# Patient Record
Sex: MALE | Race: WHITE | NOT HISPANIC OR LATINO | Employment: OTHER | URBAN - NONMETROPOLITAN AREA
[De-identification: names, ages, dates, MRNs, and addresses within clinical notes are randomized per-mention and may not be internally consistent; named-entity substitution may affect disease eponyms.]

---

## 2017-02-10 ENCOUNTER — OFFICE VISIT (OUTPATIENT)
Dept: OTOLARYNGOLOGY | Facility: CLINIC | Age: 77
End: 2017-02-10

## 2017-02-10 VITALS
TEMPERATURE: 98.1 F | HEART RATE: 82 BPM | WEIGHT: 182 LBS | RESPIRATION RATE: 20 BRPM | DIASTOLIC BLOOD PRESSURE: 93 MMHG | HEIGHT: 72 IN | SYSTOLIC BLOOD PRESSURE: 137 MMHG | BODY MASS INDEX: 24.65 KG/M2

## 2017-02-10 DIAGNOSIS — J35.1 ENLARGED TONSILS: ICD-10-CM

## 2017-02-10 DIAGNOSIS — J35.8 ASYMMETRIC TONSILS: Primary | ICD-10-CM

## 2017-02-10 DIAGNOSIS — D49.0 NEOPLASM OF TONSIL: ICD-10-CM

## 2017-02-10 PROCEDURE — 99203 OFFICE O/P NEW LOW 30 MIN: CPT | Performed by: OTOLARYNGOLOGY

## 2017-02-10 RX ORDER — ALBUTEROL SULFATE 90 UG/1
2 AEROSOL, METERED RESPIRATORY (INHALATION) EVERY 4 HOURS PRN
COMMUNITY
End: 2020-04-14 | Stop reason: SDUPTHER

## 2017-02-10 RX ORDER — MELOXICAM 7.5 MG/1
1 TABLET ORAL AS NEEDED
Status: ON HOLD | COMMUNITY
Start: 2017-02-02 | End: 2019-09-05 | Stop reason: ALTCHOICE

## 2017-02-10 RX ORDER — MECLIZINE HYDROCHLORIDE 25 MG/1
25 TABLET ORAL DAILY PRN
COMMUNITY
Start: 2016-11-22 | End: 2021-02-23

## 2017-02-10 RX ORDER — TAMSULOSIN HYDROCHLORIDE 0.4 MG/1
1 CAPSULE ORAL NIGHTLY
COMMUNITY
Start: 2017-02-02 | End: 2020-05-07

## 2017-02-10 RX ORDER — FINASTERIDE 5 MG/1
5 TABLET, FILM COATED ORAL DAILY
COMMUNITY
Start: 2017-02-02

## 2017-02-10 RX ORDER — ALPRAZOLAM 0.5 MG/1
0.5 TABLET ORAL DAILY PRN
COMMUNITY
Start: 2017-02-02 | End: 2021-09-07 | Stop reason: SDUPTHER

## 2017-02-10 RX ORDER — LISINOPRIL 5 MG/1
1 TABLET ORAL NIGHTLY
COMMUNITY
Start: 2016-12-22 | End: 2019-04-15

## 2017-02-10 NOTE — PATIENT INSTRUCTIONS
Please consider quitting tobacco use. Tobacco use can aggravate most ENT conditions we treat and can complicate or limit the effects of treatment of these conditions. Surgical outcomes are also affected by tobacco use including a higher risk of surgical complications. There are may options to assist the process of tobacco cessation including medicines, therapies and counseling. If you are interested in quitting, our office will help direct you to the best options to acheive this goal.

## 2017-02-10 NOTE — PROGRESS NOTES
PRIMARY CARE PROVIDER: Joseph Chiang MD  REFERRING PROVIDER: No ref. provider found    Chief Complaint   Patient presents with   • Sore Throat     swollen tonsil       Subjective   History of Present Illness:  Van Mckeon is a  76 y.o.  male who complains of an enlarged right tonsil. The symptoms are localized to the right tonsil. The patient has had no symptoms. This was found at a routine check-up by the patient's primary care physician. The patient is unsure how long this has been present due to not having any symptoms. He denies sudden weight loss, otalgia, sore throat, night sweats, or other complaints. The patient smokes 0.5 packs per day and drinks liquor daily.       Review of Systems:  Review of Systems   Constitutional: Negative for chills.   HENT:        See HPI   Respiratory: Positive for cough and choking.    Gastrointestinal: Negative for diarrhea, nausea and vomiting.   Musculoskeletal: Negative for neck pain and neck stiffness.   Neurological: Positive for light-headedness. Negative for dizziness.   Hematological: Bruises/bleeds easily.   Psychiatric/Behavioral: Negative for sleep disturbance.   All other systems reviewed and are negative.      Past History:  Past Medical History   Diagnosis Date   • Anxiety    • COPD (chronic obstructive pulmonary disease)    • Hypertension    • Prostate hypertrophy    • Snoring    • Thrombocytopenia    • TIA (transient ischemic attack)      Past Surgical History   Procedure Laterality Date   • Nasal sinus surgery     • Eye surgery     • Bronchoscopy       Family History   Problem Relation Age of Onset   • Emphysema Father    • Cancer Brother    • Heart disease Brother    • Cancer Other      Social History   Substance Use Topics   • Smoking status: Current Every Day Smoker     Packs/day: 0.25     Types: Cigarettes   • Smokeless tobacco: Not on file   • Alcohol use 3.6 oz/week     6 Shots of liquor per week       Current Outpatient Prescriptions:   •   albuterol (PROVENTIL HFA;VENTOLIN HFA) 108 (90 BASE) MCG/ACT inhaler, Inhale 2 puffs Every 4 (Four) Hours As Needed for wheezing., Disp: , Rfl:   •  ALPRAZolam (XANAX) 0.5 MG tablet, Take  by mouth., Disp: , Rfl:   •  finasteride (PROSCAR) 5 MG tablet, Take  by mouth Daily., Disp: , Rfl:   •  lisinopril (PRINIVIL,ZESTRIL) 5 MG tablet, Take 1 tablet by mouth Every Night., Disp: , Rfl:   •  meclizine (ANTIVERT) 25 MG tablet, Take  by mouth., Disp: , Rfl:   •  meloxicam (MOBIC) 7.5 MG tablet, Take 1 tablet by mouth As Needed., Disp: , Rfl:   •  tamsulosin (FLOMAX) 0.4 MG capsule 24 hr capsule, Take 1 capsule by mouth Every Night., Disp: , Rfl:   Allergies:  Review of patient's allergies indicates no known allergies.    Objective     Vital Signs:  Temp:  [98.1 °F (36.7 °C)] 98.1 °F (36.7 °C)  Heart Rate:  [82] 82  Resp:  [20] 20  BP: (137)/(93) 137/93    Physical Exam:  CONSTITUTIONAL: well nourished, well-developed, alert, oriented, in no acute distress   COMMUNICATION AND VOICE: able to communicate normally, normal voice quality  HEAD: normocephalic, no lesions, atraumatic, no tenderness, no masses   FACE: appearance normal, no lesions, no tenderness, no deformities, facial motion symmetric  SALIVARY GLANDS: parotid glands with no tenderness, no swelling, no masses, submandibular glands with normal size, nontender  EYES: ocular motility normal, eyelids normal, orbits normal, no proptosis, conjunctiva normal , pupils equal, round   EARS:  Hearing: response to conversational voice normal bilaterally   External Ears: auricles without lesions  Otoscopic: tympanic membrane appearance normal, no lesions, no perforation, normal mobility, no fluid  NOSE:  External Nose: structure normal, no tenderness on palpation, no nasal discharge, no lesions, no evidence of trauma, nostrils patent   Intranasal Exam: nasal mucosa normal, vestibule within normal limits, inferior turbinate normal, nasal septum midline   Nasopharynx:  nasopharyngeal mucosa, adenoids within normal limits  ORAL:  Lips: upper and lower lips without lesion   Teeth: dentition within normal limits for age   Gums: gingivae healthy   Oral Mucosa: oral mucosa normal, no mucosal lesions   Floor of Mouth: Warthin’s duct patent, mucosa normal  Tongue: lingual mucosa normal without lesions, normal tongue mobility   Palate: soft and hard palates with normal mucosa and structure  Oropharynx: oropharyngeal mucosa normal, tonsils asymmetric right>left; firm and erythymatous   HYPOPHARYNX: hypopharyngeal mucosa normal  LARYNX: epiglottis and arytenoid cartilage within normal limits, vocal cord mucosa normal with normal mobility   NECK: neck appearance normal, no masses or tenderness  THYROID: no overt thyromegaly, no tenderness, nodules or mass present on palpation, position midline   LYMPH NODES: no lymphadenopathy  CHEST/RESPIRATORY: respiratory effort normal, normal breath sounds   CARDIOVASCULAR: rate and rhythm normal, extremities without cyanosis or edema    NEUROLOGIC/PSYCHIATRIC: oriented to time, place and person, mood normal, affect appropriate, CN II-XII intact grossly    Results Review:   None.    Assessment   1. Asymmetric tonsils    2. Enlarged tonsils    3. Neoplasm of tonsil        Plan   Please consider quitting tobacco use. Tobacco use can aggravate most ENT conditions we treat and can complicate or limit the effects of treatment of these conditions. Surgical outcomes are also affected by tobacco use including a higher risk of surgical complications. There are may options to assist the process of tobacco cessation including medicines, therapies and counseling. If you are interested in quitting, our office will help direct you to the best options to acheive this goal.    -----SURGERY SCHEDULING:-----  Schedule direct laryngoscopy with biopsy of right tonsil.     ---INFORMED CONSENT DISCUSSION:---  DIRECT LARYNGOSCOPY WITH BIOPSY: The risks and benefits were  explained including but not limited to pain, bleeding, infection, (including possible mediastinitis), the risks of the general anesthesia, pain, temporary or permanent hoarseness, airway loss, and/or tooth injury. Questions were answered. No guarantees were made or implied.       ---PREOPERATIVE WORKUP:---  CBC  CMP  Chest X-Ray  EKG     Follow up  postoperatively    Dave Stout MD  02/10/17  10:31 AM

## 2018-11-21 ENCOUNTER — TELEPHONE (OUTPATIENT)
Dept: PULMONOLOGY | Facility: CLINIC | Age: 78
End: 2018-11-21

## 2018-11-21 NOTE — TELEPHONE ENCOUNTER
PT HAS BEEN SUMMONED FOR JURY DUTY IN January. CAN YOU WRITE HIM A LETTER TO BE EXCUSED.    FAX TO [facility]  [phone]  ATTN ROXIE LOZANO

## 2018-11-26 ENCOUNTER — TELEPHONE (OUTPATIENT)
Dept: PULMONOLOGY | Facility: CLINIC | Age: 78
End: 2018-11-26

## 2018-11-26 NOTE — TELEPHONE ENCOUNTER
I cannot do an excuse for jury duty based off of COPD which is what we treat him for. He has moderate lung disease and his last office visit and the one prior to that indicates that he was doing well and continues to work a labor job. Sorry.

## 2019-04-05 ENCOUNTER — HOSPITAL ENCOUNTER (OUTPATIENT)
Dept: GENERAL RADIOLOGY | Facility: HOSPITAL | Age: 79
Discharge: HOME | End: 2019-04-05
Admitting: NURSE PRACTITIONER

## 2019-04-05 ENCOUNTER — TRANSCRIBE ORDERS (OUTPATIENT)
Dept: ADMINISTRATIVE | Facility: HOSPITAL | Age: 79
End: 2019-04-05

## 2019-04-05 DIAGNOSIS — J44.9 CHRONIC OBSTRUCTIVE PULMONARY DISEASE, UNSPECIFIED COPD TYPE (HCC): ICD-10-CM

## 2019-04-05 DIAGNOSIS — J44.9 CHRONIC OBSTRUCTIVE PULMONARY DISEASE, UNSPECIFIED COPD TYPE (HCC): Primary | ICD-10-CM

## 2019-04-05 PROCEDURE — 71046 X-RAY EXAM CHEST 2 VIEWS: CPT

## 2019-04-09 PROBLEM — I10 ESSENTIAL HYPERTENSION: Status: ACTIVE | Noted: 2019-04-09

## 2019-04-09 PROBLEM — J44.9 COPD, GROUP B, BY GOLD 2017 CLASSIFICATION: Status: ACTIVE | Noted: 2019-04-09

## 2019-04-09 PROBLEM — J47.9 BRONCHIECTASIS WITHOUT COMPLICATION: Status: ACTIVE | Noted: 2019-04-09

## 2019-04-09 PROBLEM — Z87.891 PERSONAL HISTORY OF NICOTINE DEPENDENCE: Status: ACTIVE | Noted: 2019-04-09

## 2019-04-09 NOTE — PROGRESS NOTES
JAE Farris  [facility]   Res[facility] Disease Clinic  [address] [place] [address]  Phone: [phone]  Fax: [phone]     Van Mckeon is a 78 y.o. male.   CC:   Chief Complaint   Patient presents with   • Shortness of Breath        HPI: Mr. Mckeon is coming in for a follow-up of his COPD and bronchiectasis.  He does experience moderate persistent shortness of breath on a daily basis for greater than 1 year.  Is improved with Stiolto on a daily basis.  He also has pro-air he can use on an as-needed basis.  [facility].  He reports he seldom has to use the Pro Air.    The following portions of the patient's history were reviewed and updated as appropriate: allergies, current medications, past family history, past medical history, past social history, past surgical history and problem list.    Past Medical History:   Diagnosis Date   • Anxiety    • Bronchiectasis without complication (CMS/[facility]) 4/9/2019   • COPD (chronic obstructive pulmonary disease) (CMS/[facility])    • COPD, group B, by GOLD 2017 classification (CMS/[facility]) 4/9/2019   • Essential hypertension 4/9/2019   • Hypertension    • Personal history of nicotine dependence 4/9/2019   • Prostate hypertrophy    • Snoring    • Thrombocytopenia (CMS/[facility])    • TIA (transient ischemic attack)        Family History   Problem Relation Age of Onset   • Emphysema Father    • Cancer Brother    • Heart disease Brother    • Cancer Other        Social History     Socioeconomic History   • Marital status:      Spouse name: Not on file   • Number of children: Not on file   • Years of education: Not on file   • Highest education level: Not on file   Tobacco Use   • Smoking status: Current Every Day Smoker     Packs/day: 0.25     Years: 50.00     Pack years: 12.50     Types: Cigarettes   • Smokeless tobacco: Former User     Types: Snuff     Quit date: 1970   Substance and Sexual Activity   •  Alcohol use: Yes     Alcohol/week: 3.6 oz     Types: 6 Shots of liquor per week   • Drug use: No   • Sexual activity: Defer       Review of Systems   Constitutional: Negative for activity change, chills, fatigue and fever.   HENT: Negative for congestion, postnasal drip, rhinorrhea, sinus pressure, sore throat and trouble swallowing.    Eyes: Negative for blurred vision, double vision and pain.   Respiratory: Positive for shortness of breath (With exertion only). Negative for cough, chest tightness and wheezing.    Cardiovascular: Negative for chest pain, palpitations and leg swelling.   Gastrointestinal: Negative for abdominal distention, constipation, diarrhea, nausea and vomiting.   Endocrine: Negative for polydipsia, polyphagia and polyuria.   Genitourinary: Negative for dysuria, frequency and urgency.   Musculoskeletal: Negative for arthralgias, back pain, gait problem and joint swelling.   Skin: Negative for color change, dry skin, rash and skin lesions.   Allergic/Immunologic: Negative for environmental allergies, food allergies and immunocompromised state.   Neurological: Negative for dizziness, seizures, speech difficulty, weakness, light-headedness, memory problem and confusion.   Hematological: Negative for adenopathy. Does not bruise/bleed easily.   Psychiatric/Behavioral: Negative for sleep disturbance, negative for hyperactivity and depressed mood. The patient is not nervous/anxious.        .vs    Physical Exam   Constitutional: He is oriented to person, place, and time. He appears well-developed and well-nourished. No distress.   HENT:   Head: Normocephalic and atraumatic.   Right Ear: External ear normal.   Left Ear: External ear normal.   Nose: Nose normal.   Mouth/Throat: Oropharynx is clear and moist. No oropharyngeal exudate.   Eyes: Conjunctivae and EOM are normal. Pupils are equal, round, and reactive to light. Right eye exhibits no discharge. Left eye exhibits no discharge.   Neck: Normal  range of motion. Neck supple. No JVD present.   Cardiovascular: Normal rate and regular rhythm.   No murmur heard.  Pulmonary/Chest: Effort normal and breath sounds normal. No respiratory distress. He has no wheezes.   Abdominal: Soft. Bowel sounds are normal. He exhibits no distension. There is no tenderness.   Musculoskeletal: Normal range of motion. He exhibits no edema or deformity.   Neurological: He is alert and oriented to person, place, and time. He displays normal reflexes. No cranial nerve deficit. Coordination normal.   Skin: Skin is warm and dry. No rash noted. He is not diaphoretic. No erythema.   Psychiatric: He has a normal mood and affect. His behavior is normal. Thought content normal.   Nursing note and vitals reviewed.      Pulmonary Functions Testing Results:    No results found for: FEV1, FVC, GCP4RWI, TLC, DLCO  No PFTs for this visit    CXR: Chest x-ray from 4-10-19 shows minimal basilar lung scarring, stable        Problem List Items Addressed This Visit        Cardiovascular and Mediastinum    Essential hypertension    Relevant Medications    lisinopril (PRINIVIL,ZESTRIL) 20 MG tablet       Respiratory    Neoplasm of tonsil    COPD, group B, by GOLD 2017 classification (CMS/HCC)    Relevant Medications    tiotropium bromide-olodaterol (STIOLTO RESPIMAT) 2.5-2.5 MCG/ACT aerosol solution inhaler    Bronchiectasis without complication (CMS/[facility]) - Primary    Relevant Medications    tiotropium bromide-olodaterol (STIOLTO RESPIMAT) 2.5-2.5 MCG/ACT aerosol solution inhaler       Other    Personal history of nicotine dependence        Patient's Body mass index is 25.77 kg/m². BMI is within normal parameters. No follow-up required..    Patient indicates he continues to do well on Stiolto.  [facility]  He does not need refills on the Pro Air as he seldom uses it.  Chest x-ray stable today.  We did discuss low-dose CT scan for lung cancer screening purposes.   He would like to make arrangements for that at this time next year.  Follow-up in 6 months with spirometry or sooner if breathing worsens before then     Josephine Max, APRN  4/15/2019  9:19 AM    Return in about 6 months (around 10/15/2019) for FVL with diffusion.

## 2019-04-15 ENCOUNTER — OFFICE VISIT (OUTPATIENT)
Dept: PULMONOLOGY | Facility: CLINIC | Age: 79
End: 2019-04-15

## 2019-04-15 VITALS
DIASTOLIC BLOOD PRESSURE: 80 MMHG | HEIGHT: 72 IN | BODY MASS INDEX: 25.73 KG/M2 | WEIGHT: 190 LBS | OXYGEN SATURATION: 98 % | HEART RATE: 54 BPM | SYSTOLIC BLOOD PRESSURE: 116 MMHG

## 2019-04-15 DIAGNOSIS — I10 ESSENTIAL HYPERTENSION: ICD-10-CM

## 2019-04-15 DIAGNOSIS — Z87.891 PERSONAL HISTORY OF NICOTINE DEPENDENCE: ICD-10-CM

## 2019-04-15 DIAGNOSIS — D49.0 NEOPLASM OF TONSIL: ICD-10-CM

## 2019-04-15 DIAGNOSIS — J47.9 BRONCHIECTASIS WITHOUT COMPLICATION (HCC): Primary | ICD-10-CM

## 2019-04-15 DIAGNOSIS — J44.9 COPD, GROUP B, BY GOLD 2017 CLASSIFICATION (HCC): ICD-10-CM

## 2019-04-15 PROCEDURE — 99214 OFFICE O/P EST MOD 30 MIN: CPT | Performed by: NURSE PRACTITIONER

## 2019-04-15 RX ORDER — LISINOPRIL 20 MG/1
TABLET ORAL
Refills: 0 | Status: ON HOLD | COMMUNITY
Start: 2019-03-21 | End: 2019-09-05

## 2019-04-15 RX ORDER — NAPROXEN SODIUM 220 MG
220 TABLET ORAL 2 TIMES DAILY PRN
COMMUNITY
End: 2019-09-05 | Stop reason: HOSPADM

## 2019-05-28 ENCOUNTER — TELEPHONE (OUTPATIENT)
Dept: PULMONOLOGY | Facility: CLINIC | Age: 79
End: 2019-05-28

## 2019-05-28 DIAGNOSIS — J44.9 COPD, GROUP B, BY GOLD 2017 CLASSIFICATION (HCC): Primary | ICD-10-CM

## 2019-05-28 RX ORDER — PREDNISONE 20 MG/1
20 TABLET ORAL DAILY
Qty: 5 TABLET | Refills: 0 | Status: SHIPPED | OUTPATIENT
Start: 2019-05-28 | End: 2019-06-02

## 2019-05-28 NOTE — TELEPHONE ENCOUNTER
PATIENT IS NEEDING SOMETHING FOR HIS COUGH HE CANT GET RID OF; PATIENT SAYS THE COUGH MAKES HIM DIZZY.

## 2019-07-30 ENCOUNTER — TRANSCRIBE ORDERS (OUTPATIENT)
Dept: ADMINISTRATIVE | Facility: HOSPITAL | Age: 79
End: 2019-07-30

## 2019-07-30 ENCOUNTER — HOSPITAL ENCOUNTER (OUTPATIENT)
Dept: CT IMAGING | Facility: HOSPITAL | Age: 79
Discharge: HOME | End: 2019-07-30
Admitting: INTERNAL MEDICINE

## 2019-07-30 DIAGNOSIS — I72.9 ANEURYSM OF UNSPECIFIED SITE (HCC): ICD-10-CM

## 2019-07-30 DIAGNOSIS — R07.9 CHEST PAIN AT REST: Primary | ICD-10-CM

## 2019-07-30 DIAGNOSIS — R10.84 ABDOMINAL PAIN, GENERALIZED: ICD-10-CM

## 2019-07-30 DIAGNOSIS — Z87.898 HISTORY OF HEMOPTYSIS: ICD-10-CM

## 2019-07-30 DIAGNOSIS — R07.9 CHEST PAIN AT REST: ICD-10-CM

## 2019-07-30 LAB — CREAT BLDA-MCNC: 1 MG/DL (ref 0.6–1.3)

## 2019-07-30 PROCEDURE — 74177 CT ABD & PELVIS W/CONTRAST: CPT

## 2019-07-30 PROCEDURE — 82565 ASSAY OF CREATININE: CPT

## 2019-07-30 PROCEDURE — 71260 CT THORAX DX C+: CPT

## 2019-07-30 PROCEDURE — 25010000002 IOPAMIDOL 61 % SOLUTION: Performed by: INTERNAL MEDICINE

## 2019-07-30 RX ADMIN — IOPAMIDOL 100 ML: 612 INJECTION, SOLUTION INTRAVENOUS at 13:24

## 2019-07-30 RX ADMIN — IOPAMIDOL 50 ML: 612 INJECTION, SOLUTION INTRAVENOUS at 13:24

## 2019-09-03 ENCOUNTER — APPOINTMENT (OUTPATIENT)
Dept: CT IMAGING | Facility: HOSPITAL | Age: 79
End: 2019-09-03

## 2019-09-03 ENCOUNTER — APPOINTMENT (OUTPATIENT)
Dept: MRI IMAGING | Facility: HOSPITAL | Age: 79
End: 2019-09-03

## 2019-09-03 ENCOUNTER — APPOINTMENT (OUTPATIENT)
Dept: GENERAL RADIOLOGY | Facility: HOSPITAL | Age: 79
End: 2019-09-03

## 2019-09-03 ENCOUNTER — HOSPITAL ENCOUNTER (INPATIENT)
Facility: HOSPITAL | Age: 79
LOS: 2 days | Discharge: HOME | End: 2019-09-05
Attending: FAMILY MEDICINE | Admitting: INTERNAL MEDICINE

## 2019-09-03 DIAGNOSIS — I63.9 CEREBROVASCULAR ACCIDENT (CVA), UNSPECIFIED MECHANISM (HCC): Primary | ICD-10-CM

## 2019-09-03 DIAGNOSIS — Z74.09 IMPAIRED MOBILITY: ICD-10-CM

## 2019-09-03 DIAGNOSIS — R13.10 DYSPHAGIA, UNSPECIFIED TYPE: ICD-10-CM

## 2019-09-03 LAB
ALBUMIN SERPL-MCNC: 3.9 G/DL (ref 3.5–5)
ALBUMIN/GLOB SERPL: 1.3 G/DL (ref 1.1–2.5)
ALP SERPL-CCNC: 58 U/L (ref 24–120)
ALT SERPL W P-5'-P-CCNC: 26 U/L (ref 0–54)
AMMONIA BLD-SCNC: <9 UMOL/L (ref 9–33)
AMPHET+METHAMPHET UR QL: NEGATIVE
ANION GAP SERPL CALCULATED.3IONS-SCNC: 5 MMOL/L (ref 4–13)
APTT PPP: 26.9 SECONDS (ref 24.1–35)
AST SERPL-CCNC: 20 U/L (ref 7–45)
BARBITURATES UR QL SCN: NEGATIVE
BASOPHILS # BLD AUTO: 0.01 10*3/MM3 (ref 0–0.2)
BASOPHILS NFR BLD AUTO: 0.2 % (ref 0–1.5)
BENZODIAZ UR QL SCN: NEGATIVE
BILIRUB SERPL-MCNC: 0.8 MG/DL (ref 0.1–1)
BILIRUB UR QL STRIP: NEGATIVE
BUN BLD-MCNC: 19 MG/DL (ref 5–21)
BUN/CREAT SERPL: 22.1 (ref 7–25)
CALCIUM SPEC-SCNC: 11.5 MG/DL (ref 8.4–10.4)
CANNABINOIDS SERPL QL: NEGATIVE
CHLORIDE SERPL-SCNC: 108 MMOL/L (ref 98–110)
CLARITY UR: CLEAR
CO2 SERPL-SCNC: 27 MMOL/L (ref 24–31)
COCAINE UR QL: NEGATIVE
COLOR UR: YELLOW
CREAT BLD-MCNC: 0.86 MG/DL (ref 0.5–1.4)
DEPRECATED RDW RBC AUTO: 44.1 FL (ref 37–54)
EOSINOPHIL # BLD AUTO: 0.04 10*3/MM3 (ref 0–0.4)
EOSINOPHIL NFR BLD AUTO: 0.6 % (ref 0.3–6.2)
ERYTHROCYTE [DISTWIDTH] IN BLOOD BY AUTOMATED COUNT: 12.8 % (ref 12.3–15.4)
GFR SERPL CREATININE-BSD FRML MDRD: 86 ML/MIN/1.73
GLOBULIN UR ELPH-MCNC: 2.9 GM/DL
GLUCOSE BLD-MCNC: 82 MG/DL (ref 70–100)
GLUCOSE UR STRIP-MCNC: NEGATIVE MG/DL
HCT VFR BLD AUTO: 45.9 % (ref 37.5–51)
HGB BLD-MCNC: 15.5 G/DL (ref 13–17.7)
HGB UR QL STRIP.AUTO: NEGATIVE
IMM GRANULOCYTES # BLD AUTO: 0.02 10*3/MM3 (ref 0–0.05)
IMM GRANULOCYTES NFR BLD AUTO: 0.3 % (ref 0–0.5)
INR PPP: 0.96 (ref 0.91–1.09)
KETONES UR QL STRIP: ABNORMAL
LEUKOCYTE ESTERASE UR QL STRIP.AUTO: NEGATIVE
LYMPHOCYTES # BLD AUTO: 1.26 10*3/MM3 (ref 0.7–3.1)
LYMPHOCYTES NFR BLD AUTO: 19.2 % (ref 19.6–45.3)
MCH RBC QN AUTO: 31.7 PG (ref 26.6–33)
MCHC RBC AUTO-ENTMCNC: 33.8 G/DL (ref 31.5–35.7)
MCV RBC AUTO: 93.9 FL (ref 79–97)
METHADONE UR QL SCN: NEGATIVE
MONOCYTES # BLD AUTO: 0.41 10*3/MM3 (ref 0.1–0.9)
MONOCYTES NFR BLD AUTO: 6.3 % (ref 5–12)
NEUTROPHILS # BLD AUTO: 4.81 10*3/MM3 (ref 1.7–7)
NEUTROPHILS NFR BLD AUTO: 73.4 % (ref 42.7–76)
NITRITE UR QL STRIP: NEGATIVE
NRBC BLD AUTO-RTO: 0 /100 WBC (ref 0–0.2)
OPIATES UR QL: NEGATIVE
PCP UR QL SCN: NEGATIVE
PH UR STRIP.AUTO: <=5 [PH] (ref 5–8)
PLATELET # BLD AUTO: 100 10*3/MM3 (ref 140–450)
PMV BLD AUTO: 12.5 FL (ref 6–12)
POTASSIUM BLD-SCNC: 3.9 MMOL/L (ref 3.5–5.3)
PROT SERPL-MCNC: 6.8 G/DL (ref 6.3–8.7)
PROT UR QL STRIP: NEGATIVE
PROTHROMBIN TIME: 13.1 SECONDS (ref 11.9–14.6)
RBC # BLD AUTO: 4.89 10*6/MM3 (ref 4.14–5.8)
SODIUM BLD-SCNC: 140 MMOL/L (ref 135–145)
SP GR UR STRIP: 1.03 (ref 1–1.03)
TROPONIN I SERPL-MCNC: <0.012 NG/ML (ref 0–0.03)
UROBILINOGEN UR QL STRIP: ABNORMAL
WBC NRBC COR # BLD: 6.55 10*3/MM3 (ref 3.4–10.8)

## 2019-09-03 PROCEDURE — 84484 ASSAY OF TROPONIN QUANT: CPT | Performed by: FAMILY MEDICINE

## 2019-09-03 PROCEDURE — 85610 PROTHROMBIN TIME: CPT | Performed by: FAMILY MEDICINE

## 2019-09-03 PROCEDURE — 80307 DRUG TEST PRSMV CHEM ANLYZR: CPT | Performed by: FAMILY MEDICINE

## 2019-09-03 PROCEDURE — 70450 CT HEAD/BRAIN W/O DYE: CPT

## 2019-09-03 PROCEDURE — 82140 ASSAY OF AMMONIA: CPT | Performed by: EMERGENCY MEDICINE

## 2019-09-03 PROCEDURE — 71045 X-RAY EXAM CHEST 1 VIEW: CPT

## 2019-09-03 PROCEDURE — 93005 ELECTROCARDIOGRAM TRACING: CPT | Performed by: FAMILY MEDICINE

## 2019-09-03 PROCEDURE — 81003 URINALYSIS AUTO W/O SCOPE: CPT | Performed by: FAMILY MEDICINE

## 2019-09-03 PROCEDURE — 85025 COMPLETE CBC W/AUTO DIFF WBC: CPT | Performed by: FAMILY MEDICINE

## 2019-09-03 PROCEDURE — 93010 ELECTROCARDIOGRAM REPORT: CPT | Performed by: INTERNAL MEDICINE

## 2019-09-03 PROCEDURE — 85730 THROMBOPLASTIN TIME PARTIAL: CPT | Performed by: FAMILY MEDICINE

## 2019-09-03 PROCEDURE — 80053 COMPREHEN METABOLIC PANEL: CPT | Performed by: FAMILY MEDICINE

## 2019-09-03 PROCEDURE — 99285 EMERGENCY DEPT VISIT HI MDM: CPT

## 2019-09-03 PROCEDURE — 70551 MRI BRAIN STEM W/O DYE: CPT

## 2019-09-03 RX ORDER — ASPIRIN 81 MG/1
324 TABLET, CHEWABLE ORAL ONCE
Status: DISCONTINUED | OUTPATIENT
Start: 2019-09-03 | End: 2019-09-05 | Stop reason: HOSPADM

## 2019-09-03 RX ORDER — NICOTINE 21 MG/24HR
1 PATCH, TRANSDERMAL 24 HOURS TRANSDERMAL
Status: DISCONTINUED | OUTPATIENT
Start: 2019-09-03 | End: 2019-09-05 | Stop reason: HOSPADM

## 2019-09-03 RX ORDER — PANTOPRAZOLE SODIUM 40 MG/1
40 TABLET, DELAYED RELEASE ORAL DAILY
COMMUNITY
End: 2021-02-23

## 2019-09-03 RX ADMIN — NICOTINE 1 PATCH: 21 PATCH, EXTENDED RELEASE TRANSDERMAL at 19:36

## 2019-09-04 ENCOUNTER — APPOINTMENT (OUTPATIENT)
Dept: CARDIOLOGY | Facility: HOSPITAL | Age: 79
End: 2019-09-04

## 2019-09-04 ENCOUNTER — APPOINTMENT (OUTPATIENT)
Dept: ULTRASOUND IMAGING | Facility: HOSPITAL | Age: 79
End: 2019-09-04

## 2019-09-04 LAB
ARTICHOKE IGE QN: 109 MG/DL (ref 0–99)
BH CV ECHO MEAS - AO MAX PG (FULL): 2.9 MMHG
BH CV ECHO MEAS - AO MAX PG: 6.3 MMHG
BH CV ECHO MEAS - AO MEAN PG (FULL): 0.5 MMHG
BH CV ECHO MEAS - AO MEAN PG: 2.5 MMHG
BH CV ECHO MEAS - AO ROOT AREA (BSA CORRECTED): 1.7
BH CV ECHO MEAS - AO ROOT AREA: 8.6 CM^2
BH CV ECHO MEAS - AO ROOT DIAM: 3.3 CM
BH CV ECHO MEAS - AO V2 MAX: 125 CM/SEC
BH CV ECHO MEAS - AO V2 MEAN: 72.9 CM/SEC
BH CV ECHO MEAS - AO V2 VTI: 23.4 CM
BH CV ECHO MEAS - AVA(I,A): 3.2 CM^2
BH CV ECHO MEAS - AVA(I,D): 3.2 CM^2
BH CV ECHO MEAS - AVA(V,A): 2.5 CM^2
BH CV ECHO MEAS - AVA(V,D): 2.5 CM^2
BH CV ECHO MEAS - BSA(HAYCOCK): 2 M^2
BH CV ECHO MEAS - BSA: 2 M^2
BH CV ECHO MEAS - BZI_BMI: 22.9 KILOGRAMS/M^2
BH CV ECHO MEAS - BZI_METRIC_HEIGHT: 182.9 CM
BH CV ECHO MEAS - BZI_METRIC_WEIGHT: 76.7 KG
BH CV ECHO MEAS - EDV(CUBED): 96.1 ML
BH CV ECHO MEAS - EDV(MOD-SP4): 114 ML
BH CV ECHO MEAS - EDV(TEICH): 96.3 ML
BH CV ECHO MEAS - EF(CUBED): 72.2 %
BH CV ECHO MEAS - EF(MOD-SP4): 60.4 %
BH CV ECHO MEAS - EF(TEICH): 64 %
BH CV ECHO MEAS - ESV(CUBED): 26.7 ML
BH CV ECHO MEAS - ESV(MOD-SP4): 45.1 ML
BH CV ECHO MEAS - ESV(TEICH): 34.7 ML
BH CV ECHO MEAS - FS: 34.7 %
BH CV ECHO MEAS - IVS/LVPW: 1
BH CV ECHO MEAS - IVSD: 1 CM
BH CV ECHO MEAS - LA DIMENSION: 3.5 CM
BH CV ECHO MEAS - LA/AO: 1.1
BH CV ECHO MEAS - LAT PEAK E' VEL: 8.3 CM/SEC
BH CV ECHO MEAS - LV DIASTOLIC VOL/BSA (35-75): 57.5 ML/M^2
BH CV ECHO MEAS - LV MASS(C)D: 162 GRAMS
BH CV ECHO MEAS - LV MASS(C)DI: 81.7 GRAMS/M^2
BH CV ECHO MEAS - LV MAX PG: 3.3 MMHG
BH CV ECHO MEAS - LV MEAN PG: 2 MMHG
BH CV ECHO MEAS - LV SYSTOLIC VOL/BSA (12-30): 22.7 ML/M^2
BH CV ECHO MEAS - LV V1 MAX: 90.9 CM/SEC
BH CV ECHO MEAS - LV V1 MEAN: 61.5 CM/SEC
BH CV ECHO MEAS - LV V1 VTI: 21.8 CM
BH CV ECHO MEAS - LVIDD: 4.6 CM
BH CV ECHO MEAS - LVIDS: 3 CM
BH CV ECHO MEAS - LVLD AP4: 8.8 CM
BH CV ECHO MEAS - LVLS AP4: 6.5 CM
BH CV ECHO MEAS - LVOT AREA (M): 3.5 CM^2
BH CV ECHO MEAS - LVOT AREA: 3.5 CM^2
BH CV ECHO MEAS - LVOT DIAM: 2.1 CM
BH CV ECHO MEAS - LVPWD: 1 CM
BH CV ECHO MEAS - MED PEAK E' VEL: 5 CM/SEC
BH CV ECHO MEAS - MV A MAX VEL: 88.3 CM/SEC
BH CV ECHO MEAS - MV DEC TIME: 0.34 SEC
BH CV ECHO MEAS - MV E MAX VEL: 66 CM/SEC
BH CV ECHO MEAS - MV E/A: 0.75
BH CV ECHO MEAS - PA MAX PG: 2.6 MMHG
BH CV ECHO MEAS - PA V2 MAX: 81 CM/SEC
BH CV ECHO MEAS - SI(AO): 100.9 ML/M^2
BH CV ECHO MEAS - SI(CUBED): 35 ML/M^2
BH CV ECHO MEAS - SI(LVOT): 38.1 ML/M^2
BH CV ECHO MEAS - SI(MOD-SP4): 34.7 ML/M^2
BH CV ECHO MEAS - SI(TEICH): 31.1 ML/M^2
BH CV ECHO MEAS - SV(AO): 200.1 ML
BH CV ECHO MEAS - SV(CUBED): 69.3 ML
BH CV ECHO MEAS - SV(LVOT): 75.5 ML
BH CV ECHO MEAS - SV(MOD-SP4): 68.9 ML
BH CV ECHO MEAS - SV(TEICH): 61.6 ML
BH CV ECHO MEAS - TR MAX VEL: 179 CM/SEC
BH CV ECHO MEASUREMENTS AVERAGE E/E' RATIO: 9.92
HBA1C MFR BLD: 5.4 % (ref 4.8–5.9)
LEFT ATRIUM VOLUME INDEX: 17.8 ML/M2
LEFT ATRIUM VOLUME: 35.1 CM3
MAXIMAL PREDICTED HEART RATE: 141 BPM
STRESS TARGET HR: 120 BPM

## 2019-09-04 PROCEDURE — 83721 ASSAY OF BLOOD LIPOPROTEIN: CPT | Performed by: PSYCHIATRY & NEUROLOGY

## 2019-09-04 PROCEDURE — 97165 OT EVAL LOW COMPLEX 30 MIN: CPT

## 2019-09-04 PROCEDURE — 93306 TTE W/DOPPLER COMPLETE: CPT

## 2019-09-04 PROCEDURE — 94799 UNLISTED PULMONARY SVC/PX: CPT

## 2019-09-04 PROCEDURE — 94760 N-INVAS EAR/PLS OXIMETRY 1: CPT

## 2019-09-04 PROCEDURE — 25010000002 ENOXAPARIN PER 10 MG: Performed by: NURSE PRACTITIONER

## 2019-09-04 PROCEDURE — 83036 HEMOGLOBIN GLYCOSYLATED A1C: CPT | Performed by: PSYCHIATRY & NEUROLOGY

## 2019-09-04 PROCEDURE — 93880 EXTRACRANIAL BILAT STUDY: CPT | Performed by: SURGERY

## 2019-09-04 PROCEDURE — 97161 PT EVAL LOW COMPLEX 20 MIN: CPT | Performed by: PHYSICAL THERAPIST

## 2019-09-04 PROCEDURE — 99223 1ST HOSP IP/OBS HIGH 75: CPT | Performed by: PSYCHIATRY & NEUROLOGY

## 2019-09-04 PROCEDURE — 93306 TTE W/DOPPLER COMPLETE: CPT | Performed by: INTERNAL MEDICINE

## 2019-09-04 PROCEDURE — 92610 EVALUATE SWALLOWING FUNCTION: CPT | Performed by: SPEECH-LANGUAGE PATHOLOGIST

## 2019-09-04 PROCEDURE — 93880 EXTRACRANIAL BILAT STUDY: CPT

## 2019-09-04 PROCEDURE — 25010000002 PERFLUTREN 6.52 MG/ML SUSPENSION: Performed by: INTERNAL MEDICINE

## 2019-09-04 RX ORDER — NAPROXEN 500 MG/1
500 TABLET ORAL 2 TIMES DAILY PRN
Status: DISCONTINUED | OUTPATIENT
Start: 2019-09-04 | End: 2019-09-05

## 2019-09-04 RX ORDER — ATORVASTATIN CALCIUM 40 MG/1
40 TABLET, FILM COATED ORAL NIGHTLY
Status: DISCONTINUED | OUTPATIENT
Start: 2019-09-04 | End: 2019-09-05 | Stop reason: HOSPADM

## 2019-09-04 RX ORDER — FINASTERIDE 5 MG/1
5 TABLET, FILM COATED ORAL DAILY
Status: DISCONTINUED | OUTPATIENT
Start: 2019-09-04 | End: 2019-09-05 | Stop reason: HOSPADM

## 2019-09-04 RX ORDER — SODIUM CHLORIDE 0.9 % (FLUSH) 0.9 %
10 SYRINGE (ML) INJECTION EVERY 12 HOURS SCHEDULED
Status: DISCONTINUED | OUTPATIENT
Start: 2019-09-04 | End: 2019-09-05 | Stop reason: HOSPADM

## 2019-09-04 RX ORDER — LISINOPRIL 20 MG/1
20 TABLET ORAL DAILY
Status: DISCONTINUED | OUTPATIENT
Start: 2019-09-04 | End: 2019-09-05 | Stop reason: HOSPADM

## 2019-09-04 RX ORDER — CHLORDIAZEPOXIDE HYDROCHLORIDE 5 MG/1
10 CAPSULE, GELATIN COATED ORAL EVERY 8 HOURS SCHEDULED
Status: DISCONTINUED | OUTPATIENT
Start: 2019-09-04 | End: 2019-09-05 | Stop reason: HOSPADM

## 2019-09-04 RX ORDER — SODIUM CHLORIDE 0.9 % (FLUSH) 0.9 %
10 SYRINGE (ML) INJECTION AS NEEDED
Status: DISCONTINUED | OUTPATIENT
Start: 2019-09-04 | End: 2019-09-05 | Stop reason: HOSPADM

## 2019-09-04 RX ORDER — TAMSULOSIN HYDROCHLORIDE 0.4 MG/1
0.4 CAPSULE ORAL NIGHTLY
Status: DISCONTINUED | OUTPATIENT
Start: 2019-09-04 | End: 2019-09-05 | Stop reason: HOSPADM

## 2019-09-04 RX ORDER — MELOXICAM 7.5 MG/1
7.5 TABLET ORAL AS NEEDED
Status: DISCONTINUED | OUTPATIENT
Start: 2019-09-04 | End: 2019-09-05

## 2019-09-04 RX ORDER — LORAZEPAM 1 MG/1
1 TABLET ORAL EVERY 6 HOURS PRN
Status: DISCONTINUED | OUTPATIENT
Start: 2019-09-04 | End: 2019-09-05 | Stop reason: HOSPADM

## 2019-09-04 RX ORDER — ASPIRIN 81 MG/1
81 TABLET, CHEWABLE ORAL DAILY
Status: DISCONTINUED | OUTPATIENT
Start: 2019-09-04 | End: 2019-09-05 | Stop reason: HOSPADM

## 2019-09-04 RX ORDER — PANTOPRAZOLE SODIUM 40 MG/1
40 TABLET, DELAYED RELEASE ORAL
Status: DISCONTINUED | OUTPATIENT
Start: 2019-09-05 | End: 2019-09-05 | Stop reason: HOSPADM

## 2019-09-04 RX ADMIN — ATORVASTATIN CALCIUM 40 MG: 40 TABLET, FILM COATED ORAL at 22:30

## 2019-09-04 RX ADMIN — FINASTERIDE 5 MG: 5 TABLET, FILM COATED ORAL at 16:41

## 2019-09-04 RX ADMIN — ASPIRIN 81 MG 81 MG: 81 TABLET ORAL at 13:45

## 2019-09-04 RX ADMIN — CHLORDIAZEPOXIDE HYDROCHLORIDE 10 MG: 5 CAPSULE ORAL at 16:41

## 2019-09-04 RX ADMIN — ENOXAPARIN SODIUM 40 MG: 40 INJECTION SUBCUTANEOUS at 16:42

## 2019-09-04 RX ADMIN — NICOTINE 1 PATCH: 21 PATCH, EXTENDED RELEASE TRANSDERMAL at 18:46

## 2019-09-04 RX ADMIN — LISINOPRIL 20 MG: 20 TABLET ORAL at 16:41

## 2019-09-04 RX ADMIN — PERFLUTREN 9.78 MG: 6.52 INJECTION, SUSPENSION INTRAVENOUS at 13:12

## 2019-09-04 RX ADMIN — TAMSULOSIN HYDROCHLORIDE 0.4 MG: 0.4 CAPSULE ORAL at 22:30

## 2019-09-04 RX ADMIN — SODIUM CHLORIDE, PRESERVATIVE FREE 10 ML: 5 INJECTION INTRAVENOUS at 22:31

## 2019-09-05 VITALS
DIASTOLIC BLOOD PRESSURE: 65 MMHG | TEMPERATURE: 97.5 F | RESPIRATION RATE: 18 BRPM | OXYGEN SATURATION: 95 % | HEIGHT: 72 IN | SYSTOLIC BLOOD PRESSURE: 104 MMHG | WEIGHT: 169.6 LBS | BODY MASS INDEX: 22.97 KG/M2 | HEART RATE: 60 BPM

## 2019-09-05 LAB
ANION GAP SERPL CALCULATED.3IONS-SCNC: 5 MMOL/L (ref 4–13)
BASOPHILS # BLD AUTO: 0.02 10*3/MM3 (ref 0–0.2)
BASOPHILS NFR BLD AUTO: 0.5 % (ref 0–1.5)
BUN BLD-MCNC: 17 MG/DL (ref 5–21)
BUN/CREAT SERPL: 19.8 (ref 7–25)
CALCIUM SPEC-SCNC: 11.3 MG/DL (ref 8.4–10.4)
CHLORIDE SERPL-SCNC: 103 MMOL/L (ref 98–110)
CO2 SERPL-SCNC: 30 MMOL/L (ref 24–31)
CREAT BLD-MCNC: 0.86 MG/DL (ref 0.5–1.4)
DEPRECATED RDW RBC AUTO: 43.8 FL (ref 37–54)
EOSINOPHIL # BLD AUTO: 0.08 10*3/MM3 (ref 0–0.4)
EOSINOPHIL NFR BLD AUTO: 1.9 % (ref 0.3–6.2)
ERYTHROCYTE [DISTWIDTH] IN BLOOD BY AUTOMATED COUNT: 12.6 % (ref 12.3–15.4)
FOLATE SERPL-MCNC: 6.49 NG/ML (ref 4.78–24.2)
GFR SERPL CREATININE-BSD FRML MDRD: 86 ML/MIN/1.73
GLUCOSE BLD-MCNC: 85 MG/DL (ref 70–100)
HCT VFR BLD AUTO: 46.2 % (ref 37.5–51)
HGB BLD-MCNC: 15.6 G/DL (ref 13–17.7)
IMM GRANULOCYTES # BLD AUTO: 0.01 10*3/MM3 (ref 0–0.05)
IMM GRANULOCYTES NFR BLD AUTO: 0.2 % (ref 0–0.5)
LYMPHOCYTES # BLD AUTO: 1.26 10*3/MM3 (ref 0.7–3.1)
LYMPHOCYTES NFR BLD AUTO: 30.6 % (ref 19.6–45.3)
MAGNESIUM SERPL-MCNC: 2 MG/DL (ref 1.4–2.2)
MCH RBC QN AUTO: 32 PG (ref 26.6–33)
MCHC RBC AUTO-ENTMCNC: 33.8 G/DL (ref 31.5–35.7)
MCV RBC AUTO: 94.9 FL (ref 79–97)
MONOCYTES # BLD AUTO: 0.45 10*3/MM3 (ref 0.1–0.9)
MONOCYTES NFR BLD AUTO: 10.9 % (ref 5–12)
NEUTROPHILS # BLD AUTO: 2.3 10*3/MM3 (ref 1.7–7)
NEUTROPHILS NFR BLD AUTO: 55.9 % (ref 42.7–76)
NRBC BLD AUTO-RTO: 0 /100 WBC (ref 0–0.2)
PLATELET # BLD AUTO: 93 10*3/MM3 (ref 140–450)
PMV BLD AUTO: 12.6 FL (ref 6–12)
POTASSIUM BLD-SCNC: 3.9 MMOL/L (ref 3.5–5.3)
RBC # BLD AUTO: 4.87 10*6/MM3 (ref 4.14–5.8)
SODIUM BLD-SCNC: 138 MMOL/L (ref 135–145)
TSH SERPL DL<=0.05 MIU/L-ACNC: 2.95 UIU/ML (ref 0.47–4.68)
VIT B12 BLD-MCNC: 261 PG/ML (ref 239–931)
WBC NRBC COR # BLD: 4.12 10*3/MM3 (ref 3.4–10.8)

## 2019-09-05 PROCEDURE — 84443 ASSAY THYROID STIM HORMONE: CPT | Performed by: PSYCHIATRY & NEUROLOGY

## 2019-09-05 PROCEDURE — 25010000002 THIAMINE PER 100 MG: Performed by: PSYCHIATRY & NEUROLOGY

## 2019-09-05 PROCEDURE — 82746 ASSAY OF FOLIC ACID SERUM: CPT | Performed by: PSYCHIATRY & NEUROLOGY

## 2019-09-05 PROCEDURE — 99233 SBSQ HOSP IP/OBS HIGH 50: CPT | Performed by: PSYCHIATRY & NEUROLOGY

## 2019-09-05 PROCEDURE — 85025 COMPLETE CBC W/AUTO DIFF WBC: CPT | Performed by: NURSE PRACTITIONER

## 2019-09-05 PROCEDURE — 80048 BASIC METABOLIC PNL TOTAL CA: CPT | Performed by: NURSE PRACTITIONER

## 2019-09-05 PROCEDURE — 82607 VITAMIN B-12: CPT | Performed by: PSYCHIATRY & NEUROLOGY

## 2019-09-05 PROCEDURE — 83735 ASSAY OF MAGNESIUM: CPT | Performed by: PSYCHIATRY & NEUROLOGY

## 2019-09-05 RX ORDER — FOLIC ACID 5 MG/ML
1 INJECTION, SOLUTION INTRAMUSCULAR; INTRAVENOUS; SUBCUTANEOUS DAILY
Status: DISCONTINUED | OUTPATIENT
Start: 2019-09-05 | End: 2019-09-05

## 2019-09-05 RX ORDER — CELECOXIB 100 MG/1
100 CAPSULE ORAL 2 TIMES DAILY PRN
Status: DISCONTINUED | OUTPATIENT
Start: 2019-09-05 | End: 2019-09-05 | Stop reason: HOSPADM

## 2019-09-05 RX ORDER — THIAMINE HYDROCHLORIDE 100 MG/ML
100 INJECTION, SOLUTION INTRAMUSCULAR; INTRAVENOUS DAILY
Status: DISCONTINUED | OUTPATIENT
Start: 2019-09-05 | End: 2019-09-05 | Stop reason: HOSPADM

## 2019-09-05 RX ORDER — ASPIRIN 81 MG/1
81 TABLET, CHEWABLE ORAL DAILY
Qty: 30 TABLET | Refills: 2 | Status: SHIPPED | OUTPATIENT
Start: 2019-09-06 | End: 2021-06-03

## 2019-09-05 RX ORDER — CHOLECALCIFEROL (VITAMIN D3) 125 MCG
500 CAPSULE ORAL DAILY
Status: DISCONTINUED | OUTPATIENT
Start: 2019-09-06 | End: 2019-09-05 | Stop reason: HOSPADM

## 2019-09-05 RX ORDER — ATORVASTATIN CALCIUM 40 MG/1
40 TABLET, FILM COATED ORAL NIGHTLY
Qty: 30 TABLET | Refills: 2 | Status: SHIPPED | OUTPATIENT
Start: 2019-09-05 | End: 2020-06-17 | Stop reason: SINTOL

## 2019-09-05 RX ORDER — FOLIC ACID 1 MG/1
1 TABLET ORAL DAILY
Qty: 30 TABLET | Refills: 2 | Status: SHIPPED | OUTPATIENT
Start: 2019-09-06

## 2019-09-05 RX ORDER — LISINOPRIL 40 MG/1
20 TABLET ORAL DAILY
COMMUNITY

## 2019-09-05 RX ORDER — CYANOCOBALAMIN 1000 UG/ML
1000 INJECTION, SOLUTION INTRAMUSCULAR; SUBCUTANEOUS ONCE
Status: DISCONTINUED | OUTPATIENT
Start: 2019-09-05 | End: 2019-09-05 | Stop reason: HOSPADM

## 2019-09-05 RX ORDER — FOLIC ACID 5 MG/ML
1 INJECTION, SOLUTION INTRAMUSCULAR; INTRAVENOUS; SUBCUTANEOUS DAILY
Status: DISCONTINUED | OUTPATIENT
Start: 2019-09-05 | End: 2019-09-05 | Stop reason: HOSPADM

## 2019-09-05 RX ORDER — CELECOXIB 100 MG/1
100 CAPSULE ORAL 2 TIMES DAILY PRN
Qty: 60 CAPSULE | Refills: 2 | Status: SHIPPED | OUTPATIENT
Start: 2019-09-05 | End: 2021-02-23

## 2019-09-05 RX ORDER — FOLIC ACID 1 MG/1
1 TABLET ORAL DAILY
Status: DISCONTINUED | OUTPATIENT
Start: 2019-09-06 | End: 2019-09-05 | Stop reason: HOSPADM

## 2019-09-05 RX ADMIN — PANTOPRAZOLE SODIUM 40 MG: 40 TABLET, DELAYED RELEASE ORAL at 09:57

## 2019-09-05 RX ADMIN — LISINOPRIL 20 MG: 20 TABLET ORAL at 09:56

## 2019-09-05 RX ADMIN — ASPIRIN 81 MG 81 MG: 81 TABLET ORAL at 09:57

## 2019-09-05 RX ADMIN — THIAMINE HYDROCHLORIDE 100 MG: 100 INJECTION, SOLUTION INTRAMUSCULAR; INTRAVENOUS at 14:13

## 2019-09-05 RX ADMIN — FINASTERIDE 5 MG: 5 TABLET, FILM COATED ORAL at 09:57

## 2019-09-06 ENCOUNTER — READMISSION MANAGEMENT (OUTPATIENT)
Dept: CALL CENTER | Facility: HOSPITAL | Age: 79
End: 2019-09-06

## 2019-09-06 NOTE — OUTREACH NOTE
Prep Survey      Responses   Facility patient discharged from?  [facility]   Is patient eligible?  Yes   Discharge diagnosis  Acute left periventricular white matter infarct, tobacco abuse, alcohol abuse, COPD, HLD, thrombocytopenia, HTN, anxiety, B12 def., folate def.    Does the patient have one of the following disease processes/diagnoses(primary or secondary)?  Stroke (TIA)   Does the patient have Home health ordered?  Yes   What is the Home health agency?   [facility]   Is there a DME ordered?  No   Comments regarding appointments  Pt to schedule follow up with PCP   Prep survey completed?  Yes          Radha Salguero RN

## 2019-09-09 ENCOUNTER — READMISSION MANAGEMENT (OUTPATIENT)
Dept: CALL CENTER | Facility: HOSPITAL | Age: 79
End: 2019-09-09

## 2019-09-09 NOTE — OUTREACH NOTE
Stroke Week 1 Survey      Responses   Facility patient discharged from?  [facility]   Does the patient have one of the following disease processes/diagnoses(primary or secondary)?  Stroke (TIA)   Is there a successful TCM telephone encounter documented?  No   Week 1 attempt successful?  Yes   Call start time  0946   Call end time  0953   Discharge diagnosis  Acute left periventricular white matter infarct, tobacco abuse, alcohol abuse, COPD, HLD, thrombocytopenia, HTN, anxiety, B12 def., folate def.    Is patient permission given to speak with other caregiver?  Yes   List who call center can speak with  Daughter   Person spoke with today (if not patient) and relationship  Daughter--Sheila (she is an ARNP)   Meds reviewed with patient/caregiver?  Yes   Is the patient having any side effects they believe may be caused by any medication additions or changes?  No   Does the patient have all medications ordered at discharge?  Yes   Is the patient taking all medications as directed (includes completed medication regime)?  Yes   Comments regarding appointments  Pt to schedule follow up with PCP--Wednesday   Does the patient have a primary care provider?   Yes   Does the patient have an appointment with their PCP within 7 days of discharge?  Yes   Has the patient kept scheduled appointments due by today?  N/A   What is the Home health agency?   [facility]-- has been in to see    Has home health visited the patient within 72 hours of discharge?  Yes   Psychosocial issues?  No   Does the patient require any assistance with activities of daily living such as eating, bathing, dressing, walking, etc.?  No   Does the patient have any residual symptoms from stroke/TIA?  Yes   Residual symptoms comments  Still has some weakness and forgetfulness but he is much better   Does the patient understand the diet ordered at discharge?  Yes   Did the patient receive a copy of their discharge instructions?  Yes   Nursing interventions  Reviewed  instructions with patient   What is the patient's perception of their health status since discharge?  Improving   Nursing interventions  Nurse provided patient education   Is the patient able to teach back FAST for Stroke?  Yes   Is the patient/caregiver able to teach back the risk factors for a stroke?  High blood pressure-goal below 120/80, Excessive alcohol intake   Is the patient/caregiver able to teach back signs and symptoms related to disease process for when to call PCP?  Yes   Is the patient/caregiver able to teach back signs and symptoms related to disease process for when to call 911?  Yes   If the patient is a current smoker, are they able to teach back resources for cessation?  Smoking cessation medications   Is the patient/caregiver able to teach back the hierarchy of who to call/visit for symptoms/problems? PCP, Specialist, Home health nurse, Urgent Care, ED, 911  Yes   Additional teach back comments  Daughter is NP and is aware of s/s to watch for. She states pt has not been smoking and drinking ETOH. HH d/c'd him due to high functioning.   Week 1 call completed?  Yes          Daysi Huber RN

## 2019-09-16 ENCOUNTER — READMISSION MANAGEMENT (OUTPATIENT)
Dept: CALL CENTER | Facility: HOSPITAL | Age: 79
End: 2019-09-16

## 2019-09-16 NOTE — OUTREACH NOTE
Stroke Week 2 Survey      Responses   Facility patient discharged from?  [facility]   Does the patient have one of the following disease processes/diagnoses(primary or secondary)?  Stroke (TIA)   Week 2 attempt successful?  No   Unsuccessful attempts  Attempt 1          Claire Henry RN

## 2019-09-17 ENCOUNTER — READMISSION MANAGEMENT (OUTPATIENT)
Dept: CALL CENTER | Facility: HOSPITAL | Age: 79
End: 2019-09-17

## 2019-09-17 NOTE — OUTREACH NOTE
Stroke Week 2 Survey      Responses   Facility patient discharged from?  [facility]   Does the patient have one of the following disease processes/diagnoses(primary or secondary)?  Stroke (TIA)   Week 2 attempt successful?  No   Unsuccessful attempts  Attempt 2          Irma Mukherjee RN

## 2019-09-26 ENCOUNTER — READMISSION MANAGEMENT (OUTPATIENT)
Dept: CALL CENTER | Facility: HOSPITAL | Age: 79
End: 2019-09-26

## 2019-09-26 NOTE — OUTREACH NOTE
Stroke Week 3 Survey      Responses   Facility patient discharged from?  [facility]   Does the patient have one of the following disease processes/diagnoses(primary or secondary)?  Stroke (TIA)   Week 3 attempt successful?  Yes   Call start time  0939   Call end time  0942   Discharge diagnosis  Acute left periventricular white matter infarct, tobacco abuse, alcohol abuse, COPD, HLD, thrombocytopenia, HTN, anxiety, B12 def., folate def.    Meds reviewed with patient/caregiver?  Yes   Is the patient having any side effects they believe may be caused by any medication additions or changes?  No   Does the patient have all medications ordered at discharge?  Yes   Is the patient taking all medications as directed (includes completed medication regime)?  Yes   Does the patient have a primary care provider?   Yes   Does the patient have an appointment with their PCP within 7 days of discharge?  Yes   Has the patient kept scheduled appointments due by today?  Yes   What is the Home health agency?   [facility]-- has been in to see    Has home health visited the patient within 72 hours of discharge?  Yes   Psychosocial issues?  No   Does the patient require any assistance with activities of daily living such as eating, bathing, dressing, walking, etc.?  No   Does the patient have any residual symptoms from stroke/TIA?  Yes   Residual symptoms comments  Still with some weakness, but patient reports other than that he is back to normal.    Does the patient understand the diet ordered at discharge?  Yes   Did the patient receive a copy of their discharge instructions?  Yes   Nursing interventions  Reviewed instructions with patient   What is the patient's perception of their health status since discharge?  Improving   Nursing interventions  Nurse provided patient education   Is the patient able to teach back FAST for Stroke?  Yes   Is the patient/caregiver able to teach back the risk factors for a stroke?  High blood pressure-goal below  120/80, Excessive alcohol intake, High Cholesterol   Is the patient/caregiver able to teach back signs and symptoms related to disease process for when to call PCP?  Yes   Is the patient/caregiver able to teach back signs and symptoms related to disease process for when to call 911?  Yes   Is the patient/caregiver able to teach back the hierarchy of who to call/visit for symptoms/problems? PCP, Specialist, Home health nurse, Urgent Care, ED, 911  Yes   Week 3 call completed?  Yes          Gigi Martinez RN

## 2019-10-03 PROBLEM — Z72.0 TOBACCO USER: Status: ACTIVE | Noted: 2019-10-03

## 2019-10-03 PROBLEM — Z78.9 ALCOHOL USE: Status: ACTIVE | Noted: 2019-10-03

## 2019-10-03 PROBLEM — F10.90 ALCOHOL USE: Status: ACTIVE | Noted: 2019-10-03

## 2019-10-03 NOTE — PROGRESS NOTES
JAE Farris  [facility]   Respiratory Disease Clinic  [address] [place] [address]  Phone: [phone]  Fax: [phone]     Van Mckeon is a 79 y.o. male.   CC:   Chief Complaint   Patient presents with   • bronchiectasis without complication        HPI: Mr. Mckeon is coming in for a follow-up of his bronchiectasis and COPD.  He experiences moderate persistent shortness of breath and cough and the chest daily for greater than 1 year.  It is worsened by nothing and improved with use of bronchodilators.  He is on Stiolto daily.  His pro-air he can use when needed although he seldom uses it.  Review of his records report a recent admission for a CVA.  He reports he has no residual side effects.  He is due for updated imaging of his head.  Is now on a low-dose aspirin.  Breathing is aggravated by his smoking and also likely affected to some degree by his daily alcohol intake.  He is up-to-date on his flu pneumonia vaccinations.    The following portions of the patient's history were reviewed and updated as appropriate: allergies, current medications, past family history, past medical history, past social history, past surgical history and problem list.    Past Medical History:   Diagnosis Date   • Alcohol use 10/3/2019   • Anxiety    • Bronchiectasis without complication (CMS/[facility]) 4/9/2019   • COPD (chronic obstructive pulmonary disease) (CMS/[facility])    • COPD, group B, by GOLD 2017 classification (CMS/[facility]) 4/9/2019   • Essential hypertension 4/9/2019   • Hypertension    • Personal history of nicotine dependence 4/9/2019   • Prostate hypertrophy    • Snoring    • Thrombocytopenia (CMS/[facility])    • TIA (transient ischemic attack)    • Tobacco user 10/3/2019       Family History   Problem Relation Age of Onset   • Emphysema Father    • Cancer Brother    • Heart disease Brother    • Cancer Other        Social History     Socioeconomic History   • Marital status:      Spouse  name: Not on file   • Number of children: Not on file   • Years of education: Not on file   • Highest education level: Not on file   Tobacco Use   • Smoking status: Former Smoker     Packs/day: 0.25     Years: 50.00     Pack years: 12.50     Types: Cigarettes     Last attempt to quit: 2019     Years since quittin.1   • Smokeless tobacco: Former User     Types: Snuff     Quit date:    Substance and Sexual Activity   • Alcohol use: Yes     Alcohol/week: 3.6 oz     Types: 6 Shots of liquor per week   • Drug use: No   • Sexual activity: Defer       Review of Systems   Constitutional: Negative for activity change, chills, fatigue and fever.   HENT: Negative for congestion, postnasal drip, rhinorrhea, sinus pressure, sore throat and trouble swallowing.    Eyes: Negative for blurred vision, double vision and pain.   Respiratory: Positive for cough and shortness of breath. Negative for chest tightness and wheezing.    Cardiovascular: Negative for chest pain, palpitations and leg swelling.   Gastrointestinal: Negative for abdominal distention, constipation, diarrhea, nausea and vomiting.   Endocrine: Negative for polydipsia, polyphagia and polyuria.   Genitourinary: Negative for dysuria, frequency and urgency.   Musculoskeletal: Negative for arthralgias, back pain, gait problem and joint swelling.   Skin: Negative for color change, dry skin, rash and skin lesions.   Allergic/Immunologic: Negative for environmental allergies, food allergies and immunocompromised state.   Neurological: Negative for dizziness, seizures, speech difficulty, weakness, light-headedness, memory problem and confusion.   Hematological: Negative for adenopathy. Does not bruise/bleed easily.   Psychiatric/Behavioral: Negative for sleep disturbance, negative for hyperactivity and depressed mood. The patient is not nervous/anxious.        .vs    Physical Exam   Constitutional: He is oriented to person, place, and time. He appears well-developed  and well-nourished. No distress.   HENT:   Head: Normocephalic and atraumatic.   Right Ear: External ear normal.   Left Ear: External ear normal.   Nose: Nose normal.   Mouth/Throat: Oropharynx is clear and moist. No oropharyngeal exudate.   Eyes: Conjunctivae and EOM are normal. Pupils are equal, round, and reactive to light. Right eye exhibits no discharge. Left eye exhibits no discharge.   Neck: Normal range of motion. Neck supple. No JVD present.   Cardiovascular: Normal rate and regular rhythm.   No murmur heard.  Pulmonary/Chest: Effort normal and breath sounds normal. No respiratory distress. He has no wheezes.   Abdominal: Soft. Bowel sounds are normal. He exhibits no distension. There is no tenderness.   Musculoskeletal: Normal range of motion. He exhibits no edema or deformity.   Neurological: He is alert and oriented to person, place, and time. He displays normal reflexes. No cranial nerve deficit. Coordination normal.   Skin: Skin is warm and dry. No rash noted. He is not diaphoretic. No erythema.   Psychiatric: He has a normal mood and affect. His behavior is normal. Thought content normal.   Nursing note and vitals reviewed.      Pulmonary Functions Testing Results:    No results found for: FEV1, FVC, OSK6YIE, TLC, DLCO  My interpretation of his flow volume loop diffusion reflects moderate obstructive disease with reduced mid flows, moderate diffusion impairment when corrected for alveolar volume is still moderate, FEV1 and mid flows improved compared to previous, diffusion mildly reduced however stable    CXR: No imaging for this visit.  Imaging reviewed from July 2019 at [facility] showed severe emphysematous changes otherwise nothing acute        Problem List Items Addressed This Visit        Respiratory    COPD, group B, by GOLD 2017 classification (CMS/[facility])    Relevant Orders    Pulmonary Function Test (Completed)    Bronchiectasis without complication (CMS/[facility]) - Primary       Other    Personal  history of nicotine dependence    Tobacco user    Alcohol use        Patient's Body mass index is 25.83 kg/m². BMI is within normal parameters. No follow-up required..      Assessment/Plan         He is stable from a pulmonary standpoint on his Stiolto.  He is not requiring the use of his rescue inhaler.  He has had updated imaging as well as spirometry this year.  We will see him back in 6 months however he will call sooner if needed.  He is up-to-date on his flu pneumonia vaccine.    Josephine Max, JAE  10/14/2019  10:10 AM    Return in about 6 months (around 4/14/2020).

## 2019-10-07 ENCOUNTER — READMISSION MANAGEMENT (OUTPATIENT)
Dept: CALL CENTER | Facility: HOSPITAL | Age: 79
End: 2019-10-07

## 2019-10-07 NOTE — OUTREACH NOTE
"Stroke Week 4 Survey      Responses   Facility patient discharged from?  [facility]   Does the patient have one of the following disease processes/diagnoses(primary or secondary)?  Stroke (TIA)   Week 4 attempt successful?  Yes   Call start time  1347   Call end time  1358   Discharge diagnosis  Acute left periventricular white matter infarct, tobacco abuse, alcohol abuse, COPD, HLD, thrombocytopenia, HTN, anxiety, B12 def., folate def.    Meds reviewed with patient/caregiver?  Yes   Is the patient having any side effects they believe may be caused by any medication additions or changes?  No   Prescription comments  Pt states he is having a hard time with Atorvastatin.  states its a \"horse pill\" and his cutter is unable to cut in half.    Is the patient taking all medications as directed (includes completed medication regime)?  Yes   Has the patient kept scheduled appointments due by today?  Yes   Comments  Pt will see Dr. Aleman on wednesday   Is the patient still receiving Home Health Services?  Yes   Psychosocial issues?  No   Does the patient require any assistance with activities of daily living such as eating, bathing, dressing, walking, etc.?  No   Does the patient have any residual symptoms from stroke/TIA?  Yes   Does the patient understand the diet ordered at discharge?  Yes   Comments  Pt reports he is checking his blood pressure daily   What is the patient's perception of their health status since discharge?  Improving   Is the patient able to teach back FAST for Stroke?  Yes   Is the patient/caregiver able to teach back the risk factors for a stroke?  High blood pressure-goal below 120/80, Smoking, Diabetes, High Cholesterol, Physical inactivity and obesity, Carotid or other artery disease, History of TIAs, History of Afib, HIgh red blood cell count, Excessive alcohol intake, Illegal drug use, Sleep apnea   Is the patient/caregiver able to teach back signs and symptoms related to disease process for when " to call PCP?  Yes   Is the patient/caregiver able to teach back signs and symptoms related to disease process for when to call 911?  Yes   Is the patient/caregiver able to teach back the hierarchy of who to call/visit for symptoms/problems? PCP, Specialist, Home health nurse, Urgent Care, ED, 911  Yes   Week 4 Call Completed?  Yes   Would the patient like one additional call?  No   Graduated  Yes   Did the patient feel the follow up calls were helpful during their recovery period?  Yes   Was the number of calls appropriate?  Yes          Arabella Su, RN

## 2019-10-09 ENCOUNTER — OFFICE VISIT (OUTPATIENT)
Dept: NEUROLOGY | Facility: CLINIC | Age: 79
End: 2019-10-09

## 2019-10-09 VITALS
HEART RATE: 64 BPM | RESPIRATION RATE: 18 BRPM | DIASTOLIC BLOOD PRESSURE: 82 MMHG | SYSTOLIC BLOOD PRESSURE: 130 MMHG | BODY MASS INDEX: 24.65 KG/M2 | WEIGHT: 182 LBS | HEIGHT: 72 IN

## 2019-10-09 DIAGNOSIS — I63.9 CEREBROVASCULAR ACCIDENT (CVA), UNSPECIFIED MECHANISM (HCC): Primary | ICD-10-CM

## 2019-10-09 PROCEDURE — 99215 OFFICE O/P EST HI 40 MIN: CPT | Performed by: PSYCHIATRY & NEUROLOGY

## 2019-10-09 NOTE — PATIENT INSTRUCTIONS
Patient not to be mowing or out in the heat.  Patient not to be climbing or using sharp cutting tools and to have safety precautions while driving her working over hot fire/stove/water.  Patient to get with PCP immediately about chest pain.  Patient to get to emergency room immediately if chest pain increases or stroke symptoms occur.

## 2019-10-09 NOTE — PROGRESS NOTES
Subjective   Van Mckeon, 1940, is a male who is being seen today for   Chief Complaint   Patient presents with   • Stroke       HISTORY OF PRESENT ILLNESS: Extended follow-up.  Patient seen for stroke on 9/5/2019.  Patient came in with facial droop (stepson not sure which side) and confusion.  Neurology was consulted and put patient on 81 mg aspirin daily and Lipitor 40 mg daily.  MRI of the brain showed left periventricular stroke.  No PFO on echocardiogram with some concentric hypertrophy.  Carotid ultrasound noncontributory.  Patient saw Dr. Chiang his PCP who increased his Ecotrin to 81 mg 2 p.o. daily.  Patient had some recent blood work.  His original CBC showed 100,000 platelets and the most recent CBC showed 118,000 platelets.  Calcium is elevated.  Ionized calcium  elevated.  Patient has been having some midsternal chest pain lasting about 10 minutes nonradiating to the neck or arm and this is about every third or fourth day.  He  discussed this with his PCP 1 month ago and work-up was noncontributory.  And we are contacting PCP regarding this to follow-up immediately if necessary.  Patient's vision apparently  blurred in the sun but has been better this week.  Patient since hospitalization has not had any stroke symptoms.  Patient has intermittent vertigo and takes meclizine    REVIEW OF SYSTEMS:   GENERAL: Blood pressure today 130/82 left arm seated in same standing with pulse 64  PULMONARY: Patient denies any shortness of breath or sleep apnea CVS: As above  GASTROINTESTINAL: No acute GI distress  GENITOURINARY: No acute  distress  GYN: Not applicable  MUSCULOSKELETAL: As above  HEENT: Patient had right cataract surgery and has blurred vision in the left eye at times.  Patient denies any hearing changes acutely but has chronic hearing loss left greater than right and tinnitus both ears  ENDOCRINE: No acute endocrine symptoms  PSYCHIATRIC: No acute psychiatric symptoms  HEMATOLOGY: As  above  SKIN: No acute skin changes  Family history reviewed and otherwise noncontributory other than a history of cancer and heart disease and emphysema  Social history: Patient did smoke until this last event and apparently stopped after the stroke.  Patient does use alcohol.  Patient denies drug use    PHYSICAL EXAMINATION:    GENERAL: No acute distress  CRANIUM: Normocephalic/atraumatic  HEENT:       EYES: No acute fundic abnormalities.  Pupils equal round reactive to light.  EOMs intact without nystagmus and fields full to confrontation       EARS: Tympanic membrane normal bilaterally and hears tuning fork bilaterally       THROAT: No oropharynx abnormalities and tongue midline and swallowing normally by history.       NECK: No bruit/no lymphadenopathy  CHEST: No acute cardiopulmonary abnormalities by auscultation.  Some wheezing at the bases bilaterally.  No definite tenderness to palpation over the midsternal area  ABDOMEN: Nondistended  EXTREMITIES: Pulses symmetrical  NEURO: Patient alert and follows commands without difficulty  SPEECH: Some word finding difficulty    CRANIAL NERVES: Motor/sensory about the face shows possibly some right lower facial weakness but otherwise symmetric pin sensation    MOTOR STRENGTH: Motor strength upper lower extremities normal  STATION AND GAIT: Gait normal/Romberg negative  CEREBELLAR: Finger-nose and heel-to-shin normal  SENSORY: Some decrease in pin and vibration distal proximal lower extremities to the ankles bilaterally otherwise normal pin and vibration throughout  REFLEXES: Absent ankle jerks bilaterally but present knee jerks and biceps bilaterally with toes equivocal and no clonus noted      ASSESSMENT AND PLAN: Patient with history of CVA as above.  Patient to get to emergency room immediately if further stroke symptoms.  Patient to do MRA brain with open MRI and I told him he can take a Xanax which he takes as needed 30 minutes prior to the MRI a brain but needs  to have a  take him to the test and pick him up.  Patient driving precautions and safety precautions were reviewed in detail.  Patient have speech therapy and sed rate when he gets his next blood work done.  Patient has B12 and folate deficiency is taking supplements.  Patient to follow-up with PCP regarding low platelets.  Patient to follow with PCP about chest pain.  I spent 40 minutes with this patient with 30 minutes counseling.Patient's Body mass index is 24.68 kg/m². BMI is within normal parameters. No follow-up required..      Van was seen today for stroke.    Diagnoses and all orders for this visit:    Cerebrovascular accident (CVA), unspecified mechanism (CMS/HCC)  -     Sedimentation Rate; Future  -     Ambulatory Referral to Speech Therapy  -     MRI Angiogram Head Without Contrast; Future    Other orders  -     Cancel: Ambulatory Referral to Ophthalmology  -     Cancel: CBC & Differential; Future  -     Cancel: Comprehensive Metabolic Panel; Future  -     Cancel: Lipid Panel; Future  -     Cancel: Magnesium; Future  -     Cancel: T4, Free; Future        Dictated utilizing Dragon voice recognition software

## 2019-10-14 ENCOUNTER — OFFICE VISIT (OUTPATIENT)
Dept: PULMONOLOGY | Facility: CLINIC | Age: 79
End: 2019-10-14

## 2019-10-14 ENCOUNTER — PROCEDURE VISIT (OUTPATIENT)
Dept: PULMONOLOGY | Facility: CLINIC | Age: 79
End: 2019-10-14

## 2019-10-14 VITALS
HEART RATE: 66 BPM | SYSTOLIC BLOOD PRESSURE: 138 MMHG | BODY MASS INDEX: 25.77 KG/M2 | OXYGEN SATURATION: 98 % | HEIGHT: 70 IN | DIASTOLIC BLOOD PRESSURE: 82 MMHG | WEIGHT: 180 LBS

## 2019-10-14 DIAGNOSIS — Z78.9 ALCOHOL USE: ICD-10-CM

## 2019-10-14 DIAGNOSIS — J44.9 COPD, GROUP B, BY GOLD 2017 CLASSIFICATION (HCC): ICD-10-CM

## 2019-10-14 DIAGNOSIS — J47.9 BRONCHIECTASIS WITHOUT COMPLICATION (HCC): Primary | ICD-10-CM

## 2019-10-14 DIAGNOSIS — J44.9 COPD, GROUP B, BY GOLD 2017 CLASSIFICATION (HCC): Primary | ICD-10-CM

## 2019-10-14 DIAGNOSIS — Z72.0 TOBACCO USER: ICD-10-CM

## 2019-10-14 DIAGNOSIS — Z87.891 PERSONAL HISTORY OF NICOTINE DEPENDENCE: ICD-10-CM

## 2019-10-14 PROCEDURE — 99214 OFFICE O/P EST MOD 30 MIN: CPT | Performed by: NURSE PRACTITIONER

## 2019-10-14 PROCEDURE — 94729 DIFFUSING CAPACITY: CPT | Performed by: NURSE PRACTITIONER

## 2019-10-14 PROCEDURE — 94010 BREATHING CAPACITY TEST: CPT | Performed by: NURSE PRACTITIONER

## 2019-10-14 NOTE — PROCEDURES
Pulmonary Function Test  Performed by: Josephine Max APRN  Authorized by: Josephine Max APRN      Pre Drug    FVC: 80%   FEV1: 60%   FEF 25-75%: 32%   FEV1/FVC: 58%   DLCO: 60%   D/VAsb: 64%

## 2019-10-18 ENCOUNTER — HOSPITAL ENCOUNTER (OUTPATIENT)
Dept: MRI IMAGING | Facility: HOSPITAL | Age: 79
Discharge: HOME | End: 2019-10-18
Admitting: PSYCHIATRY & NEUROLOGY

## 2019-10-18 DIAGNOSIS — I63.9 CEREBROVASCULAR ACCIDENT (CVA), UNSPECIFIED MECHANISM (HCC): ICD-10-CM

## 2019-10-18 PROCEDURE — 70544 MR ANGIOGRAPHY HEAD W/O DYE: CPT

## 2020-02-20 ENCOUNTER — TELEPHONE (OUTPATIENT)
Dept: INTERNAL MEDICINE | Facility: CLINIC | Age: 80
End: 2020-02-20

## 2020-04-01 PROBLEM — F10.20 ALCOHOL DEPENDENCE, UNCOMPLICATED (HCC): Status: ACTIVE | Noted: 2020-04-01

## 2020-04-01 NOTE — PROGRESS NOTES
JAE Farris  [facility]   Respiratory Disease Clinic  [address] [place] [address]  Phone: [phone]  Fax: [phone]     Van Mckeon is a 79 y.o. male.   CC:   Chief Complaint   Patient presents with   • Bronchiectasis without complication        HPI: Mr. Mckeon is being evaluated today for his bronchiectasis and COPD.  He experiences moderate persistent shortness of breath and productive cough occurring in the chest daily for greater than 1 year.  It is worsened by nothing and improved with use of bronchodilators.  He controls his symptoms with Stiolto daily. He has pro-air he can use when needed although he has not utilized that in the last 6 months. Breathing is aggravated by his smoking and also likely affected to some degree by his daily alcohol intake.  He is having some watery nasal discharge and postnasal drainage.  He reports it is not overwhelming and he has not started any medication for this.  Overall he feels he is at his baseline other than the upper airway symptoms.     The following portions of the patient's history were reviewed and updated as appropriate: allergies, current medications, past family history, past medical history, past social history, past surgical history and problem list.    Past Medical History:   Diagnosis Date   • Alcohol use 10/3/2019   • Anxiety    • Bronchiectasis without complication (CMS/[facility]) 4/9/2019   • COPD (chronic obstructive pulmonary disease) (CMS/[facility])    • COPD, group B, by GOLD 2017 classification (CMS/[facility]) 4/9/2019   • Essential hypertension 4/9/2019   • Hypertension    • Personal history of nicotine dependence 4/9/2019   • Prostate hypertrophy    • Snoring    • Thrombocytopenia (CMS/[facility])    • TIA (transient ischemic attack)    • Tobacco user 10/3/2019       Prior to Admission medications    Medication Sig Start Date End Date Taking? Authorizing Provider   albuterol (PROVENTIL HFA;VENTOLIN HFA) 108 (90 BASE) MCG/ACT  inhaler Inhale 2 puffs Every 4 (Four) Hours As Needed for wheezing.    Dylon Bobo MD   ALPRAZolam (XANAX) 0.5 MG tablet Take 0.5 mg by mouth Daily As Needed for Anxiety. 2/2/17   Dylon Bobo MD   aspirin 81 MG chewable tablet Chew 1 tablet Daily. 9/6/19   Clara Ambrosio APRN   atorvastatin (LIPITOR) 40 MG tablet Take 1 tablet by mouth Every Night. 9/5/19   Clara Ambrosio APRN   celecoxib (CeleBREX) 100 MG capsule Take 1 capsule by mouth 2 (Two) Times a Day As Needed for Mild Pain . 9/5/19   Clara Ambrosio APRN   cyanocobalamin (VITAMIN B-12) 500 MCG tablet Take 1 tablet by mouth Daily. 9/6/19   Clara Ambrosio APRN   finasteride (PROSCAR) 5 MG tablet Take 5 mg by mouth Daily. 2/2/17   Dylon Bobo MD   folic acid (FOLVITE) 1 MG tablet Take 1 tablet by mouth Daily. 9/6/19   Clara Ambrosio APRN   lisinopril (PRINIVIL,ZESTRIL) 40 MG tablet Take 20 mg by mouth Daily.    Dylon Bobo MD   meclizine (ANTIVERT) 25 MG tablet Take 25 mg by mouth Daily As Needed for dizziness. 11/22/16   Dylon Bobo MD   pantoprazole (PROTONIX) 40 MG EC tablet Take 40 mg by mouth Daily.    Dylon Bobo MD   tamsulosin (FLOMAX) 0.4 MG capsule 24 hr capsule Take 1 capsule by mouth Every Night. 2/2/17   Dylon Bobo MD   tiotropium bromide-olodaterol (STIOLTO RESPIMAT) 2.5-2.5 MCG/ACT aerosol solution inhaler Inhale 2 puffs Daily.    Dylon Bobo MD       Family History   Problem Relation Age of Onset   • Emphysema Father    • Cancer Brother    • Heart disease Brother    • Cancer Other        Social History     Socioeconomic History   • Marital status:      Spouse name: Not on file   • Number of children: Not on file   • Years of education: Not on file   • Highest education level: Not on file   Tobacco Use   • Smoking status: Former Smoker     Packs/day: 0.25     Years: 50.00     Pack years: 12.50     Types: Cigarettes     Last  attempt to quit: 2019     Years since quittin.6   • Smokeless tobacco: Former User     Types: Snuff     Quit date:    Substance and Sexual Activity   • Alcohol use: Yes     Alcohol/week: 6.0 standard drinks     Types: 6 Shots of liquor per week   • Drug use: No   • Sexual activity: Defer       Review of Systems   Constitutional: Negative for activity change, chills, fatigue and fever.   HENT: Positive for postnasal drip and rhinorrhea. Negative for congestion, sinus pressure, sore throat and trouble swallowing.    Eyes: Negative for blurred vision, double vision and pain.   Respiratory: Positive for cough and shortness of breath. Negative for chest tightness and wheezing.    Cardiovascular: Negative for chest pain, palpitations and leg swelling.   Gastrointestinal: Negative for abdominal distention, constipation, diarrhea, nausea and vomiting.   Endocrine: Negative for polydipsia, polyphagia and polyuria.   Genitourinary: Negative for dysuria, frequency and urgency.   Musculoskeletal: Negative for arthralgias, back pain, gait problem and joint swelling.   Skin: Negative for color change, dry skin, rash and skin lesions.   Allergic/Immunologic: Negative for environmental allergies, food allergies and immunocompromised state.   Neurological: Negative for dizziness, seizures, speech difficulty, weakness, light-headedness, memory problem and confusion.   Hematological: Negative for adenopathy. Does not bruise/bleed easily.   Psychiatric/Behavioral: Negative for sleep disturbance, negative for hyperactivity and depressed mood. The patient is not nervous/anxious.        There were no vitals taken for this visit.    Physical Exam:    Complete physical exam not performed due to telephone encounter, COVID association.  He did seem to be awake and alert and conversation was appropriate.  He was able to speak in full sentences and did not seem to have any respiratory compromise.  He did not cough during the  telephone encounter    Pulmonary Functions Testing Results:    PFT Values        Some values may be hidden. Unless noted otherwise, only the newest values recorded on each date are displayed.         Old Values PFT Results 10/14/19   No data to display.      Pre Drug PFT Results 10/14/19   FVC 80   FEV1 60   FEF 25-75% 32   FEV1/FVC 58      Post Drug PFT Results 10/14/19   No data to display.      Other Tests PFT Results 10/14/19   DLCO 60   D/VAsb 64           Results for orders placed in visit on 10/14/19   Pulmonary Function Test    Narrative Licha Parker, RRT     10/14/2019  9:52 AM  Pulmonary Function Test  Performed by: Josephine Max APRN  Authorized by: Josephine Max APRN      Pre Drug    FVC: 80%   FEV1: 60%   FEF 25-75%: 32%   FEV1/FVC: 58%   DLCO: 60%   D/VAsb: 64%         No PFTs for this visit    CXR: No imaging for this visit        Problem List Items Addressed This Visit        Respiratory    COPD, group B, by GOLD 2017 classification (CMS/HCC)    Bronchiectasis without complication (CMS/[facility]) - Primary       Other    Personal history of nicotine dependence    Tobacco user    Alcohol dependence, uncomplicated (CMS/HCC)          Assessment/Plan         He is stable from a COPD and bronchiectasis standpoint.  He is managing well with his Stiolto.  He is not requiring the use of his rescue inhaler.  He does not need refills on either of these.  He is not in need of information about alcohol dependency.  He is not ready to establish a smoking cessation plan at this time.  We will make a follow-up appointment with him with spirometry in the fall.  He is aware he can reach out to us if he develops any new or worsening pulmonary related issues despite the use of his prescribed medications and we can establish another visit otherwise I will see him back in 6 months.    This visit has been rescheduled as a phone visit to comply with patient safety concerns in accordance with CDC recommendations.  Total time of discussion was 11 minutes.      Josephine Max, APRN  4/13/2020  09:23    Return in about 6 months (around 10/13/2020) for FVL with diffusion.

## 2020-04-13 ENCOUNTER — OFFICE VISIT (OUTPATIENT)
Dept: PULMONOLOGY | Facility: CLINIC | Age: 80
End: 2020-04-13

## 2020-04-13 DIAGNOSIS — Z87.891 PERSONAL HISTORY OF NICOTINE DEPENDENCE: ICD-10-CM

## 2020-04-13 DIAGNOSIS — Z72.0 TOBACCO USER: ICD-10-CM

## 2020-04-13 DIAGNOSIS — J47.9 BRONCHIECTASIS WITHOUT COMPLICATION (HCC): Primary | ICD-10-CM

## 2020-04-13 DIAGNOSIS — J44.9 COPD, GROUP B, BY GOLD 2017 CLASSIFICATION (HCC): ICD-10-CM

## 2020-04-13 DIAGNOSIS — F10.20 ALCOHOL DEPENDENCE, UNCOMPLICATED (HCC): ICD-10-CM

## 2020-04-13 PROCEDURE — 99442 PR PHYS/QHP TELEPHONE EVALUATION 11-20 MIN: CPT | Performed by: NURSE PRACTITIONER

## 2020-04-13 NOTE — PROGRESS NOTES
You have chosen to receive care through a telephone visit. Do you consent to use a telephone visit for your medical care today? Yes

## 2020-04-14 DIAGNOSIS — J44.9 COPD, GROUP B, BY GOLD 2017 CLASSIFICATION (HCC): Primary | ICD-10-CM

## 2020-04-14 RX ORDER — ALBUTEROL SULFATE 90 UG/1
2 AEROSOL, METERED RESPIRATORY (INHALATION) EVERY 4 HOURS PRN
Qty: 3 INHALER | Refills: 3 | Status: SHIPPED | OUTPATIENT
Start: 2020-04-14

## 2020-04-14 NOTE — TELEPHONE ENCOUNTER
Patient is needing a refill on his Albuterol HFA inhaler. It needs to be sent to the [facility].  Request sent to Josephine ROWE to sign.

## 2020-05-06 ENCOUNTER — TELEPHONE (OUTPATIENT)
Dept: INTERNAL MEDICINE | Facility: CLINIC | Age: 80
End: 2020-05-06

## 2020-05-06 NOTE — TELEPHONE ENCOUNTER
Patient called in requesting to speak with Dr. Chiang. Wants to know if he can order him a PAD scan and a NIAS scan. States that his daughter is a technician and recommended these scans because he is having pain in the back of his legs.     Callback Van Mckeon (Self) [phone] (H)

## 2020-05-06 NOTE — TELEPHONE ENCOUNTER
He needs an office visit for Dr. Chiang to check his legs and pulses before we can order any tests for him.

## 2020-05-07 ENCOUNTER — OFFICE VISIT (OUTPATIENT)
Dept: INTERNAL MEDICINE | Facility: CLINIC | Age: 80
End: 2020-05-07

## 2020-05-07 VITALS
HEIGHT: 70 IN | SYSTOLIC BLOOD PRESSURE: 130 MMHG | WEIGHT: 179.6 LBS | OXYGEN SATURATION: 98 % | BODY MASS INDEX: 25.71 KG/M2 | HEART RATE: 79 BPM | DIASTOLIC BLOOD PRESSURE: 90 MMHG | TEMPERATURE: 98.3 F

## 2020-05-07 DIAGNOSIS — Z12.11 SCREENING FOR COLON CANCER: ICD-10-CM

## 2020-05-07 DIAGNOSIS — I10 ESSENTIAL HYPERTENSION: ICD-10-CM

## 2020-05-07 DIAGNOSIS — I10 ESSENTIAL (PRIMARY) HYPERTENSION: ICD-10-CM

## 2020-05-07 DIAGNOSIS — G72.9 MYOPATHY: Primary | ICD-10-CM

## 2020-05-07 PROBLEM — F40.240 CLAUSTROPHOBIA: Status: ACTIVE | Noted: 2020-05-07

## 2020-05-07 PROBLEM — N40.0 BENIGN PROSTATIC HYPERPLASIA: Status: ACTIVE | Noted: 2020-05-07

## 2020-05-07 PROBLEM — D69.6 THROMBOCYTOPENIA (HCC): Status: ACTIVE | Noted: 2020-05-07

## 2020-05-07 PROBLEM — J84.10 PULMONARY FIBROSIS: Status: ACTIVE | Noted: 2020-05-07

## 2020-05-07 PROBLEM — J68.4: Status: ACTIVE | Noted: 2020-05-07

## 2020-05-07 PROBLEM — J44.9 CHRONIC OBSTRUCTIVE PULMONARY DISEASE: Status: ACTIVE | Noted: 2020-05-07

## 2020-05-07 PROBLEM — F41.9 ANXIETY: Status: ACTIVE | Noted: 2020-05-07

## 2020-05-07 PROBLEM — J47.9 BRONCHIOLECTASIS (HCC): Status: ACTIVE | Noted: 2020-05-07

## 2020-05-07 PROBLEM — I67.82 TEMPORARY CEREBRAL VASCULAR DYSFUNCTION: Status: ACTIVE | Noted: 2019-09-03

## 2020-05-07 PROCEDURE — 99214 OFFICE O/P EST MOD 30 MIN: CPT | Performed by: INTERNAL MEDICINE

## 2020-05-07 NOTE — PROGRESS NOTES
Subjective   Van Mckeon is a 79 y.o. male.   Chief Complaint   Patient presents with   • Leg Pain     bilateral       Patient presents today with pain in posterior thighs.  His daughter is a nurse practitioner and had concerns about peripheral artery disease.  He states that when he sitting he does not have difficulty but when he is up and moving around his thighs hurt.  He has no claudication he is not complaining of lower back pain.       The following portions of the patient's history were reviewed and updated as appropriate: allergies, current medications, past family history, past medical history, past social history, past surgical history and problem list.    Review of Systems   Constitutional: Negative for activity change, appetite change, fatigue, fever, unexpected weight gain and unexpected weight loss.   HENT: Negative for swollen glands, trouble swallowing and voice change.    Eyes: Negative for blurred vision and visual disturbance.   Respiratory: Negative for cough and shortness of breath.    Cardiovascular: Negative for chest pain, palpitations and leg swelling.   Gastrointestinal: Negative for abdominal pain, constipation, diarrhea, nausea, vomiting and indigestion.   Endocrine: Negative for cold intolerance, heat intolerance, polydipsia and polyphagia.   Genitourinary: Negative for dysuria and frequency.   Musculoskeletal: Positive for myalgias ( Posterior thighs). Negative for arthralgias, back pain, joint swelling and neck pain.   Skin: Negative for color change, rash and skin lesions.   Neurological: Negative for dizziness, weakness, headache, memory problem and confusion.   Hematological: Does not bruise/bleed easily.   Psychiatric/Behavioral: Negative for agitation, hallucinations and suicidal ideas. The patient is not nervous/anxious.        Objective   Past Medical History:   Diagnosis Date   • Alcohol use 10/3/2019   • Anxiety    • Bronchiectasis without complication (CMS/[facility]) 4/9/2019    • COPD (chronic obstructive pulmonary disease) (CMS/HCC)    • COPD, group B, by GOLD 2017 classification (CMS/HCC) 4/9/2019   • Essential hypertension 4/9/2019   • Hypertension    • Personal history of nicotine dependence 4/9/2019   • Prostate hypertrophy    • Snoring    • Thrombocytopenia (CMS/HCC)    • TIA (transient ischemic attack)    • Tobacco user 10/3/2019      Past Surgical History:   Procedure Laterality Date   • BRONCHOSCOPY     • EYE SURGERY     • NASAL SINUS SURGERY          Current Outpatient Medications:   •  albuterol sulfate  (90 Base) MCG/ACT inhaler, Inhale 2 puffs Every 4 (Four) Hours As Needed for Wheezing., Disp: 3 inhaler, Rfl: 3  •  ALPRAZolam (XANAX) 0.5 MG tablet, Take 0.5 mg by mouth Daily As Needed for Anxiety., Disp: , Rfl:   •  aspirin 81 MG chewable tablet, Chew 1 tablet Daily., Disp: 30 tablet, Rfl: 2  •  atorvastatin (LIPITOR) 40 MG tablet, Take 1 tablet by mouth Every Night., Disp: 30 tablet, Rfl: 2  •  cyanocobalamin (VITAMIN B-12) 500 MCG tablet, Take 1 tablet by mouth Daily., Disp: 30 tablet, Rfl: 2  •  finasteride (PROSCAR) 5 MG tablet, Take 5 mg by mouth Daily., Disp: , Rfl:   •  folic acid (FOLVITE) 1 MG tablet, Take 1 tablet by mouth Daily., Disp: 30 tablet, Rfl: 2  •  meclizine (ANTIVERT) 25 MG tablet, Take 25 mg by mouth Daily As Needed for dizziness., Disp: , Rfl:   •  pantoprazole (PROTONIX) 40 MG EC tablet, Take 40 mg by mouth Daily., Disp: , Rfl:   •  tiotropium bromide-olodaterol (STIOLTO RESPIMAT) 2.5-2.5 MCG/ACT aerosol solution inhaler, Inhale 2 puffs Daily., Disp: , Rfl:   •  celecoxib (CeleBREX) 100 MG capsule, Take 1 capsule by mouth 2 (Two) Times a Day As Needed for Mild Pain ., Disp: 60 capsule, Rfl: 2  •  Coenzyme Q10 (COQ-10) 100 MG capsule, Take 1 capsule by mouth Daily., Disp: 100 each, Rfl: 3  •  lisinopril (PRINIVIL,ZESTRIL) 40 MG tablet, Take 20 mg by mouth Daily., Disp: , Rfl:      Vitals:    05/07/20 1357   BP: 130/90   Pulse: 79   Temp: 98.3  °F (36.8 °C)   SpO2: 98%         05/07/20  1357   Weight: 81.5 kg (179 lb 9.6 oz)     Patient's Body mass index is 25.77 kg/m². BMI is within normal parameters. No follow-up required..      Physical Exam   Constitutional: He is oriented to person, place, and time. He appears well-developed and well-nourished.   HENT:   Head: Normocephalic and atraumatic.   Right Ear: External ear normal.   Left Ear: External ear normal.   Nose: Nose normal.   Mouth/Throat: Oropharynx is clear and moist.   Eyes: Pupils are equal, round, and reactive to light. Conjunctivae and EOM are normal.   Neck: Normal range of motion. Neck supple. No thyromegaly present.   Cardiovascular: Normal rate, regular rhythm, normal heart sounds and intact distal pulses.   Pulses in both feet posterior tibial and pedal are excellent and 3+   Pulmonary/Chest: Effort normal and breath sounds normal.   Abdominal: Soft. Bowel sounds are normal.   Musculoskeletal:   Patient has excellent leg extension he is able to extend to 85 degrees both legs without evidence of sciatica   Lymphadenopathy:     He has no cervical adenopathy.   Neurological: He is alert and oriented to person, place, and time.   Skin: Skin is warm and dry.   No discoloration of the skin of the feet to indicate peripheral vascular disease   Psychiatric: He has a normal mood and affect. His behavior is normal. Thought content normal.   Nursing note and vitals reviewed.            Assessment/Plan   Diagnoses and all orders for this visit:    1. Myopathy (Primary)  -     CK  -     ALT  -     AST  -     Lipid Panel  -     Comprehensive Metabolic Panel    2. Essential (primary) hypertension   -     Lipid Panel    3. Screening for colon cancer  -     Ambulatory Referral to Gastroenterology    4. Essential hypertension    Other orders  -     Coenzyme Q10 (COQ-10) 100 MG capsule; Take 1 capsule by mouth Daily.  Dispense: 100 each; Refill: 3      At this point patient appears to have a myopathy  possibly related related to the Lipitor.  He is also having some constipation my other concern would be whether there is anything going on in his abdomen a CT of his abdomen and pelvis with oral contrast would be indicated we are going to try and get him scheduled for colonoscopy as well.

## 2020-05-08 LAB
ALBUMIN SERPL-MCNC: 4.2 G/DL (ref 3.5–5.2)
ALBUMIN/GLOB SERPL: 1.6 G/DL
ALP SERPL-CCNC: 60 U/L (ref 39–117)
ALT SERPL-CCNC: 11 U/L (ref 1–41)
AST SERPL-CCNC: 15 U/L (ref 1–40)
BILIRUB SERPL-MCNC: 0.5 MG/DL (ref 0.2–1.2)
BUN SERPL-MCNC: 14 MG/DL (ref 8–23)
BUN/CREAT SERPL: 14.9 (ref 7–25)
CALCIUM SERPL-MCNC: 11.9 MG/DL (ref 8.6–10.5)
CHLORIDE SERPL-SCNC: 102 MMOL/L (ref 98–107)
CHOLEST SERPL-MCNC: 147 MG/DL (ref 0–200)
CK SERPL-CCNC: 106 U/L (ref 20–200)
CO2 SERPL-SCNC: 25.1 MMOL/L (ref 22–29)
CREAT SERPL-MCNC: 0.94 MG/DL (ref 0.76–1.27)
GLOBULIN SER CALC-MCNC: 2.6 GM/DL
GLUCOSE SERPL-MCNC: 90 MG/DL (ref 65–99)
HDLC SERPL-MCNC: 50 MG/DL (ref 40–60)
LDLC SERPL CALC-MCNC: 78 MG/DL (ref 0–100)
POTASSIUM SERPL-SCNC: 4.6 MMOL/L (ref 3.5–5.2)
PROT SERPL-MCNC: 6.8 G/DL (ref 6–8.5)
SODIUM SERPL-SCNC: 137 MMOL/L (ref 136–145)
TRIGL SERPL-MCNC: 97 MG/DL (ref 0–150)
VLDLC SERPL CALC-MCNC: 19.4 MG/DL (ref 5–40)

## 2020-05-18 ENCOUNTER — HOSPITAL ENCOUNTER (OUTPATIENT)
Dept: CT IMAGING | Facility: HOSPITAL | Age: 80
Discharge: HOME | End: 2020-05-18
Admitting: INTERNAL MEDICINE

## 2020-05-18 DIAGNOSIS — G72.9 MYOPATHY: ICD-10-CM

## 2020-05-18 DIAGNOSIS — Z12.11 SCREENING FOR COLON CANCER: ICD-10-CM

## 2020-05-18 LAB — CREAT BLDA-MCNC: 1 MG/DL (ref 0.6–1.3)

## 2020-05-18 PROCEDURE — 74178 CT ABD&PLV WO CNTR FLWD CNTR: CPT

## 2020-05-18 PROCEDURE — 25010000002 IOPAMIDOL 61 % SOLUTION: Performed by: INTERNAL MEDICINE

## 2020-05-18 PROCEDURE — 82565 ASSAY OF CREATININE: CPT

## 2020-05-18 RX ADMIN — IOPAMIDOL 50 ML: 612 INJECTION, SOLUTION INTRAVENOUS at 09:35

## 2020-05-18 RX ADMIN — IOPAMIDOL 100 ML: 612 INJECTION, SOLUTION INTRAVENOUS at 09:35

## 2020-05-20 ENCOUNTER — TELEPHONE (OUTPATIENT)
Dept: VASCULAR SURGERY | Facility: CLINIC | Age: 80
End: 2020-05-20

## 2020-05-20 DIAGNOSIS — I71.40 ABDOMINAL AORTIC ANEURYSM (AAA) WITHOUT RUPTURE (HCC): Primary | ICD-10-CM

## 2020-05-20 NOTE — TELEPHONE ENCOUNTER
Spoke with patient and advised of upcoming appointment with Dr. Sauceda based on referral from Dr. Chiang.  Advised of location and time. Patient confirmed address and all information mailed.

## 2020-06-10 ENCOUNTER — RESULTS ENCOUNTER (OUTPATIENT)
Dept: INTERNAL MEDICINE | Facility: CLINIC | Age: 80
End: 2020-06-10

## 2020-06-10 ENCOUNTER — TELEPHONE (OUTPATIENT)
Dept: INTERNAL MEDICINE | Facility: CLINIC | Age: 80
End: 2020-06-10

## 2020-06-10 DIAGNOSIS — Z11.59 NEED FOR HEPATITIS C SCREENING TEST: ICD-10-CM

## 2020-06-10 DIAGNOSIS — I10 ESSENTIAL HYPERTENSION: Primary | ICD-10-CM

## 2020-06-10 DIAGNOSIS — I10 ESSENTIAL HYPERTENSION: ICD-10-CM

## 2020-06-10 DIAGNOSIS — Z12.5 SCREENING PSA (PROSTATE SPECIFIC ANTIGEN): ICD-10-CM

## 2020-06-10 NOTE — TELEPHONE ENCOUNTER
Patient called and said that he was supposed to have labs completed before his wellness visit. Patient doesn't have orders listed in chart info.     Please complete lab orders and contact patient once done.   Patient contact # [phone].

## 2020-06-11 DIAGNOSIS — Z12.5 SCREENING PSA (PROSTATE SPECIFIC ANTIGEN): ICD-10-CM

## 2020-06-11 DIAGNOSIS — Z11.59 NEED FOR HEPATITIS C SCREENING TEST: ICD-10-CM

## 2020-06-11 DIAGNOSIS — I10 ESSENTIAL HYPERTENSION: ICD-10-CM

## 2020-06-12 LAB
ALBUMIN SERPL-MCNC: 4 G/DL (ref 3.5–5.2)
ALBUMIN/GLOB SERPL: 1.5 G/DL
ALP SERPL-CCNC: 66 U/L (ref 39–117)
ALT SERPL-CCNC: 12 U/L (ref 1–41)
APPEARANCE UR: CLEAR
AST SERPL-CCNC: 11 U/L (ref 1–40)
BASOPHILS # BLD AUTO: 0.03 10*3/MM3 (ref 0–0.2)
BASOPHILS NFR BLD AUTO: 0.6 % (ref 0–1.5)
BILIRUB SERPL-MCNC: 0.6 MG/DL (ref 0.2–1.2)
BILIRUB UR QL STRIP: NEGATIVE
BUN SERPL-MCNC: 12 MG/DL (ref 8–23)
BUN/CREAT SERPL: 10.5 (ref 7–25)
CALCIUM SERPL-MCNC: 11.4 MG/DL (ref 8.6–10.5)
CHLORIDE SERPL-SCNC: 105 MMOL/L (ref 98–107)
CHOLEST SERPL-MCNC: 147 MG/DL (ref 0–200)
CO2 SERPL-SCNC: 25.2 MMOL/L (ref 22–29)
COLOR UR: YELLOW
CREAT SERPL-MCNC: 1.14 MG/DL (ref 0.76–1.27)
EOSINOPHIL # BLD AUTO: 0.08 10*3/MM3 (ref 0–0.4)
EOSINOPHIL NFR BLD AUTO: 1.5 % (ref 0.3–6.2)
ERYTHROCYTE [DISTWIDTH] IN BLOOD BY AUTOMATED COUNT: 13 % (ref 12.3–15.4)
GLOBULIN SER CALC-MCNC: 2.7 GM/DL
GLUCOSE SERPL-MCNC: 97 MG/DL (ref 65–99)
GLUCOSE UR QL: NEGATIVE
HCT VFR BLD AUTO: 43 % (ref 37.5–51)
HCV AB S/CO SERPL IA: <0.1 S/CO RATIO (ref 0–0.9)
HDLC SERPL-MCNC: 46 MG/DL (ref 40–60)
HGB BLD-MCNC: 14.5 G/DL (ref 13–17.7)
HGB UR QL STRIP: NEGATIVE
IMM GRANULOCYTES # BLD AUTO: 0.01 10*3/MM3 (ref 0–0.05)
IMM GRANULOCYTES NFR BLD AUTO: 0.2 % (ref 0–0.5)
KETONES UR QL STRIP: NEGATIVE
LDLC SERPL CALC-MCNC: 78 MG/DL (ref 0–100)
LEUKOCYTE ESTERASE UR QL STRIP: NEGATIVE
LYMPHOCYTES # BLD AUTO: 1.71 10*3/MM3 (ref 0.7–3.1)
LYMPHOCYTES NFR BLD AUTO: 32.6 % (ref 19.6–45.3)
MCH RBC QN AUTO: 31.8 PG (ref 26.6–33)
MCHC RBC AUTO-ENTMCNC: 33.7 G/DL (ref 31.5–35.7)
MCV RBC AUTO: 94.3 FL (ref 79–97)
MONOCYTES # BLD AUTO: 0.55 10*3/MM3 (ref 0.1–0.9)
MONOCYTES NFR BLD AUTO: 10.5 % (ref 5–12)
NEUTROPHILS # BLD AUTO: 2.86 10*3/MM3 (ref 1.7–7)
NEUTROPHILS NFR BLD AUTO: 54.6 % (ref 42.7–76)
NITRITE UR QL STRIP: NEGATIVE
NRBC BLD AUTO-RTO: 0 /100 WBC (ref 0–0.2)
PH UR STRIP: 6.5 [PH] (ref 5–8)
PLATELET # BLD AUTO: 122 10*3/MM3 (ref 140–450)
POTASSIUM SERPL-SCNC: 4.5 MMOL/L (ref 3.5–5.2)
PROT SERPL-MCNC: 6.7 G/DL (ref 6–8.5)
PROT UR QL STRIP: NEGATIVE
PSA SERPL-MCNC: 0.72 NG/ML (ref 0–4)
RBC # BLD AUTO: 4.56 10*6/MM3 (ref 4.14–5.8)
SODIUM SERPL-SCNC: 137 MMOL/L (ref 136–145)
SP GR UR: 1.02 (ref 1–1.03)
TRIGL SERPL-MCNC: 113 MG/DL (ref 0–150)
TSH SERPL DL<=0.005 MIU/L-ACNC: 2.69 UIU/ML (ref 0.27–4.2)
URATE SERPL-MCNC: 7.6 MG/DL (ref 3.4–7)
UROBILINOGEN UR STRIP-MCNC: NORMAL MG/DL
VLDLC SERPL CALC-MCNC: 22.6 MG/DL
WBC # BLD AUTO: 5.24 10*3/MM3 (ref 3.4–10.8)

## 2020-06-17 ENCOUNTER — OFFICE VISIT (OUTPATIENT)
Dept: INTERNAL MEDICINE | Facility: CLINIC | Age: 80
End: 2020-06-17

## 2020-06-17 ENCOUNTER — TELEPHONE (OUTPATIENT)
Dept: VASCULAR SURGERY | Facility: CLINIC | Age: 80
End: 2020-06-17

## 2020-06-17 VITALS
HEART RATE: 55 BPM | BODY MASS INDEX: 23.84 KG/M2 | HEIGHT: 72 IN | DIASTOLIC BLOOD PRESSURE: 80 MMHG | WEIGHT: 176 LBS | OXYGEN SATURATION: 98 % | SYSTOLIC BLOOD PRESSURE: 120 MMHG | TEMPERATURE: 98.6 F

## 2020-06-17 DIAGNOSIS — T46.6X5A STATIN MYOPATHY: ICD-10-CM

## 2020-06-17 DIAGNOSIS — J42 CHRONIC BRONCHITIS, UNSPECIFIED CHRONIC BRONCHITIS TYPE (HCC): ICD-10-CM

## 2020-06-17 DIAGNOSIS — G72.0 STATIN MYOPATHY: ICD-10-CM

## 2020-06-17 DIAGNOSIS — I10 BENIGN HYPERTENSION: Primary | ICD-10-CM

## 2020-06-17 DIAGNOSIS — I67.82 TEMPORARY CEREBRAL VASCULAR DYSFUNCTION: ICD-10-CM

## 2020-06-17 PROBLEM — I70.0 CALCIFICATION OF ABDOMINAL AORTA (HCC): Status: ACTIVE | Noted: 2020-06-17

## 2020-06-17 PROBLEM — I73.9 PAD (PERIPHERAL ARTERY DISEASE): Status: ACTIVE | Noted: 2020-06-17

## 2020-06-17 PROBLEM — E83.52 HYPERCALCEMIA: Status: ACTIVE | Noted: 2020-06-17

## 2020-06-17 PROBLEM — I63.9 INFARCTION OF LEFT BASAL GANGLIA (HCC): Status: ACTIVE | Noted: 2020-06-17

## 2020-06-17 PROBLEM — R00.1 BRADYCARDIA: Status: ACTIVE | Noted: 2020-06-17

## 2020-06-17 PROBLEM — M21.962 KNEE DEFORMITY, ACQUIRED, LEFT: Status: ACTIVE | Noted: 2020-06-17

## 2020-06-17 PROBLEM — N40.0 HYPERTROPHY OF PROSTATE: Status: ACTIVE | Noted: 2020-06-17

## 2020-06-17 PROBLEM — H26.9 LEFT CATARACT: Status: ACTIVE | Noted: 2020-06-17

## 2020-06-17 PROBLEM — Z87.898 HISTORY OF HEMOPTYSIS: Status: ACTIVE | Noted: 2020-06-17

## 2020-06-17 PROBLEM — R91.1 LUNG NODULE: Status: ACTIVE | Noted: 2020-06-17

## 2020-06-17 PROBLEM — K21.9 GASTROESOPHAGEAL REFLUX DISEASE: Status: ACTIVE | Noted: 2020-06-17

## 2020-06-17 PROBLEM — I32 PERICARDITIS ASSOCIATED WITH SEVERE CHRONIC ANEMIA: Status: ACTIVE | Noted: 2020-06-17

## 2020-06-17 PROBLEM — D64.9 PERICARDITIS ASSOCIATED WITH SEVERE CHRONIC ANEMIA: Status: ACTIVE | Noted: 2020-06-17

## 2020-06-17 PROBLEM — Z96.1 PSEUDOPHAKIA: Status: ACTIVE | Noted: 2018-04-20

## 2020-06-17 PROCEDURE — G0439 PPPS, SUBSEQ VISIT: HCPCS | Performed by: INTERNAL MEDICINE

## 2020-06-17 RX ORDER — TAMSULOSIN HYDROCHLORIDE 0.4 MG/1
1 CAPSULE ORAL DAILY
COMMUNITY
Start: 2019-02-21

## 2020-06-17 NOTE — PROGRESS NOTES
"The ABCs of the Annual Wellness Visit  Subsequent Medicare Wellness Visit  There are no new medical problems today patient has no new complaints  Chief Complaint   Patient presents with   • Medicare Wellness-subsequent     No new complaints.  Wants to discuss \"aneurysm\" and his appt with Dr. Sauceda tomorrow.       Subjective   History of Present Illness:  Van Mckeon is a 79 y.o. male who presents for a Subsequent Medicare Wellness Visit.    HEALTH RISK ASSESSMENT    Recent Hospitalizations:  No hospitalization(s) within the last year.    Current Medical Providers:  Patient Care Team:  Joseph Chiang MD as PCP - General (Internal Medicine)  [facility], Dave Hayden MD as Consulting Physician (Otolaryngology)  Josephine Max APRN as Nurse Practitioner (Pulmonary Disease)    Smoking Status:  Social History     Tobacco Use   Smoking Status Former Smoker   • Packs/day: 0.25   • Years: 50.00   • Pack years: 12.50   • Types: Cigarettes   • Last attempt to quit: 2019   • Years since quittin.7   Smokeless Tobacco Former User   • Types: Snuff   • Quit date:        Alcohol Consumption:  Social History     Substance and Sexual Activity   Alcohol Use Yes   • Alcohol/week: 3.0 standard drinks   • Types: 3 Shots of liquor per week   • Frequency: 4 or more times a week   • Drinks per session: 3 or 4       Depression Screen:   PHQ-2/PHQ-9 Depression Screening 2020   Little interest or pleasure in doing things 1   Feeling down, depressed, or hopeless 0   Total Score 1       Fall Risk Screen:  LANIEADI Fall Risk Assessment was completed, and patient is at LOW risk for falls.Assessment completed on:2020    Health Habits and Functional and Cognitive Screening:  Functional & Cognitive Status 2020   Do you have difficulty preparing food and eating? No   Do you have difficulty bathing yourself, getting dressed or grooming yourself? No   Do you have difficulty using the toilet? No   Do you have difficulty " moving around from place to place? No   Do you have trouble with steps or getting out of a bed or a chair? No   Do you need help using the phone?  No   Are you deaf or do you have serious difficulty hearing?  No   Do you need help with transportation? No   Do you need help shopping? No   Do you need help preparing meals?  No   Do you need help with housework?  No   Do you need help with laundry? No   Do you need help taking your medications? No   Do you need help managing money? No   Do you ever drive or ride in a car without wearing a seat belt? No   Have you felt unusual stress, anger or loneliness in the last month? No   Who do you live with? Alone   If you need help, do you have trouble finding someone available to you? No   Do you have difficulty concentrating, remembering or making decisions? Yes         Does the patient have evidence of cognitive impairment? No    Asprin use counseling:Taking ASA appropriately as indicated    Age-appropriate Screening Schedule:  Refer to the list below for future screening recommendations based on patient's age, sex and/or medical conditions. Orders for these recommended tests are listed in the plan section. The patient has been provided with a written plan.    Health Maintenance   Topic Date Due   • ZOSTER VACCINE (1 of 2) 07/23/1990   • INFLUENZA VACCINE  08/01/2020   • TDAP/TD VACCINES (2 - Td) 11/01/2028          The following portions of the patient's history were reviewed and updated as appropriate: allergies, current medications, past family history, past medical history, past social history, past surgical history and problem list.    Outpatient Medications Prior to Visit   Medication Sig Dispense Refill   • albuterol sulfate  (90 Base) MCG/ACT inhaler Inhale 2 puffs Every 4 (Four) Hours As Needed for Wheezing. 3 inhaler 3   • ALPRAZolam (XANAX) 0.5 MG tablet Take 0.5 mg by mouth Daily As Needed for Anxiety.     • aspirin 81 MG chewable tablet Chew 1 tablet  Daily. (Patient taking differently: Chew 162 mg Daily.) 30 tablet 2   • celecoxib (CeleBREX) 100 MG capsule Take 1 capsule by mouth 2 (Two) Times a Day As Needed for Mild Pain . 60 capsule 2   • Coenzyme Q10 (COQ-10) 100 MG capsule Take 1 capsule by mouth Daily. 100 each 3   • cyanocobalamin (VITAMIN B-12) 500 MCG tablet Take 1 tablet by mouth Daily. 30 tablet 2   • finasteride (PROSCAR) 5 MG tablet Take 5 mg by mouth Daily.     • folic acid (FOLVITE) 1 MG tablet Take 1 tablet by mouth Daily. 30 tablet 2   • lisinopril (PRINIVIL,ZESTRIL) 40 MG tablet Take 20 mg by mouth Daily.     • meclizine (ANTIVERT) 25 MG tablet Take 25 mg by mouth Daily As Needed for dizziness.     • pantoprazole (PROTONIX) 40 MG EC tablet Take 40 mg by mouth Daily.     • tamsulosin (Flomax) 0.4 MG capsule 24 hr capsule Take 1 capsule by mouth Daily.     • tiotropium bromide-olodaterol (STIOLTO RESPIMAT) 2.5-2.5 MCG/ACT aerosol solution inhaler Inhale 2 puffs Daily.     • atorvastatin (LIPITOR) 40 MG tablet Take 1 tablet by mouth Every Night. 30 tablet 2     No facility-administered medications prior to visit.        Patient Active Problem List   Diagnosis   • Asymmetry of tonsils   • Enlarged tonsils   • Neoplasm of tonsil   • COPD, group B, by GOLD 2017 classification (CMS/HCC)   • Bronchiectasis without complication (CMS/HCC)   • Essential (primary) hypertension   • Personal history of nicotine dependence   • Temporary cerebral vascular dysfunction   • Tobacco user   • Alcohol use   • Alcohol dependence, uncomplicated (CMS/HCC)   • Benign prostatic hyperplasia   • Anxiety   • Claustrophobia   • Pulmonary fibrosis (CMS/HCC)   • Thrombocytopenia (CMS/HCC)   • Anatoly sprayers' lung (CMS/HCC)   • Bronchiolectasis (CMS/HCC)   • Chronic obstructive pulmonary disease (CMS/HCC)   • Benign hypertension   • Myopathy   • Bradycardia   • Calcification of abdominal aorta (CMS/HCC)   • Gastroesophageal reflux disease   • Hypercalcemia   • Hypertrophy of  prostate   • Infarction of left basal ganglia (CMS/HCC)   • Knee deformity, acquired, left   • Left cataract   • Lung nodule   • PAD (peripheral artery disease) (CMS/HCC)   • Pericarditis associated with severe chronic anemia   • Pseudophakia   • History of hemoptysis   • Statin myopathy       Advanced Care Planning:  ACP discussion was held with the patient during this visit. Patient has an advance directive in EMR which is still valid.     Review of Systems   Constitutional: Negative for activity change, appetite change and chills.   HENT: Negative for congestion, ear pain and facial swelling.    Eyes: Negative for pain, discharge and itching.   Respiratory: Negative for apnea, cough and shortness of breath.    Cardiovascular: Negative for chest pain, palpitations and leg swelling.   Gastrointestinal: Negative for abdominal distention, abdominal pain and anal bleeding.   Endocrine: Negative for cold intolerance and heat intolerance.   Genitourinary: Negative for difficulty urinating, dysuria and flank pain.   Musculoskeletal: Negative for arthralgias, back pain and joint swelling.   Skin: Negative for color change, pallor and rash.   Allergic/Immunologic: Negative for environmental allergies and food allergies.   Neurological: Negative for dizziness and facial asymmetry.   Hematological: Negative for adenopathy. Does not bruise/bleed easily.   Psychiatric/Behavioral: Negative for agitation, behavioral problems and confusion.       Compared to one year ago, the patient feels his physical health is the same.  Compared to one year ago, the patient feels his mental health is the same.    Reviewed chart for potential of high risk medication in the elderly: yes  Reviewed chart for potential of harmful drug interactions in the elderly:yes    Objective         Vitals:    06/17/20 0808   BP: 120/80   BP Location: Left arm   Pulse: 55   Temp: 98.6 °F (37 °C)   TempSrc: Temporal   SpO2: 98%   Weight: 79.8 kg (176 lb)  "  Height: 182.9 cm (72\")   PainSc: 0-No pain       Body mass index is 23.87 kg/m².  Discussed the patient's BMI with him. The BMI is in the acceptable range.    Physical Exam   Constitutional: He is oriented to person, place, and time. He appears well-developed and well-nourished.   HENT:   Head: Normocephalic and atraumatic.   Mouth/Throat: Oropharynx is clear and moist.   Eyes: Pupils are equal, round, and reactive to light. EOM are normal.   Neck: Normal range of motion. Neck supple.   Cardiovascular: Normal rate and regular rhythm.   Pulmonary/Chest: Effort normal and breath sounds normal.   Abdominal: Soft. Bowel sounds are normal.   Musculoskeletal: Normal range of motion.   Neurological: He is alert and oriented to person, place, and time.   Skin: Skin is warm and dry.   Psychiatric: He has a normal mood and affect. His behavior is normal.   Nursing note and vitals reviewed.      Lab Results   Component Value Date    GLU 97 06/11/2020    CHLPL 147 06/11/2020    TRIG 113 06/11/2020    HDL 46 06/11/2020    LDL 78 06/11/2020    VLDL 22.6 06/11/2020        Assessment/Plan   Medicare Risks and Personalized Health Plan  CMS Preventative Services Quick Reference  Cardiovascular risk  Colon Cancer Screening  Immunizations Discussed/Encouraged (specific immunizations; Shingrix )  Lung Cancer Risk  Prostate Cancer Screening     The above risks/problems have been discussed with the patient.  Pertinent information has been shared with the patient in the After Visit Summary.  Follow up plans and orders are seen below in the Assessment/Plan Section.    Diagnoses and all orders for this visit:    1. Benign hypertension (Primary)    2. Temporary cerebral vascular dysfunction    3. Chronic bronchitis, unspecified chronic bronchitis type (CMS/HCC)    4. Statin myopathy      Follow Up:  No follow-ups on file.     An After Visit Summary and PPPS were given to the patient.       She is up-to-date on all of his immunizations the " only thing I see that he needs is a Shingrix at talk to him about checking with insurance regarding best coverage.  His last colonoscopy was 9 years ago he will be due again next year.  He also states that after stopping his statins his pain completely went away due to the severity of the pain I am not instituting another form of statin drug he would be a candidate for injectables.

## 2020-06-18 ENCOUNTER — OFFICE VISIT (OUTPATIENT)
Dept: VASCULAR SURGERY | Facility: CLINIC | Age: 80
End: 2020-06-18

## 2020-06-18 ENCOUNTER — TELEPHONE (OUTPATIENT)
Dept: VASCULAR SURGERY | Facility: CLINIC | Age: 80
End: 2020-06-18

## 2020-06-18 VITALS
HEART RATE: 70 BPM | WEIGHT: 174 LBS | OXYGEN SATURATION: 93 % | BODY MASS INDEX: 23.57 KG/M2 | HEIGHT: 72 IN | DIASTOLIC BLOOD PRESSURE: 82 MMHG | SYSTOLIC BLOOD PRESSURE: 132 MMHG

## 2020-06-18 DIAGNOSIS — I71.40 ABDOMINAL AORTIC ANEURYSM (AAA) 35 TO 39 MM IN DIAMETER (HCC): Primary | ICD-10-CM

## 2020-06-18 DIAGNOSIS — I10 ESSENTIAL (PRIMARY) HYPERTENSION: ICD-10-CM

## 2020-06-18 DIAGNOSIS — I73.9 PAD (PERIPHERAL ARTERY DISEASE) (HCC): ICD-10-CM

## 2020-06-18 DIAGNOSIS — I65.23 BILATERAL CAROTID ARTERY STENOSIS: ICD-10-CM

## 2020-06-18 PROCEDURE — 99204 OFFICE O/P NEW MOD 45 MIN: CPT | Performed by: SURGERY

## 2020-06-18 NOTE — PROGRESS NOTES
06/18/2020      Joseph Chiang MD  [address] [place] [address]    Van Mckeon  1940    Chief Complaint   Patient presents with   • Establish Care     Referred over by Dr Joseph Chiang for Abdominal Aortic Aneurysm without Rupture. Test [id number] CT Abdomen Pelvis With & Without Contrast. Patient denies any stroke like symptoms.        Dear Joseph Chiang MD    HPI  I had the pleasure of seeing your patient Van Mckeon in the office today.  Thank you kindly for this consultation.  As you recall, Van Mckeon is a 79 y.o.  male who you are currently following for routine health maintenance.  He had complaints of left sided abdominal pain for 6 months and bilateral leg pain. He reports the abdominal pain has resolved.  He was sent for CT of the abdomen and pelvis and abdominal aortic aneurysm was found.  He was a former smoker but has quit.   He is maintained on aspirin.  He does have a history of TIA in August.         Past Medical History:   Diagnosis Date   • Alcohol use 10/3/2019   • Anxiety    • Bronchiectasis without complication (CMS/HCC) 4/9/2019   • COPD (chronic obstructive pulmonary disease) (CMS/[facility])    • COPD, group B, by GOLD 2017 classification (CMS/[facility]) 4/9/2019   • Essential hypertension 4/9/2019   • Hypertension    • Personal history of nicotine dependence 4/9/2019   • Prostate hypertrophy    • Snoring    • Thrombocytopenia (CMS/[facility])    • TIA (transient ischemic attack)    • Tobacco user 10/3/2019       Past Surgical History:   Procedure Laterality Date   • BRONCHOSCOPY     • EYE SURGERY     • NASAL SINUS SURGERY         Family History   Problem Relation Age of Onset   • Emphysema Father    • Cancer Brother    • Heart disease Brother    • Cancer Other        Social History     Socioeconomic History   • Marital status:      Spouse name: Not on file   • Number of children: Not on file   • Years of education: Not on file   • Highest education level: Not on  file   Tobacco Use   • Smoking status: Former Smoker     Packs/day: 0.25     Years: 50.00     Pack years: 12.50     Types: Cigarettes     Last attempt to quit: 2019     Years since quittin.7   • Smokeless tobacco: Former User     Types: Snuff     Quit date:    Substance and Sexual Activity   • Alcohol use: Yes     Alcohol/week: 3.0 standard drinks     Types: 3 Shots of liquor per week     Frequency: 4 or more times a week     Drinks per session: 3 or 4   • Drug use: No   • Sexual activity: Defer       Allergies   Allergen Reactions   • Statins Myalgia   • Other Unknown - High Severity     Horse Serum   • Tetanus Toxoids Itching         Current Outpatient Medications:   •  albuterol sulfate  (90 Base) MCG/ACT inhaler, Inhale 2 puffs Every 4 (Four) Hours As Needed for Wheezing., Disp: 3 inhaler, Rfl: 3  •  ALPRAZolam (XANAX) 0.5 MG tablet, Take 0.5 mg by mouth Daily As Needed for Anxiety., Disp: , Rfl:   •  aspirin 81 MG chewable tablet, Chew 1 tablet Daily. (Patient taking differently: Chew 162 mg Daily.), Disp: 30 tablet, Rfl: 2  •  celecoxib (CeleBREX) 100 MG capsule, Take 1 capsule by mouth 2 (Two) Times a Day As Needed for Mild Pain ., Disp: 60 capsule, Rfl: 2  •  Coenzyme Q10 (COQ-10) 100 MG capsule, Take 1 capsule by mouth Daily., Disp: 100 each, Rfl: 3  •  cyanocobalamin (VITAMIN B-12) 500 MCG tablet, Take 1 tablet by mouth Daily., Disp: 30 tablet, Rfl: 2  •  finasteride (PROSCAR) 5 MG tablet, Take 5 mg by mouth Daily., Disp: , Rfl:   •  folic acid (FOLVITE) 1 MG tablet, Take 1 tablet by mouth Daily., Disp: 30 tablet, Rfl: 2  •  lisinopril (PRINIVIL,ZESTRIL) 40 MG tablet, Take 20 mg by mouth Daily., Disp: , Rfl:   •  meclizine (ANTIVERT) 25 MG tablet, Take 25 mg by mouth Daily As Needed for dizziness., Disp: , Rfl:   •  pantoprazole (PROTONIX) 40 MG EC tablet, Take 40 mg by mouth Daily., Disp: , Rfl:   •  tamsulosin (Flomax) 0.4 MG capsule 24 hr capsule, Take 1 capsule by mouth Daily., Disp:  ", Rfl:   •  tiotropium bromide-olodaterol (STIOLTO RESPIMAT) 2.5-2.5 MCG/ACT aerosol solution inhaler, Inhale 2 puffs Daily., Disp: , Rfl:     Review of Systems   Constitutional: Negative.    HENT: Negative.    Eyes: Negative.    Respiratory: Negative.    Cardiovascular: Negative.    Gastrointestinal: Negative.    Endocrine: Negative.    Genitourinary: Negative.    Musculoskeletal: Negative.    Skin: Negative.    Allergic/Immunologic: Negative.    Neurological: Negative.    Hematological: Negative.    Psychiatric/Behavioral: Negative.    All other systems reviewed and are negative.    /82 (BP Location: Left arm, Patient Position: Sitting, Cuff Size: Adult)   Pulse 70   Ht 182.9 cm (72\")   Wt 78.9 kg (174 lb)   SpO2 93%   BMI 23.60 kg/m²     Physical Exam   Constitutional: He is oriented to person, place, and time. He appears well-developed and well-nourished.   HENT:   Head: Normocephalic and atraumatic.   Eyes: Pupils are equal, round, and reactive to light. No scleral icterus.   Neck: Neck supple. No JVD present. Carotid bruit is not present. No thyromegaly present.   Cardiovascular: Normal rate, regular rhythm and normal heart sounds.   Pulses:       Carotid pulses are 2+ on the right side, and 2+ on the left side.       Femoral pulses are 2+ on the right side, and 2+ on the left side.       Popliteal pulses are 2+ on the right side, and 2+ on the left side.        Dorsalis pedis pulses are 2+ on the right side, and 2+ on the left side.        Posterior tibial pulses are 2+ on the right side, and 2+ on the left side.   Prominent popliteals bilateral   Pulmonary/Chest: Effort normal and breath sounds normal.   Abdominal: Soft. Bowel sounds are normal. He exhibits pulsatile midline mass. He exhibits no distension, no abdominal bruit and no mass. There is no hepatosplenomegaly. There is no tenderness.   Musculoskeletal: Normal range of motion. He exhibits no edema.   Lymphadenopathy:     He has no " cervical adenopathy.   Neurological: He is alert and oriented to person, place, and time. He has normal strength. No cranial nerve deficit or sensory deficit.   Skin: Skin is warm, dry and intact.   Psychiatric: He has a normal mood and affect. His behavior is normal. Judgment and thought content normal.   Nursing note and vitals reviewed.    Diagnostic Data:  EXAMINATION: CT abdomen and pelvis with and without contrast 05/18/2020     HISTORY: Left-sided abdominal pain x6 months. Bilateral leg pain.     DOSE: 655 mGycm. Automated exposure control was utilized to diminish  patient radiation dose..     FINDINGS: Multiple contiguous axials are obtained through the abdomen  and pelvis both with and without IV contrast administration and  following oral contrast administration. Reformatted images are obtained  the sagittal and coronal projections from the original data set.     Right middle lobe and left lingular atelectasis or scarring are present.  There is a calcified granuloma within the left posterior costophrenic  angle. Lung bases are otherwise clear. The base of the heart is  unremarkable.     The liver is homogeneous in density with no evidence of focal mass and  normal enhancement of the hepatic vasculature.     The gallbladder is unremarkable with no definite stones or  pericholecystic inflammatory change. No evidence of intra or  extrahepatic biliary dilatation.     The pancreas and spleen are unremarkable. There is no evidence of  pancreatic ductal dilatation or discrete mass.     The adrenals are unremarkable. There is a cortical cyst involving the  posterior interpolar aspect of the right kidney measuring 4.1 cm in size  and a cortical cyst involving the posterior lower pole of the left  kidney measuring 2 cm in size. Both of these measure less than 5  Hounsfield units on postcontrast imaging. No discrete solid enhancing  mass is identified within either kidney. There is no evidence of  nephrolithiasis.  The ureters are decompressed and normal in appearance.     Examination of the retroperitoneum demonstrates an infrarenal abdominal  aortic aneurysm with mural thrombus measuring 3.7 x 3.8 cm in size.  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     There is mild constipation with increased stool throughout the colon.  Diverticulosis is noted of the sigmoid colon without evidence of acute  diverticulitis. The cecum is on a loose mesentery projecting into the  right upper quadrant and midabdomen. The appendix is identified and is  normal in appearance.     The small bowel mesentery is unremarkable with no evidence of mass or  adenopathy. A small fat-containing periumbilical hernia is present.     There is diffuse enlargement of the prostate gland with mass effect on  the base of bladder. The prostate measures 6.5 cm in transverse  dimension by 5.7 cm in anterior to posterior dimension. The urinary  bladder is unremarkable.     Review of bone windows does demonstrate spondylosis within the lumbar  spine with degenerative disc disease at L5-S1. End plate intrusion's are  noted at several levels.     IMPRESSION:  1.. Cortical cysts of both kidneys. No evidence of nephrolithiasis or  obstructive uropathy.  2. Infrarenal abdominal aortic aneurysm with measurements as above with  mural thrombus. 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 No evidence of retroperitoneal hematoma or adenopathy.  3. Mild to moderate constipation. Diverticulosis is noted of the sigmoid  colon without evidence of acute diverticulitis. The cecum is on a loose  mesentery. The appendix is identified and is normal in appearance.  4. Tiny fat-containing periumbilical hernia.  5. Diffuse enlargement of the prostate gland with mass effect on the  base of  bladder.  This report was finalized on 05/18/2020 11:17 by Dr. Arnulfo Garsia MD.    Patient Active Problem List   Diagnosis   • Asymmetry of tonsils   • Enlarged tonsils   • Neoplasm of tonsil   • COPD, group B, by GOLD 2017 classification (CMS/HCC)   • Bronchiectasis without complication (CMS/HCC)   • Essential (primary) hypertension   • Personal history of nicotine dependence   • Temporary cerebral vascular dysfunction   • Tobacco user   • Alcohol use   • Alcohol dependence, uncomplicated (CMS/HCC)   • Benign prostatic hyperplasia   • Anxiety   • Claustrophobia   • Pulmonary fibrosis (CMS/HCC)   • Thrombocytopenia (CMS/HCC)   • [place] sprayers' lung (CMS/HCC)   • Bronchiolectasis (CMS/HCC)   • Chronic obstructive pulmonary disease (CMS/HCC)   • Benign hypertension   • Myopathy   • Bradycardia   • Calcification of abdominal aorta (CMS/HCC)   • Gastroesophageal reflux disease   • Hypercalcemia   • Hypertrophy of prostate   • Infarction of left basal ganglia (CMS/HCC)   • Knee deformity, acquired, left   • Left cataract   • Lung nodule   • PAD (peripheral artery disease) (CMS/HCC)   • Pericarditis associated with severe chronic anemia   • Pseudophakia   • History of hemoptysis   • Statin myopathy        Diagnosis Plan   1. Abdominal aortic aneurysm (AAA) 35 to 39 mm in diameter (CMS/HCC)  US Aorta Limited   2. Essential (primary) hypertension     3. PAD (peripheral artery disease) (CMS/HCC)  US Arterial Doppler Lower Extremity Bilateral   4. Bilateral carotid artery stenosis         Plan: After thoroughly evaluating Van Mckeon, I believe the best course of action is to remain conservative from a vascular surgery standpoint.  His aneurysm is stable measuring 3.7  X 3.8 cm.  On examination, he does have prominent popliteal pulses.  He denies any family history of aneurysmal disease.  He is a former smoker though.  I will see him back in 1 years time with continued surveillance which will include a carotid  duplex, abdominal ultrasound, and a popliteal duplex.  He did have a stroke less than a year ago which appears to be small vessel disease in nature. I did discuss vascular risk factors as they pertain to the progression of vascular disease including controlling hypertension.  This risk factors currently stable.  The patient is to continue taking their medications as previously discussed.   This was all discussed in full with complete understanding.  Thank you for allowing me to participate in the care of your patient.  Please do not hesitate to call with any questions or concerns.  We will keep you aware of any further encounters with Van Mckeon.        Sincerely yours,         DO Андрей Gonzalez Ronald L, MD

## 2020-06-30 ENCOUNTER — HOSPITAL ENCOUNTER (OUTPATIENT)
Dept: GENERAL RADIOLOGY | Facility: HOSPITAL | Age: 80
Discharge: HOME | End: 2020-06-30
Admitting: NURSE PRACTITIONER

## 2020-06-30 ENCOUNTER — OFFICE VISIT (OUTPATIENT)
Dept: INTERNAL MEDICINE | Facility: CLINIC | Age: 80
End: 2020-06-30

## 2020-06-30 VITALS
OXYGEN SATURATION: 98 % | TEMPERATURE: 97.9 F | BODY MASS INDEX: 23.6 KG/M2 | SYSTOLIC BLOOD PRESSURE: 120 MMHG | HEART RATE: 66 BPM | HEIGHT: 72 IN | WEIGHT: 174.2 LBS | DIASTOLIC BLOOD PRESSURE: 80 MMHG

## 2020-06-30 DIAGNOSIS — M53.3 TAIL BONE PAIN: Primary | ICD-10-CM

## 2020-06-30 DIAGNOSIS — M53.3 TAIL BONE PAIN: ICD-10-CM

## 2020-06-30 DIAGNOSIS — M43.17 SPONDYLOLISTHESIS OF LUMBOSACRAL REGION: ICD-10-CM

## 2020-06-30 PROCEDURE — 72100 X-RAY EXAM L-S SPINE 2/3 VWS: CPT

## 2020-06-30 PROCEDURE — 99213 OFFICE O/P EST LOW 20 MIN: CPT | Performed by: NURSE PRACTITIONER

## 2020-06-30 RX ORDER — METHYLPREDNISOLONE 4 MG/1
TABLET ORAL
Qty: 1 EACH | Refills: 0 | Status: SHIPPED | OUTPATIENT
Start: 2020-06-30 | End: 2020-07-30

## 2020-06-30 NOTE — PROGRESS NOTES
"Subjective   Van Mckeon is a 79 y.o. male.   Chief Complaint   Patient presents with   • Tailbone Pain     onset saturday       Back Pain   This is a new problem. The current episode started in the past 7 days (Started Saturday after riding tractor ). The problem is unchanged. The pain is present in the sacro-iliac. The quality of the pain is described as aching (\"like a toothache\"). The pain does not radiate. The pain is moderate. The symptoms are aggravated by standing. Pertinent negatives include no abdominal pain, chest pain, dysuria, fever, leg pain, numbness, pelvic pain, tingling, weakness or weight loss. Treatments tried: Takes daily celebrex. The treatment provided no relief.    Van was riding on a tractor Saturday and reports he has to climb up and down on this.  He states when he was finished and got off of the tractor he noticed the pain in his tailbone.  Sitting he feels fine, but feels the pain when he stands up.  He denies any injury or fall to the area.      The following portions of the patient's history were reviewed and updated as appropriate: allergies, current medications, past family history, past medical history, past social history, past surgical history and problem list.    Review of Systems   Constitutional: Negative for activity change, appetite change, fatigue, fever, unexpected weight gain and unexpected weight loss.   HENT: Negative for swollen glands, trouble swallowing and voice change.    Eyes: Negative for blurred vision and visual disturbance.   Respiratory: Negative for cough and shortness of breath.    Cardiovascular: Negative for chest pain, palpitations and leg swelling.   Gastrointestinal: Negative for abdominal pain, constipation, diarrhea, nausea, vomiting and indigestion.   Endocrine: Negative for cold intolerance, heat intolerance, polydipsia and polyphagia.   Genitourinary: Negative for dysuria, frequency and pelvic pain.   Musculoskeletal: Positive for back " pain. Negative for arthralgias, joint swelling and neck pain.   Skin: Negative for color change, rash and skin lesions.   Neurological: Negative for dizziness, tingling, weakness, numbness, headache, memory problem and confusion.   Hematological: Does not bruise/bleed easily.   Psychiatric/Behavioral: Negative for agitation, hallucinations and suicidal ideas. The patient is not nervous/anxious.        Objective   Past Medical History:   Diagnosis Date   • Alcohol use 10/3/2019   • Anxiety    • Bronchiectasis without complication (CMS/[facility]) 4/9/2019   • COPD (chronic obstructive pulmonary disease) (CMS/[facility])    • COPD, group B, by GOLD 2017 classification (CMS/HCC) 4/9/2019   • Essential hypertension 4/9/2019   • Hypertension    • Personal history of nicotine dependence 4/9/2019   • Prostate hypertrophy    • Snoring    • Thrombocytopenia (CMS/[facility])    • TIA (transient ischemic attack)    • Tobacco user 10/3/2019      Past Surgical History:   Procedure Laterality Date   • BRONCHOSCOPY     • EYE SURGERY     • NASAL SINUS SURGERY          Current Outpatient Medications:   •  albuterol sulfate  (90 Base) MCG/ACT inhaler, Inhale 2 puffs Every 4 (Four) Hours As Needed for Wheezing., Disp: 3 inhaler, Rfl: 3  •  ALPRAZolam (XANAX) 0.5 MG tablet, Take 0.5 mg by mouth Daily As Needed for Anxiety., Disp: , Rfl:   •  aspirin 81 MG chewable tablet, Chew 1 tablet Daily. (Patient taking differently: Chew 162 mg Daily.), Disp: 30 tablet, Rfl: 2  •  celecoxib (CeleBREX) 100 MG capsule, Take 1 capsule by mouth 2 (Two) Times a Day As Needed for Mild Pain ., Disp: 60 capsule, Rfl: 2  •  Coenzyme Q10 (COQ-10) 100 MG capsule, Take 1 capsule by mouth Daily., Disp: 100 each, Rfl: 3  •  cyanocobalamin (VITAMIN B-12) 500 MCG tablet, Take 1 tablet by mouth Daily., Disp: 30 tablet, Rfl: 2  •  finasteride (PROSCAR) 5 MG tablet, Take 5 mg by mouth Daily., Disp: , Rfl:   •  folic acid (FOLVITE) 1 MG tablet, Take 1 tablet by mouth Daily., Disp:  30 tablet, Rfl: 2  •  lisinopril (PRINIVIL,ZESTRIL) 40 MG tablet, Take 20 mg by mouth Daily., Disp: , Rfl:   •  meclizine (ANTIVERT) 25 MG tablet, Take 25 mg by mouth Daily As Needed for dizziness., Disp: , Rfl:   •  pantoprazole (PROTONIX) 40 MG EC tablet, Take 40 mg by mouth Daily., Disp: , Rfl:   •  tamsulosin (Flomax) 0.4 MG capsule 24 hr capsule, Take 1 capsule by mouth Daily., Disp: , Rfl:   •  tiotropium bromide-olodaterol (STIOLTO RESPIMAT) 2.5-2.5 MCG/ACT aerosol solution inhaler, Inhale 2 puffs Daily., Disp: , Rfl:      Vitals:    06/30/20 1412   BP: 120/80   Pulse: 66   Temp: 97.9 °F (36.6 °C)   SpO2: 98%         06/30/20  1412   Weight: 79 kg (174 lb 3.2 oz)           Physical Exam   Constitutional: He is oriented to person, place, and time. He appears well-developed and well-nourished.   HENT:   Head: Normocephalic and atraumatic.   Right Ear: External ear normal.   Left Ear: External ear normal.   Eyes: Conjunctivae and EOM are normal.   Neck: Normal range of motion.   Cardiovascular: Normal rate, regular rhythm and normal heart sounds.   Pulmonary/Chest: Effort normal and breath sounds normal.   Popping in the LLL; consistent with COPD.   Musculoskeletal:   No spinal tenderness with palpation.  Negative straight leg raises.    Neurological: He is alert and oriented to person, place, and time.   Skin: Skin is warm and dry.   Psychiatric: He has a normal mood and affect. His behavior is normal. Thought content normal.   Nursing note and vitals reviewed.            Assessment/Plan   Diagnoses and all orders for this visit:    1. Tail bone pain (Primary)  -     XR Spine Lumbar 2 or 3 View; Future  -     XR Sacrum & Coccyx; Future      This is a new complaint for the patient.  He reports his whole life he has had some back discomfort on and off due to his occupation but this is a new pain.  I would like to get imaging today to rule out fracture.  Will consider sending medrol dose pack pending imaging  results and if appropriate.

## 2020-07-30 ENCOUNTER — OFFICE VISIT (OUTPATIENT)
Dept: INTERNAL MEDICINE | Facility: CLINIC | Age: 80
End: 2020-07-30

## 2020-07-30 VITALS
OXYGEN SATURATION: 98 % | HEART RATE: 98 BPM | DIASTOLIC BLOOD PRESSURE: 92 MMHG | TEMPERATURE: 98.2 F | SYSTOLIC BLOOD PRESSURE: 178 MMHG | BODY MASS INDEX: 23.7 KG/M2 | HEIGHT: 72 IN | WEIGHT: 175 LBS

## 2020-07-30 DIAGNOSIS — I87.303 STASIS EDEMA OF BOTH LOWER EXTREMITIES: Primary | ICD-10-CM

## 2020-07-30 DIAGNOSIS — I10 ESSENTIAL (PRIMARY) HYPERTENSION: ICD-10-CM

## 2020-07-30 PROCEDURE — 99214 OFFICE O/P EST MOD 30 MIN: CPT | Performed by: INTERNAL MEDICINE

## 2020-07-30 RX ORDER — HYDROCHLOROTHIAZIDE 12.5 MG/1
12.5 TABLET ORAL DAILY
Qty: 90 TABLET | Refills: 3 | Status: SHIPPED | OUTPATIENT
Start: 2020-07-30 | End: 2021-02-23

## 2020-07-30 NOTE — PROGRESS NOTES
Subjective   Van Mckeon is a 80 y.o. male.   Chief Complaint   Patient presents with   • Leg Swelling     SWELLING IN FEET AND LEGS; 2-3 WEEKS WITH SYMPTOMS       Patient has noted some swelling of lower extremities primarily in his feet also has some bruising across the toes of the right foot.  He is not short of breath or having chest pains there is been no change in his medication recently.       The following portions of the patient's history were reviewed and updated as appropriate: allergies, current medications, past family history, past medical history, past social history, past surgical history and problem list.    Review of Systems   Constitutional: Negative for activity change, appetite change, fatigue, fever, unexpected weight gain and unexpected weight loss.   HENT: Negative for swollen glands, trouble swallowing and voice change.    Eyes: Negative for blurred vision and visual disturbance.   Respiratory: Negative for cough and shortness of breath.    Cardiovascular: Positive for leg swelling. Negative for chest pain and palpitations.   Gastrointestinal: Negative for abdominal pain, constipation, diarrhea, nausea, vomiting and indigestion.   Endocrine: Negative for cold intolerance, heat intolerance, polydipsia and polyphagia.   Genitourinary: Negative for dysuria and frequency.   Musculoskeletal: Negative for arthralgias, back pain, joint swelling and neck pain.   Skin: Negative for color change, rash and skin lesions.   Neurological: Negative for dizziness, weakness, headache, memory problem and confusion.   Hematological: Does not bruise/bleed easily.   Psychiatric/Behavioral: Negative for agitation, hallucinations and suicidal ideas. The patient is not nervous/anxious.        Objective   Past Medical History:   Diagnosis Date   • Alcohol use 10/3/2019   • Anxiety    • Bronchiectasis without complication (CMS/[facility]) 4/9/2019   • COPD (chronic obstructive pulmonary disease) (CMS/[facility])    • COPD,  group B, by GOLD 2017 classification (CMS/[facility]) 4/9/2019   • Essential hypertension 4/9/2019   • Hypertension    • Personal history of nicotine dependence 4/9/2019   • Prostate hypertrophy    • Snoring    • Thrombocytopenia (CMS/[facility])    • TIA (transient ischemic attack)    • Tobacco user 10/3/2019      Past Surgical History:   Procedure Laterality Date   • BRONCHOSCOPY     • EYE SURGERY     • NASAL SINUS SURGERY          Current Outpatient Medications:   •  albuterol sulfate  (90 Base) MCG/ACT inhaler, Inhale 2 puffs Every 4 (Four) Hours As Needed for Wheezing., Disp: 3 inhaler, Rfl: 3  •  ALPRAZolam (XANAX) 0.5 MG tablet, Take 0.5 mg by mouth Daily As Needed for Anxiety., Disp: , Rfl:   •  aspirin 81 MG chewable tablet, Chew 1 tablet Daily. (Patient taking differently: Chew 162 mg Daily.), Disp: 30 tablet, Rfl: 2  •  celecoxib (CeleBREX) 100 MG capsule, Take 1 capsule by mouth 2 (Two) Times a Day As Needed for Mild Pain ., Disp: 60 capsule, Rfl: 2  •  Coenzyme Q10 (COQ-10) 100 MG capsule, Take 1 capsule by mouth Daily., Disp: 100 each, Rfl: 3  •  cyanocobalamin (VITAMIN B-12) 500 MCG tablet, Take 1 tablet by mouth Daily., Disp: 30 tablet, Rfl: 2  •  finasteride (PROSCAR) 5 MG tablet, Take 5 mg by mouth Daily., Disp: , Rfl:   •  folic acid (FOLVITE) 1 MG tablet, Take 1 tablet by mouth Daily., Disp: 30 tablet, Rfl: 2  •  lisinopril (PRINIVIL,ZESTRIL) 40 MG tablet, Take 20 mg by mouth Daily., Disp: , Rfl:   •  meclizine (ANTIVERT) 25 MG tablet, Take 25 mg by mouth Daily As Needed for dizziness., Disp: , Rfl:   •  pantoprazole (PROTONIX) 40 MG EC tablet, Take 40 mg by mouth Daily., Disp: , Rfl:   •  tiotropium bromide-olodaterol (STIOLTO RESPIMAT) 2.5-2.5 MCG/ACT aerosol solution inhaler, Inhale 2 puffs Daily., Disp: , Rfl:   •  hydroCHLOROthiazide (HYDRODIURIL) 12.5 MG tablet, Take 1 tablet by mouth Daily., Disp: 90 tablet, Rfl: 3  •  tamsulosin (Flomax) 0.4 MG capsule 24 hr capsule, Take 1 capsule by mouth  Daily., Disp: , Rfl:      Vitals:    07/30/20 0850   BP: 178/92   Pulse: 98   Temp: 98.2 °F (36.8 °C)   SpO2: 98%         07/30/20  0850   Weight: 79.4 kg (175 lb)     Patient's Body mass index is 23.73 kg/m². BMI is within normal parameters. No follow-up required..      Physical Exam   Constitutional: He is oriented to person, place, and time. He appears well-developed and well-nourished.   HENT:   Head: Normocephalic and atraumatic.   Right Ear: External ear normal.   Left Ear: External ear normal.   Nose: Nose normal.   Mouth/Throat: Oropharynx is clear and moist.   Eyes: Pupils are equal, round, and reactive to light. Conjunctivae and EOM are normal.   Neck: Normal range of motion. Neck supple. No thyromegaly present.   Cardiovascular: Normal rate, regular rhythm, normal heart sounds and intact distal pulses.   Pulmonary/Chest: Effort normal and breath sounds normal.   Abdominal: Soft. Bowel sounds are normal.   Musculoskeletal: He exhibits edema ( 1+ both feet).   Lymphadenopathy:     He has no cervical adenopathy.   Neurological: He is alert and oriented to person, place, and time.   Skin: Skin is warm and dry.   Psychiatric: He has a normal mood and affect. His behavior is normal. Thought content normal.   Nursing note and vitals reviewed.            Assessment/Plan   Diagnoses and all orders for this visit:    1. Stasis edema of both lower extremities (Primary)    2. Essential (primary) hypertension  -     Basic Metabolic Panel    Other orders  -     hydroCHLOROthiazide (HYDRODIURIL) 12.5 MG tablet; Take 1 tablet by mouth Daily.  Dispense: 90 tablet; Refill: 3      Patient has stasis edema both lower extremities this may be caused from his nonsteroidal use I am going to go ahead and start him on HydroDIURIL daily both for the stasis as well as for his elevated blood pressure he will come back to see me short-term to make sure his blood pressure and potassium are okay

## 2020-07-31 LAB
BUN SERPL-MCNC: 14 MG/DL (ref 8–23)
BUN/CREAT SERPL: 13.9 (ref 7–25)
CALCIUM SERPL-MCNC: 10.9 MG/DL (ref 8.6–10.5)
CHLORIDE SERPL-SCNC: 104 MMOL/L (ref 98–107)
CO2 SERPL-SCNC: 28 MMOL/L (ref 22–29)
CREAT SERPL-MCNC: 1.01 MG/DL (ref 0.76–1.27)
GLUCOSE SERPL-MCNC: 82 MG/DL (ref 65–99)
POTASSIUM SERPL-SCNC: 4.1 MMOL/L (ref 3.5–5.2)
SODIUM SERPL-SCNC: 140 MMOL/L (ref 136–145)

## 2020-10-12 PROBLEM — J44.9 CHRONIC OBSTRUCTIVE PULMONARY DISEASE: Status: RESOLVED | Noted: 2020-05-07 | Resolved: 2020-10-12

## 2020-10-12 PROBLEM — J47.9 BRONCHIOLECTASIS (HCC): Status: RESOLVED | Noted: 2020-05-07 | Resolved: 2020-10-12

## 2020-10-12 NOTE — PROGRESS NOTES
JAE Farris  [facility]   [facility]  [address] [place] [address]  Phone: [phone]  Fax: [phone]     Van Mckeon is a 80 y.o. male.   CC:   Chief Complaint   Patient presents with   • Shortness of Breath        HPI: Mr. Mckeon is being evaluated today for management of his bronchiectasis and COPD.  He experiences moderate persistent shortness of breath and productive cough occurring in the chest daily for greater than 1 year.  It is worsened by nothing and improved with use of bronchodilators.  He controls his symptoms with Stiolto daily. He has pro-air he can use when needed.  [facility]  Breathing is aggravated by his smoking.  He is not interested in smoking cessation information.  He denies any new complaints.    The following portions of the patient's history were reviewed and updated as appropriate: allergies, current medications, past family history, past medical history, past social history, past surgical history and problem list.    Past Medical History:   Diagnosis Date   • Alcohol use 10/3/2019   • Anxiety    • Bronchiectasis without complication (CMS/[facility]) 4/9/2019   • COPD (chronic obstructive pulmonary disease) (CMS/[facility])    • COPD, group B, by GOLD 2017 classification (CMS/[facility]) 4/9/2019   • Essential hypertension 4/9/2019   • Hypertension    • Personal history of nicotine dependence 4/9/2019   • Prostate hypertrophy    • Snoring    • Thrombocytopenia (CMS/[facility])    • TIA (transient ischemic attack)    • Tobacco user 10/3/2019       Prior to Admission medications    Medication Sig Start Date End Date Taking? Authorizing Provider   albuterol sulfate  (90 Base) MCG/ACT inhaler Inhale 2 puffs Every 4 (Four) Hours As Needed for Wheezing. 4/14/20   Josephine Max APRN   ALPRAZolam (XANAX) 0.5 MG tablet Take 0.5 mg by mouth Daily As Needed for Anxiety. 2/2/17   Provider, MD Dylon   aspirin 81 MG  chewable tablet Chew 1 tablet Daily.  Patient taking differently: Chew 162 mg Daily. 19   Clara Ambrosio APRN   celecoxib (CeleBREX) 100 MG capsule Take 1 capsule by mouth 2 (Two) Times a Day As Needed for Mild Pain . 19   Clara Ambrosio APRN   Coenzyme Q10 (COQ-10) 100 MG capsule Take 1 capsule by mouth Daily. 20   Joseph Chiang MD   cyanocobalamin (VITAMIN B-12) 500 MCG tablet Take 1 tablet by mouth Daily. 19   Clara Ambrosio APRN   finasteride (PROSCAR) 5 MG tablet Take 5 mg by mouth Daily. 17   Dylon Bobo MD   folic acid (FOLVITE) 1 MG tablet Take 1 tablet by mouth Daily. 19   Clara Ambrosio APRN   hydroCHLOROthiazide (HYDRODIURIL) 12.5 MG tablet Take 1 tablet by mouth Daily. 20   Joseph Chiang MD   lisinopril (PRINIVIL,ZESTRIL) 40 MG tablet Take 20 mg by mouth Daily.    Dylon Bobo MD   meclizine (ANTIVERT) 25 MG tablet Take 25 mg by mouth Daily As Needed for dizziness. 16   Dylon Bobo MD   pantoprazole (PROTONIX) 40 MG EC tablet Take 40 mg by mouth Daily.    Dylon Bobo MD   tamsulosin (Flomax) 0.4 MG capsule 24 hr capsule Take 1 capsule by mouth Daily. 19   Dylon Bobo MD   tiotropium bromide-olodaterol (STIOLTO RESPIMAT) 2.5-2.5 MCG/ACT aerosol solution inhaler Inhale 2 puffs Daily.    Dylon Bobo MD       Family History   Problem Relation Age of Onset   • Emphysema Father    • Cancer Brother    • Heart disease Brother    • Cancer Other        Social History     Socioeconomic History   • Marital status:      Spouse name: Not on file   • Number of children: Not on file   • Years of education: Not on file   • Highest education level: Not on file   Tobacco Use   • Smoking status: Current Every Day Smoker     Packs/day: 1.00     Years: 50.00     Pack years: 50.00     Types: Cigarettes     Last attempt to quit: 2019     Years since quittin.1   • Smokeless tobacco:  "Former User     Types: Snuff     Quit date: 1970   • Tobacco comment: started back 6 months ago   Substance and Sexual Activity   • Alcohol use: Yes     Alcohol/week: 3.0 standard drinks     Types: 3 Shots of liquor per week     Frequency: 4 or more times a week     Drinks per session: 3 or 4   • Drug use: No   • Sexual activity: Defer       Review of Systems   Constitutional: Negative for activity change, chills, fatigue and fever.   HENT: Negative for congestion, postnasal drip, rhinorrhea, sinus pressure, sore throat and trouble swallowing.    Eyes: Negative for blurred vision, double vision and pain.   Respiratory: Positive for shortness of breath. Negative for cough, chest tightness and wheezing.    Cardiovascular: Negative for chest pain, palpitations and leg swelling.   Gastrointestinal: Negative for abdominal distention, constipation, diarrhea, nausea and vomiting.   Endocrine: Negative for polydipsia, polyphagia and polyuria.   Genitourinary: Negative for dysuria, frequency and urgency.   Musculoskeletal: Negative for arthralgias, back pain, gait problem and joint swelling.   Skin: Negative for color change, dry skin, rash and skin lesions.   Allergic/Immunologic: Negative for environmental allergies, food allergies and immunocompromised state.   Neurological: Negative for dizziness, seizures, speech difficulty, weakness, light-headedness, memory problem and confusion.   Hematological: Negative for adenopathy. Does not bruise/bleed easily.   Psychiatric/Behavioral: Negative for sleep disturbance, negative for hyperactivity and depressed mood. The patient is not nervous/anxious.        /78   Pulse 76   Temp 98 °F (36.7 °C)   Resp 16   Ht 182.9 cm (72\")   Wt 78 kg (172 lb)   SpO2 98% Comment: RA  BMI 23.33 kg/m²     Physical Exam  Vitals signs and nursing note reviewed.   Constitutional:       General: He is not in acute distress.     Appearance: He is well-developed. He is not diaphoretic.      " Interventions: Face mask in place.   HENT:      Head: Normocephalic and atraumatic.      Right Ear: External ear normal.      Left Ear: External ear normal.      Nose: Nose normal.      Mouth/Throat:      Pharynx: No oropharyngeal exudate.   Eyes:      General:         Right eye: No discharge.         Left eye: No discharge.      Conjunctiva/sclera: Conjunctivae normal.      Pupils: Pupils are equal, round, and reactive to light.   Neck:      Musculoskeletal: Normal range of motion and neck supple.      Vascular: No JVD.   Cardiovascular:      Rate and Rhythm: Normal rate and regular rhythm.      Heart sounds: No murmur.   Pulmonary:      Effort: Pulmonary effort is normal. No respiratory distress.      Breath sounds: Normal breath sounds. No wheezing.   Abdominal:      General: Bowel sounds are normal. There is no distension.      Palpations: Abdomen is soft.      Tenderness: There is no abdominal tenderness.   Musculoskeletal: Normal range of motion.         General: No deformity.   Skin:     General: Skin is warm and dry.      Findings: No erythema or rash.   Neurological:      Mental Status: He is alert and oriented to person, place, and time.      Cranial Nerves: No cranial nerve deficit.      Coordination: Coordination normal.      Deep Tendon Reflexes: Reflexes normal.   Psychiatric:         Behavior: Behavior normal.         Thought Content: Thought content normal.         Pulmonary Functions Testing Results:    PFT Values        Some values may be hidden. Unless noted otherwise, only the newest values recorded on each date are displayed.         Old Values PFT Results 10/14/19   No data to display.      Pre Drug PFT Results 10/14/19   FVC 80   FEV1 60   FEF 25-75% 32   FEV1/FVC 58      Post Drug PFT Results 10/14/19   No data to display.      Other Tests PFT Results 10/14/19   DLCO 60   D/VAsb 64           Results for orders placed in visit on 10/14/19   Pulmonary Function Test    Narrative Keith  Licha KNOWLES, RRT     10/14/2019  9:52 AM  Pulmonary Function Test  Performed by: Josephine Max APRN  Authorized by: Josephine Max APRN      Pre Drug    FVC: 80%   FEV1: 60%   FEF 25-75%: 32%   FEV1/FVC: 58%   DLCO: 60%   D/VAsb: 64%         No PFTs for this visit    CXR: No imaging for this visit        Problem List Items Addressed This Visit        Respiratory    COPD, group B, by GOLD 2017 classification (CMS/[facility])    Bronchiectasis without complication (CMS/[facility]) - Primary       Other    Personal history of nicotine dependence        Patient's Body mass index is 23.33 kg/m². BMI is within normal parameters. No follow-up required..      Assessment/Plan         COPD and bronchiectasis stable on Stiolto.  He does not need refills.  These were provided to him by the [facility].  He has plenty of albuterol although he seldom requires it.  Flu pneumonia vaccinations up-to-date.  He continues to smoke and is not interested in smoking cessation information.  We will update PFTs when he returns in 6 months if feasible to do so given the pandemic.  He will call sooner if needed.    JAE Farris  10/19/2020  12:13 CDT    Return in about 6 months (around 4/19/2021) for FVL with diffusion.

## 2020-10-19 ENCOUNTER — OFFICE VISIT (OUTPATIENT)
Dept: PULMONOLOGY | Facility: CLINIC | Age: 80
End: 2020-10-19

## 2020-10-19 VITALS
SYSTOLIC BLOOD PRESSURE: 128 MMHG | OXYGEN SATURATION: 98 % | BODY MASS INDEX: 23.3 KG/M2 | WEIGHT: 172 LBS | HEART RATE: 76 BPM | HEIGHT: 72 IN | DIASTOLIC BLOOD PRESSURE: 78 MMHG | TEMPERATURE: 98 F | RESPIRATION RATE: 16 BRPM

## 2020-10-19 DIAGNOSIS — Z87.891 PERSONAL HISTORY OF NICOTINE DEPENDENCE: ICD-10-CM

## 2020-10-19 DIAGNOSIS — J44.9 COPD, GROUP B, BY GOLD 2017 CLASSIFICATION (HCC): ICD-10-CM

## 2020-10-19 DIAGNOSIS — J47.9 BRONCHIECTASIS WITHOUT COMPLICATION (HCC): Primary | ICD-10-CM

## 2020-10-19 PROCEDURE — 99213 OFFICE O/P EST LOW 20 MIN: CPT | Performed by: NURSE PRACTITIONER

## 2020-12-08 NOTE — PROGRESS NOTES
Chief Complaint   Patient presents with   • Colonoscopy     1-4-2014 colon 4 polyps has constipation       PCP: Joseph Chiang MD  REFER: No ref. provider found    Subjective     HPI    Van Mckeon is a 80 y.o. male who presents to office for preventative maintenance.  There is  a personal history of colon polyps.  There is not a history of colon cancer.  He does not have complaints of nausea/vomiting,  weight loss, no BRBPR, no melena.  Over past 4 months increase in constipation.  Bowels currently described as moving apx once per month and he is required to take OTC laxative.  Prior to change in bowels his bowel habit described as moving daily without difficulty.   He has not tried taking anything on regular basis for BM.   There is not a family history of colon cancer.  There is not a family history of colon polyps.  His last colonoscopy-2015 .  Bowels do move on regular basis.    CScope (Dr Luevano) 2013-tubular adenoma     Past Medical History:   Diagnosis Date   • Alcohol use 10/3/2019   • Anxiety    • Bronchiectasis without complication (CMS/[facility]) 4/9/2019   • COPD (chronic obstructive pulmonary disease) (CMS/[facility])    • COPD, group B, by GOLD 2017 classification (CMS/[facility]) 4/9/2019   • Essential hypertension 4/9/2019   • Hypertension    • Personal history of nicotine dependence 4/9/2019   • Prostate hypertrophy    • Snoring    • Thrombocytopenia (CMS/[facility])    • TIA (transient ischemic attack)    • Tobacco user 10/3/2019     Past Surgical History:   Procedure Laterality Date   • BRONCHOSCOPY     • EYE SURGERY     • NASAL SINUS SURGERY       Outpatient Medications Marked as Taking for the 12/10/20 encounter (Office Visit) with Dewayne Hinson APRN   Medication Sig Dispense Refill   • albuterol sulfate  (90 Base) MCG/ACT inhaler Inhale 2 puffs Every 4 (Four) Hours As Needed for Wheezing. 3 inhaler 3   • aspirin 81 MG chewable tablet Chew 1 tablet Daily. (Patient taking differently: Chew 162 mg  Daily.) 30 tablet 2   • Coenzyme Q10 (COQ-10) 100 MG capsule Take 1 capsule by mouth Daily. 100 each 3   • cyanocobalamin (VITAMIN B-12) 500 MCG tablet Take 1 tablet by mouth Daily. 30 tablet 2   • finasteride (PROSCAR) 5 MG tablet Take 5 mg by mouth Daily.     • folic acid (FOLVITE) 1 MG tablet Take 1 tablet by mouth Daily. 30 tablet 2   • lisinopril (PRINIVIL,ZESTRIL) 40 MG tablet Take 20 mg by mouth Daily.     • tiotropium bromide-olodaterol (STIOLTO RESPIMAT) 2.5-2.5 MCG/ACT aerosol solution inhaler Inhale 2 puffs Daily.       Allergies   Allergen Reactions   • Statins Myalgia   • Other Unknown - High Severity     Horse Serum   • Tetanus Toxoids Itching     Social History     Socioeconomic History   • Marital status:      Spouse name: Not on file   • Number of children: Not on file   • Years of education: Not on file   • Highest education level: Not on file   Tobacco Use   • Smoking status: Former Smoker     Packs/day: 1.00     Years: 50.00     Pack years: 50.00     Types: Cigarettes     Quit date: 2019     Years since quittin.2   • Smokeless tobacco: Former User     Types: Snuff     Quit date:    Substance and Sexual Activity   • Alcohol use: Yes     Alcohol/week: 3.0 standard drinks     Types: 3 Shots of liquor per week     Frequency: 4 or more times a week     Drinks per session: 3 or 4     Comment: daily   • Drug use: No   • Sexual activity: Defer     Family History   Problem Relation Age of Onset   • Emphysema Father    • Cancer Brother    • Heart disease Brother    • Cancer Other    • Colon cancer Neg Hx    • Colon polyps Neg Hx    • Esophageal cancer Neg Hx      Review of Systems   Constitutional: Negative for fatigue, fever and unexpected weight change.   HENT: Negative for hearing loss, sore throat and voice change.    Eyes: Negative for visual disturbance.   Respiratory: Negative for cough, shortness of breath and wheezing.    Cardiovascular: Negative for chest pain and palpitations.    Gastrointestinal: Positive for constipation. Negative for abdominal pain, blood in stool and vomiting.   Endocrine: Negative for polydipsia and polyuria.   Genitourinary: Negative for difficulty urinating, dysuria, hematuria and urgency.   Musculoskeletal: Negative for joint swelling and myalgias.   Skin: Negative for color change, rash and wound.   Neurological: Negative for dizziness, tremors, seizures and syncope.   Hematological: Does not bruise/bleed easily.   Psychiatric/Behavioral: Negative for agitation and confusion. The patient is not nervous/anxious.      Objective   Vitals:    12/10/20 0909   BP: 128/70   Pulse: 80   Temp: 97.3 °F (36.3 °C)   SpO2: 97%     Physical Exam  Constitutional:       Appearance: He is well-developed.   HENT:      Head: Normocephalic and atraumatic.   Eyes:      Comments: Pink, Nonicteric   Neck:      Comments: Global Assessment- supple. No JVD or lymphadenopathy  Cardiovascular:      Rate and Rhythm: Normal rate and regular rhythm.      Heart sounds: Normal heart sounds. No murmur. No friction rub. No gallop.    Pulmonary:      Effort: Pulmonary effort is normal. No respiratory distress.      Breath sounds: Normal breath sounds. No wheezing or rales.   Abdominal:      General: Bowel sounds are normal. There is no distension.      Palpations: Abdomen is soft. There is no mass.      Tenderness: There is no abdominal tenderness. There is no guarding or rebound.   Neurological:      Mental Status: He is alert and oriented to person, place, and time.      Comments: General Exam-Deemed a reliable historian, able to converse without difficulty and Able to move all extremities without difficulty       Imaging Results (Most Recent)     None        Body mass index is 23.06 kg/m².    Assessment/Plan   Diagnoses and all orders for this visit:    1. History of adenomatous polyp of colon (Primary)  -     Case Request; Standing  -     Implement Anesthesia Orders Day of Procedure; Standing  -      Obtain Informed Consent; Standing  -     Case Request    2. Change in bowel habit    3. Constipation, unspecified constipation type    Other orders  -     Cancel: sodium-potassium-magnesium sulfates (Suprep Bowel Prep Kit) 17.5-3.13-1.6 GM/177ML solution oral solution; Take as directed  Dispense: 1 bottle; Refill: 0  -     polyethylene glycol (GoLYTELY) 236 g solution; Take 3,785 mL by mouth 1 (One) Time for 1 dose. Take as directed  Dispense: 3350 mL; Refill: 0      COLONOSCOPY WITH ANESTHESIA (N/A)    Recommend daily use of Miralax, adjust as needed      Patient is to continue all blood pressure and cardiac medications prior to procedure and has been advised to take medications morning of procedure   Pt verbalized understanding    Advised pt to stop ASA, use of NSAIDs, Fish Oil, and MV 5 days prior to procedure, per Dr Luevano protocol.  Tylenol based products are ok to take.  Pt verbalized understanding.      All risks, benefits, alternatives, and indications of colonoscopy procedure have been discussed with the patient. Risks to include perforation of the colon requiring possible surgery or colostomy, risk of bleeding from biopsies or removal of colon tissue, possibility of missing a colon polyp or cancer, or adverse drug reaction.  Benefits to include the diagnosis and management of disease of the colon and rectum. Alternatives to include barium enema, radiographic evaluation, lab testing or no intervention. He verbalizes understanding and agrees.     Precautions are currently being put in place due to COVID-19.  I have explained to Van Mckeon they will be required to undergo COVID testing prior to their procedure.  Van Mckeon verbalized understanding and was willing to proceed.     Patient's Body mass index is 23.06 kg/m². BMI is above normal parameters. Recommendations include: no follow up.          Dewayne Hinson, APRN  12/10/20        There are no Patient Instructions on file for this  visit.

## 2020-12-10 ENCOUNTER — OFFICE VISIT (OUTPATIENT)
Dept: GASTROENTEROLOGY | Facility: CLINIC | Age: 80
End: 2020-12-10

## 2020-12-10 ENCOUNTER — TRANSCRIBE ORDERS (OUTPATIENT)
Dept: LAB | Facility: HOSPITAL | Age: 80
End: 2020-12-10

## 2020-12-10 VITALS
HEIGHT: 72 IN | WEIGHT: 170 LBS | BODY MASS INDEX: 23.03 KG/M2 | HEART RATE: 80 BPM | TEMPERATURE: 97.3 F | DIASTOLIC BLOOD PRESSURE: 70 MMHG | SYSTOLIC BLOOD PRESSURE: 128 MMHG | OXYGEN SATURATION: 97 %

## 2020-12-10 DIAGNOSIS — R19.4 CHANGE IN BOWEL HABIT: ICD-10-CM

## 2020-12-10 DIAGNOSIS — Z01.818 PRE-OP TESTING: Primary | ICD-10-CM

## 2020-12-10 DIAGNOSIS — K59.00 CONSTIPATION, UNSPECIFIED CONSTIPATION TYPE: ICD-10-CM

## 2020-12-10 DIAGNOSIS — Z86.010 HISTORY OF ADENOMATOUS POLYP OF COLON: Primary | ICD-10-CM

## 2020-12-10 PROBLEM — Z86.0101 HISTORY OF ADENOMATOUS POLYP OF COLON: Status: ACTIVE | Noted: 2020-12-10

## 2020-12-10 PROCEDURE — S0260 H&P FOR SURGERY: HCPCS | Performed by: NURSE PRACTITIONER

## 2020-12-10 RX ORDER — SODIUM, POTASSIUM,MAG SULFATES 17.5-3.13G
SOLUTION, RECONSTITUTED, ORAL ORAL
Qty: 1 BOTTLE | Refills: 0 | Status: CANCELLED | OUTPATIENT
Start: 2020-12-10

## 2020-12-15 ENCOUNTER — TELEPHONE (OUTPATIENT)
Dept: GASTROENTEROLOGY | Facility: CLINIC | Age: 80
End: 2020-12-15

## 2020-12-15 NOTE — TELEPHONE ENCOUNTER
Spoke with patient - He did not go get covid testing done yesterday.     Patient asked to wait till the summer before having colonoscopy.     I will put him in recall and he will recd letter to call me to reschedule colonoscopy.     Thank you.     Cancelled procedure with Dr. Luevano for 12/16/2020

## 2020-12-17 NOTE — TELEPHONE ENCOUNTER
Spoke with Van Mckeon on phone  Explained I did not recommend delaying colonoscopy until summer due to complain of constipation  Explained delay in colonoscopy could delay necessary treatment   He woke up today with diarrhea and asked what could have caused this.  I once again explained I recommend colonoscopy for further evaluation  Van Mckeon verbalized understanding and willing to proceed with colonoscopy

## 2021-01-05 ENCOUNTER — TELEPHONE (OUTPATIENT)
Dept: GASTROENTEROLOGY | Facility: CLINIC | Age: 81
End: 2021-01-05

## 2021-01-05 NOTE — TELEPHONE ENCOUNTER
Spoke with patient this morning - He is needing to schedule his colonoscopy with us.     Patient denied scheduling - he is waiting for a call back about get his covid vaccine - Once he gets this he will schedule his procedure with us.     I will give him a call again in a month.     Thank you

## 2021-02-23 ENCOUNTER — OFFICE VISIT (OUTPATIENT)
Dept: INTERNAL MEDICINE | Facility: CLINIC | Age: 81
End: 2021-02-23

## 2021-02-23 VITALS
OXYGEN SATURATION: 98 % | DIASTOLIC BLOOD PRESSURE: 82 MMHG | BODY MASS INDEX: 23.43 KG/M2 | SYSTOLIC BLOOD PRESSURE: 124 MMHG | HEIGHT: 72 IN | TEMPERATURE: 97.1 F | HEART RATE: 69 BPM | WEIGHT: 173 LBS

## 2021-02-23 DIAGNOSIS — I63.9 INFARCTION OF LEFT BASAL GANGLIA (HCC): ICD-10-CM

## 2021-02-23 DIAGNOSIS — J44.9 COPD, GROUP B, BY GOLD 2017 CLASSIFICATION (HCC): ICD-10-CM

## 2021-02-23 DIAGNOSIS — G72.0 STATIN MYOPATHY: ICD-10-CM

## 2021-02-23 DIAGNOSIS — I71.40 ABDOMINAL AORTIC ANEURYSM (AAA) WITHOUT RUPTURE (HCC): ICD-10-CM

## 2021-02-23 DIAGNOSIS — I10 ESSENTIAL (PRIMARY) HYPERTENSION: Primary | ICD-10-CM

## 2021-02-23 DIAGNOSIS — T46.6X5A STATIN MYOPATHY: ICD-10-CM

## 2021-02-23 PROBLEM — J47.9 BRONCHIOLECTASIS (HCC): Status: ACTIVE | Noted: 2021-02-23

## 2021-02-23 PROCEDURE — 99214 OFFICE O/P EST MOD 30 MIN: CPT | Performed by: INTERNAL MEDICINE

## 2021-02-23 NOTE — PROGRESS NOTES
Subjective   Van Mckeon is a 80 y.o. male.   Chief Complaint   Patient presents with   • Hypertension     6 month follow up    • Med Refill     wants to knoe if he needs to coninue coq-10 if so needs refill        History of Present Illness patient is here for follow-up of hypertension COPD and recent discovery of abdominal aortic aneurysm.  I was able to go back and review patient's CAT scan which showed his aneurysm this aneurysm is less than 4 cm in size he is already been evaluated by vascular surgery who he plans to follow-up with in June.  He already has a repeat ultrasound scheduled prior to that visit.  In regard to his COPD he is breathing reasonably well he is not having any new problems he is able to do his usual activities his only major concern is that he is actually having some memory difficulty since his prior stroke.    The following portions of the patient's history were reviewed and updated as appropriate: allergies, current medications, past family history, past medical history, past social history, past surgical history and problem list.    Review of Systems   Constitutional: Negative for activity change, appetite change, fatigue, fever, unexpected weight gain and unexpected weight loss.   HENT: Negative for swollen glands, trouble swallowing and voice change.    Eyes: Negative for blurred vision and visual disturbance.   Respiratory: Positive for shortness of breath. Negative for cough.    Cardiovascular: Negative for chest pain, palpitations and leg swelling.   Gastrointestinal: Negative for abdominal pain, constipation, diarrhea, nausea, vomiting and indigestion.   Endocrine: Negative for cold intolerance, heat intolerance, polydipsia and polyphagia.   Genitourinary: Negative for dysuria and frequency.   Musculoskeletal: Negative for arthralgias, back pain, joint swelling and neck pain.   Skin: Negative for color change, rash and skin lesions.   Neurological: Positive for memory  problem. Negative for dizziness, weakness, headache and confusion.   Hematological: Does not bruise/bleed easily.   Psychiatric/Behavioral: Negative for agitation, hallucinations and suicidal ideas. The patient is not nervous/anxious.        Objective   Past Medical History:   Diagnosis Date   • Alcohol use 10/3/2019   • Anxiety    • Bronchiectasis without complication (CMS/HCC) 4/9/2019   • COPD (chronic obstructive pulmonary disease) (CMS/HCC)    • COPD, group B, by GOLD 2017 classification (CMS/HCC) 4/9/2019   • Essential hypertension 4/9/2019   • Hypertension    • Personal history of nicotine dependence 4/9/2019   • Prostate hypertrophy    • Snoring    • Thrombocytopenia (CMS/HCC)    • TIA (transient ischemic attack)    • Tobacco user 10/3/2019      Past Surgical History:   Procedure Laterality Date   • BRONCHOSCOPY     • EYE SURGERY     • NASAL SINUS SURGERY          Current Outpatient Medications:   •  albuterol sulfate  (90 Base) MCG/ACT inhaler, Inhale 2 puffs Every 4 (Four) Hours As Needed for Wheezing., Disp: 3 inhaler, Rfl: 3  •  ALPRAZolam (XANAX) 0.5 MG tablet, Take 0.5 mg by mouth Daily As Needed for Anxiety., Disp: , Rfl:   •  aspirin 81 MG chewable tablet, Chew 1 tablet Daily. (Patient taking differently: Chew 162 mg Daily. Pt takes 81mg), Disp: 30 tablet, Rfl: 2  •  cyanocobalamin (VITAMIN B-12) 500 MCG tablet, Take 1 tablet by mouth Daily., Disp: 30 tablet, Rfl: 2  •  finasteride (PROSCAR) 5 MG tablet, Take 5 mg by mouth Daily., Disp: , Rfl:   •  folic acid (FOLVITE) 1 MG tablet, Take 1 tablet by mouth Daily., Disp: 30 tablet, Rfl: 2  •  lisinopril (PRINIVIL,ZESTRIL) 40 MG tablet, Take 20 mg by mouth Daily., Disp: , Rfl:   •  tamsulosin (Flomax) 0.4 MG capsule 24 hr capsule, Take 1 capsule by mouth Daily., Disp: , Rfl:   •  tiotropium bromide-olodaterol (STIOLTO RESPIMAT) 2.5-2.5 MCG/ACT aerosol solution inhaler, Inhale 2 puffs Daily., Disp: , Rfl:      Vitals:    02/23/21 1120   BP: 124/82    Pulse: 69   Temp: 97.1 °F (36.2 °C)   SpO2: 98%         02/23/21  1120   Weight: 78.5 kg (173 lb)     Patient's Body mass index is 23.46 kg/m². BMI is .      Physical Exam  Vitals signs and nursing note reviewed.   Constitutional:       General: He is not in acute distress.     Appearance: Normal appearance. He is well-developed.   HENT:      Head: Normocephalic and atraumatic.      Right Ear: External ear normal.      Left Ear: External ear normal.      Nose: Nose normal.   Eyes:      Extraocular Movements: Extraocular movements intact.      Conjunctiva/sclera: Conjunctivae normal.      Pupils: Pupils are equal, round, and reactive to light.   Neck:      Musculoskeletal: Normal range of motion and neck supple. No neck rigidity or muscular tenderness.   Cardiovascular:      Rate and Rhythm: Normal rate and regular rhythm.      Pulses: Normal pulses.      Heart sounds: Normal heart sounds.   Pulmonary:      Effort: Pulmonary effort is normal.      Breath sounds: Normal breath sounds.   Abdominal:      General: Bowel sounds are normal.      Palpations: Abdomen is soft.   Musculoskeletal: Normal range of motion.   Skin:     General: Skin is warm and dry.   Neurological:      General: No focal deficit present.      Mental Status: He is alert and oriented to person, place, and time.   Psychiatric:         Mood and Affect: Mood normal.         Behavior: Behavior normal.               Assessment/Plan   Diagnoses and all orders for this visit:    1. Essential (primary) hypertension (Primary)    2. COPD, group B, by GOLD 2017 classification (CMS/HCC)    3. Abdominal aortic aneurysm (AAA) without rupture (CMS/HCC)    4. Statin myopathy    5. Infarction of left basal ganglia (CMS/HCC)      At this point we are following up on a number of problems which are chronic #1 patient has hypertension his blood pressure is well controlled on current medication.  #2 patient has COPD he is able to function be very active I am not making  any changes in that medication or treatment.  In regard to his abdominal aortic aneurysm I reviewed his last CAT scan as well as vascular surgery evaluation he does have an upcoming appointment with vascular surgery so no new plans for that as he is scheduled to have an ultrasound in the summer.  In regard guard to need for statin therapy he is intolerant to statins and he is asking whether he should continue his co-Q10 I have actually discontinued that since that is used to go along with his statins.  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 I have encouraged him to limit his TV time which apparently is his major activity.

## 2021-04-12 PROBLEM — Z72.0 TOBACCO USER: Status: RESOLVED | Noted: 2019-10-03 | Resolved: 2021-04-12

## 2021-04-12 PROBLEM — J47.9 BRONCHIECTASIS WITHOUT COMPLICATION (HCC): Chronic | Status: ACTIVE | Noted: 2019-04-09

## 2021-04-12 PROBLEM — Z87.891 PERSONAL HISTORY OF NICOTINE DEPENDENCE: Chronic | Status: ACTIVE | Noted: 2019-04-09

## 2021-04-12 PROBLEM — J44.9 COPD, GROUP B, BY GOLD 2017 CLASSIFICATION: Chronic | Status: ACTIVE | Noted: 2019-04-09

## 2021-04-12 NOTE — PROGRESS NOTES
Chief Complaint  COPD    Subjective    History of Present Illness {  Problem List  Visit Diagnosis   Encounters  Notes  Medications  Labs  Result Review Imaging  Media     Van Mckeon presents to [facility] for:    Management of his bronchiectasis and COPD.  Most recent FEV1 was 60.  Diffusion 61, corrected was 64.  He frequently has shortness of breath and productive coughing.  He controls his symptoms with Stiolto daily. He has pro-air he can use when needed.  [facility]. he has an extensive smoking history.  He is not ready to establish a cessation plan.  He does not meet low-dose lung cancer imaging criteria due to his age.  He has intermittent pleuritic type chest discomfort.  He has benefited from nonsteroidals in the past.  He would like a refill.         Prior to Admission medications    Medication Sig Start Date End Date Taking? Authorizing Provider   albuterol sulfate  (90 Base) MCG/ACT inhaler Inhale 2 puffs Every 4 (Four) Hours As Needed for Wheezing. 4/14/20   Josephine Max APRN   ALPRAZolam (XANAX) 0.5 MG tablet Take 0.5 mg by mouth Daily As Needed for Anxiety. 2/2/17   Dylon Bobo MD   aspirin 81 MG chewable tablet Chew 1 tablet Daily.  Patient taking differently: Chew 162 mg Daily. Pt takes 81mg 9/6/19   Clara Ambrosio APRN   cyanocobalamin (VITAMIN B-12) 500 MCG tablet Take 1 tablet by mouth Daily. 9/6/19   Clara Ambrosio APRN   finasteride (PROSCAR) 5 MG tablet Take 5 mg by mouth Daily. 2/2/17   Dylon Bobo MD   folic acid (FOLVITE) 1 MG tablet Take 1 tablet by mouth Daily. 9/6/19   Clara Ambrosio APRN   lisinopril (PRINIVIL,ZESTRIL) 40 MG tablet Take 20 mg by mouth Daily.    Dylon Bobo MD   tamsulosin (Flomax) 0.4 MG capsule 24 hr capsule Take 1 capsule by mouth Daily. 2/21/19   Dylon Bobo MD   tiotropium bromide-olodaterol  "(STIOLTO RESPIMAT) 2.5-2.5 MCG/ACT aerosol solution inhaler Inhale 2 puffs Daily.    Provider, Historical, MD       Social History     Socioeconomic History   • Marital status:      Spouse name: Not on file   • Number of children: Not on file   • Years of education: Not on file   • Highest education level: Not on file   Tobacco Use   • Smoking status: Current Every Day Smoker     Packs/day: 1.00     Years: 50.00     Pack years: 50.00     Types: Cigarettes     Last attempt to quit: 2019     Years since quittin.6   • Smokeless tobacco: Former User     Types: Snuff     Quit date:    Substance and Sexual Activity   • Alcohol use: Yes     Alcohol/week: 3.0 standard drinks     Types: 3 Shots of liquor per week     Comment: daily   • Drug use: No   • Sexual activity: Defer       Objective   Vital Signs:   /74   Pulse 50   Ht 182.9 cm (72\")   Wt 78.9 kg (174 lb)   SpO2 92% Comment: RA  BMI 23.60 kg/m²     Physical Exam  Constitutional:       Interventions: Face mask in place.   Cardiovascular:      Rate and Rhythm: Normal rate and regular rhythm.      Heart sounds: No murmur heard.     Pulmonary:      Effort: Pulmonary effort is normal.      Breath sounds: Normal breath sounds.   Musculoskeletal:      Right lower leg: No edema.      Left lower leg: No edema.   Neurological:      Mental Status: He is alert and oriented to person, place, and time.        Result Review :{ Labs  Result Review  Imaging  Med Tab  Media :    PFT Values        Some values may be hidden. Unless noted otherwise, only the newest values recorded on each date are displayed.         Old Values PFT Results 10/14/19   No data to display.      Pre Drug PFT Results 10/14/19   FVC 80   FEV1 60   FEF 25-75% 32   FEV1/FVC 58      Post Drug PFT Results 10/14/19   No data to display.      Other Tests PFT Results 10/14/19   DLCO 60   D/VAsb 64           My interpretation of the PFT: None for this visit    Results for orders placed " in visit on 10/14/19    Pulmonary Function Test    Narrative  Pulmonary Function Test  Performed by: Josephine Max APRN  Authorized by: Josephine Max APRN    Pre Drug  FVC: 80%  FEV1: 60%  FEF 25-75%: 32%  FEV1/FVC: 58%  DLCO: 60%  D/VAsb: 64%      My interpretation of imaging: None for this visit    My interpretation of labs: None for this visit      Assessment and Plan {CC Problem List  Visit Diagnosis  ROS  Review (Popup)  Health Maintenance  Quality  BestPractice  Medications  SmartSets  SnapShot Encounters  Media      Diagnoses and all orders for this visit:    1. Bronchiectasis without complication (CMS/[facility]) (Primary)  Comments:  Continue Stiolto.  Use albuterol as needed.  He does not need refills.    2. Personal history of nicotine dependence  Comments:  Recommend smoking cessation.  He is not interested    3. COPD, group B, by GOLD 2017 classification (CMS/[facility])  Comments:  Continue bronchodilators.  Update PFTs with follow-up    4. Pleurisy  Comments:  Refill sent for Celebrex.  He has responded well in the past to this.  Use sparingly  Orders:  -     celecoxib (CeleBREX) 100 MG capsule; Take 1 capsule by mouth 2 (Two) Times a Day As Needed for Mild Pain  for up to 30 days.  Dispense: 60 capsule; Refill: 1        Patient's Body mass index is 23.6 kg/m². BMI is within normal parameters. No follow-up required..    JAE Farris  4/21/2021  11:58 CDT    Follow Up {Instructions Charge Capture  Follow-up Communications   Return in about 6 months (around 10/21/2021) for FVL with diffusion.    Patient was given instructions and counseling regarding his condition or for health maintenance advice. Please see specific information pulled into the AVS if appropriate.

## 2021-04-15 ENCOUNTER — OFFICE VISIT (OUTPATIENT)
Dept: INTERNAL MEDICINE | Facility: CLINIC | Age: 81
End: 2021-04-15

## 2021-04-15 VITALS
TEMPERATURE: 97.1 F | WEIGHT: 170 LBS | SYSTOLIC BLOOD PRESSURE: 146 MMHG | HEIGHT: 72 IN | DIASTOLIC BLOOD PRESSURE: 90 MMHG | OXYGEN SATURATION: 99 % | HEART RATE: 61 BPM | BODY MASS INDEX: 23.03 KG/M2

## 2021-04-15 DIAGNOSIS — M95.4 CHEST WALL DEFORMITY, ACQUIRED: Primary | ICD-10-CM

## 2021-04-15 DIAGNOSIS — I71.40 ABDOMINAL AORTIC ANEURYSM (AAA) WITHOUT RUPTURE (HCC): ICD-10-CM

## 2021-04-15 PROCEDURE — 99214 OFFICE O/P EST MOD 30 MIN: CPT | Performed by: INTERNAL MEDICINE

## 2021-04-15 NOTE — PROGRESS NOTES
Subjective   Van Mckeon is a 80 y.o. male.   Chief Complaint   Patient presents with   • KNOT ON RIBS     LEFT SIDE OF RIBS LARGE KNOT; NOTICED 3/4 DAYS AGO ON OCCASION HAS A SHARP PAIN ON THE LEFT SIDE        History of Present Illness patient is here due to an enlargement of the left lower chest he is also concerned that he has an aneurysm that he is worried at that aneurysm that bulging there.  He has had some sharp pains on that left-hand side.  Coincidently I was able to pull up a CAT scan of his abdomen which clearly shows he is aneurysm as well as that left lower chest wall.  I reviewed that with him showing him the actual pictures he has a left chest wall deformity looks like an old injury.    The following portions of the patient's history were reviewed and updated as appropriate: allergies, current medications, past family history, past medical history, past social history, past surgical history and problem list.    Review of Systems   Constitutional: Negative for activity change, appetite change, fatigue, fever, unexpected weight gain and unexpected weight loss.   HENT: Negative for swollen glands, trouble swallowing and voice change.    Eyes: Negative for blurred vision and visual disturbance.   Respiratory: Negative for cough and shortness of breath.    Cardiovascular: Negative for chest pain, palpitations and leg swelling.   Gastrointestinal: Negative for abdominal pain, constipation, diarrhea, nausea, vomiting and indigestion.   Endocrine: Negative for cold intolerance, heat intolerance, polydipsia and polyphagia.   Genitourinary: Negative for dysuria and frequency.   Musculoskeletal: Negative for arthralgias, back pain, joint swelling and neck pain.   Skin: Negative for color change, rash and skin lesions.   Neurological: Negative for dizziness, weakness, headache, memory problem and confusion.   Hematological: Does not bruise/bleed easily.   Psychiatric/Behavioral: Negative for agitation,  hallucinations and suicidal ideas. The patient is not nervous/anxious.        Objective   Past Medical History:   Diagnosis Date   • Alcohol use 10/3/2019   • Anxiety    • Bronchiectasis without complication (CMS/[facility]) 4/9/2019   • COPD (chronic obstructive pulmonary disease) (CMS/HCC)    • COPD, group B, by GOLD 2017 classification (CMS/HCC) 4/9/2019   • Essential hypertension 4/9/2019   • Hypertension    • Personal history of nicotine dependence 4/9/2019   • Prostate hypertrophy    • Snoring    • Thrombocytopenia (CMS/HCC)    • TIA (transient ischemic attack)    • Tobacco user 10/3/2019      Past Surgical History:   Procedure Laterality Date   • BRONCHOSCOPY     • EYE SURGERY     • NASAL SINUS SURGERY          Current Outpatient Medications:   •  albuterol sulfate  (90 Base) MCG/ACT inhaler, Inhale 2 puffs Every 4 (Four) Hours As Needed for Wheezing., Disp: 3 inhaler, Rfl: 3  •  ALPRAZolam (XANAX) 0.5 MG tablet, Take 0.5 mg by mouth Daily As Needed for Anxiety., Disp: , Rfl:   •  aspirin 81 MG chewable tablet, Chew 1 tablet Daily. (Patient taking differently: Chew 162 mg Daily. Pt takes 81mg), Disp: 30 tablet, Rfl: 2  •  cyanocobalamin (VITAMIN B-12) 500 MCG tablet, Take 1 tablet by mouth Daily., Disp: 30 tablet, Rfl: 2  •  finasteride (PROSCAR) 5 MG tablet, Take 5 mg by mouth Daily., Disp: , Rfl:   •  folic acid (FOLVITE) 1 MG tablet, Take 1 tablet by mouth Daily., Disp: 30 tablet, Rfl: 2  •  lisinopril (PRINIVIL,ZESTRIL) 40 MG tablet, Take 20 mg by mouth Daily., Disp: , Rfl:   •  tamsulosin (Flomax) 0.4 MG capsule 24 hr capsule, Take 1 capsule by mouth Daily., Disp: , Rfl:   •  tiotropium bromide-olodaterol (STIOLTO RESPIMAT) 2.5-2.5 MCG/ACT aerosol solution inhaler, Inhale 2 puffs Daily., Disp: , Rfl:      Vitals:    04/15/21 1351   BP: 146/90   Pulse: 61   Temp: 97.1 °F (36.2 °C)   SpO2: 99%         04/15/21  1351   Weight: 77.1 kg (170 lb)     Patient's Body mass index is 23.06 kg/m². BMI is  .      Physical Exam  Vitals and nursing note reviewed.   Constitutional:       General: He is not in acute distress.     Appearance: Normal appearance. He is well-developed.   HENT:      Head: Normocephalic and atraumatic.      Right Ear: External ear normal.      Left Ear: External ear normal.      Nose: Nose normal.   Eyes:      Extraocular Movements: Extraocular movements intact.      Conjunctiva/sclera: Conjunctivae normal.      Pupils: Pupils are equal, round, and reactive to light.   Cardiovascular:      Rate and Rhythm: Normal rate and regular rhythm.      Pulses: Normal pulses.      Heart sounds: Normal heart sounds.   Pulmonary:      Effort: Pulmonary effort is normal.      Breath sounds: Normal breath sounds.   Abdominal:      General: Bowel sounds are normal.      Palpations: Abdomen is soft.   Musculoskeletal:         General: Normal range of motion.      Cervical back: Normal range of motion and neck supple. No rigidity. No muscular tenderness.   Skin:     General: Skin is warm and dry.   Neurological:      General: No focal deficit present.      Mental Status: He is alert and oriented to person, place, and time.   Psychiatric:         Mood and Affect: Mood normal.         Behavior: Behavior normal.               Assessment/Plan   Diagnoses and all orders for this visit:    1. Chest wall deformity, acquired (Primary)    2. Abdominal aortic aneurysm (AAA) without rupture (CMS/HCC)      I was able to show patient what his chest wall looks like on CAT scan.  The abnormality that he is noticing right now is likely more prominent due to of separation of his AC joint and a little bit of winging of that left scapula.  He sounds good otherwise I do not see any other problems the pain is likely from activity or something that he is done I have told him to call me if he continues to have problem we will get some chest x-rays and possibly a CT of his chest.  No medications are prescribed patient felt relieved he  ensured me that he would call me if he had more problems.

## 2021-04-21 ENCOUNTER — OFFICE VISIT (OUTPATIENT)
Dept: PULMONOLOGY | Facility: CLINIC | Age: 81
End: 2021-04-21

## 2021-04-21 VITALS
WEIGHT: 174 LBS | SYSTOLIC BLOOD PRESSURE: 126 MMHG | DIASTOLIC BLOOD PRESSURE: 74 MMHG | BODY MASS INDEX: 23.57 KG/M2 | HEART RATE: 50 BPM | OXYGEN SATURATION: 92 % | HEIGHT: 72 IN

## 2021-04-21 DIAGNOSIS — J44.9 COPD, GROUP B, BY GOLD 2017 CLASSIFICATION (HCC): Chronic | ICD-10-CM

## 2021-04-21 DIAGNOSIS — J47.9 BRONCHIECTASIS WITHOUT COMPLICATION (HCC): Primary | Chronic | ICD-10-CM

## 2021-04-21 DIAGNOSIS — R09.1 PLEURISY: Chronic | ICD-10-CM

## 2021-04-21 DIAGNOSIS — Z87.891 PERSONAL HISTORY OF NICOTINE DEPENDENCE: Chronic | ICD-10-CM

## 2021-04-21 PROCEDURE — 99214 OFFICE O/P EST MOD 30 MIN: CPT | Performed by: NURSE PRACTITIONER

## 2021-04-21 RX ORDER — CELECOXIB 100 MG/1
100 CAPSULE ORAL 2 TIMES DAILY PRN
Qty: 60 CAPSULE | Refills: 1 | Status: SHIPPED | OUTPATIENT
Start: 2021-04-21 | End: 2021-05-21

## 2021-04-27 ENCOUNTER — TELEPHONE (OUTPATIENT)
Dept: GASTROENTEROLOGY | Facility: CLINIC | Age: 81
End: 2021-04-27

## 2021-04-27 NOTE — TELEPHONE ENCOUNTER
Called patient this morning to schedule colonoscopy because back in December 2020- he didn't schedule due to covid. While talking with patient this morning he states he is scheduled for vascular surgery in June due to an aortic aneurysm. Patient wants to take care of this first.   Told patient I would check back with him in June 2021.

## 2021-05-21 ENCOUNTER — APPOINTMENT (OUTPATIENT)
Dept: CT IMAGING | Facility: HOSPITAL | Age: 81
End: 2021-05-21

## 2021-05-21 ENCOUNTER — APPOINTMENT (OUTPATIENT)
Dept: GENERAL RADIOLOGY | Facility: HOSPITAL | Age: 81
End: 2021-05-21

## 2021-05-21 ENCOUNTER — HOSPITAL ENCOUNTER (OUTPATIENT)
Facility: HOSPITAL | Age: 81
Setting detail: OBSERVATION
Discharge: LEFT BEFORE DISCHARGE | End: 2021-05-21
Attending: EMERGENCY MEDICINE | Admitting: EMERGENCY MEDICINE

## 2021-05-21 VITALS
SYSTOLIC BLOOD PRESSURE: 147 MMHG | RESPIRATION RATE: 15 BRPM | OXYGEN SATURATION: 96 % | TEMPERATURE: 97.7 F | HEIGHT: 72 IN | BODY MASS INDEX: 22.75 KG/M2 | WEIGHT: 168 LBS | DIASTOLIC BLOOD PRESSURE: 83 MMHG | HEART RATE: 71 BPM

## 2021-05-21 DIAGNOSIS — R41.82 ALTERED MENTAL STATUS, UNSPECIFIED ALTERED MENTAL STATUS TYPE: ICD-10-CM

## 2021-05-21 DIAGNOSIS — G45.9 TIA (TRANSIENT ISCHEMIC ATTACK): Primary | ICD-10-CM

## 2021-05-21 LAB
ABO GROUP BLD: NORMAL
ALBUMIN SERPL-MCNC: 3.9 G/DL (ref 3.5–5.2)
ALBUMIN/GLOB SERPL: 1.3 G/DL
ALP SERPL-CCNC: 75 U/L (ref 39–117)
ALT SERPL W P-5'-P-CCNC: 9 U/L (ref 1–41)
ANION GAP SERPL CALCULATED.3IONS-SCNC: 5 MMOL/L (ref 5–15)
AST SERPL-CCNC: 14 U/L (ref 1–40)
BASOPHILS # BLD AUTO: 0.03 10*3/MM3 (ref 0–0.2)
BASOPHILS NFR BLD AUTO: 0.4 % (ref 0–1.5)
BILIRUB SERPL-MCNC: 0.5 MG/DL (ref 0–1.2)
BLD GP AB SCN SERPL QL: NEGATIVE
BUN SERPL-MCNC: 17 MG/DL (ref 8–23)
BUN/CREAT SERPL: 16.2 (ref 7–25)
CALCIUM SPEC-SCNC: 11.3 MG/DL (ref 8.6–10.5)
CHLORIDE SERPL-SCNC: 107 MMOL/L (ref 98–107)
CO2 SERPL-SCNC: 27 MMOL/L (ref 22–29)
CREAT SERPL-MCNC: 1.05 MG/DL (ref 0.76–1.27)
DEPRECATED RDW RBC AUTO: 48 FL (ref 37–54)
EOSINOPHIL # BLD AUTO: 0.14 10*3/MM3 (ref 0–0.4)
EOSINOPHIL NFR BLD AUTO: 2.1 % (ref 0.3–6.2)
ERYTHROCYTE [DISTWIDTH] IN BLOOD BY AUTOMATED COUNT: 13.6 % (ref 12.3–15.4)
GFR SERPL CREATININE-BSD FRML MDRD: 68 ML/MIN/1.73
GLOBULIN UR ELPH-MCNC: 2.9 GM/DL
GLUCOSE BLDC GLUCOMTR-MCNC: 88 MG/DL (ref 70–130)
GLUCOSE SERPL-MCNC: 96 MG/DL (ref 65–99)
HCT VFR BLD AUTO: 46.7 % (ref 37.5–51)
HGB BLD-MCNC: 15.3 G/DL (ref 13–17.7)
HOLD SPECIMEN: NORMAL
HOLD SPECIMEN: NORMAL
IMM GRANULOCYTES # BLD AUTO: 0.02 10*3/MM3 (ref 0–0.05)
IMM GRANULOCYTES NFR BLD AUTO: 0.3 % (ref 0–0.5)
INR PPP: 1.04 (ref 0.91–1.09)
LYMPHOCYTES # BLD AUTO: 1.69 10*3/MM3 (ref 0.7–3.1)
LYMPHOCYTES NFR BLD AUTO: 25.2 % (ref 19.6–45.3)
MCH RBC QN AUTO: 31.3 PG (ref 26.6–33)
MCHC RBC AUTO-ENTMCNC: 32.8 G/DL (ref 31.5–35.7)
MCV RBC AUTO: 95.5 FL (ref 79–97)
MONOCYTES # BLD AUTO: 0.49 10*3/MM3 (ref 0.1–0.9)
MONOCYTES NFR BLD AUTO: 7.3 % (ref 5–12)
NEUTROPHILS NFR BLD AUTO: 4.33 10*3/MM3 (ref 1.7–7)
NEUTROPHILS NFR BLD AUTO: 64.7 % (ref 42.7–76)
NRBC BLD AUTO-RTO: 0 /100 WBC (ref 0–0.2)
PLATELET # BLD AUTO: 115 10*3/MM3 (ref 140–450)
PMV BLD AUTO: 12 FL (ref 6–12)
POTASSIUM SERPL-SCNC: 4.3 MMOL/L (ref 3.5–5.2)
PROT SERPL-MCNC: 6.8 G/DL (ref 6–8.5)
PROTHROMBIN TIME: 12.8 SECONDS (ref 11.5–13.4)
RBC # BLD AUTO: 4.89 10*6/MM3 (ref 4.14–5.8)
RH BLD: POSITIVE
SARS-COV-2 RDRP RESP QL NAA+PROBE: NORMAL
SODIUM SERPL-SCNC: 139 MMOL/L (ref 136–145)
T&S EXPIRATION DATE: NORMAL
WBC # BLD AUTO: 6.7 10*3/MM3 (ref 3.4–10.8)
WHOLE BLOOD HOLD SPECIMEN: NORMAL
WHOLE BLOOD HOLD SPECIMEN: NORMAL

## 2021-05-21 PROCEDURE — 70450 CT HEAD/BRAIN W/O DYE: CPT

## 2021-05-21 PROCEDURE — 86900 BLOOD TYPING SEROLOGIC ABO: CPT | Performed by: EMERGENCY MEDICINE

## 2021-05-21 PROCEDURE — 82962 GLUCOSE BLOOD TEST: CPT

## 2021-05-21 PROCEDURE — 87635 SARS-COV-2 COVID-19 AMP PRB: CPT | Performed by: EMERGENCY MEDICINE

## 2021-05-21 PROCEDURE — 80053 COMPREHEN METABOLIC PANEL: CPT | Performed by: EMERGENCY MEDICINE

## 2021-05-21 PROCEDURE — 93010 ELECTROCARDIOGRAM REPORT: CPT | Performed by: INTERNAL MEDICINE

## 2021-05-21 PROCEDURE — 71045 X-RAY EXAM CHEST 1 VIEW: CPT

## 2021-05-21 PROCEDURE — 70498 CT ANGIOGRAPHY NECK: CPT

## 2021-05-21 PROCEDURE — 0 IOPAMIDOL PER 1 ML: Performed by: EMERGENCY MEDICINE

## 2021-05-21 PROCEDURE — 86901 BLOOD TYPING SEROLOGIC RH(D): CPT | Performed by: EMERGENCY MEDICINE

## 2021-05-21 PROCEDURE — 99283 EMERGENCY DEPT VISIT LOW MDM: CPT

## 2021-05-21 PROCEDURE — 85610 PROTHROMBIN TIME: CPT | Performed by: EMERGENCY MEDICINE

## 2021-05-21 PROCEDURE — 86850 RBC ANTIBODY SCREEN: CPT | Performed by: EMERGENCY MEDICINE

## 2021-05-21 PROCEDURE — 85025 COMPLETE CBC W/AUTO DIFF WBC: CPT | Performed by: EMERGENCY MEDICINE

## 2021-05-21 PROCEDURE — C9803 HOPD COVID-19 SPEC COLLECT: HCPCS

## 2021-05-21 PROCEDURE — 70496 CT ANGIOGRAPHY HEAD: CPT

## 2021-05-21 PROCEDURE — 93005 ELECTROCARDIOGRAM TRACING: CPT | Performed by: EMERGENCY MEDICINE

## 2021-05-21 RX ORDER — SODIUM CHLORIDE 0.9 % (FLUSH) 0.9 %
10 SYRINGE (ML) INJECTION AS NEEDED
Status: DISCONTINUED | OUTPATIENT
Start: 2021-05-21 | End: 2021-05-21 | Stop reason: HOSPADM

## 2021-05-21 RX ADMIN — IOPAMIDOL 100 ML: 755 INJECTION, SOLUTION INTRAVENOUS at 16:12

## 2021-05-22 LAB
QT INTERVAL: 380 MS
QTC INTERVAL: 392 MS

## 2021-05-24 ENCOUNTER — TELEPHONE (OUTPATIENT)
Dept: INTERNAL MEDICINE | Facility: CLINIC | Age: 81
End: 2021-05-24

## 2021-05-25 ENCOUNTER — OFFICE VISIT (OUTPATIENT)
Dept: INTERNAL MEDICINE | Facility: CLINIC | Age: 81
End: 2021-05-25

## 2021-05-25 VITALS
DIASTOLIC BLOOD PRESSURE: 72 MMHG | TEMPERATURE: 97.5 F | OXYGEN SATURATION: 97 % | BODY MASS INDEX: 22.62 KG/M2 | HEIGHT: 72 IN | WEIGHT: 167 LBS | SYSTOLIC BLOOD PRESSURE: 128 MMHG | HEART RATE: 61 BPM

## 2021-05-25 DIAGNOSIS — Z72.0 TOBACCO USE: ICD-10-CM

## 2021-05-25 DIAGNOSIS — G45.9 TIA (TRANSIENT ISCHEMIC ATTACK): ICD-10-CM

## 2021-05-25 DIAGNOSIS — Z09 HOSPITAL DISCHARGE FOLLOW-UP: Primary | ICD-10-CM

## 2021-05-25 DIAGNOSIS — I63.9 INFARCTION OF LEFT BASAL GANGLIA (HCC): ICD-10-CM

## 2021-05-25 DIAGNOSIS — D69.6 THROMBOCYTOPENIA (HCC): ICD-10-CM

## 2021-05-25 PROCEDURE — 99214 OFFICE O/P EST MOD 30 MIN: CPT | Performed by: NURSE PRACTITIONER

## 2021-05-25 NOTE — PATIENT INSTRUCTIONS
"Transient Ischemic Attack    A transient ischemic attack (TIA) is a \"warning stroke\" that causes stroke-like symptoms that go away quickly. A TIA does not cause lasting damage to the brain. But having a TIA is a sign that you may be at risk for a stroke. Lifestyle changes and medical treatments can help prevent a stroke.  It is important to know the symptoms of a TIA and what to do. Get help right away, even if your symptoms go away. The symptoms of a TIA are the same as those of a stroke. They can happen fast, and they usually go away within minutes or hours. They can include:  · Weakness or loss of feeling in your face, arm, or leg. This often happens on one side of your body.  · Trouble walking.  · Trouble moving your arms or legs.  · Trouble talking or understanding what people are saying.  · Trouble seeing.  · Seeing two of one object (double vision).  · Feeling dizzy.  · Feeling confused.  · Loss of balance or coordination.  · Feeling sick to your stomach (nauseous) and throwing up (vomiting).  · A very bad headache for no reason.  What increases the risk?  Certain things may make you more likely to have a TIA. Some of these are things that you can change, such as:  · Being very overweight (obese).  · Using products that contain nicotine or tobacco, such as cigarettes and e-cigarettes.  · Taking birth control pills.  · Not being active.  · Drinking too much alcohol.  · Using drugs.  Other risk factors include:  · Having an irregular heartbeat (atrial fibrillation).  · Being  or .  · Having had blood clots, stroke, TIA, or heart attack in the past.  · Being a woman with a history of high blood pressure in pregnancy (preeclampsia).  · Being over the age of 60.  · Being male.  · Having family history of stroke.  · Having the following diseases or conditions:  ? High blood pressure.  ? High cholesterol.  ? Diabetes.  ? Heart disease.  ? Sickle cell disease.  ? Sleep apnea.  ? Migraine " "headache.  ? Long-term (chronic) diseases that cause soreness and swelling (inflammation).  ? Disorders that affect how your blood clots.  Follow these instructions at home:  Medicines    · Take over-the-counter and prescription medicines only as told by your doctor.  · If you were told to take aspirin or another medicine to thin your blood, take it exactly as told by your doctor.  ? Taking too much of the medicine can cause bleeding.  ? Taking too little of the medicine may not work to treat the problem.  Eating and drinking    · Eat 5 or more servings of fruits and vegetables each day.  · Follow instructions from your doctor about your diet. You may need to follow a certain diet to help lower your risk of having a stroke. You may need to:  ? Eat a diet that is low in fat and salt.  ? Eat foods that contain a lot of fiber.  ? Limit the amount of carbohydrates and sugar in your diet.  · Limit alcohol intake to 1 drink a day for nonpregnant women and 2 drinks a day for men. One drink equals 12 oz of beer, 5 oz of wine, or 1½ oz of hard liquor.  General instructions  · Keep a healthy weight.  · Stay active. Try to get at least 30 minutes of activity on all or most days.  · Find out if you have a condition called sleep apnea. Get treatment if needed.  · Do not use any products that contain nicotine or tobacco, such as cigarettes and e-cigarettes. If you need help quitting, ask your doctor.  · Do not abuse drugs.  · Keep all follow-up visits as told by your doctor. This is important.  Get help right away if:  · You have any signs of stroke. \"BE FAST\" is an easy way to remember the main warning signs:  ? B - Balance. Signs are dizziness, sudden trouble walking, or loss of balance.  ? E - Eyes. Signs are trouble seeing or a sudden change in how you see.  ? F - Face. Signs are sudden weakness or loss of feeling of the face, or the face or eyelid drooping on one side.  ? A - Arms. Signs are weakness or loss of feeling in an " "arm. This happens suddenly and usually on one side of the body.  ? S - Speech. Signs are sudden trouble speaking, slurred speech, or trouble understanding what people say.  ? T - Time. Time to call emergency services. Write down what time symptoms started.  · You have other signs of stroke, such as:  ? A sudden, very bad headache with no known cause.  ? Feeling sick to your stomach (nausea).  ? Throwing up (vomiting).  ? Jerky movements that you cannot control (seizure).  These symptoms may be an emergency. Do not wait to see if the symptoms will go away. Get medical help right away. Call your local emergency services (911 in the [place] Do not drive yourself to the hospital.  Summary  · A transient ischemic attack (TIA) is a \"warning stroke\" that causes stroke-like symptoms that go away quickly.  · A TIA is a medical emergency. Get help right away, even if your symptoms go away.  · A TIA does not cause lasting damage to the brain.  · Having a TIA is a sign that you may be at risk for a stroke. Lifestyle changes and medical treatments can help prevent a stroke.  This information is not intended to replace advice given to you by your health care provider. Make sure you discuss any questions you have with your health care provider.  Document Revised: 09/13/2019 Document Reviewed: 03/21/2018  Elsevier Patient Education © 2021 [employer]    "

## 2021-05-25 NOTE — PROGRESS NOTES
"Chief Complaint  ER followup (Followup for ER visit from Friday left AMA)    Subjective          Van Mckeon presents to [facility]  Mr. Mckeon is a pleasant 79 yo male who present following ED visit for possible TIA. Patient's daughter is present for history assistance. Patient's daughter reports on 5/21 her father had called and complained of left arm tingling, but blamed this on his carpal tunnel and continued with his mowing. However, around 8 am, noticed difficulties with his memory and when his other daughter showed up with breakfast he had reported he didn't know what he was supposed to do with the meal. He had a history of stroke, so they took in to the ER for further evaluation. She reports by the time they were in the ER, 30 minutes from memory change, he was back to his baseline and feeling better. The CT of head, CTA of neck and head all confirmed chronic stroke findings, but no acute stroke findings. The ER physician wanted to keep patient for further testing, including rule out seizure with EEG, but patient refused and left AMA. Patient reports that he is feeling okay today and denies further issues with memory or numbness on the left side. His daughter was concerned today about how much aspirin he should be taking. He is taking  1 tablet daily, but previously was told by Dr. Chiang to take 2 tablets daily for stroke prevention. His platelet count was 115,000 and most likely the reason for reducing his dose. Neurologic exam completed today without acute findings.       Objective   Vital Signs:   /72 (BP Location: Left arm, Patient Position: Sitting, Cuff Size: Adult)   Pulse 61   Temp 97.5 °F (36.4 °C) (Infrared)   Ht 182.9 cm (72\")   Wt 75.8 kg (167 lb)   SpO2 97%   BMI 22.65 kg/m²     Physical Exam  Vitals and nursing note reviewed.   Constitutional:       Appearance: He is well-developed.   HENT:      Head: Normocephalic and atraumatic.      Right " Ear: External ear normal.      Left Ear: External ear normal.   Eyes:      Conjunctiva/sclera: Conjunctivae normal.      Pupils: Pupils are equal, round, and reactive to light.   Neck:      Thyroid: No thyromegaly.   Cardiovascular:      Rate and Rhythm: Normal rate and regular rhythm.      Heart sounds: Normal heart sounds.   Pulmonary:      Effort: Pulmonary effort is normal.      Breath sounds: Normal breath sounds.   Abdominal:      General: Bowel sounds are normal.      Palpations: Abdomen is soft.   Musculoskeletal:      Cervical back: Normal range of motion and neck supple.   Lymphadenopathy:      Cervical: No cervical adenopathy.   Skin:     General: Skin is warm and dry.   Neurological:      Mental Status: He is alert and oriented to person, place, and time.      Sensory: Sensation is intact.      Motor: Motor function is intact.      Coordination: Coordination is intact.      Gait: Gait is intact.      Comments: Mild aphasia   Psychiatric:         Attention and Perception: Attention normal.         Mood and Affect: Mood normal.         Speech: Speech is delayed.         Behavior: Behavior normal.         Thought Content: Thought content normal.         Cognition and Memory: He exhibits impaired remote memory.         Judgment: Judgment normal.        Result Review :   The following data was reviewed by: JAE Oliver on 05/25/2021:  Common labs    Common Labsle 6/11/20 6/11/20 6/11/20 6/11/20 6/11/20 7/30/20 5/21/21 5/21/21    0828 0828 0828 0828 0828  1500 1500   Glucose        96   Glucose     97 82     BUN     12 14  17   Creatinine     1.14 1.01  1.05   eGFR Non  Am     62 71  68   eGFR  Am     75 86     Sodium     137 140  139   Potassium     4.5 4.1  4.3   Chloride     105 104  107   Calcium     11.4 (A) 10.9 (A)  11.3 (A)   Total Protein     6.7      Albumin     4.00   3.90   Total Bilirubin     0.6   0.5   Alkaline Phosphatase     66   75   AST (SGOT)     11   14   ALT (SGPT)      12   9   WBC   5.24    6.70    Hemoglobin   14.5    15.3    Hematocrit   43.0    46.7    Platelets   122 (A)    115 (A)    Total Cholesterol    147       Triglycerides    113       HDL Cholesterol    46       LDL Cholesterol     78       PSA 0.724          Uric Acid  7.6 (A)         (A) Abnormal value       Comments are available for some flowsheets but are not being displayed.           Data reviewed: Recent hospitalization notes ER visit 5/21          Assessment and Plan    Diagnoses and all orders for this visit:    1. Hospital discharge follow-up (Primary)    2. TIA (transient ischemic attack)  -     Ambulatory Referral to Neurology    3. Infarction of left basal ganglia (CMS/HCC)  -     Ambulatory Referral to Neurology    4. Tobacco use    5. Thrombocytopenia (CMS/HCC)  -     CBC & Differential      I spent 20 minutes caring for Van on this date of service. This time includes time spent by me in the following activities:preparing for the visit, reviewing tests, obtaining and/or reviewing a separately obtained history, performing a medically appropriate examination and/or evaluation , counseling and educating the patient/family/caregiver, ordering medications, tests, or procedures, referring and communicating with other health care professionals , documenting information in the medical record, independently interpreting results and communicating that information with the patient/family/caregiver and care coordination     Follow Up     Return if symptoms worsen or fail to improve.     Will refer patient to neurologist at this point. His history of stroke and recent TIA places him at high risk for reoccurrence. Patient ,000, will have him continue on daily 81 mg until advised otherwise by Dr. Chiang or Dr. Aleman. Will recheck platelet count today.    Advised patient to quit smoking immediately. Discussed his increased risk for stroke/TIA. Educated on different methods of smoking cessation. He did not  want to set a plan today, but will start reducing cigarette use and when he is ready will call the office for smoking cessation assistance.     Patient was given instructions and counseling regarding his condition or for health maintenance advice. Please see specific information pulled into the AVS if appropriate.

## 2021-05-26 ENCOUNTER — TELEPHONE (OUTPATIENT)
Dept: INTERNAL MEDICINE | Facility: CLINIC | Age: 81
End: 2021-05-26

## 2021-05-26 LAB
BASOPHILS # BLD AUTO: 0.04 10*3/MM3 (ref 0–0.2)
BASOPHILS NFR BLD AUTO: 0.8 % (ref 0–1.5)
EOSINOPHIL # BLD AUTO: 0.05 10*3/MM3 (ref 0–0.4)
EOSINOPHIL NFR BLD AUTO: 1 % (ref 0.3–6.2)
ERYTHROCYTE [DISTWIDTH] IN BLOOD BY AUTOMATED COUNT: 12.3 % (ref 12.3–15.4)
HCT VFR BLD AUTO: 45.4 % (ref 37.5–51)
HGB BLD-MCNC: 15.6 G/DL (ref 13–17.7)
IMM GRANULOCYTES # BLD AUTO: 0.02 10*3/MM3 (ref 0–0.05)
IMM GRANULOCYTES NFR BLD AUTO: 0.4 % (ref 0–0.5)
LYMPHOCYTES # BLD AUTO: 1.57 10*3/MM3 (ref 0.7–3.1)
LYMPHOCYTES NFR BLD AUTO: 30 % (ref 19.6–45.3)
MCH RBC QN AUTO: 32.4 PG (ref 26.6–33)
MCHC RBC AUTO-ENTMCNC: 34.4 G/DL (ref 31.5–35.7)
MCV RBC AUTO: 94.4 FL (ref 79–97)
MONOCYTES # BLD AUTO: 0.48 10*3/MM3 (ref 0.1–0.9)
MONOCYTES NFR BLD AUTO: 9.2 % (ref 5–12)
NEUTROPHILS # BLD AUTO: 3.08 10*3/MM3 (ref 1.7–7)
NEUTROPHILS NFR BLD AUTO: 58.6 % (ref 42.7–76)
NRBC BLD AUTO-RTO: 0 /100 WBC (ref 0–0.2)
PLATELET # BLD AUTO: 115 10*3/MM3 (ref 140–450)
RBC # BLD AUTO: 4.81 10*6/MM3 (ref 4.14–5.8)
WBC # BLD AUTO: 5.24 10*3/MM3 (ref 3.4–10.8)

## 2021-05-26 NOTE — TELEPHONE ENCOUNTER
----- Message from JAE Mata sent at 5/26/2021  7:56 AM CDT -----  Platelet still slightly low and with bruising on skin yesterday, would like for him to stay on asa 81 daily per discharge instructions. Will discuss the need for additional dose of ASA with Dr. Aleman.

## 2021-06-02 ENCOUNTER — TELEPHONE (OUTPATIENT)
Dept: VASCULAR SURGERY | Facility: CLINIC | Age: 81
End: 2021-06-02

## 2021-06-02 NOTE — TELEPHONE ENCOUNTER
Left message reminding Mr Mckeon of his appointments for Thursday, June 3rd, 2021. Reminded Mr Mckeon to arrive at the [facility] at 8 am for 830 am testing with nothing to eat or drink after midnight and follow up afterwards at 1115 am with Dr Sauceda. Also advised Mr Mckeon if he had any questions, concerns or needs to reschedule to please call the office at [phone].

## 2021-06-03 ENCOUNTER — HOSPITAL ENCOUNTER (OUTPATIENT)
Dept: ULTRASOUND IMAGING | Facility: HOSPITAL | Age: 81
Discharge: HOME | End: 2021-06-03

## 2021-06-03 ENCOUNTER — OFFICE VISIT (OUTPATIENT)
Dept: VASCULAR SURGERY | Facility: CLINIC | Age: 81
End: 2021-06-03

## 2021-06-03 VITALS
HEIGHT: 72 IN | SYSTOLIC BLOOD PRESSURE: 120 MMHG | HEART RATE: 76 BPM | WEIGHT: 170.2 LBS | DIASTOLIC BLOOD PRESSURE: 76 MMHG | BODY MASS INDEX: 23.05 KG/M2 | OXYGEN SATURATION: 96 %

## 2021-06-03 DIAGNOSIS — I73.9 PAD (PERIPHERAL ARTERY DISEASE) (HCC): ICD-10-CM

## 2021-06-03 DIAGNOSIS — I65.23 BILATERAL CAROTID ARTERY STENOSIS: ICD-10-CM

## 2021-06-03 DIAGNOSIS — I72.4 ANEURYSM OF RIGHT POPLITEAL ARTERY (HCC): ICD-10-CM

## 2021-06-03 DIAGNOSIS — I71.40 ABDOMINAL AORTIC ANEURYSM (AAA) 35 TO 39 MM IN DIAMETER (HCC): ICD-10-CM

## 2021-06-03 DIAGNOSIS — I71.40 ABDOMINAL AORTIC ANEURYSM (AAA) 35 TO 39 MM IN DIAMETER (HCC): Primary | ICD-10-CM

## 2021-06-03 PROCEDURE — 93925 LOWER EXTREMITY STUDY: CPT | Performed by: SURGERY

## 2021-06-03 PROCEDURE — 93925 LOWER EXTREMITY STUDY: CPT

## 2021-06-03 PROCEDURE — 76775 US EXAM ABDO BACK WALL LIM: CPT

## 2021-06-03 PROCEDURE — 99214 OFFICE O/P EST MOD 30 MIN: CPT | Performed by: SURGERY

## 2021-06-03 RX ORDER — CLOPIDOGREL BISULFATE 75 MG/1
75 TABLET ORAL DAILY
Qty: 30 TABLET | Refills: 5 | Status: SHIPPED | OUTPATIENT
Start: 2021-06-03 | End: 2021-08-25 | Stop reason: SDDI

## 2021-06-03 NOTE — PROGRESS NOTES
"6/3/2021       Joseph Chiang MD  [address] [place] [address]    Van Mckeon  1940    Chief Complaint   Patient presents with   • Follow-up     1 year followup on AAA.         Dear Joseph Chiang MD   HPI  I had the pleasure of seeing your patient Van Mckeon in the office today.  As you recall, Van Mckeon is a 80 y.o.  male who you are currently following for routine health maintenance.  We are following for a small abdominal aortic aneurysm, and small right popliteal artery aneurysm.  He was recently in ED after having some speech difficulty and confusion.  His symptoms resolved while in ED.  He was a former smoker but has quit.  He is maintained on aspirin. He did have noninvasive testing performed today, which I did review in office.       Review of Systems   Constitutional: Negative.    HENT: Negative.    Eyes: Negative.    Respiratory: Negative.    Cardiovascular: Negative.    Gastrointestinal: Negative.    Endocrine: Negative.    Genitourinary: Negative.    Musculoskeletal: Negative.    Skin: Negative.    Allergic/Immunologic: Negative.    Neurological: Negative.    Hematological: Negative.    Psychiatric/Behavioral: Negative.    All other systems reviewed and are negative.       /76 (BP Location: Left arm, Patient Position: Sitting, Cuff Size: Adult)   Pulse 76   Ht 182.9 cm (72\")   Wt 77.2 kg (170 lb 3.2 oz)   SpO2 96%   BMI 23.08 kg/m²     Physical Exam  Vitals and nursing note reviewed.   Constitutional:       General: He is not in acute distress.     Appearance: Normal appearance. He is well-developed. He is not diaphoretic.   HENT:      Head: Normocephalic and atraumatic.   Neck:      Vascular: No carotid bruit or JVD.   Cardiovascular:      Rate and Rhythm: Normal rate and regular rhythm.      Pulses: Normal pulses.           Femoral pulses are 2+ on the right side and 2+ on the left side.       Popliteal pulses are 2+ on the right side and 2+ on " the left side.        Dorsalis pedis pulses are 2+ on the right side and 2+ on the left side.        Posterior tibial pulses are 2+ on the right side and 2+ on the left side.      Heart sounds: Normal heart sounds, S1 normal and S2 normal. No murmur heard.   No friction rub. No gallop.       Comments: Prominent popliteals bilaterally  Pulmonary:      Effort: Pulmonary effort is normal.      Breath sounds: Normal breath sounds.   Abdominal:      General: Bowel sounds are normal. There is no abdominal bruit.      Palpations: Abdomen is soft. There is pulsatile mass.      Tenderness: There is no abdominal tenderness.   Musculoskeletal:         General: Normal range of motion.      Cervical back: Neck supple.   Skin:     General: Skin is warm and dry.   Neurological:      General: No focal deficit present.      Mental Status: He is alert and oriented to person, place, and time.      Cranial Nerves: No cranial nerve deficit.   Psychiatric:         Mood and Affect: Mood normal.         Behavior: Behavior normal.         Thought Content: Thought content normal.         Judgment: Judgment normal.       Diagnostic Data:  CT Angiogram Neck    Result Date: 5/21/2021  Narrative: EXAMINATION: CT ANGIOGRAM NECK-   5/21/2021 3:50 PM CDT  HISTORY: Stroke, follow up; R41.82-Altered mental status, unspecified  In order to have a CT radiation dose as low as reasonably achievable Automated Exposure Control was utilized for adjustment of the mA and/or KV according to patient size.  DLP in mGycm= 223.  CT angiography protocol. CT imaging with bolus IV contrast injection. Under concurrent supervision axial, sagittal, coronal, and three-dimensional data sets were constructed.  Minimal aortic arch calcification. Normal great vessel origins.  Normal and symmetric common carotid arteries.  There is minimal atherosclerotic calcification at the left ICA origin. 0% stenosis based on ACAS/NASCET criteria.  Similar appearance with minimal  atherosclerotic calcification at the right ICA origin. 0% stenosis based on ACAS/NASCET criteria.  Symmetric and normally patent vertebral arteries.  No intraluminal thrombus is seen.  Chronic change at the lung apices with emphysema.  Prominent degenerative disc and endplate change with degenerative retrolisthesis at C4-5.  Summary: 1. Minimal atherosclerotic plaque with no significant carotid occlusive disease.              This report was finalized on 05/21/2021 16:18 by Dr. Mariano Norris MD.    XR Chest 1 View    Result Date: 5/21/2021  Narrative: Frontal upright radiograph of the chest 5/21/2021 3:27 PM CDT  HISTORY: Stroke protocol  COMPARISON: Chest exam dated 9/3/2019.  FINDINGS:  No lung consolidation. No pleural effusion or pneumothorax. The cardiomediastinal silhouette and pulmonary vascularity are within normal limits. The osseous structures and surrounding soft tissues demonstrate no acute abnormality.      Impression: 1. No radiographic evidence of acute cardiopulmonary process.   This report was finalized on 05/21/2021 15:31 by Dr Stan Jacome, .    US Aorta Limited    Result Date: 6/3/2021  Narrative: EXAM: Ultrasound of the abdominal using grayscale, color Doppler, and spectral Doppler  INDICATION: AAA; I71.4-Abdominal aortic aneurysm, without rupture  COMPARISON: 5/18/2020  FINDINGS:  The proximal abdominal aorta measures 2.7 x 2.8 cm AP diameter. The mid abdominal aorta measures 2.4 x 2.6 cm AP diameter. The distal abdominal aorta measures 3.7 x 3.8 cm AP diameter. The right common iliac artery measures 2.0 cm. The left common iliac artery measures 1.6 cm.      Impression:  1.  Infrarenal abdominal aortic aneurysm measuring up to 3.8 cm. Measurements are unchanged from CT performed 5/18/2020. 2.  Ectasia of the common iliac arteries measuring 2.0 cm on the RIGHT and 1.6 cm and the LEFT. This report was finalized on 06/03/2021 09:02 by Dr. Mike Steinberg MD.    CT Angiogram Head    Result Date:  5/21/2021  Narrative: EXAMINATION: CT ANGIOGRAM HEAD-   5/21/2021 3:50 PM CDT  HISTORY: Stroke, follow up; R41.82-Altered mental status, unspecified  In order to have a CT radiation dose as low as reasonably achievable Automated Exposure Control was utilized for adjustment of the mA and/or KV according to patient size.  DLP in mGycm= 223.  CT angiography protocol. CT imaging with bolus IV contrast injection. Under concurrent supervision axial, sagittal, coronal, and three-dimensional data sets were constructed. Normal and symmetric distal internal carotid arteries.  Normal and symmetric anterior, middle, and posterior cerebral arteries.  No aneurysm or arterial occlusion.  No intraluminal thrombus.  Summary: 1. No intracranial arterial abnormality is seen.            This report was finalized on 05/21/2021 16:20 by Dr. Mariano Norris MD.    CT Head Without Contrast Stroke Protocol    Result Date: 5/21/2021  Narrative: EXAMINATION: CT head without contrast or protocol 5/21/2021  DOSE: 781 mGycm. Automated exposure control was utilized to diminish patient radiation dose..  HISTORY: Stroke follow-up.  FINDINGS: Today's exam is compared to previous study of 9/3/2019. Multiple contiguous axials are obtained from the skull base to the vertex per protocol without intravenous contrast-enhancement with reformatted images obtained in the sagittal and coronal projections from the original data set.  There is mild atrophy of the brain. There is evidence of a previous extensive left basal ganglia infarct with encephalomalacia and ex vacuo enlargement of the frontal horn of the left lateral ventricle. There is no evidence of mass, mass effect or shift of the midline. No evidence of acute infarct or hemorrhage. No acute calvarial abnormalities are present.  Mucous retention cysts or polyps are noted within both maxillary sinuses. There is also disease of the ethmoid air cells. The mastoid air cells demonstrate normal aeration.  There is no fluid in the middle ear cavity.      Impression: 1.. Previous infarct involving the left basal ganglia with encephalomalacia and ex vacuo enlargement of the frontal horn of the left lateral ventricle. 2. Otherwise unremarkable nonenhanced CT of the brain. 3. Chronic appearing sinus disease of the maxillary sinuses and ethmoid air cells. This report was finalized on 05/21/2021 16:18 by Dr. Arnulfo Garsia MD.       Patient Active Problem List   Diagnosis   • Asymmetry of tonsils   • Enlarged tonsils   • Neoplasm of tonsil   • COPD, group B, by GOLD 2017 classification (CMS/HCC)   • Bronchiectasis without complication (CMS/HCC)   • Essential (primary) hypertension   • Personal history of nicotine dependence   • Temporary cerebral vascular dysfunction   • Alcohol use   • Alcohol dependence, uncomplicated (CMS/HCC)   • Benign prostatic hyperplasia   • Anxiety   • Claustrophobia   • Pulmonary fibrosis (CMS/HCC)   • Thrombocytopenia (CMS/HCC)   • [place] sprayers' lung (CMS/HCC)   • Benign hypertension   • Myopathy   • Bradycardia   • Calcification of abdominal aorta (CMS/HCC)   • Gastroesophageal reflux disease   • Hypercalcemia   • Hypertrophy of prostate   • Infarction of left basal ganglia (CMS/HCC)   • Knee deformity, acquired, left   • Left cataract   • Lung nodule   • PAD (peripheral artery disease) (CMS/HCC)   • Pericarditis associated with severe chronic anemia   • Pseudophakia   • History of hemoptysis   • Statin myopathy   • Stasis edema of both lower extremities   • History of adenomatous polyp of colon   • Bronchiolectasis (CMS/HCC)   • Abdominal aortic aneurysm (AAA) without rupture (CMS/HCC)   • TIA (transient ischemic attack)        Diagnosis Plan   1. Abdominal aortic aneurysm (AAA) 35 to 39 mm in diameter (CMS/HCC)  US Aorta Limited   2. Aneurysm of right popliteal artery (CMS/HCC)  US Arterial Doppler Lower Extremity Bilateral       Plan: After thoroughly evaluating PATI Johnson  believe the best course of action is to remain conservative from a vascular surgery standpoint.  I did review his testing and aneurysm measuring 3.8 cm, which is stable.  His right popliteal 1.13 cm and left is ectatic.  He did recently have TIA symptoms while taking aspirin.  I would have him stop aspirin and begin taking Plavix.  We discussed his thrombocytopenia and if any problems should arise to contact our office.  His CTA of the neck showed no significant carotid stenosis.  He is awaiting referral to neurology from PCP.   We will see him back in 1 year with repeat noninvasive testing , including an ultrasound of the aorta and a popliteal duplex.  I did discuss vascular risk factors as they pertain to the progression of vascular disease including controlling hypertension.  His blood pressure is stable on his current medication regimen.   The patient is to continue taking their medications as previously discussed.   This was all discussed in full with complete understanding.  Thank you for allowing me to participate in the care of your patient.  Please do not hesitate to call with any questions or concerns.  We will keep you aware of any further encounters with Van Mckeon.        Sincerely yours,         JAE Spencer Ronald L, MD

## 2021-08-25 ENCOUNTER — OFFICE VISIT (OUTPATIENT)
Dept: NEUROLOGY | Facility: CLINIC | Age: 81
End: 2021-08-25

## 2021-08-25 VITALS
BODY MASS INDEX: 22.21 KG/M2 | SYSTOLIC BLOOD PRESSURE: 122 MMHG | HEART RATE: 72 BPM | WEIGHT: 164 LBS | HEIGHT: 72 IN | DIASTOLIC BLOOD PRESSURE: 70 MMHG | RESPIRATION RATE: 18 BRPM

## 2021-08-25 DIAGNOSIS — J44.9 CHRONIC OBSTRUCTIVE PULMONARY DISEASE, UNSPECIFIED COPD TYPE (HCC): ICD-10-CM

## 2021-08-25 DIAGNOSIS — I10 ESSENTIAL (PRIMARY) HYPERTENSION: ICD-10-CM

## 2021-08-25 DIAGNOSIS — G45.9 TIA (TRANSIENT ISCHEMIC ATTACK): Primary | ICD-10-CM

## 2021-08-25 DIAGNOSIS — R41.3 MEMORY DIFFICULTY: ICD-10-CM

## 2021-08-25 PROCEDURE — 99215 OFFICE O/P EST HI 40 MIN: CPT | Performed by: PSYCHIATRY & NEUROLOGY

## 2021-08-25 RX ORDER — ASPIRIN 81 MG/1
81 TABLET ORAL
COMMUNITY

## 2021-08-25 NOTE — PROGRESS NOTES
Subjective   Van Mckeon, 1940, is a male who is being seen today for   Chief Complaint   Patient presents with   • Stroke       HISTORY OF PRESENT ILLNESS: Patient seen for recent ER visit for TIA at 521/21.  Patient had confusion while working in the yard mowing grass.  Patient was seen by his daughter and given some lunch but he did not know what to do with it.  Patient went to the ER where a CT head scan and CT angiography head and neck were noncontributory.  Patient left AMA as within 2 hours to the problem had resolved.  Patient has had no episodes since then.  Patient saw Dr. Sauceda about abdominal aortic aneurysm who put the patient on Plavix and he took it for about 3 weeks and then stop and restart his aspirin 81 mg 2 p.o. daily patient has low platelets previously.  Patient is now complaining of some memory difficulty more significant than previously.  Patient tried to write a check recently and had difficulty with that.  The problems seem to come and go.  Patient had previous muscle pain with statin so that was stopped.  His lipids were in normal range last year.  Patient's calcium was elevated on previous recent CMP and platelets 115,000.  Patient is to follow-up with PCP about that.  Patient to get to emergency room mainly if stroke symptoms occur.  Patient has occasional headache but rare and usually not significant.  Patient has had no history of head trauma.  Patient apparently has  had no problems driving but is warned of driving and safety precautions.  Patient had a previous stroke in 2019 with infarct involving the left basal ganglia with encephalomalacia and ex vacuo enlargement of the frontal horn of the left lateral ventricle.  There was chronic appearing sinus disease in the maxillary sinus and ethmoid air cells.    REVIEW OF SYSTEMS:   GENERAL: Blood pressure today 124/72 left arm seated 122/70 left arm standing  PULMONARY: Patient has history of COPD and is to get overnight  continuous oximetry on room air to check for desaturations.  CVS: No acute chest pain or palpitation  GASTROINTESTINAL: No acute GI distress  GENITOURINARY: No acute  distress  GYN: Not applicable  MUSCULOSKELETAL: No acute musculoskeletal problems  HEENT: No acute vision or hearing change  ENDOCRINE: Not diabetic  PSYCHIATRIC: No history of psychiatric symptoms  HEMATOLOGY: As above  SKIN: As above  Family history reviewed and otherwise noncontributory to this problem  Social history: Patient denies smoking or drug use.  Patient does use alcohol    PHYSICAL EXAMINATION:    GENERAL: No acute distress.  MMSE is 29 out of 30  CRANIUM: Normal cephalic/atraumatic  HEENT:       EYES: EOMs intact without nystagmus and fields full to confrontation.  Pupils equal round reactive to light.  No acute fundic abnormalities.       EARS: Tympanic membrane on the left obscured by wax but hears tuning fork bilaterally       THROAT: No acute oropharynx abnormalities and no swallowing difficulties by history       NECK: No bruits/no lymphadenopathy  CHEST: No acute cardiopulmonary abnormalities by auscultation  ABDOMEN: Nondistended  EXTREMITIES: Dorsalis pedis pulses symmetrical.  Patient has peripheral edema bilaterally in the lower extremities  NEURO: Patient alert and follows commands with minimal difficulty  SPEECH: Normal    CRANIAL NERVES: Motor/sensory about the face normal and symmetric    MOTOR STRENGTH: Motor strength upper and lower extremities normal  STATION AND GAIT: Gait slightly wide-based but no tendency to fall and Romberg negative.  Patient can tandem walk with minimal difficulty  CEREBELLAR: Finger-nose and heel-to-shin normal  SENSORY: Decreased pin vibration distal approximately lower extremities to the ankles bilaterally otherwise normal pin and vibration throughout  REFLEXES: Decreased to absent reflexes throughout upper and lower extremity without clonus or Babinski      ASSESSMENT AND PLAN: Patient with  history of TIA/stroke and memory difficulty.  Patient is to get repeat MRI brain noncontrast.  Patient denies any metal placement since last MRI in 2019.  Patient to get formal memory testing and overnight continuous oximetry on room air.  Patient get further blood work.  I spent 40 minutes with this patient with counseling, review of records and exam.  Patient BMI in normal range for age.  Patient to get to emergency room Malays stroke symptoms occur.      Diagnoses and all orders for this visit:    1. TIA (transient ischemic attack) (Primary)  -     MRI Brain Without Contrast; Future    2. Memory difficulty  -     CBC & Differential; Future  -     Comprehensive Metabolic Panel; Future  -     Ambulatory Referral to Speech Therapy  -     MRI Brain Without Contrast; Future  -     Vitamin B12; Future  -     Folate; Future  -     Lipid Panel; Future  -     Magnesium; Future  -     Sedimentation Rate; Future  -     T4, Free; Future    3. Chronic obstructive pulmonary disease, unspecified COPD type (CMS/HCC)  -     Overnight Sleep Oximetry Study; Future    4. Essential (primary) hypertension   -     Lipid Panel; Future        Dictated utilizing Dragon voice recognition software

## 2021-08-25 NOTE — PATIENT INSTRUCTIONS
Patient to get to emergency room immediately if stroke symptoms occur.  Patient to have driving precautions and safety precautions as discussed.  Patient to have caution while climbing or using sharp cutting pills or working around hot fire/stove/grill/water.  Patient to follow-up with PCP about elevated calcium and low platelets

## 2021-08-31 ENCOUNTER — OFFICE VISIT (OUTPATIENT)
Dept: INTERNAL MEDICINE | Facility: CLINIC | Age: 81
End: 2021-08-31

## 2021-08-31 VITALS
SYSTOLIC BLOOD PRESSURE: 142 MMHG | OXYGEN SATURATION: 99 % | DIASTOLIC BLOOD PRESSURE: 78 MMHG | HEART RATE: 61 BPM | HEIGHT: 72 IN | BODY MASS INDEX: 22.08 KG/M2 | WEIGHT: 163 LBS | TEMPERATURE: 97.1 F

## 2021-08-31 DIAGNOSIS — Z71.6 TOBACCO ABUSE COUNSELING: ICD-10-CM

## 2021-08-31 DIAGNOSIS — R41.3 MEMORY DISORDER: ICD-10-CM

## 2021-08-31 DIAGNOSIS — F10.10 ETOH ABUSE: ICD-10-CM

## 2021-08-31 DIAGNOSIS — Z79.899 ENCOUNTER FOR LONG-TERM CURRENT USE OF MEDICATION: ICD-10-CM

## 2021-08-31 DIAGNOSIS — I10 ESSENTIAL (PRIMARY) HYPERTENSION: Primary | ICD-10-CM

## 2021-08-31 PROCEDURE — G0439 PPPS, SUBSEQ VISIT: HCPCS | Performed by: INTERNAL MEDICINE

## 2021-08-31 NOTE — PROGRESS NOTES
The ABCs of the Annual Wellness Visit  Subsequent Medicare Wellness Visit    Chief Complaint   Patient presents with   • Medicare Wellness-subsequent      Subjective    History of Present Illness:  Van Mckeon is a 81 y.o. male who presents for a Subsequent Medicare Wellness Visit.  Mr. Mckeon is here for his annual wellness evaluation.  He is also undergoing a work-up and evaluation by Dr. Aleman for a memory disorder.  I spent some time asking him in some detail question about alcohol consumption apparently he drinks 3 shots every evening these are 3 full glass shots measuring at least 4 ounces.    The following portions of the patient's history were reviewed and   updated as appropriate: allergies, current medications, past family history, past medical history, past social history, past surgical history and problem list.     Compared to one year ago, the patient feels his physical   health is the same.    Compared to one year ago, the patient feels his mental   health is worse.    Recent Hospitalizations:  He was not admitted to the hospital during the last year.       Current Medical Providers:  Patient Care Team:  Joseph Chiang MD as PCP - General (Internal Medicine)  [facility], Dave Hayden MD as Consulting Physician (Otolaryngology)  Josephine Max APRN as Nurse Practitioner (Pulmonary Disease)    Outpatient Medications Prior to Visit   Medication Sig Dispense Refill   • albuterol sulfate  (90 Base) MCG/ACT inhaler Inhale 2 puffs Every 4 (Four) Hours As Needed for Wheezing. 3 inhaler 3   • ALPRAZolam (XANAX) 0.5 MG tablet Take 0.5 mg by mouth Daily As Needed for Anxiety.     • aspirin 81 MG EC tablet Take 81 mg by mouth. TAke 2 tabs po daily     • cyanocobalamin (VITAMIN B-12) 500 MCG tablet Take 1 tablet by mouth Daily. 30 tablet 2   • finasteride (PROSCAR) 5 MG tablet Take 5 mg by mouth Daily.     • folic acid (FOLVITE) 1 MG tablet Take 1 tablet by mouth Daily. 30 tablet 2   • lisinopril  (PRINIVIL,ZESTRIL) 40 MG tablet Take 20 mg by mouth Daily.     • tamsulosin (Flomax) 0.4 MG capsule 24 hr capsule Take 1 capsule by mouth Daily.     • tiotropium bromide-olodaterol (STIOLTO RESPIMAT) 2.5-2.5 MCG/ACT aerosol solution inhaler Inhale 2 puffs Daily.       No facility-administered medications prior to visit.       No opioid medication identified on active medication list. I have reviewed chart for other potential  high risk medication/s and harmful drug interactions in the elderly.          Aspirin is on active medication list. Aspirin use is indicated based on review of current medical condition/s. Pros and cons of this therapy have been discussed today. Benefits of this medication outweigh potential harm.  Patient has been encouraged to continue taking this medication.  .      Patient Active Problem List   Diagnosis   • Asymmetry of tonsils   • Enlarged tonsils   • Neoplasm of tonsil   • COPD, group B, by GOLD 2017 classification (CMS/HCC)   • Bronchiectasis without complication (CMS/HCC)   • Essential (primary) hypertension   • Personal history of nicotine dependence   • Temporary cerebral vascular dysfunction   • Alcohol use   • Alcohol dependence, uncomplicated (CMS/HCC)   • Benign prostatic hyperplasia   • Anxiety   • Claustrophobia   • Pulmonary fibrosis (CMS/HCC)   • Thrombocytopenia (CMS/HCC)   • [place] sprayers' lung (CMS/HCC)   • Benign hypertension   • Myopathy   • Bradycardia   • Calcification of abdominal aorta (CMS/HCC)   • Gastroesophageal reflux disease   • Hypercalcemia   • Hypertrophy of prostate   • Infarction of left basal ganglia (CMS/HCC)   • Knee deformity, acquired, left   • Left cataract   • Lung nodule   • PAD (peripheral artery disease) (CMS/HCC)   • Pericarditis associated with severe chronic anemia   • Pseudophakia   • History of hemoptysis   • Statin myopathy   • Stasis edema of both lower extremities   • History of adenomatous polyp of colon   • Bronchiolectasis  "(CMS/[facility])   • Abdominal aortic aneurysm (AAA) without rupture (CMS/[facility])   • TIA (transient ischemic attack)     Advance Care Planning   Advance Directive is not on file.  ACP discussion was held with the patient during this visit. Patient does not have an advance directive, information provided.    Review of Systems   Constitutional: Negative for appetite change, chills and fever.   HENT: Negative for congestion and ear pain.    Eyes: Negative for pain and discharge.   Respiratory: Negative for cough, chest tightness and shortness of breath.    Cardiovascular: Negative for chest pain, palpitations and leg swelling.   Gastrointestinal: Negative for abdominal distention and abdominal pain.   Endocrine: Negative for cold intolerance.   Genitourinary: Negative for difficulty urinating, dysuria and flank pain.   Musculoskeletal: Negative for arthralgias, back pain and gait problem.   Skin: Negative for color change and rash.   Neurological: Negative for dizziness and seizures.   Psychiatric/Behavioral: Negative for agitation, decreased concentration and dysphoric mood.        Objective       Vitals:    08/31/21 0956   BP: 142/78   BP Location: Left arm   Patient Position: Sitting   Cuff Size: Adult   Pulse: 61   Temp: 97.1 °F (36.2 °C)   TempSrc: Temporal   SpO2: 99%   Weight: 73.9 kg (163 lb)   Height: 182.9 cm (72\")   PainSc: 0-No pain     BMI Readings from Last 1 Encounters:   08/31/21 22.11 kg/m²   BMI is within normal parameters. No follow-up required.    Does the patient have evidence of cognitive impairment? Yes    Physical Exam  Vitals and nursing note reviewed.   Constitutional:       General: He is not in acute distress.     Appearance: Normal appearance. He is well-developed.   HENT:      Head: Normocephalic and atraumatic.      Right Ear: External ear normal.      Left Ear: External ear normal.      Nose: Nose normal.   Eyes:      Extraocular Movements: Extraocular movements intact.      Conjunctiva/sclera: " Conjunctivae normal.      Pupils: Pupils are equal, round, and reactive to light.   Cardiovascular:      Rate and Rhythm: Normal rate and regular rhythm.      Pulses: Normal pulses.      Heart sounds: Normal heart sounds.   Pulmonary:      Effort: Pulmonary effort is normal.      Breath sounds: Normal breath sounds.   Abdominal:      General: Bowel sounds are normal.      Palpations: Abdomen is soft.   Musculoskeletal:         General: Normal range of motion.      Cervical back: Normal range of motion and neck supple. No rigidity. No muscular tenderness.   Skin:     General: Skin is warm and dry.   Neurological:      General: No focal deficit present.      Mental Status: He is alert and oriented to person, place, and time.   Psychiatric:         Mood and Affect: Mood normal.         Behavior: Behavior normal.                 HEALTH RISK ASSESSMENT    Smoking Status:  Social History     Tobacco Use   Smoking Status Current Every Day Smoker   • Packs/day: 1.00   • Years: 50.00   • Pack years: 50.00   • Types: Cigarettes   • Last attempt to quit: 2019   • Years since quittin.0   Smokeless Tobacco Former User   • Types: Snuff   • Quit date:      Alcohol Consumption:  Social History     Substance and Sexual Activity   Alcohol Use Yes   • Alcohol/week: 3.0 standard drinks   • Types: 3 Shots of liquor per week    Comment: daily     Fall Risk Screen:    [facility], and patient is at LOW risk for falls.Assessment completed on:2021    Depression Screening:  PHQ-2/PHQ-9 Depression Screening 2021   Little interest or pleasure in doing things 0   Feeling down, depressed, or hopeless 0   Total Score 0       Health Habits and Functional and Cognitive Screening:  Functional & Cognitive Status 2021   Do you have difficulty preparing food and eating? No   Do you have difficulty bathing yourself, getting dressed or grooming yourself? No   Do you have difficulty using the toilet?  No   Do you have difficulty moving around from place to place? No   Do you have trouble with steps or getting out of a bed or a chair? No   Current Diet Well Balanced Diet   Dental Exam Up to date   Eye Exam Up to date   Exercise (times per week) 0 times per week   Current Exercises Include No Regular Exercise   Do you need help using the phone?  No   Are you deaf or do you have serious difficulty hearing?  No   Do you need help with transportation? No   Do you need help shopping? No   Do you need help preparing meals?  No   Do you need help with housework?  No   Do you need help with laundry? No   Do you need help taking your medications? No   Do you need help managing money? No   Do you ever drive or ride in a car without wearing a seat belt? No   Have you felt unusual stress, anger or loneliness in the last month? No   Who do you live with? Alone   If you need help, do you have trouble finding someone available to you? No   Have you been bothered in the last four weeks by sexual problems? No   Do you have difficulty concentrating, remembering or making decisions? Yes       Age-appropriate Screening Schedule:  Refer to the list below for future screening recommendations based on patient's age, sex and/or medical conditions. Orders for these recommended tests are listed in the plan section. The patient has been provided with a written plan.    Health Maintenance   Topic Date Due   • ZOSTER VACCINE (1 of 2) Never done   • INFLUENZA VACCINE  10/01/2021              Assessment/Plan     CMS Preventative Services Quick Reference  Risk Factors Identified During Encounter  Alcohol Misuse  Cardiovascular Disease  Dementia/Memory   Hearing Problem  The above risks/problems have been discussed with the patient.  Follow up actions/plans if indicated are seen below in the Assessment/Plan Section.  Pertinent information has been shared with the patient in the After Visit Summary.    Diagnoses and all orders for this  visit:    1. Essential (primary) hypertension (Primary)  -     Cancel: Comprehensive Metabolic Panel  -     Urinalysis With Microscopic - Urine, Clean Catch  -     Uric Acid    2. Encounter for long-term current use of medication  -     Cancel: CBC & Differential  -     TSH  -     Cancel: Lipid Panel    3. ETOH abuse    4. Tobacco abuse counseling    5. Memory disorder    At today's visit her primary focus was his wellness evaluation updating immunizations colonoscopies etc.  In addition to that I have counseled him regarding his tobacco and alcohol consumption.  Both of these may be leading to some memory particularly 3 shots every evening talk to him about a healthy amount of alcohol which would certainly be no more than 4 shots a week.  He is to continue follow-up with Dr. Aleman and his work-up evaluation there.    Follow Up:   Return in about 6 months (around 2/28/2022).     An After Visit Summary and PPPS were given to the patient.

## 2021-09-01 ENCOUNTER — TELEPHONE (OUTPATIENT)
Dept: INTERNAL MEDICINE | Facility: CLINIC | Age: 81
End: 2021-09-01

## 2021-09-01 LAB
APPEARANCE UR: CLEAR
BACTERIA #/AREA URNS HPF: NORMAL /HPF
BILIRUB UR QL STRIP: NEGATIVE
CASTS URNS MICRO: NORMAL
COLOR UR: YELLOW
EPI CELLS #/AREA URNS HPF: NORMAL /HPF
GLUCOSE UR QL: NEGATIVE
HGB UR QL STRIP: NEGATIVE
KETONES UR QL STRIP: NEGATIVE
LEUKOCYTE ESTERASE UR QL STRIP: NEGATIVE
NITRITE UR QL STRIP: NEGATIVE
PH UR STRIP: 7.5 [PH] (ref 5–8)
PROT UR QL STRIP: NEGATIVE
RBC #/AREA URNS HPF: NORMAL /HPF
SP GR UR: 1.01 (ref 1–1.03)
TSH SERPL DL<=0.005 MIU/L-ACNC: 2.85 UIU/ML (ref 0.27–4.2)
URATE SERPL-MCNC: 7 MG/DL (ref 3.4–7)
UROBILINOGEN UR STRIP-MCNC: NORMAL MG/DL
WBC #/AREA URNS HPF: NORMAL /HPF

## 2021-09-07 DIAGNOSIS — F41.8 TEST ANXIETY: Primary | ICD-10-CM

## 2021-09-07 RX ORDER — ALPRAZOLAM 0.5 MG/1
TABLET ORAL
Qty: 2 TABLET | Refills: 0 | Status: SHIPPED | OUTPATIENT
Start: 2021-09-07 | End: 2021-11-01

## 2021-09-07 NOTE — TELEPHONE ENCOUNTER
Pt states Dr Aleman ordered MRI. It is not scheduled yet. He is requesting [facility] for this. Roberto in [place].

## 2021-09-07 NOTE — TELEPHONE ENCOUNTER
Pt has called asking for Xanax for upcoming MRI per Dr. Aleman.  We have given it to him before.  I have pended Rx if you agree.

## 2021-09-28 ENCOUNTER — OFFICE VISIT (OUTPATIENT)
Dept: PHYSICAL THERAPY | Facility: CLINIC | Age: 81
End: 2021-09-28

## 2021-09-28 ENCOUNTER — TELEPHONE (OUTPATIENT)
Dept: NEUROLOGY | Facility: CLINIC | Age: 81
End: 2021-09-28

## 2021-09-28 DIAGNOSIS — R41.89 OTHER SYMPTOMS AND SIGNS INVOLVING COGNITIVE FUNCTIONS AND AWARENESS: Primary | ICD-10-CM

## 2021-09-28 PROCEDURE — 96125 COGNITIVE TEST BY HC PRO: CPT | Performed by: SPEECH-LANGUAGE PATHOLOGIST

## 2021-09-28 NOTE — PROGRESS NOTES
Speech/Language Pathology Initial Evaluation and Discharge    Patient: Van Mckeon               : 1940  Visit Date: 2021  Referring practitioner: Dave Aleman MD  Date of Initial Visit: 2021  Patient seen for 1 sessions    Visit Diagnoses:    ICD-10-CM ICD-9-CM   1. Other symptoms and signs involving cognitive functions and awareness  R41.89 799.59     Past Medical History:   Diagnosis Date   • Alcohol use 10/3/2019   • Anxiety    • Bronchiectasis without complication (CMS/[facility]) 2019   • COPD (chronic obstructive pulmonary disease) (CMS/[facility])    • COPD, group B, by GOLD 2017 classification (CMS/[facility]) 2019   • Essential hypertension 2019   • Hypertension    • Personal history of nicotine dependence 2019   • Prostate hypertrophy    • Snoring    • Thrombocytopenia (CMS/[facility])    • TIA (transient ischemic attack)    • Tobacco user 10/3/2019     Past Surgical History:   Procedure Laterality Date   • BRONCHOSCOPY     • EYE SURGERY     • NASAL SINUS SURGERY         SUBJECTIVE     Subjective   Patient presents with the following symptoms: memory problems and episodes of confusion  Date of Onset:   History of present problems: Patient reports worsening memory and episodes of confusion where he will not be able to write or spell, he states that these last for up to 30 min.   The functional impact on the patient: Difficulty learning or remembering new information.     Pertinent Medical History Related to this Problem: CVA in 2019, recent TIA       OBJECTIVE     RBANS: The Repeatable Battery for the Assessment of Neuropsychological Status (RBANS) assesses patient function in the areas of Immediate and Delayed memory, visuospatial/constructional skills, language and attention. It is used to detect and track neurocognitive deficits.   Index score Percentile Qualitative Description   Immediate Memory 69 2 Extremely Low   Visuospatial 64 1 Extremely Low   Language 92 30 Average    Attention 79 8 Borderline   Delayed Memory 68 1 Extremely Low   Total Scale 67 1 Extremely Low    Comments:       IMPRESSION/RECOMMENDATIONS  Factors Affecting Performance:   Learning Motivation: strong  Education/Learning Comments: Patient and daughter demonstrated understanding    Clinical Impression: Memory loss  Impact on Function: Difficulty learning and remembering new information  Prognosis Comment: Baseline status      Therapy Education/Self Care    Details: Assessment results and POC   Given home strategies   Progress: New   Education provided to:  Patient and Family   Level of understanding Verbalized           Total Time of Visit:            60   mins    ASSESSMENT/PLAN       ASSESSMENT:   Patient is not indicated for skilled care by a Speech/Language Pathologist. Patient and daughter state that he uses external strategies and is able to manage his household with assistance from family. Patient declined therapy.     Summary of Assessment: Memory loss.       PLAN:  Details of Plan of Care: Follow up with MD    Thank you so much for letting us work with Van. I appreciate your letting us work with your patients. If you have any questions or concerns, please don't hesitate to contact me.     SPEECH/LANGUAGE PATHOLOGIST SIGNATURE: Snehal Orosco, CCC-SLP

## 2021-10-01 NOTE — TELEPHONE ENCOUNTER
Pt has been notified of upcoming appts in [facility] system. He has also made me aware that he will be using his own medication prescribed in order to help with his claustrophobia.

## 2021-10-01 NOTE — TELEPHONE ENCOUNTER
PT IS CALLING AGAIN TO GET RX FOR MRI . IF HE DON'T HAVE SOMETHING HE CAN NOT DO IT PLEASE ADVISE       [id number] - [place], [place] - [address] AT [address] - [phone]  - [phone]   [phone]    PLEASE ADVISE.

## 2021-10-04 NOTE — TELEPHONE ENCOUNTER
PT RETURNING A MESS LEFT ON MACHINE. DID NOT SEE WHERE WE CALLED HIM ? DISHA THINKS MAYBE FOR RX NEEDED ? TRANSFERRED

## 2021-10-04 NOTE — TELEPHONE ENCOUNTER
Jean Marie notified there is a script from Dr. Chiang for the MRI, he said he didn't realize the script was sent.  Explained that he needs to have someone drive for him, he said his daughter will take him.

## 2021-10-11 ENCOUNTER — OFFICE VISIT (OUTPATIENT)
Dept: NEUROLOGY | Facility: CLINIC | Age: 81
End: 2021-10-11

## 2021-10-11 ENCOUNTER — HOSPITAL ENCOUNTER (OUTPATIENT)
Dept: MRI IMAGING | Facility: HOSPITAL | Age: 81
Discharge: HOME | End: 2021-10-11
Admitting: PSYCHIATRY & NEUROLOGY

## 2021-10-11 VITALS
RESPIRATION RATE: 18 BRPM | WEIGHT: 163 LBS | HEART RATE: 64 BPM | HEIGHT: 72 IN | BODY MASS INDEX: 22.08 KG/M2 | DIASTOLIC BLOOD PRESSURE: 74 MMHG | SYSTOLIC BLOOD PRESSURE: 122 MMHG

## 2021-10-11 DIAGNOSIS — R41.3 MEMORY DIFFICULTIES: Primary | ICD-10-CM

## 2021-10-11 DIAGNOSIS — G45.9 TIA (TRANSIENT ISCHEMIC ATTACK): ICD-10-CM

## 2021-10-11 DIAGNOSIS — R41.3 MEMORY DIFFICULTY: ICD-10-CM

## 2021-10-11 PROCEDURE — 70551 MRI BRAIN STEM W/O DYE: CPT

## 2021-10-11 PROCEDURE — 99214 OFFICE O/P EST MOD 30 MIN: CPT | Performed by: PSYCHIATRY & NEUROLOGY

## 2021-10-11 NOTE — PATIENT INSTRUCTIONS
I suggested the patient be driving only with the adult  in the car with him to have safety and fall precautions.  If the patient is started on Namenda or increasing the dosage patient not to be driving at all for least 3 days after starting medication to make sure no side effects.  Patient to not be climbing or using sharp cutting tools

## 2021-10-11 NOTE — PROGRESS NOTES
Subjective   Van Mckeon, 1940, is a male who is being seen today for   Chief Complaint   Patient presents with   • Stroke       HISTORY OF PRESENT ILLNESS: Patient seen for memory difficulties.  Patient had his MRI brain which has not been officially read but reviewed with the radiologist and showed no new findings.  He had to have the lacunar type event in the left hemisphere periventricular in the past.  He has had no new stroke symptoms.  Patient had his formal memory testing which showed extremely low scores and immediate memory delayed memory and visuospatial memory with average to borderline findings elsewhere.  Patient's MMSE today is 29/30.  Patient has not got his blood work done as requested so we will have to hold on starting the Namenda until baseline blood work is done potential side effects of the Namenda are explained in detail to the patient's daughter and the patient.  Patient takes 81 mg aspirin but is allergic to statins.  Patient denies any significant headaches or falls neck section 1  REVIEW OF SYSTEMS:   GENERAL: Blood pressure 120/70 left arm seated 122/74 left arm standing with pulse 64  PULMONARY: No acute respiratory difficulty.  Overnight continuous oximetry on room air was without significant desaturations  CVS: No acute chest pain or palpitation  GASTROINTESTINAL: No acute GI distress  GENITOURINARY: No acute  distress  GYN: Not applicable  MUSCULOSKELETAL: No acute musculoskeletal symptoms  HEENT: No acute vision or hearing change  ENDOCRINE: Not diabetic  PSYCHIATRIC: No acute psychiatric symptoms  HEMATOLOGY: No recent CBC for review  SKIN: No acute skin changes  Family history reviewed and otherwise noncontributory to this problem  Social history: Patient denies smoking or drug use.  Patient does use alcohol  PHYSICAL EXAMINATION:    GENERAL: No acute distress  CRANIUM: Normal cephalic/atraumatic  HEENT:       EYES: EOMs intact without nystagmus and fields full to  confrontation.  Pupils equal round reactive to light.  No acute fundic abnormalities.       EARS: Tympanic membranes normal and hears tuning fork bilaterally       THROAT: No acute oropharynx abnormalities/ no swallowing difficulties by history       NECK: No bruits/no lymphadenopathy  CHEST: No acute cardiopulmonary abnormalities by auscultation  ABDOMEN: Nondistended  EXTREMITIES: Dorsalis pedis pulses symmetrical  NEURO: Patient alert follows commands with some difficulty  SPEECH: Normal    CRANIAL NERVES: Motor/sensory about the face normal and symmetric    MOTOR STRENGTH: Motor strength upper and lower extremities normal  STATION AND GAIT: Slightly wide-based gait but no tendency to fall  CEREBELLAR: Finger-nose and heel shin normal  SENSORY: Decreased pin and vibration distal to proximal in the lower extremities ankles bilaterally otherwise normal pin and vibration throughout  REFLEXES: Decreased to absent reflexes throughout upper and lower extremities/ no clonus or Babinski      ASSESSMENT AND PLAN: Patient with history of memory difficulties as above hopefully eventually starting low-dose Namenda and if started patient cannot be driving for least 3 days after starting the medicine and increasing the dosage.  I spent 30 minutes with this patient with counseling and review of records and exam.      Diagnoses and all orders for this visit:    1. Memory difficulties (Primary)        Dictated utilizing Dragon voice recognition software

## 2021-10-19 ENCOUNTER — TELEPHONE (OUTPATIENT)
Dept: NEUROLOGY | Facility: CLINIC | Age: 81
End: 2021-10-19

## 2021-10-19 NOTE — TELEPHONE ENCOUNTER
Provider:   Caller: SABINA  Relationship to Patient: DAUGHTER  Pharmacy: NA  Phone Number: [phone]  Reason for Call: PATIENTS DAUGHTER CALLING TO VERIFY WE GOT LAB RESULTS. I LET HER KNOW THEY HAD BEEN SCANNED INTO CHART. SHE STATED  HAD MENTIONED PATIENT STARTING NAMENDA AFTER LABS CAME BACK. PLEASE ADVISE.   When was the patient last seen: 10-11-21

## 2021-10-19 NOTE — TELEPHONE ENCOUNTER
Results in letter read to Sheila.  Notified Dr. Aleman wants Van to see his PCP for the lab before starting Namenda.

## 2021-11-01 ENCOUNTER — HOSPITAL ENCOUNTER (OUTPATIENT)
Dept: GENERAL RADIOLOGY | Facility: HOSPITAL | Age: 81
Discharge: HOME | End: 2021-11-01
Admitting: INTERNAL MEDICINE

## 2021-11-01 ENCOUNTER — TELEPHONE (OUTPATIENT)
Dept: INTERNAL MEDICINE | Facility: CLINIC | Age: 81
End: 2021-11-01

## 2021-11-01 ENCOUNTER — OFFICE VISIT (OUTPATIENT)
Dept: INTERNAL MEDICINE | Facility: CLINIC | Age: 81
End: 2021-11-01

## 2021-11-01 VITALS
BODY MASS INDEX: 20.99 KG/M2 | WEIGHT: 155 LBS | SYSTOLIC BLOOD PRESSURE: 112 MMHG | OXYGEN SATURATION: 96 % | TEMPERATURE: 96.9 F | HEIGHT: 72 IN | DIASTOLIC BLOOD PRESSURE: 74 MMHG | HEART RATE: 66 BPM

## 2021-11-01 DIAGNOSIS — R05.9 COUGH: ICD-10-CM

## 2021-11-01 DIAGNOSIS — R05.9 COUGH: Primary | ICD-10-CM

## 2021-11-01 DIAGNOSIS — E83.52 HYPERCALCEMIA: ICD-10-CM

## 2021-11-01 DIAGNOSIS — D69.6 THROMBOCYTOPENIA (HCC): ICD-10-CM

## 2021-11-01 DIAGNOSIS — I10 BENIGN HYPERTENSION: ICD-10-CM

## 2021-11-01 PROCEDURE — 99214 OFFICE O/P EST MOD 30 MIN: CPT | Performed by: INTERNAL MEDICINE

## 2021-11-01 PROCEDURE — 71046 X-RAY EXAM CHEST 2 VIEWS: CPT

## 2021-11-01 NOTE — TELEPHONE ENCOUNTER
----- Message from Joseph Chiang MD sent at 11/1/2021 12:58 PM CDT -----  No signs of tumors they did see some streaky areas which could be just congestion if he develops productive sputum or temperatures call we will place him on antibiotics.

## 2021-11-01 NOTE — PROGRESS NOTES
Subjective   Van Mckeon is a 81 y.o. male.   Chief Complaint   Patient presents with   • Shortness of Breath   • Fatigue     has notcied more fatigue and shakyness and weakness when standing for 10-15 min   • Memory Loss     pt states over the last few months he seems to be having memory issues    • discuss medicaiton     Dr. Aquino wants to put patient on a new medication, but wanted you to review his labs work fist.         History of Present Illness patient was recently seen evaluation by neurology they had requested we review the new medication for any contraindications also some lab work that showed a low platelet count and elevated calcium.  I went back and reviewed his calcium and his platelet counts over the last few years his low platelet count and elevated calcium are running in the same general range that they have for some time.    The following portions of the patient's history were reviewed and updated as appropriate: allergies, current medications, past family history, past medical history, past social history, past surgical history and problem list.    Review of Systems   Constitutional: Positive for unexpected weight loss. Negative for activity change, appetite change, fatigue, fever and unexpected weight gain.   HENT: Negative for swollen glands, trouble swallowing and voice change.    Eyes: Negative for blurred vision and visual disturbance.   Respiratory: Negative for cough and shortness of breath.    Cardiovascular: Negative for chest pain, palpitations and leg swelling.   Gastrointestinal: Negative for abdominal pain, constipation, diarrhea, nausea, vomiting and indigestion.   Endocrine: Negative for cold intolerance, heat intolerance, polydipsia and polyphagia.   Genitourinary: Negative for dysuria and frequency.   Musculoskeletal: Negative for arthralgias, back pain, joint swelling and neck pain.   Skin: Negative for color change, rash and skin lesions.   Neurological: Positive for  weakness and memory problem. Negative for dizziness, headache and confusion.   Hematological: Does not bruise/bleed easily.   Psychiatric/Behavioral: Negative for agitation, hallucinations and suicidal ideas. The patient is not nervous/anxious.        Objective   Past Medical History:   Diagnosis Date   • Alcohol use 10/3/2019   • Anxiety    • Bronchiectasis without complication ([facility]) 4/9/2019   • COPD (chronic obstructive pulmonary disease) ([facility])    • COPD, group B, by GOLD 2017 classification ([facility]) 4/9/2019   • Essential hypertension 4/9/2019   • Hypertension    • Personal history of nicotine dependence 4/9/2019   • Prostate hypertrophy    • Snoring    • Thrombocytopenia ([facility])    • TIA (transient ischemic attack)    • Tobacco user 10/3/2019      Past Surgical History:   Procedure Laterality Date   • BRONCHOSCOPY     • EYE SURGERY     • NASAL SINUS SURGERY          Current Outpatient Medications:   •  albuterol sulfate  (90 Base) MCG/ACT inhaler, Inhale 2 puffs Every 4 (Four) Hours As Needed for Wheezing., Disp: 3 inhaler, Rfl: 3  •  aspirin 81 MG EC tablet, Take 81 mg by mouth. TAke 2 tabs po daily, Disp: , Rfl:   •  cyanocobalamin (VITAMIN B-12) 500 MCG tablet, Take 1 tablet by mouth Daily., Disp: 30 tablet, Rfl: 2  •  finasteride (PROSCAR) 5 MG tablet, Take 5 mg by mouth Daily., Disp: , Rfl:   •  folic acid (FOLVITE) 1 MG tablet, Take 1 tablet by mouth Daily., Disp: 30 tablet, Rfl: 2  •  lisinopril (PRINIVIL,ZESTRIL) 40 MG tablet, Take 20 mg by mouth Daily., Disp: , Rfl:   •  tamsulosin (Flomax) 0.4 MG capsule 24 hr capsule, Take 1 capsule by mouth Daily., Disp: , Rfl:   •  tiotropium bromide-olodaterol (STIOLTO RESPIMAT) 2.5-2.5 MCG/ACT aerosol solution inhaler, Inhale 2 puffs Daily., Disp: , Rfl:       Vitals:    11/01/21 1044   BP: 112/74   Pulse: 66   Temp: 96.9 °F (36.1 °C)   SpO2: 96%         11/01/21  1044   Weight: 70.3 kg (155 lb)       Body mass index is 21.02 kg/m².    Physical Exam  Vitals and  nursing note reviewed.   Constitutional:       General: He is not in acute distress.     Appearance: Normal appearance. He is well-developed.   HENT:      Head: Normocephalic and atraumatic.      Right Ear: External ear normal.      Left Ear: External ear normal.      Nose: Nose normal.   Eyes:      Extraocular Movements: Extraocular movements intact.      Conjunctiva/sclera: Conjunctivae normal.      Pupils: Pupils are equal, round, and reactive to light.   Cardiovascular:      Rate and Rhythm: Normal rate and regular rhythm.      Pulses: Normal pulses.      Heart sounds: Normal heart sounds.   Pulmonary:      Effort: Pulmonary effort is normal.      Breath sounds: Normal breath sounds.   Abdominal:      General: Bowel sounds are normal.      Palpations: Abdomen is soft.   Musculoskeletal:         General: Normal range of motion.      Cervical back: Normal range of motion and neck supple. No rigidity. No muscular tenderness.   Skin:     General: Skin is warm and dry.   Neurological:      General: No focal deficit present.      Mental Status: He is alert and oriented to person, place, and time.   Psychiatric:         Mood and Affect: Mood normal.         Behavior: Behavior normal.               Assessment/Plan   Diagnoses and all orders for this visit:    1. Cough (Primary)  -     XR Chest PA & Lateral; Future    2. Benign hypertension    3. Thrombocytopenia (HCC)    4. Hypercalcemia      Patient has a cough which is not really new for him however since he has had weight loss will go ahead and get another chest x-ray.  I reviewed his chest x-ray from May when he had his stroke at that point nothing showed up he is recently had another MRI and I reviewed that MRI there is no evidence of any new problems.  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  In regard to his elevated calcium it is consistently elevated over the last many years he likely has asymptomatic hyperparathyroidism.  I am  not putting him through another work-up for that.  His platelets are low and that is benign thrombocytopenia

## 2022-05-18 PROBLEM — J47.9 BRONCHIOLECTASIS: Status: RESOLVED | Noted: 2021-02-23 | Resolved: 2022-05-18

## 2022-05-18 PROBLEM — J84.10 PULMONARY FIBROSIS: Status: RESOLVED | Noted: 2020-05-07 | Resolved: 2022-05-18

## 2022-05-18 PROBLEM — R91.1 LUNG NODULE: Status: RESOLVED | Noted: 2020-06-17 | Resolved: 2022-05-18

## 2022-05-18 PROBLEM — Z87.898 HISTORY OF HEMOPTYSIS: Status: RESOLVED | Noted: 2020-06-17 | Resolved: 2022-05-18

## 2022-05-23 ENCOUNTER — TELEPHONE (OUTPATIENT)
Dept: VASCULAR SURGERY | Facility: CLINIC | Age: 82
End: 2022-05-23

## 2022-05-23 NOTE — PROGRESS NOTES
"5/24/2022       Jean Marie Cabrera MD  [address]  [facility] [address]    Van Mckeon  1940    Chief Complaint   Patient presents with   • Popliteal Aneurysm     1 year follow-up with US pad lower ext arteries bilat 05/24/22.  Pt states he has pain in both legs and has a lot of movement.  Pt denies any stroke-like symptoms.   • Current Smoker     Pt verified Current Smoker       Dear Jean Marie Cabrera MD   HPI  I had the pleasure of seeing your patient Van Mckeon in the office today.  As you recall, Van Mckeon is a 81 y.o.  male who you are currently following for routine health maintenance.  We are following for a small abdominal aortic aneurysm, and small right popliteal artery aneurysm.  He has previous TIA symptoms, but carotids found to be widely patent.  He was a former smoker but has quit.  He is maintained on aspirin. He did have noninvasive testing performed today, which I did review in office.       Review of Systems   Constitutional: Negative.    HENT: Negative.    Eyes: Negative.    Respiratory: Negative.    Cardiovascular: Negative.    Gastrointestinal: Negative.    Endocrine: Negative.    Genitourinary: Negative.    Musculoskeletal: Negative.    Skin: Negative.    Allergic/Immunologic: Negative.    Neurological: Negative.    Hematological: Negative.    Psychiatric/Behavioral: Negative.    All other systems reviewed and are negative.       /80 (BP Location: Left arm, Patient Position: Sitting, Cuff Size: Adult)   Pulse 71   Resp 18   Ht 182.9 cm (72\")   Wt 70.3 kg (155 lb)   SpO2 96%   BMI 21.02 kg/m²     Physical Exam  Vitals and nursing note reviewed.   Constitutional:       General: He is not in acute distress.     Appearance: Normal appearance. He is well-developed and normal weight. He is not diaphoretic.   HENT:      Head: Normocephalic and atraumatic.   Neck:      Vascular: No carotid bruit or JVD.   Cardiovascular:      Rate and Rhythm: Normal rate and " regular rhythm.      Pulses: Normal pulses.           Femoral pulses are 2+ on the right side and 2+ on the left side.       Popliteal pulses are 2+ on the right side and 2+ on the left side.        Dorsalis pedis pulses are 2+ on the right side and 2+ on the left side.        Posterior tibial pulses are 2+ on the right side and 2+ on the left side.      Heart sounds: Normal heart sounds, S1 normal and S2 normal. No murmur heard.    No friction rub. No gallop.      Comments: Prominent popliteals bilaterally  Pulmonary:      Effort: Pulmonary effort is normal.      Breath sounds: Normal breath sounds.   Abdominal:      General: Bowel sounds are normal. There is no abdominal bruit.      Palpations: Abdomen is soft. There is pulsatile mass.      Tenderness: There is no abdominal tenderness.   Musculoskeletal:         General: Normal range of motion.      Cervical back: Neck supple.   Skin:     General: Skin is warm and dry.   Neurological:      General: No focal deficit present.      Mental Status: He is alert and oriented to person, place, and time.      Cranial Nerves: No cranial nerve deficit.   Psychiatric:         Mood and Affect: Mood normal.         Behavior: Behavior normal.         Thought Content: Thought content normal.         Judgment: Judgment normal.       Diagnostic Data:  XR Spine Thoracic 3 View    Result Date: 5/24/2022  Narrative: XR SPINE THORACIC 3 VW- 5/24/2022 11:26 AM CDT  HISTORY: mid back pain; M54.9-Dorsalgia, unspecified  COMPARISON: None  FINDINGS: Frontal, lateral and swimmers lateral radiographs of the thoracic spine demonstrate normal alignment without evidence of compression fracture or subluxation. The pedicles are intact. The disc spaces are maintained.  The visualized thorax is clear.      Impression: 1. No acute osseous injury in the thoracic spine is identified..  This report was finalized on 05/24/2022 12:18 by Dr. Mario Solomon MD.    US Aorta Limited    Result Date:  5/24/2022  Narrative: EXAMINATION:   US AORTA LIMITED-  5/24/2022 11:43 AM CDT  HISTORY: ULTRASOUND AORTA 5/24/2022 11:22 AM CDT  HISTORY: Abdominal aortic aneurysm  COMPARISON: [place] 3, 2021  TECHNIQUE: Transverse and longitudinal sonographic images of the abdominal aorta were obtained.  FINDINGS:  The proximal aorta measures 2.7 x 2.9 cm.  The mid aorta measures 2.4 x 2.9 cm.  The distal aorta measures 3.7 x 4.2 cm.  The right iliac artery is mildly aneurysmal at 2 cm. The left iliac artery is mildly aneurysmal at 1.4 cm..      Impression: 1. 3.7 x 4.2 cm infrarenal abdominal aortic aneurysm. Previously this was 3.7 x 3.8 cm [place] 3, 2021. 2. Right common iliac artery is 2 cm. Left common iliac artery is 1.4 cm. These are also aneurysmal.  This report was finalized on 05/24/2022 11:46 by Dr. Mario Solomon MD.     Popliteal duplex both measuring 0.9      Patient Active Problem List   Diagnosis   • Asymmetry of tonsils   • Enlarged tonsils   • Neoplasm of tonsil   • COPD, group B, by GOLD 2017 classification (HCC)   • Bronchiectasis without complication (HCC)   • Essential (primary) hypertension   • Personal history of nicotine dependence   • Temporary cerebral vascular dysfunction   • Alcohol use   • Alcohol dependence, uncomplicated (HCC)   • Benign prostatic hyperplasia   • Anxiety   • Claustrophobia   • Thrombocytopenia (HCC)   • Anatoly sprayers' lung (HCC)   • Benign hypertension   • Myopathy   • Bradycardia   • Calcification of abdominal aorta ([facility])   • Gastroesophageal reflux disease   • Hypercalcemia   • Hypertrophy of prostate   • Infarction of left basal ganglia (HCC)   • Knee deformity, acquired, left   • Left cataract   • PAD (peripheral artery disease) ([facility])   • Pericarditis associated with severe chronic anemia   • Pseudophakia   • Statin myopathy   • Stasis edema of both lower extremities   • History of adenomatous polyp of colon   • Abdominal aortic aneurysm (AAA) without rupture ([facility])   • TIA  (transient ischemic attack)        Diagnosis Plan   1. Abdominal aortic aneurysm (AAA) 35 to 39 mm in diameter (HCC)  CT Angiogram Abdomen Pelvis   2. Aneurysm of right popliteal artery (HCC)  US Arterial Doppler Lower Extremity Bilateral   3. Bilateral carotid artery stenosis  US Carotid Bilateral   4. Essential (primary) hypertension         Plan: After thoroughly evaluating Van Mckeon, I believe the best course of action is to remain conservative from a vascular surgery standpoint.  I did review his testing and aneurysm measuring 3.8 cm, which is unchanged  His right popliteal 0.91 cm and left is 0.90.   Previously, His CTA of the neck showed no significant carotid stenosis.   We will see him back in 1 year with repeat noninvasive testing , including a CTA of the abdomen and pelvis, carotid duplex, and a popliteal duplex.  I did discuss vascular risk factors as they pertain to the progression of vascular disease including controlling hypertension.  This risk factor is currently stable.  The patient is to continue taking their medications as previously discussed.   This was all discussed in full with complete understanding.  Thank you for allowing me to participate in the care of your patient.  Please do not hesitate to call with any questions or concerns.  We will keep you aware of any further encounters with Van Mckeon.        Sincerely yours,         DO Rick Gonzalez David R, MD

## 2022-05-23 NOTE — TELEPHONE ENCOUNTER
Left VM for pt with appt reminder for tomorrow 5/24/22 with arrival at 10:30 with US at 11:00 and 12:00 and follow-up with Dr Sauceda at 1:15.

## 2022-05-24 ENCOUNTER — OFFICE VISIT (OUTPATIENT)
Dept: VASCULAR SURGERY | Facility: CLINIC | Age: 82
End: 2022-05-24

## 2022-05-24 ENCOUNTER — HOSPITAL ENCOUNTER (OUTPATIENT)
Dept: ULTRASOUND IMAGING | Facility: HOSPITAL | Age: 82
Discharge: HOME | End: 2022-05-24

## 2022-05-24 ENCOUNTER — HOSPITAL ENCOUNTER (OUTPATIENT)
Dept: GENERAL RADIOLOGY | Facility: HOSPITAL | Age: 82
Discharge: HOME | End: 2022-05-24

## 2022-05-24 ENCOUNTER — TRANSCRIBE ORDERS (OUTPATIENT)
Dept: GENERAL RADIOLOGY | Facility: HOSPITAL | Age: 82
End: 2022-05-24

## 2022-05-24 VITALS
BODY MASS INDEX: 20.99 KG/M2 | RESPIRATION RATE: 18 BRPM | HEIGHT: 72 IN | DIASTOLIC BLOOD PRESSURE: 80 MMHG | OXYGEN SATURATION: 96 % | WEIGHT: 155 LBS | SYSTOLIC BLOOD PRESSURE: 132 MMHG | HEART RATE: 71 BPM

## 2022-05-24 DIAGNOSIS — M54.9 MID BACK PAIN: ICD-10-CM

## 2022-05-24 DIAGNOSIS — I71.40 ABDOMINAL AORTIC ANEURYSM (AAA) 35 TO 39 MM IN DIAMETER: Primary | ICD-10-CM

## 2022-05-24 DIAGNOSIS — I72.4 ANEURYSM OF RIGHT POPLITEAL ARTERY: ICD-10-CM

## 2022-05-24 DIAGNOSIS — I65.23 BILATERAL CAROTID ARTERY STENOSIS: ICD-10-CM

## 2022-05-24 DIAGNOSIS — M54.9 MID BACK PAIN: Primary | ICD-10-CM

## 2022-05-24 DIAGNOSIS — I71.40 ABDOMINAL AORTIC ANEURYSM (AAA) 35 TO 39 MM IN DIAMETER: ICD-10-CM

## 2022-05-24 DIAGNOSIS — I10 ESSENTIAL (PRIMARY) HYPERTENSION: ICD-10-CM

## 2022-05-24 PROCEDURE — 72072 X-RAY EXAM THORAC SPINE 3VWS: CPT

## 2022-05-24 PROCEDURE — 93925 LOWER EXTREMITY STUDY: CPT | Performed by: SURGERY

## 2022-05-24 PROCEDURE — 93925 LOWER EXTREMITY STUDY: CPT

## 2022-05-24 PROCEDURE — 76775 US EXAM ABDO BACK WALL LIM: CPT

## 2022-05-24 PROCEDURE — 99214 OFFICE O/P EST MOD 30 MIN: CPT | Performed by: SURGERY

## 2022-05-25 ENCOUNTER — TELEPHONE (OUTPATIENT)
Dept: VASCULAR SURGERY | Facility: CLINIC | Age: 82
End: 2022-05-25

## 2022-05-25 NOTE — TELEPHONE ENCOUNTER
Left a message for her to call back.  I need more information regarding records request that was laying on my desk. [facility], but there was nothing listed on them as to where this is to go.      **[place] called back and said that we should send all of his records to the [facility].  I printed them out and faxed them to the number listed per her request.

## 2022-06-08 NOTE — PROGRESS NOTES
"Chief Complaint  bronchiectasis with out complication and COPD    Subjective    History of Present Illness     Van Mckeon presents to [facility] for:    Management of his bronchiectasis and COPD.  He has not been seen by myself since April 2021.  Most recent FEV1 was 60.  Diffusion 61, corrected was 64.  He quit smoking in 2019.  He frequently has shortness of breath and productive coughing.  He controls his symptoms with Stiolto daily.  He is doing great with it still.  [facility]  He has pro-air he can use when needed.  Seldom requires it.  His current rescue he has with him still has 132 doses left.  He has suffered a stroke.  Minimal residual other than occasional memory issues and \"getting overheated if it is too hot outside\" according to his daughter who is present.       Prior to Admission medications    Medication Sig Start Date End Date Taking? Authorizing Provider   albuterol sulfate  (90 Base) MCG/ACT inhaler Inhale 2 puffs Every 4 (Four) Hours As Needed for Wheezing. 4/14/20   Josephine Max APRN   aspirin 81 MG EC tablet Take 81 mg by mouth. TAke 2 tabs po daily    ProviderDylon MD   cyanocobalamin (VITAMIN B-12) 500 MCG tablet Take 1 tablet by mouth Daily. 9/6/19   Clara Ambrosio APRN   finasteride (PROSCAR) 5 MG tablet Take 5 mg by mouth Daily. 2/2/17   Dylon Bobo MD   folic acid (FOLVITE) 1 MG tablet Take 1 tablet by mouth Daily. 9/6/19   Clara Ambrosio APRN   lisinopril (PRINIVIL,ZESTRIL) 40 MG tablet Take 20 mg by mouth Daily.    ProviderDylon MD   tamsulosin (FLOMAX) 0.4 MG capsule 24 hr capsule Take 1 capsule by mouth Daily. 2/21/19   Dylon Bobo MD   tiotropium bromide-olodaterol (STIOLTO RESPIMAT) 2.5-2.5 MCG/ACT aerosol solution inhaler Inhale 2 puffs Daily.    ProviderDylon MD       Social History     Socioeconomic History   • Marital " "status:    Tobacco Use   • Smoking status: Current Every Day Smoker     Packs/day: 1.50     Years: 50.00     Pack years: 75.00     Types: Cigarettes     Last attempt to quit: 2019     Years since quittin.7   • Smokeless tobacco: Former User     Types: Snuff     Quit date:    Vaping Use   • Vaping Use: Never used   Substance and Sexual Activity   • Alcohol use: Yes     Alcohol/week: 1.0 standard drink     Types: 1 Shots of liquor per week     Comment: daily   • Drug use: No   • Sexual activity: Defer       Objective   Vital Signs:   /70   Pulse 79   Ht 182.9 cm (72\")   Wt 69.3 kg (152 lb 12.8 oz)   SpO2 95% Comment: ra  BMI 20.72 kg/m²     Physical Exam  Constitutional:       Interventions: Face mask in place.   Cardiovascular:      Rate and Rhythm: Normal rate and regular rhythm.      Heart sounds: No murmur heard.  Pulmonary:      Effort: Pulmonary effort is normal.      Breath sounds: Normal breath sounds.   Musculoskeletal:      Right lower leg: No edema.      Left lower leg: No edema.   Neurological:      Mental Status: He is alert and oriented to person, place, and time.        Result Review :      My interpretation of the PFT: none for this visit    Results for orders placed in visit on 10/14/19    Pulmonary Function Test    Narrative  Pulmonary Function Test  Performed by: Josephine Max APRN  Authorized by: Josephine Max APRN    Pre Drug  FVC: 80%  FEV1: 60%  FEF 25-75%: 32%  FEV1/FVC: 58%  DLCO: 60%  D/VAsb: 64%    My interpretation of imaging:  None for this visit    My interpretation of labs: none for this visit      Assessment and Plan     Diagnoses and all orders for this visit:    1. Bronchiectasis without complication (HCC) (Primary)  Comments:  Well-controlled on Stiolto.  Continue.  [facility]  Seldom requiring albuterol.  Routine imaging/PFTs not indicated at his age    2. COPD, group B, by GOLD 2017 classification (HCC)  Comments:  Well-controlled " on Stiolto.  Continue.  [facility].  Seldom requiring albuterol.  Routine imaging/PFTs not indicated at his age  Orders:  -     tiotropium bromide-olodaterol (Stiolto Respimat) 2.5-2.5 MCG/ACT aerosol solution inhaler; Inhale 2 puffs Daily for 90 days.  Dispense: 3 each; Refill: 3    3. Personal history of nicotine dependence  Comments:  Recommend smoking cessation.        BMI is within normal parameters. No other follow-up for BMI required.    Josephine Max, JAE  6/16/2022  15:12 CDT    Follow Up   Return in about 6 months (around 12/16/2022).    Patient was given instructions and counseling regarding his condition or for health maintenance advice. Please see specific information pulled into the AVS if appropriate.

## 2022-06-16 ENCOUNTER — OFFICE VISIT (OUTPATIENT)
Dept: PULMONOLOGY | Facility: CLINIC | Age: 82
End: 2022-06-16

## 2022-06-16 VITALS
SYSTOLIC BLOOD PRESSURE: 108 MMHG | OXYGEN SATURATION: 95 % | HEART RATE: 79 BPM | HEIGHT: 72 IN | DIASTOLIC BLOOD PRESSURE: 70 MMHG | WEIGHT: 152.8 LBS | BODY MASS INDEX: 20.7 KG/M2

## 2022-06-16 DIAGNOSIS — J47.9 BRONCHIECTASIS WITHOUT COMPLICATION: Primary | Chronic | ICD-10-CM

## 2022-06-16 DIAGNOSIS — Z87.891 PERSONAL HISTORY OF NICOTINE DEPENDENCE: Chronic | ICD-10-CM

## 2022-06-16 DIAGNOSIS — J44.9 COPD, GROUP B, BY GOLD 2017 CLASSIFICATION: Chronic | ICD-10-CM

## 2022-06-16 PROCEDURE — 99213 OFFICE O/P EST LOW 20 MIN: CPT | Performed by: NURSE PRACTITIONER

## 2022-06-16 RX ORDER — TIOTROPIUM BROMIDE AND OLODATEROL 3.124; 2.736 UG/1; UG/1
2 SPRAY, METERED RESPIRATORY (INHALATION)
Qty: 3 EACH | Refills: 3 | Status: SHIPPED | OUTPATIENT
Start: 2022-06-16 | End: 2022-09-14

## 2022-08-15 ENCOUNTER — HOSPITAL ENCOUNTER (EMERGENCY)
Facility: HOSPITAL | Age: 82
Discharge: LEFT BEFORE DISCHARGE | End: 2022-08-15

## 2022-08-15 VITALS
WEIGHT: 152 LBS | DIASTOLIC BLOOD PRESSURE: 92 MMHG | RESPIRATION RATE: 17 BRPM | BODY MASS INDEX: 20.59 KG/M2 | OXYGEN SATURATION: 92 % | TEMPERATURE: 98.6 F | HEART RATE: 85 BPM | SYSTOLIC BLOOD PRESSURE: 147 MMHG | HEIGHT: 72 IN

## 2022-08-15 PROCEDURE — 99211 OFF/OP EST MAY X REQ PHY/QHP: CPT

## 2022-12-27 NOTE — PROGRESS NOTES
"Chief Complaint  Bronchiectasis without complication    Subjective    History of Present Illness     Van Mckeon presents to [facility] for:    History of Present Illness  Management of his bronchiectasis and COPD.  Most recent FEV1 was 60.  Diffusion 61, corrected was 64.  He is a long time smoker. He frequently has shortness of breath and productive coughing.  He controls his symptoms with Stiolto daily.  More recently he has not been using it daily.  He has been using the albuterol instead.  He is not really sure why he started doing this.  He believes both of them are helpful.  [facility]  He indicates he received something recently that this may no longer be on formulary although they did just mail him a 90-day supply.  He has suffered a stroke in the past with minimal residual memory issues.  As a result his daughter who is a nurse practitioner at [employer] attends his appointments.  She indicates he was hospitalized around [place] time.  Underwent colonoscopy without issue.  He also had an endoscopy identifying erosive esophagitis.  They started him on Prilosec.  Several days later he reported feeling \"weak and wobbly in his legs\".  He had labs obtained through PCP identifying a critically elevated calcium level.  He went to the emergency room where this was confirmed through repeat labs.  He was admitted to the ICU for treatment of this as well as confusion.  She indicates they believe they found multiple cysts on his parathyroid.  He is now being followed by ENT.  They have ordered a PET scan and a scan of his chest with IV contrast.  She believes they are assessing lymph nodes but she is not certain.  He denies having any acute pulmonary complaints.  He is concerned about a \"spot behind my right ear\".  He indicates he believes he cut it while he was shaving.       Prior to Admission medications    Medication Sig Start " "Date End Date Taking? Authorizing Provider   albuterol sulfate  (90 Base) MCG/ACT inhaler Inhale 2 puffs Every 4 (Four) Hours As Needed for Wheezing. 4/14/20   Josephine Max APRN   aspirin 81 MG EC tablet Take 81 mg by mouth. TAke 2 tabs po daily    Dylon Bobo MD   cyanocobalamin (VITAMIN B-12) 500 MCG tablet Take 1 tablet by mouth Daily. 9/6/19   Clara Ambrosio APRN   finasteride (PROSCAR) 5 MG tablet Take 5 mg by mouth Daily. 2/2/17   Dylon Bobo MD   folic acid (FOLVITE) 1 MG tablet Take 1 tablet by mouth Daily. 9/6/19   Clara Ambrosio APRN   lisinopril (PRINIVIL,ZESTRIL) 40 MG tablet Take 20 mg by mouth Daily.    Dylon Bobo MD   tamsulosin (FLOMAX) 0.4 MG capsule 24 hr capsule Take 1 capsule by mouth Daily. 2/21/19   Dylon Bobo MD       Social History     Socioeconomic History   • Marital status:    Tobacco Use   • Smoking status: Every Day     Packs/day: 1.50     Years: 50.00     Pack years: 75.00     Types: Cigarettes   • Smokeless tobacco: Former     Types: Snuff     Quit date: 1970   Vaping Use   • Vaping Use: Never used   Substance and Sexual Activity   • Alcohol use: Yes     Alcohol/week: 1.0 standard drink     Types: 1 Shots of liquor per week     Comment: daily - 2 mixed drinks   • Drug use: No   • Sexual activity: Defer       Objective   Vital Signs:   /82   Pulse 74   Ht 182.9 cm (72\")   Wt 68.9 kg (152 lb)   SpO2 96% Comment: RA  BMI 20.61 kg/m²     Physical Exam  Constitutional:       Interventions: Face mask in place.   HENT:      Head:     Cardiovascular:      Rate and Rhythm: Normal rate and regular rhythm.      Heart sounds: No murmur heard.  Pulmonary:      Effort: Pulmonary effort is normal.      Breath sounds: Normal breath sounds.   Musculoskeletal:      Right lower leg: No edema.      Left lower leg: No edema.   Neurological:      Mental Status: He is alert and oriented to person, place, and time.      "   Result Review :      My interpretation of the PFT: none    Results for orders placed in visit on 10/14/19    Pulmonary Function Test    Narrative  Pulmonary Function Test  Performed by: Josephine Max APRN  Authorized by: Josephine Max APRN    Pre Drug  FVC: 80%  FEV1: 60%  FEF 25-75%: 32%  FEV1/FVC: 58%  DLCO: 60%  D/VAsb: 64%      My interpretation of imaging:  none    My interpretation of labs: none      Assessment and Plan     Diagnoses and all orders for this visit:    1. Bronchiectasis without complication (HCC) (Primary)  Comments:  Stable.  Encouraged to use Stiolto 2 sprays daily as opposed to intermittently.  Use albuterol if steel toe not beneficial.    2. COPD, group B, by GOLD 2017 classification (HCC)  Comments:  Stable.  Encouraged to use Stiolto 2 sprays daily as opposed to intermittently.  Use albuterol if steel toe not beneficial.    3. Personal history of nicotine dependence  Comments:  Recently resumed smoking.  Encouraged to stop doing this.  Does not meet low-dose lung cancer screening criteria    4. Superficial abrasion  Comments:  Appears to have small nick from shaving.  Encouraged to watch closely.  If ongoing bleeding/wound healing and issue may need to see dermatology    5. Parathyroid disorder (HCC)  Comments:  Recent hospitalization for elevated calcium.  Abnormal parathyroid scan.  Awaiting PET scan and CT scan of chest with IV contrast      Body mass index is 20.61 kg/m².      JAE Farris  1/24/2023  14:49 CST    Follow Up   Return in about 1 year (around 1/24/2024).    Patient was given instructions and counseling regarding his condition or for health maintenance advice. Please see specific information pulled into the AVS if appropriate.

## 2023-01-16 ENCOUNTER — TELEPHONE (OUTPATIENT)
Dept: PULMONOLOGY | Facility: CLINIC | Age: 83
End: 2023-01-16
Payer: MEDICARE

## 2023-01-16 NOTE — TELEPHONE ENCOUNTER
Patient states he was told the [facility] was not going to pay for his Stiolto. Told patient we have samples that I will leave at the  that he can  on 1/24/23 when he sees Josephine ROWE.   Instructed patient he should find out from the [facility] what is covered.

## 2023-01-24 ENCOUNTER — OFFICE VISIT (OUTPATIENT)
Dept: PULMONOLOGY | Facility: CLINIC | Age: 83
End: 2023-01-24
Payer: OTHER GOVERNMENT

## 2023-01-24 VITALS
HEART RATE: 74 BPM | BODY MASS INDEX: 20.59 KG/M2 | DIASTOLIC BLOOD PRESSURE: 82 MMHG | WEIGHT: 152 LBS | OXYGEN SATURATION: 96 % | SYSTOLIC BLOOD PRESSURE: 124 MMHG | HEIGHT: 72 IN

## 2023-01-24 DIAGNOSIS — Z87.891 PERSONAL HISTORY OF NICOTINE DEPENDENCE: Chronic | ICD-10-CM

## 2023-01-24 DIAGNOSIS — J44.9 COPD, GROUP B, BY GOLD 2017 CLASSIFICATION: Chronic | ICD-10-CM

## 2023-01-24 DIAGNOSIS — J47.9 BRONCHIECTASIS WITHOUT COMPLICATION: Primary | Chronic | ICD-10-CM

## 2023-01-24 DIAGNOSIS — E21.5 PARATHYROID DISORDER: ICD-10-CM

## 2023-01-24 DIAGNOSIS — T14.8XXA SUPERFICIAL ABRASION: ICD-10-CM

## 2023-01-24 PROCEDURE — 99214 OFFICE O/P EST MOD 30 MIN: CPT | Performed by: NURSE PRACTITIONER

## 2023-01-24 RX ORDER — FUROSEMIDE 40 MG/1
40 TABLET ORAL DAILY
COMMUNITY
Start: 2022-12-20

## 2023-01-24 RX ORDER — POTASSIUM CHLORIDE 1500 MG/1
20 TABLET, FILM COATED, EXTENDED RELEASE ORAL DAILY
COMMUNITY
Start: 2022-12-20

## 2023-01-24 RX ORDER — TIOTROPIUM BROMIDE AND OLODATEROL 3.124; 2.736 UG/1; UG/1
2 SPRAY, METERED RESPIRATORY (INHALATION)
COMMUNITY

## 2023-05-15 ENCOUNTER — TELEPHONE (OUTPATIENT)
Dept: VASCULAR SURGERY | Facility: CLINIC | Age: 83
End: 2023-05-15
Payer: MEDICARE

## 2023-05-15 NOTE — TELEPHONE ENCOUNTER
Tried to call the patient to remind him of his prep, testing and appt tomorrow with Dr. Sauceda, but there was no answer/machine.

## 2023-05-16 ENCOUNTER — HOSPITAL ENCOUNTER (OUTPATIENT)
Dept: ULTRASOUND IMAGING | Facility: HOSPITAL | Age: 83
Discharge: HOME | End: 2023-05-16
Payer: OTHER GOVERNMENT

## 2023-05-16 ENCOUNTER — OFFICE VISIT (OUTPATIENT)
Dept: VASCULAR SURGERY | Facility: CLINIC | Age: 83
End: 2023-05-16
Payer: MEDICARE

## 2023-05-16 ENCOUNTER — HOSPITAL ENCOUNTER (OUTPATIENT)
Dept: CT IMAGING | Facility: HOSPITAL | Age: 83
Discharge: HOME | End: 2023-05-16
Payer: OTHER GOVERNMENT

## 2023-05-16 VITALS
HEART RATE: 72 BPM | OXYGEN SATURATION: 98 % | DIASTOLIC BLOOD PRESSURE: 74 MMHG | SYSTOLIC BLOOD PRESSURE: 122 MMHG | HEIGHT: 72 IN | WEIGHT: 152 LBS | BODY MASS INDEX: 20.59 KG/M2

## 2023-05-16 DIAGNOSIS — I10 ESSENTIAL (PRIMARY) HYPERTENSION: ICD-10-CM

## 2023-05-16 DIAGNOSIS — Z87.891 PERSONAL HISTORY OF NICOTINE DEPENDENCE: Chronic | ICD-10-CM

## 2023-05-16 DIAGNOSIS — I72.4 ANEURYSM OF RIGHT POPLITEAL ARTERY: ICD-10-CM

## 2023-05-16 DIAGNOSIS — I71.40 ABDOMINAL AORTIC ANEURYSM (AAA) 35 TO 39 MM IN DIAMETER: ICD-10-CM

## 2023-05-16 DIAGNOSIS — I65.23 BILATERAL CAROTID ARTERY STENOSIS: ICD-10-CM

## 2023-05-16 DIAGNOSIS — I72.4 POPLITEAL ARTERY ANEURYSM, BILATERAL: ICD-10-CM

## 2023-05-16 DIAGNOSIS — I71.43 INFRARENAL ABDOMINAL AORTIC ANEURYSM (AAA) WITHOUT RUPTURE: Primary | ICD-10-CM

## 2023-05-16 LAB — CREAT BLDA-MCNC: 1.7 MG/DL (ref 0.6–1.3)

## 2023-05-16 PROCEDURE — 93880 EXTRACRANIAL BILAT STUDY: CPT

## 2023-05-16 PROCEDURE — 74174 CTA ABD&PLVS W/CONTRAST: CPT

## 2023-05-16 PROCEDURE — 25510000001 IOPAMIDOL PER 1 ML: Performed by: SURGERY

## 2023-05-16 PROCEDURE — 82565 ASSAY OF CREATININE: CPT

## 2023-05-16 PROCEDURE — 93925 LOWER EXTREMITY STUDY: CPT

## 2023-05-16 RX ADMIN — IOPAMIDOL 100 ML: 755 INJECTION, SOLUTION INTRAVENOUS at 08:30

## 2023-05-16 NOTE — PROGRESS NOTES
"5/16/2023       Jean Marie Cabrera MD  [address]  [facility] [address]    Van Mckeon  1940    Chief Complaint   Patient presents with    Follow-up     1 year follow up with testing. Pt was last seen on 5/24/22 for AAA. Pt states no changes or issues in the last year.        Dear Jean Marie Cabrera MD   HPI  I had the pleasure of seeing your patient Van Mckeon in the office today.  As you recall, Van Mckeon is a 82 y.o.  male who you are currently following for routine health maintenance.  We are following for a small abdominal aortic aneurysm, and small right popliteal artery aneurysm.  He has previous TIA symptoms, but carotids found to be widely patent.  He was a former smoker but has quit.  He is maintained on aspirin.  He did have noninvasive testing performed today, which I did review in office.       Review of Systems   Constitutional: Negative.    HENT: Negative.     Eyes: Negative.    Respiratory: Negative.     Cardiovascular:  Positive for leg swelling.   Gastrointestinal: Negative.    Endocrine: Negative.    Genitourinary: Negative.    Musculoskeletal: Negative.    Skin: Negative.    Allergic/Immunologic: Negative.    Neurological: Negative.    Hematological: Negative.    Psychiatric/Behavioral: Negative.     All other systems reviewed and are negative.     /74   Pulse 72   Ht 182.9 cm (72\")   Wt 68.9 kg (152 lb)   SpO2 98%   BMI 20.61 kg/m²     Physical Exam  Vitals and nursing note reviewed.   Constitutional:       General: He is not in acute distress.     Appearance: Normal appearance. He is well-developed and normal weight. He is not diaphoretic.   HENT:      Head: Normocephalic and atraumatic.   Eyes:      General: No scleral icterus.     Pupils: Pupils are equal, round, and reactive to light.   Neck:      Thyroid: No thyromegaly.      Vascular: No carotid bruit or JVD.   Cardiovascular:      Rate and Rhythm: Normal rate and regular rhythm.      Pulses: Normal " pulses.           Carotid pulses are 2+ on the right side and 2+ on the left side.       Femoral pulses are 2+ on the right side and 2+ on the left side.       Popliteal pulses are 2+ on the right side and 2+ on the left side.        Dorsalis pedis pulses are 2+ on the right side and 2+ on the left side.        Posterior tibial pulses are 2+ on the right side and 2+ on the left side.      Heart sounds: Normal heart sounds, S1 normal and S2 normal. No murmur heard.    No friction rub. No gallop.      Comments: Prominent popliteals bilaterally  Pulmonary:      Effort: Pulmonary effort is normal.      Breath sounds: Normal breath sounds.   Abdominal:      General: Bowel sounds are normal. There is no distension or abdominal bruit.      Palpations: Abdomen is soft. There is pulsatile mass. There is no mass.      Tenderness: There is no abdominal tenderness.   Musculoskeletal:         General: Normal range of motion.      Cervical back: Neck supple.   Lymphadenopathy:      Cervical: No cervical adenopathy.   Skin:     General: Skin is warm and dry.   Neurological:      General: No focal deficit present.      Mental Status: He is alert and oriented to person, place, and time.      Cranial Nerves: No cranial nerve deficit.      Sensory: No sensory deficit.   Psychiatric:         Mood and Affect: Mood normal.         Behavior: Behavior normal.         Thought Content: Thought content normal.         Judgment: Judgment normal.     Diagnostic Data:  CT Angiogram Abdomen Pelvis    Result Date: 5/16/2023  Narrative: EXAMINATION: CT ANGIOGRAM ABDOMEN PELVIS-   5/16/2023 8:25 AM CDT  HISTORY: Aortic aneurysm (AAA), surveillance; I71.40-Abdominal aortic aneurysm, without rupture, unspecified  In order to have a CT radiation dose as low as reasonably achievable Automated Exposure Control was utilized for adjustment of the mA and/or KV according to patient size.  DLP in mGycm= 476  The CT angiography of the abdomen and pelvis is  performed after intravenous contrast enhancement.  Images are acquired in axial plane and subsequent 2-D reconstruction in coronal and sagittal planes and 3-D maximum intensity projection reconstruction.  There is no previous similar study for comparison. Correlation is made with CT scan of the abdomen dated 05/18/2020.  There are atheromatous changes of the abdominal aorta iliac and femoral arteries.  There is fusiform aneurysmal dilatation of the infrarenal abdominal aorta involving 9 cm of the distal abdominal aorta. Maximum lumen diameter is 4.1 cm. It previously measured 3.8 x 3.7 cm at the same level. There is prefrontal partial lumen mural thrombosis with effective lumen of the aorta measuring 2.9 x 2.1 cm. No dissection.  Moderate ectasia of the right common iliac artery is stable and measures 2.1 cm. No change.  Atheromatous changes of both common internal and external iliac arteries is seen. No aneurysmal dilatation. No stenosis. Atheromatous plaques are seen in the common femoral arteries bilaterally without stenosis. Both divides into normal-appearing superficial deep femoral arteries.  Atheromatous plaques are seen at the origin of the celiac and superior mesenteric artery. No significant stenosis. Atheromatous plaques is seen at the origin of both renal arteries. No stenosis. 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  The lung bases included in the study are unremarkable except for mild atelectatic changes at calcified granulomas.  The liver and spleen are unremarkable.  No radiopaque gallstones.  Pancreas is normal.  Adrenal glands bilaterally are unremarkable.  Moderate lobulation of the kidneys bilaterally seen. There are several well-defined sharply marginated low-density nodule/mass in both kidneys which are similar to the previous study. The largest nodule in the right kidney posteriorly measures 2.9 cm in maximum dimension in axial plane images. It measured 4.1 cm in the  previous study. The CT density suggests a cyst. The largest nodule in the left kidney, laterally in the lower pole, measures 2.9 cm in maximum dimension. It previously measured 2 cm. CT density suggests a cyst. No radiopaque calculi. No hydronephrosis. The ureters are nondilated. The urinary bladder is poorly distended with moderate thickening of the walls. No intrinsic abnormality.  Prostate is significantly enlarged and measures 6.1 x 5.2 x 5.3 cm. No significant change when measured at the same level and same dimension.  There are small fat-containing inguinal hernias. A tiny fat-containing umbilical hernia.  The stomach and duodenum and small bowel are normal. Cecum lies low in the pelvis. Appendix is normal. There is significant large volume stool in the entire colon. No obstruction.  There is no evidence of abdominal or pelvic lymphadenopathy.  Images are reviewed in bone window show chronic degenerative changes of the thoracolumbar spine with multilevel Schmorl's nodes. No acute bony abnormality.      Impression: 1. Fusiform aneurysmal dilatation of the infrarenal abdominal aorta which now measures 41 cm in diameter. It previously measured 3.8 x 3.7 cm at the same level. No dissection. Partial thrombosis. 2. 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 3. Bilateral renal cysts as described above. 4. A large volume stool in the colon. No obstruction. 5. Enlarged prostate. 6. Moderate thickening of the wall of the incompletely distended urinary bladder may partly be due to incomplete distention. There is probability of chronic partial outlet obstruction or chronic cystitis.          This report was finalized on 05/16/2023 10:22 by Dr. Glory Sumner MD.        Noninvasive testing including a carotid duplex shows less than 50% carotid stenosis bilaterally.  Popliteal duplex shows small popliteal artery aneurysms 1.14 on the right 1.05 on the left.    Patient Active Problem List   Diagnosis     Asymmetry of tonsils    Enlarged tonsils    Neoplasm of tonsil    COPD, group B, by GOLD 2017 classification    Bronchiectasis without complication    Essential (primary) hypertension    Personal history of nicotine dependence    Temporary cerebral vascular dysfunction    Alcohol use    Alcohol dependence, uncomplicated    Benign prostatic hyperplasia    Anxiety    Claustrophobia    Thrombocytopenia    Anatoly sprayers' lung    Benign hypertension    Myopathy    Bradycardia    Calcification of abdominal aorta    Gastroesophageal reflux disease    Hypercalcemia    Hypertrophy of prostate    Infarction of left basal ganglia    Knee deformity, acquired, left    Left cataract    PAD (peripheral artery disease)    Pericarditis associated with severe chronic anemia    Pseudophakia    Statin myopathy    Stasis edema of both lower extremities    History of adenomatous polyp of colon    Abdominal aortic aneurysm (AAA) without rupture    TIA (transient ischemic attack)        Diagnosis Plan   1. Infrarenal abdominal aortic aneurysm (AAA) without rupture        2. Essential (primary) hypertension        3. Popliteal artery aneurysm, bilateral        4. Personal history of nicotine dependence            Plan: After thoroughly evaluating Van Mckeon, I believe the best course of action is to remain conservative from vascular surgery standpoint.  I did review his testing closely and his aneurysm is slightly larger measuring about 4.2 cm.  I will send to my rep as I am worried about losing the ability to fix minimally invasive as the landing zone is becoming aneurysmal.  He has small popliteal artery aneurysms bilaterally measuring just over 1 cm.  His carotid duplex shows less than 50% carotid stenosis bilaterally.  Once I discuss further with my rep I will contact the patient and his daughter Siomara.  Otherwise I will plan for a follow-up in 1 year with a repeat CTA of the abdomen pelvis, carotid duplex, and popliteal  artery duplex.   I did discuss vascular risk factors as they pertain to the progression of vascular disease including controlling hypertension.  This risk factors currently stable and controlled.  The patient is to continue taking their medications as previously discussed.   This was all discussed in full with complete understanding.  Thank you for allowing me to participate in the care of your patient.  Please do not hesitate to call with any questions or concerns.  We will keep you aware of any further encounters with Van Mckeon.        Sincerely yours,         DO Rick Gonzalez David R, MD

## 2023-05-16 NOTE — LETTER
"May 16, 2023     Jean Marie Cabrera MD  [address]  [facility] [address]    Patient: Van Mckeon   YOB: 1940   Date of Visit: 5/16/2023       Dear Dr. Rick MD:    Thank you for referring Van Mckeon to me for evaluation. Below are the relevant portions of my assessment and plan of care.    If you have questions, please do not hesitate to call me. I look forward to following Van along with you.         Sincerely,        Collins Sauceda DO        CC: No Recipients    Collins Sauceda DO  05/16/23 1235  Sign when Signing Visit  5/16/2023       Jean Marie Cabrera MD  [address]  [facility] [address]    Van Mckeon  1940    Chief Complaint   Patient presents with   • Follow-up     1 year follow up with testing. Pt was last seen on 5/24/22 for AAA. Pt states no changes or issues in the last year.        Dear Jean Marie Cabrera MD   HPI  I had the pleasure of seeing your patient Van Mckeon in the office today.  As you recall, Van Mckeon is a 82 y.o.  male who you are currently following for routine health maintenance.  We are following for a small abdominal aortic aneurysm, and small right popliteal artery aneurysm.  He has previous TIA symptoms, but carotids found to be widely patent.  He was a former smoker but has quit.  He is maintained on aspirin.  He did have noninvasive testing performed today, which I did review in office.       Review of Systems   Constitutional: Negative.    HENT: Negative.     Eyes: Negative.    Respiratory: Negative.     Cardiovascular:  Positive for leg swelling.   Gastrointestinal: Negative.    Endocrine: Negative.    Genitourinary: Negative.    Musculoskeletal: Negative.    Skin: Negative.    Allergic/Immunologic: Negative.    Neurological: Negative.    Hematological: Negative.    Psychiatric/Behavioral: Negative.     All other systems reviewed and are negative.     /74   Pulse 72   Ht 182.9 cm (72\")   Wt 68.9 " kg (152 lb)   SpO2 98%   BMI 20.61 kg/m²     Physical Exam  Vitals and nursing note reviewed.   Constitutional:       General: He is not in acute distress.     Appearance: Normal appearance. He is well-developed and normal weight. He is not diaphoretic.   HENT:      Head: Normocephalic and atraumatic.   Eyes:      General: No scleral icterus.     Pupils: Pupils are equal, round, and reactive to light.   Neck:      Thyroid: No thyromegaly.      Vascular: No carotid bruit or JVD.   Cardiovascular:      Rate and Rhythm: Normal rate and regular rhythm.      Pulses: Normal pulses.           Carotid pulses are 2+ on the right side and 2+ on the left side.       Femoral pulses are 2+ on the right side and 2+ on the left side.       Popliteal pulses are 2+ on the right side and 2+ on the left side.        Dorsalis pedis pulses are 2+ on the right side and 2+ on the left side.        Posterior tibial pulses are 2+ on the right side and 2+ on the left side.      Heart sounds: Normal heart sounds, S1 normal and S2 normal. No murmur heard.    No friction rub. No gallop.      Comments: Prominent popliteals bilaterally  Pulmonary:      Effort: Pulmonary effort is normal.      Breath sounds: Normal breath sounds.   Abdominal:      General: Bowel sounds are normal. There is no distension or abdominal bruit.      Palpations: Abdomen is soft. There is pulsatile mass. There is no mass.      Tenderness: There is no abdominal tenderness.   Musculoskeletal:         General: Normal range of motion.      Cervical back: Neck supple.   Lymphadenopathy:      Cervical: No cervical adenopathy.   Skin:     General: Skin is warm and dry.   Neurological:      General: No focal deficit present.      Mental Status: He is alert and oriented to person, place, and time.      Cranial Nerves: No cranial nerve deficit.      Sensory: No sensory deficit.   Psychiatric:         Mood and Affect: Mood normal.         Behavior: Behavior normal.          Thought Content: Thought content normal.         Judgment: Judgment normal.     Diagnostic Data:  CT Angiogram Abdomen Pelvis    Result Date: 5/16/2023  Narrative: EXAMINATION: CT ANGIOGRAM ABDOMEN PELVIS-   5/16/2023 8:25 AM CDT  HISTORY: Aortic aneurysm (AAA), surveillance; I71.40-Abdominal aortic aneurysm, without rupture, unspecified  In order to have a CT radiation dose as low as reasonably achievable Automated Exposure Control was utilized for adjustment of the mA and/or KV according to patient size.  DLP in mGycm= 476  The CT angiography of the abdomen and pelvis is performed after intravenous contrast enhancement.  Images are acquired in axial plane and subsequent 2-D reconstruction in coronal and sagittal planes and 3-D maximum intensity projection reconstruction.  There is no previous similar study for comparison. Correlation is made with CT scan of the abdomen dated 05/18/2020.  There are atheromatous changes of the abdominal aorta iliac and femoral arteries.  There is fusiform aneurysmal dilatation of the infrarenal abdominal aorta involving 9 cm of the distal abdominal aorta. Maximum lumen diameter is 4.1 cm. It previously measured 3.8 x 3.7 cm at the same level. There is prefrontal partial lumen mural thrombosis with effective lumen of the aorta measuring 2.9 x 2.1 cm. No dissection.  Moderate ectasia of the right common iliac artery is stable and measures 2.1 cm. No change.  Atheromatous changes of both common internal and external iliac arteries is seen. No aneurysmal dilatation. No stenosis. Atheromatous plaques are seen in the common femoral arteries bilaterally without stenosis. Both divides into normal-appearing superficial deep femoral arteries.  Atheromatous plaques are seen at the origin of the celiac and superior mesenteric artery. No significant stenosis. Atheromatous plaques is seen at the origin of both renal arteries. No stenosis. The origin of inferior mesenteric artery is completely  occluded.  The lung bases included in the study are unremarkable except for mild atelectatic changes at calcified granulomas.  The liver and spleen are unremarkable.  No radiopaque gallstones.  Pancreas is normal.  Adrenal glands bilaterally are unremarkable.  Moderate lobulation of the kidneys bilaterally seen. There are several well-defined sharply marginated low-density nodule/mass in both kidneys which are similar to the previous study. The largest nodule in the right kidney posteriorly measures 2.9 cm in maximum dimension in axial plane images. It measured 4.1 cm in the previous study. The CT density suggests a cyst. The largest nodule in the left kidney, laterally in the lower pole, measures 2.9 cm in maximum dimension. It previously measured 2 cm. CT density suggests a cyst. No radiopaque calculi. No hydronephrosis. The ureters are nondilated. The urinary bladder is poorly distended with moderate thickening of the walls. No intrinsic abnormality.  Prostate is significantly enlarged and measures 6.1 x 5.2 x 5.3 cm. No significant change when measured at the same level and same dimension.  There are small fat-containing inguinal hernias. A tiny fat-containing umbilical hernia.  The stomach and duodenum and small bowel are normal. Cecum lies low in the pelvis. Appendix is normal. There is significant large volume stool in the entire colon. No obstruction.  There is no evidence of abdominal or pelvic lymphadenopathy.  Images are reviewed in bone window show chronic degenerative changes of the thoracolumbar spine with multilevel Schmorl's nodes. No acute bony abnormality.      Impression: 1. Fusiform aneurysmal dilatation of the infrarenal abdominal aorta which now measures 41 cm in diameter. It previously measured 3.8 x 3.7 cm at the same level. No dissection. Partial thrombosis. 2. 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 3. Bilateral renal cysts as described above. 4. A large volume  stool in the colon. No obstruction. 5. Enlarged prostate. 6. Moderate thickening of the wall of the incompletely distended urinary bladder may partly be due to incomplete distention. There is probability of chronic partial outlet obstruction or chronic cystitis.          This report was finalized on 05/16/2023 10:22 by Dr. Glory Sumner MD.        Noninvasive testing including a carotid duplex shows less than 50% carotid stenosis bilaterally.  Popliteal duplex shows small popliteal artery aneurysms 1.14 on the right 1.05 on the left.    Patient Active Problem List   Diagnosis   • Asymmetry of tonsils   • Enlarged tonsils   • Neoplasm of tonsil   • COPD, group B, by GOLD 2017 classification   • Bronchiectasis without complication   • Essential (primary) hypertension   • Personal history of nicotine dependence   • Temporary cerebral vascular dysfunction   • Alcohol use   • Alcohol dependence, uncomplicated   • Benign prostatic hyperplasia   • Anxiety   • Claustrophobia   • Thrombocytopenia   • Anatoly sprayers' lung   • Benign hypertension   • Myopathy   • Bradycardia   • Calcification of abdominal aorta   • Gastroesophageal reflux disease   • Hypercalcemia   • Hypertrophy of prostate   • Infarction of left basal ganglia   • Knee deformity, acquired, left   • Left cataract   • PAD (peripheral artery disease)   • Pericarditis associated with severe chronic anemia   • Pseudophakia   • Statin myopathy   • Stasis edema of both lower extremities   • History of adenomatous polyp of colon   • Abdominal aortic aneurysm (AAA) without rupture   • TIA (transient ischemic attack)        Diagnosis Plan   1. Infrarenal abdominal aortic aneurysm (AAA) without rupture        2. Essential (primary) hypertension        3. Popliteal artery aneurysm, bilateral        4. Personal history of nicotine dependence            Plan: After thoroughly evaluating Van Mckeon, I believe the best course of action is to remain conservative  from vascular surgery standpoint.  I did review his testing closely and his aneurysm is slightly larger measuring about 4.2 cm.  I will send to my rep as I am worried about losing the ability to fix minimally invasive as the landing zone is becoming aneurysmal.  He has small popliteal artery aneurysms bilaterally measuring just over 1 cm.  His carotid duplex shows less than 50% carotid stenosis bilaterally.  Once I discuss further with my rep I will contact the patient and his daughter Siomara.  Otherwise I will plan for a follow-up in 1 year with a repeat CTA of the abdomen pelvis, carotid duplex, and popliteal artery duplex.   I did discuss vascular risk factors as they pertain to the progression of vascular disease including controlling hypertension.  This risk factors currently stable and controlled.  The patient is to continue taking their medications as previously discussed.   This was all discussed in full with complete understanding.  Thank you for allowing me to participate in the care of your patient.  Please do not hesitate to call with any questions or concerns.  We will keep you aware of any further encounters with Van Mckeon.        Sincerely yours,         DO Rick Gonzalez David R, MD

## 2023-05-24 ENCOUNTER — TELEPHONE (OUTPATIENT)
Dept: PODIATRY | Facility: CLINIC | Age: 83
End: 2023-05-24
Payer: MEDICARE

## 2023-05-24 NOTE — TELEPHONE ENCOUNTER
Pt daughter gaye called wanting to know if her father needs the surgery or not. Was told on 05/16/2023 that dr dean was going to speak to rep regarding poss surgery and get back with them. Her number [phone]

## 2023-05-25 DIAGNOSIS — I71.43 INFRARENAL ABDOMINAL AORTIC ANEURYSM (AAA) WITHOUT RUPTURE: Primary | ICD-10-CM

## 2023-06-06 ENCOUNTER — OFFICE VISIT (OUTPATIENT)
Dept: CARDIOLOGY | Facility: CLINIC | Age: 83
End: 2023-06-06
Payer: MEDICARE

## 2023-06-06 ENCOUNTER — LAB (OUTPATIENT)
Dept: LAB | Facility: HOSPITAL | Age: 83
End: 2023-06-06
Payer: MEDICARE

## 2023-06-06 VITALS
OXYGEN SATURATION: 93 % | WEIGHT: 151 LBS | BODY MASS INDEX: 20.45 KG/M2 | DIASTOLIC BLOOD PRESSURE: 66 MMHG | HEART RATE: 51 BPM | SYSTOLIC BLOOD PRESSURE: 114 MMHG | HEIGHT: 72 IN

## 2023-06-06 DIAGNOSIS — I10 ESSENTIAL (PRIMARY) HYPERTENSION: ICD-10-CM

## 2023-06-06 DIAGNOSIS — I73.9 PAD (PERIPHERAL ARTERY DISEASE): ICD-10-CM

## 2023-06-06 DIAGNOSIS — I10 ESSENTIAL (PRIMARY) HYPERTENSION: Chronic | ICD-10-CM

## 2023-06-06 DIAGNOSIS — I50.32 CHRONIC DIASTOLIC CONGESTIVE HEART FAILURE: ICD-10-CM

## 2023-06-06 DIAGNOSIS — Z78.9 STATIN INTOLERANCE: ICD-10-CM

## 2023-06-06 DIAGNOSIS — Z86.73 HISTORY OF TIA (TRANSIENT ISCHEMIC ATTACK): ICD-10-CM

## 2023-06-06 DIAGNOSIS — I50.32 CHRONIC DIASTOLIC CONGESTIVE HEART FAILURE: Chronic | ICD-10-CM

## 2023-06-06 DIAGNOSIS — Z01.810 PREOP CARDIOVASCULAR EXAM: Primary | ICD-10-CM

## 2023-06-06 DIAGNOSIS — I73.9 PAD (PERIPHERAL ARTERY DISEASE): Chronic | ICD-10-CM

## 2023-06-06 LAB
ALBUMIN SERPL-MCNC: 4.1 G/DL (ref 3.5–5.2)
ALBUMIN/GLOB SERPL: 1.4 G/DL
ALP SERPL-CCNC: 70 U/L (ref 39–117)
ALT SERPL W P-5'-P-CCNC: 12 U/L (ref 1–41)
ANION GAP SERPL CALCULATED.3IONS-SCNC: 8 MMOL/L (ref 5–15)
AST SERPL-CCNC: 17 U/L (ref 1–40)
BILIRUB SERPL-MCNC: 0.2 MG/DL (ref 0–1.2)
BUN SERPL-MCNC: 25 MG/DL (ref 8–23)
BUN/CREAT SERPL: 16.8 (ref 7–25)
CALCIUM SPEC-SCNC: 10.7 MG/DL (ref 8.6–10.5)
CHLORIDE SERPL-SCNC: 106 MMOL/L (ref 98–107)
CHOLEST SERPL-MCNC: 130 MG/DL (ref 0–200)
CK SERPL-CCNC: 103 U/L (ref 20–200)
CO2 SERPL-SCNC: 27 MMOL/L (ref 22–29)
CREAT SERPL-MCNC: 1.49 MG/DL (ref 0.76–1.27)
EGFRCR SERPLBLD CKD-EPI 2021: 46.6 ML/MIN/1.73
GLOBULIN UR ELPH-MCNC: 3 GM/DL
GLUCOSE SERPL-MCNC: 82 MG/DL (ref 65–99)
HDLC SERPL-MCNC: 51 MG/DL (ref 40–60)
LDLC SERPL CALC-MCNC: 62 MG/DL (ref 0–100)
LDLC/HDLC SERPL: 1.19 {RATIO}
NT-PROBNP SERPL-MCNC: 130.8 PG/ML (ref 0–1800)
POTASSIUM SERPL-SCNC: 4.5 MMOL/L (ref 3.5–5.2)
PROT SERPL-MCNC: 7.1 G/DL (ref 6–8.5)
SODIUM SERPL-SCNC: 141 MMOL/L (ref 136–145)
TRIGL SERPL-MCNC: 91 MG/DL (ref 0–150)
VLDLC SERPL-MCNC: 17 MG/DL (ref 5–40)

## 2023-06-06 PROCEDURE — 82550 ASSAY OF CK (CPK): CPT

## 2023-06-06 PROCEDURE — 80061 LIPID PANEL: CPT

## 2023-06-06 PROCEDURE — 83880 ASSAY OF NATRIURETIC PEPTIDE: CPT

## 2023-06-06 PROCEDURE — 80053 COMPREHEN METABOLIC PANEL: CPT

## 2023-06-06 PROCEDURE — 36415 COLL VENOUS BLD VENIPUNCTURE: CPT

## 2023-06-06 RX ORDER — ROSUVASTATIN CALCIUM 5 MG/1
5 TABLET, COATED ORAL DAILY
Qty: 30 TABLET | Refills: 11 | Status: SHIPPED | OUTPATIENT
Start: 2023-06-06

## 2023-06-06 NOTE — PROGRESS NOTES
Reason For Visit:  Preoperative risk assessment    Subjective        Van Mckeon is a 82 y.o. male with the below pertinent PMH who is referred for preoperative risk assessment.    Patient follows with Dr. Sauceda for his vascular disease.  Following his most recent visit, plan was established for him to undergo endovascular repair of his AAA.  He was referred to cardiology for further evaluation.    Today, he reports that he has maintained a decent exertional capacity and denies any clear anginal symptoms.  He states that he was able to walk a reasonable distance around the hospital for his recent appointments without any chest pain or needing to stop.  He has some chronic dyspnea on exertion related to COPD that has not changed recently.  He does endorse some lower extremity swelling primarily in his ankles for which he had been prescribed Lasix in the past but had to stop it due to worsening renal function.  About a month ago, he was advised to stop Lasix due to RODRÍGUEZ but has instead taking it every other day because his swelling worsened again off of it.  He gets occasional mild orthostatic lightheadedness when he stands up too fast but otherwise denies significant lightheadedness.  No significant issues with palpitations or orthopnea.    ROS: Pertinent findings are included above.    Pertinent PMH  - History of TIA   - Former tobacco use  - Hypertension  - COPD  - AAA  - Bilateral popliteal artery aneurysms    Pertinent past medical, surgical, family, and social history were reviewed and updated in the EMR.      Current Outpatient Medications:     albuterol sulfate  (90 Base) MCG/ACT inhaler, Inhale 2 puffs Every 4 (Four) Hours As Needed for Wheezing., Disp: 3 inhaler, Rfl: 3    aspirin 81 MG EC tablet, Take 1 tablet by mouth Daily., Disp: , Rfl:     cyanocobalamin (VITAMIN B-12) 500 MCG tablet, Take 1 tablet by mouth Daily., Disp: 30 tablet, Rfl: 2    finasteride (PROSCAR) 5 MG tablet, Take 1 tablet  "by mouth Daily., Disp: , Rfl:     folic acid (FOLVITE) 1 MG tablet, Take 1 tablet by mouth Daily., Disp: 30 tablet, Rfl: 2    furosemide (LASIX) 40 MG tablet, Take 1 tablet by mouth Every Other Day., Disp: , Rfl:     lisinopril (PRINIVIL,ZESTRIL) 40 MG tablet, Take 1 tablet by mouth Daily. Takes 1/2 tablet po QD, Disp: , Rfl:     metoprolol tartrate (LOPRESSOR) 25 MG tablet, Take 12.5 mg by mouth 2 (Two) Times a Day., Disp: , Rfl:     potassium chloride ER (K-TAB) 20 MEQ tablet controlled-release ER tablet, Take 1 tablet by mouth Every Other Day., Disp: , Rfl:     tamsulosin (FLOMAX) 0.4 MG capsule 24 hr capsule, Take 1 capsule by mouth Daily., Disp: , Rfl:     tiotropium bromide-olodaterol (Stiolto Respimat) 2.5-2.5 MCG/ACT aerosol solution inhaler, Inhale 2 puffs Daily., Disp: , Rfl:      Objective   Vital Signs:  /66   Pulse 51   Ht 182.9 cm (72\")   Wt 68.5 kg (151 lb)   SpO2 93%   BMI 20.48 kg/m²   Estimated body mass index is 20.48 kg/m² as calculated from the following:    Height as of this encounter: 182.9 cm (72\").    Weight as of this encounter: 68.5 kg (151 lb).      Constitutional:       Appearance: Healthy appearance. Not in distress.   Neck:      Vascular: JVD normal.   Pulmonary:      Effort: Pulmonary effort is normal.      Breath sounds: Normal breath sounds.   Cardiovascular:      Normal rate. Irregularly irregular rhythm.      Murmurs: There is no murmur.      No gallop.  No click. No rub.   Edema:     Ankle: bilateral 1+ edema of the ankle.  Abdominal:      General: There is no distension.      Palpations: Abdomen is soft.      Tenderness: There is no abdominal tenderness.   Skin:     General: Skin is warm and dry.   Neurological:      Mental Status: Alert and oriented to person, place and time.      Result Review :           ECG 12 Lead    Date/Time: 6/6/2023 4:33 PM  Performed by: Jean Marie Díaz MD  Authorized by: Jean Marie Díaz MD   Comparison: compared with previous ECG from " 5/21/2023  Comparison to previous ECG: Ventricular rate has increased 13 bpm  Rhythm: sinus rhythm  Ectopy: atrial premature contractions  Rate: normal  Conduction: conduction normal  ST Segments: ST segments normal  T Waves: T waves normal  Clinical impression comment: Borderline ECG         Adult Transthoracic Echo Complete W/ Cont if Necessary Per Protocol (09/04/2019 13:33)   Left ventricular wall thickness is consistent with hypertrophy. Sigmoid-shaped ventricular septum is present.  Left ventricular diastolic dysfunction (grade I) consistent with impaired relaxation.  Left ventricular systolic function is normal.  NO CLOTS, SHUNTS OR MASSES  NORMAL LV AND RV SYSTOLIC FUNCTION  NO SIGNIFICANT VALVULAR DYSFUNCTION       Assessment and Plan   Diagnoses and all orders for this visit:    1. Preop cardiovascular exam (Primary)  Patient endorses reasonable activity tolerance without any clear anginal symptoms.  In the absence of clear angina, there is no current indication for further ischemic evaluation.  He is undergoing work-up for CHF as noted below.  Currently, the patient is at elevated risk for a moderate risk surgery (percutaneous endovascular AAA repair) given his history of known vascular disease, hypertension, and CHF.  This risk does not preclude proceeding with the planned surgery.  He should continue his aspirin, statin, and beta-blocker during the perioperative period.    2. Chronic diastolic congestive heart failure  Prior echocardiogram from 2019 demonstrated LVH with G1DD.  He now has mild CHF symptoms with lower extremity swelling.  I will obtain an updated echocardiogram although anticipate that this is related to HFpEF.  I will await lab results from today and try to obtain recent lab results from his PCPs office to help clarify baseline renal function before making significant changes to his medications.  - Echocardiogram  - Continue Lasix 40 mg every other day  - Continue KCl 20 mEq every other  day  - Anticipate starting an SGLT2 inhibitor and spironolactone in the future with potential weaning off of Lasix  - Referral to heart failure clinic for follow-up in 2 weeks  - CMP and BNP today    3. Essential (primary) hypertension  - Diuretics as noted above  - Lisinopril 40 mg daily  - Metoprolol tartrate 12.5 mg twice daily; may consider stopping in the future if persistently low HR    4. PAD (peripheral artery disease)  5. History of TIA (transient ischemic attack)  6. Statin intolerance  Reports a history of statin intolerance.  On chart review, it appears that in 5/2020 he developed myalgias in his thighs and had a normal CK level.  Atorvastatin 40 mg daily was stopped, and it does not appear that he ever trialed a statin again.  He likely would be able to tolerate a lower dose or different statin, and we agreed to trial Crestor.  - Start Crestor 5 mg daily  - Continue aspirin 81 mg daily  - Lipid panel and CK today    Follow Up   No follow-ups on file.  Patient was given instructions and counseling regarding his condition or for health maintenance advice. Please see specific information pulled into the AVS if appropriate.       EMR Dragon/Transcription disclaimer: Much of this encounter note is an electronic transcription/translation of spoken language to printed text. The electronic translation of spoken language may permit erroneous, or at times, nonsensical words or phrases to be inadvertently transcribed; although I have reviewed the note for such errors, some may still exist.

## 2023-06-07 ENCOUNTER — TELEPHONE (OUTPATIENT)
Dept: CARDIOLOGY | Facility: CLINIC | Age: 83
End: 2023-06-07
Payer: MEDICARE

## 2023-06-07 NOTE — TELEPHONE ENCOUNTER
----- Message from Jean Marie Díaz MD sent at 6/6/2023  3:14 PM CDT -----  Regarding: labs  Can you get labs from Dr. Cabrera's office since the beginning of the year

## 2023-06-07 NOTE — TELEPHONE ENCOUNTER
Called PCP office and requested the 2023 labs to be faxed to us.  They state they will send them over.  Pam Flynn MA

## 2023-06-12 ENCOUNTER — TELEPHONE (OUTPATIENT)
Dept: CARDIOLOGY | Facility: CLINIC | Age: 83
End: 2023-06-12
Payer: MEDICARE

## 2023-06-12 NOTE — TELEPHONE ENCOUNTER
The patient's daughter is called and notified of Dr. Díaz's recommendations after reviewing his blood work results from Dr. Cabrera.  She reports he will start the Spironolactone 25mg QD, hold the potassium, and change the Lasix to every Mon, Wed, & Friday.  Please send RX to [facility].  Pam Flynn MA

## 2023-06-12 NOTE — TELEPHONE ENCOUNTER
----- Message from Jean Marie Díaz MD sent at 6/9/2023  4:54 PM CDT -----  Regarding: Labs and Meds  After reviewing his labs from Dr. Cabrera, I'd like to start him on spironolactone 25 mg daily, switch lasix to Monday, Wednesday, and Friday (instead of every other day), and stop taking potassium. Please let me know if he is ok with this change and I will send a prescription to his pharmacy. I can get follow-up labs at his HF clinic visit. Thanks!

## 2023-06-13 ENCOUNTER — PREP FOR SURGERY (OUTPATIENT)
Dept: VASCULAR SURGERY | Facility: CLINIC | Age: 83
End: 2023-06-13
Payer: MEDICARE

## 2023-06-13 DIAGNOSIS — Z79.02 ENCOUNTER FOR MONITORING ANTIPLATELET THERAPY: ICD-10-CM

## 2023-06-13 DIAGNOSIS — I71.40 ABDOMINAL AORTIC ANEURYSM: Primary | ICD-10-CM

## 2023-06-13 DIAGNOSIS — Z51.81 ENCOUNTER FOR MONITORING ANTIPLATELET THERAPY: ICD-10-CM

## 2023-06-13 DIAGNOSIS — Z01.818 PREOP TESTING: ICD-10-CM

## 2023-06-14 RX ORDER — FUROSEMIDE 40 MG/1
40 TABLET ORAL 3 TIMES WEEKLY
Qty: 30 TABLET | Refills: 5 | Status: SHIPPED | OUTPATIENT
Start: 2023-06-14

## 2023-06-14 RX ORDER — SPIRONOLACTONE 25 MG/1
25 TABLET ORAL DAILY
Qty: 30 TABLET | Refills: 11 | Status: SHIPPED | OUTPATIENT
Start: 2023-06-14

## 2023-06-19 ENCOUNTER — TELEPHONE (OUTPATIENT)
Dept: VASCULAR SURGERY | Facility: CLINIC | Age: 83
End: 2023-06-19
Payer: MEDICARE

## 2023-06-19 PROBLEM — Z01.818 PREOP TESTING: Status: ACTIVE | Noted: 2023-06-19

## 2023-06-19 NOTE — TELEPHONE ENCOUNTER
Pt expressed understanding of prework/surgery time and instruction for procedure scheduled 07/19/23 with Dr. Sauceda.  NPO after midnight.

## 2023-07-14 LAB
ANION GAP SERPL CALCULATED.3IONS-SCNC: 8 MMOL/L (ref 5–15)
APTT PPP: 28.3 SECONDS (ref 24.1–35)
BASOPHILS # BLD AUTO: 0.02 10*3/MM3 (ref 0–0.2)
BASOPHILS NFR BLD AUTO: 0.3 % (ref 0–1.5)
BUN SERPL-MCNC: 24 MG/DL (ref 8–23)
BUN/CREAT SERPL: 13 (ref 7–25)
CALCIUM SPEC-SCNC: 9.8 MG/DL (ref 8.6–10.5)
CHLORIDE SERPL-SCNC: 105 MMOL/L (ref 98–107)
CO2 SERPL-SCNC: 27 MMOL/L (ref 22–29)
CREAT SERPL-MCNC: 1.85 MG/DL (ref 0.76–1.27)
DEPRECATED RDW RBC AUTO: 47.8 FL (ref 37–54)
EGFRCR SERPLBLD CKD-EPI 2021: 35.9 ML/MIN/1.73
EOSINOPHIL # BLD AUTO: 0.09 10*3/MM3 (ref 0–0.4)
EOSINOPHIL NFR BLD AUTO: 1.3 % (ref 0.3–6.2)
ERYTHROCYTE [DISTWIDTH] IN BLOOD BY AUTOMATED COUNT: 13.2 % (ref 12.3–15.4)
GLUCOSE SERPL-MCNC: 95 MG/DL (ref 65–99)
HCT VFR BLD AUTO: 41.8 % (ref 37.5–51)
HGB BLD-MCNC: 13.1 G/DL (ref 13–17.7)
IMM GRANULOCYTES # BLD AUTO: 0.03 10*3/MM3 (ref 0–0.05)
IMM GRANULOCYTES NFR BLD AUTO: 0.4 % (ref 0–0.5)
INR PPP: 0.95 (ref 0.91–1.09)
LYMPHOCYTES # BLD AUTO: 1.55 10*3/MM3 (ref 0.7–3.1)
LYMPHOCYTES NFR BLD AUTO: 22.8 % (ref 19.6–45.3)
MCH RBC QN AUTO: 30.7 PG (ref 26.6–33)
MCHC RBC AUTO-ENTMCNC: 31.3 G/DL (ref 31.5–35.7)
MCV RBC AUTO: 97.9 FL (ref 79–97)
MONOCYTES # BLD AUTO: 0.53 10*3/MM3 (ref 0.1–0.9)
MONOCYTES NFR BLD AUTO: 7.8 % (ref 5–12)
NEUTROPHILS NFR BLD AUTO: 4.57 10*3/MM3 (ref 1.7–7)
NEUTROPHILS NFR BLD AUTO: 67.4 % (ref 42.7–76)
NRBC BLD AUTO-RTO: 0 /100 WBC (ref 0–0.2)
PLATELET # BLD AUTO: 114 10*3/MM3 (ref 140–450)
PMV BLD AUTO: 11.9 FL (ref 6–12)
POTASSIUM SERPL-SCNC: 4.8 MMOL/L (ref 3.5–5.2)
PROTHROMBIN TIME: 12.8 SECONDS (ref 11.8–14.8)
RBC # BLD AUTO: 4.27 10*6/MM3 (ref 4.14–5.8)
SODIUM SERPL-SCNC: 140 MMOL/L (ref 136–145)
WBC NRBC COR # BLD: 6.79 10*3/MM3 (ref 3.4–10.8)

## 2023-07-14 PROCEDURE — 85730 THROMBOPLASTIN TIME PARTIAL: CPT | Performed by: NURSE PRACTITIONER

## 2023-07-14 PROCEDURE — 80048 BASIC METABOLIC PNL TOTAL CA: CPT | Performed by: NURSE PRACTITIONER

## 2023-07-14 PROCEDURE — 85025 COMPLETE CBC W/AUTO DIFF WBC: CPT | Performed by: NURSE PRACTITIONER

## 2023-07-14 PROCEDURE — 85610 PROTHROMBIN TIME: CPT | Performed by: NURSE PRACTITIONER

## 2023-07-19 ENCOUNTER — APPOINTMENT (OUTPATIENT)
Dept: INTERVENTIONAL RADIOLOGY/VASCULAR | Facility: HOSPITAL | Age: 83
DRG: 269 | End: 2023-07-19
Payer: MEDICARE

## 2023-07-19 ENCOUNTER — HOSPITAL ENCOUNTER (INPATIENT)
Facility: HOSPITAL | Age: 83
LOS: 1 days | Discharge: HOME | DRG: 269 | End: 2023-07-20
Attending: SURGERY | Admitting: SURGERY
Payer: MEDICARE

## 2023-07-19 DIAGNOSIS — Z01.818 PREOP TESTING: ICD-10-CM

## 2023-07-19 DIAGNOSIS — I71.40 ABDOMINAL AORTIC ANEURYSM: ICD-10-CM

## 2023-07-19 DIAGNOSIS — Z51.81 ENCOUNTER FOR MONITORING ANTIPLATELET THERAPY: ICD-10-CM

## 2023-07-19 DIAGNOSIS — Z79.02 ENCOUNTER FOR MONITORING ANTIPLATELET THERAPY: ICD-10-CM

## 2023-07-19 PROBLEM — I71.43 INFRARENAL ABDOMINAL AORTIC ANEURYSM (AAA) WITHOUT RUPTURE: Status: ACTIVE | Noted: 2023-07-19

## 2023-07-19 LAB
ABO GROUP BLD: NORMAL
BLD GP AB SCN SERPL QL: NEGATIVE
RH BLD: POSITIVE
T&S EXPIRATION DATE: NORMAL

## 2023-07-19 PROCEDURE — 04V03EZ RESTRICTION OF ABDOMINAL AORTA WITH BRANCHED OR FENESTRATED INTRALUMINAL DEVICE, ONE OR TWO ARTERIES, PERCUTANEOUS APPROACH: ICD-10-PCS | Performed by: SURGERY

## 2023-07-19 PROCEDURE — 34713 PERQ ACCESS & CLSR FEM ART: CPT | Performed by: SURGERY

## 2023-07-19 PROCEDURE — C1894 INTRO/SHEATH, NON-LASER: HCPCS | Performed by: SURGERY

## 2023-07-19 PROCEDURE — 86850 RBC ANTIBODY SCREEN: CPT | Performed by: NURSE PRACTITIONER

## 2023-07-19 PROCEDURE — 04H032Z INSERTION OF MONITORING DEVICE INTO ABDOMINAL AORTA, PERCUTANEOUS APPROACH: ICD-10-PCS | Performed by: SURGERY

## 2023-07-19 PROCEDURE — C1889 IMPLANT/INSERT DEVICE, NOC: HCPCS | Performed by: SURGERY

## 2023-07-19 PROCEDURE — 4A133B1 MONITORING OF ARTERIAL PRESSURE, PERIPHERAL, PERCUTANEOUS APPROACH: ICD-10-PCS | Performed by: SURGERY

## 2023-07-19 PROCEDURE — 86900 BLOOD TYPING SEROLOGIC ABO: CPT | Performed by: NURSE PRACTITIONER

## 2023-07-19 PROCEDURE — 25010000002 CEFAZOLIN PER 500 MG: Performed by: SURGERY

## 2023-07-19 PROCEDURE — 25510000001 IOPAMIDOL 61 % SOLUTION: Performed by: SURGERY

## 2023-07-19 PROCEDURE — 86901 BLOOD TYPING SEROLOGIC RH(D): CPT | Performed by: NURSE PRACTITIONER

## 2023-07-19 PROCEDURE — 76000 FLUOROSCOPY <1 HR PHYS/QHP: CPT

## 2023-07-19 PROCEDURE — B41C1ZZ FLUOROSCOPY OF PELVIC ARTERIES USING LOW OSMOLAR CONTRAST: ICD-10-PCS | Performed by: SURGERY

## 2023-07-19 PROCEDURE — L1830 KO IMMOB CANVAS LONG PRE OTS: HCPCS | Performed by: SURGERY

## 2023-07-19 PROCEDURE — 34705 EVAC RPR A-BIILIAC NDGFT: CPT | Performed by: SURGERY

## 2023-07-19 PROCEDURE — C1760 CLOSURE DEV, VASC: HCPCS | Performed by: SURGERY

## 2023-07-19 PROCEDURE — 25010000002 HEPARIN (PORCINE) PER 1000 UNITS: Performed by: SURGERY

## 2023-07-19 PROCEDURE — C2628 CATHETER, OCCLUSION: HCPCS | Performed by: SURGERY

## 2023-07-19 PROCEDURE — B4101ZZ FLUOROSCOPY OF ABDOMINAL AORTA USING LOW OSMOLAR CONTRAST: ICD-10-PCS | Performed by: SURGERY

## 2023-07-19 PROCEDURE — 0 BUPIVACAINE 0.5 % SOLUTION: Performed by: SURGERY

## 2023-07-19 DEVICE — STENTGR AAA ENDURANT2 BIFUR 20F 32X14X103MM: Type: IMPLANTABLE DEVICE | Site: GROIN | Status: FUNCTIONAL

## 2023-07-19 DEVICE — IMPLANTABLE DEVICE: Type: IMPLANTABLE DEVICE | Site: GROIN | Status: FUNCTIONAL

## 2023-07-19 DEVICE — STENTGR AAA ENDURANT2 LW 14F 16X16X93MM: Type: IMPLANTABLE DEVICE | Site: GROIN | Status: FUNCTIONAL

## 2023-07-19 RX ORDER — FOLIC ACID 1 MG/1
1 TABLET ORAL DAILY
Status: DISCONTINUED | OUTPATIENT
Start: 2023-07-19 | End: 2023-07-20 | Stop reason: HOSPADM

## 2023-07-19 RX ORDER — FLUMAZENIL 0.1 MG/ML
0.2 INJECTION INTRAVENOUS AS NEEDED
Status: DISCONTINUED | OUTPATIENT
Start: 2023-07-19 | End: 2023-07-19 | Stop reason: HOSPADM

## 2023-07-19 RX ORDER — OXYCODONE AND ACETAMINOPHEN 10; 325 MG/1; MG/1
1 TABLET ORAL ONCE AS NEEDED
Status: DISCONTINUED | OUTPATIENT
Start: 2023-07-19 | End: 2023-07-19 | Stop reason: HOSPADM

## 2023-07-19 RX ORDER — NICOTINE 21 MG/24HR
1 PATCH, TRANSDERMAL 24 HOURS TRANSDERMAL
Status: DISCONTINUED | OUTPATIENT
Start: 2023-07-19 | End: 2023-07-20 | Stop reason: HOSPADM

## 2023-07-19 RX ORDER — ACETAMINOPHEN 325 MG/1
650 TABLET ORAL EVERY 4 HOURS PRN
Status: DISCONTINUED | OUTPATIENT
Start: 2023-07-19 | End: 2023-07-20 | Stop reason: HOSPADM

## 2023-07-19 RX ORDER — HYDROCODONE BITARTRATE AND ACETAMINOPHEN 5; 325 MG/1; MG/1
1 TABLET ORAL EVERY 4 HOURS PRN
Status: DISCONTINUED | OUTPATIENT
Start: 2023-07-19 | End: 2023-07-20 | Stop reason: HOSPADM

## 2023-07-19 RX ORDER — ALBUTEROL SULFATE 90 UG/1
2 AEROSOL, METERED RESPIRATORY (INHALATION) EVERY 4 HOURS PRN
Status: DISCONTINUED | OUTPATIENT
Start: 2023-07-19 | End: 2023-07-19 | Stop reason: SDUPTHER

## 2023-07-19 RX ORDER — ALBUTEROL SULFATE 2.5 MG/3ML
2.5 SOLUTION RESPIRATORY (INHALATION) EVERY 4 HOURS PRN
Status: DISCONTINUED | OUTPATIENT
Start: 2023-07-19 | End: 2023-07-20 | Stop reason: HOSPADM

## 2023-07-19 RX ORDER — ONDANSETRON 2 MG/ML
4 INJECTION INTRAMUSCULAR; INTRAVENOUS
Status: DISCONTINUED | OUTPATIENT
Start: 2023-07-19 | End: 2023-07-19 | Stop reason: HOSPADM

## 2023-07-19 RX ORDER — DROPERIDOL 2.5 MG/ML
0.62 INJECTION, SOLUTION INTRAMUSCULAR; INTRAVENOUS ONCE AS NEEDED
Status: DISCONTINUED | OUTPATIENT
Start: 2023-07-19 | End: 2023-07-19 | Stop reason: HOSPADM

## 2023-07-19 RX ORDER — HYDROMORPHONE HYDROCHLORIDE 1 MG/ML
0.5 INJECTION, SOLUTION INTRAMUSCULAR; INTRAVENOUS; SUBCUTANEOUS
Status: DISCONTINUED | OUTPATIENT
Start: 2023-07-19 | End: 2023-07-19 | Stop reason: HOSPADM

## 2023-07-19 RX ORDER — NALOXONE HCL 0.4 MG/ML
0.04 VIAL (ML) INJECTION AS NEEDED
Status: DISCONTINUED | OUTPATIENT
Start: 2023-07-19 | End: 2023-07-19 | Stop reason: HOSPADM

## 2023-07-19 RX ORDER — SODIUM CHLORIDE 0.9 % (FLUSH) 0.9 %
3 SYRINGE (ML) INJECTION AS NEEDED
Status: DISCONTINUED | OUTPATIENT
Start: 2023-07-19 | End: 2023-07-19 | Stop reason: HOSPADM

## 2023-07-19 RX ORDER — SODIUM CHLORIDE 0.9 % (FLUSH) 0.9 %
3 SYRINGE (ML) INJECTION EVERY 12 HOURS SCHEDULED
Status: DISCONTINUED | OUTPATIENT
Start: 2023-07-19 | End: 2023-07-19 | Stop reason: HOSPADM

## 2023-07-19 RX ORDER — CHOLECALCIFEROL (VITAMIN D3) 125 MCG
500 CAPSULE ORAL DAILY
Status: DISCONTINUED | OUTPATIENT
Start: 2023-07-19 | End: 2023-07-20 | Stop reason: HOSPADM

## 2023-07-19 RX ORDER — SODIUM CHLORIDE, SODIUM LACTATE, POTASSIUM CHLORIDE, CALCIUM CHLORIDE 600; 310; 30; 20 MG/100ML; MG/100ML; MG/100ML; MG/100ML
1000 INJECTION, SOLUTION INTRAVENOUS CONTINUOUS
Status: DISCONTINUED | OUTPATIENT
Start: 2023-07-19 | End: 2023-07-19

## 2023-07-19 RX ORDER — SODIUM CHLORIDE 9 MG/ML
40 INJECTION, SOLUTION INTRAVENOUS AS NEEDED
Status: DISCONTINUED | OUTPATIENT
Start: 2023-07-19 | End: 2023-07-19 | Stop reason: HOSPADM

## 2023-07-19 RX ORDER — LIDOCAINE HYDROCHLORIDE 10 MG/ML
0.5 INJECTION, SOLUTION EPIDURAL; INFILTRATION; INTRACAUDAL; PERINEURAL ONCE AS NEEDED
Status: DISCONTINUED | OUTPATIENT
Start: 2023-07-19 | End: 2023-07-19 | Stop reason: HOSPADM

## 2023-07-19 RX ORDER — IBUPROFEN 600 MG/1
600 TABLET ORAL ONCE AS NEEDED
Status: DISCONTINUED | OUTPATIENT
Start: 2023-07-19 | End: 2023-07-19 | Stop reason: HOSPADM

## 2023-07-19 RX ORDER — NITROGLYCERIN 0.4 MG/1
0.4 TABLET SUBLINGUAL
Status: DISCONTINUED | OUTPATIENT
Start: 2023-07-19 | End: 2023-07-20 | Stop reason: HOSPADM

## 2023-07-19 RX ORDER — SODIUM CHLORIDE 0.9 % (FLUSH) 0.9 %
10 SYRINGE (ML) INJECTION EVERY 12 HOURS SCHEDULED
Status: DISCONTINUED | OUTPATIENT
Start: 2023-07-19 | End: 2023-07-20 | Stop reason: HOSPADM

## 2023-07-19 RX ORDER — ROSUVASTATIN CALCIUM 10 MG/1
5 TABLET, COATED ORAL DAILY
Status: DISCONTINUED | OUTPATIENT
Start: 2023-07-19 | End: 2023-07-20 | Stop reason: HOSPADM

## 2023-07-19 RX ORDER — ASPIRIN 81 MG/1
81 TABLET ORAL DAILY
Status: DISCONTINUED | OUTPATIENT
Start: 2023-07-19 | End: 2023-07-20 | Stop reason: HOSPADM

## 2023-07-19 RX ORDER — ACETAMINOPHEN 500 MG
1000 TABLET ORAL ONCE
Status: COMPLETED | OUTPATIENT
Start: 2023-07-19 | End: 2023-07-19

## 2023-07-19 RX ORDER — SODIUM CHLORIDE 0.9 % (FLUSH) 0.9 %
10 SYRINGE (ML) INJECTION AS NEEDED
Status: DISCONTINUED | OUTPATIENT
Start: 2023-07-19 | End: 2023-07-20 | Stop reason: HOSPADM

## 2023-07-19 RX ORDER — LABETALOL HYDROCHLORIDE 5 MG/ML
5 INJECTION, SOLUTION INTRAVENOUS
Status: DISCONTINUED | OUTPATIENT
Start: 2023-07-19 | End: 2023-07-19 | Stop reason: HOSPADM

## 2023-07-19 RX ORDER — SODIUM CHLORIDE 9 MG/ML
40 INJECTION, SOLUTION INTRAVENOUS AS NEEDED
Status: DISCONTINUED | OUTPATIENT
Start: 2023-07-19 | End: 2023-07-20 | Stop reason: HOSPADM

## 2023-07-19 RX ORDER — FENTANYL CITRATE 50 UG/ML
25 INJECTION, SOLUTION INTRAMUSCULAR; INTRAVENOUS
Status: DISCONTINUED | OUTPATIENT
Start: 2023-07-19 | End: 2023-07-19 | Stop reason: HOSPADM

## 2023-07-19 RX ORDER — BUPIVACAINE HYDROCHLORIDE 5 MG/ML
INJECTION, SOLUTION PERINEURAL AS NEEDED
Status: DISCONTINUED | OUTPATIENT
Start: 2023-07-19 | End: 2023-07-19 | Stop reason: HOSPADM

## 2023-07-19 RX ORDER — SODIUM CHLORIDE 0.9 % (FLUSH) 0.9 %
10 SYRINGE (ML) INJECTION AS NEEDED
Status: DISCONTINUED | OUTPATIENT
Start: 2023-07-19 | End: 2023-07-19 | Stop reason: HOSPADM

## 2023-07-19 RX ORDER — TAMSULOSIN HYDROCHLORIDE 0.4 MG/1
0.4 CAPSULE ORAL DAILY
Status: DISCONTINUED | OUTPATIENT
Start: 2023-07-19 | End: 2023-07-20 | Stop reason: HOSPADM

## 2023-07-19 RX ORDER — SPIRONOLACTONE 25 MG/1
25 TABLET ORAL DAILY
Status: DISCONTINUED | OUTPATIENT
Start: 2023-07-19 | End: 2023-07-20 | Stop reason: HOSPADM

## 2023-07-19 RX ORDER — FUROSEMIDE 40 MG/1
40 TABLET ORAL 3 TIMES WEEKLY
Status: DISCONTINUED | OUTPATIENT
Start: 2023-07-19 | End: 2023-07-20 | Stop reason: HOSPADM

## 2023-07-19 RX ORDER — SODIUM CHLORIDE, SODIUM LACTATE, POTASSIUM CHLORIDE, CALCIUM CHLORIDE 600; 310; 30; 20 MG/100ML; MG/100ML; MG/100ML; MG/100ML
100 INJECTION, SOLUTION INTRAVENOUS CONTINUOUS
Status: DISCONTINUED | OUTPATIENT
Start: 2023-07-19 | End: 2023-07-19

## 2023-07-19 RX ORDER — NALOXONE HCL 0.4 MG/ML
0.4 VIAL (ML) INJECTION
Status: DISCONTINUED | OUTPATIENT
Start: 2023-07-19 | End: 2023-07-20 | Stop reason: HOSPADM

## 2023-07-19 RX ORDER — MAGNESIUM HYDROXIDE 1200 MG/15ML
LIQUID ORAL AS NEEDED
Status: DISCONTINUED | OUTPATIENT
Start: 2023-07-19 | End: 2023-07-19 | Stop reason: HOSPADM

## 2023-07-19 RX ORDER — LISINOPRIL 20 MG/1
40 TABLET ORAL DAILY
Status: DISCONTINUED | OUTPATIENT
Start: 2023-07-19 | End: 2023-07-20 | Stop reason: HOSPADM

## 2023-07-19 RX ORDER — SODIUM CHLORIDE 0.9 % (FLUSH) 0.9 %
3-10 SYRINGE (ML) INJECTION AS NEEDED
Status: DISCONTINUED | OUTPATIENT
Start: 2023-07-19 | End: 2023-07-19 | Stop reason: HOSPADM

## 2023-07-19 RX ORDER — FINASTERIDE 5 MG/1
5 TABLET, FILM COATED ORAL DAILY
Status: DISCONTINUED | OUTPATIENT
Start: 2023-07-19 | End: 2023-07-20 | Stop reason: HOSPADM

## 2023-07-19 RX ADMIN — METOPROLOL TARTRATE 12.5 MG: 25 TABLET, FILM COATED ORAL at 21:33

## 2023-07-19 RX ADMIN — EMPAGLIFLOZIN 10 MG: 10 TABLET, FILM COATED ORAL at 17:20

## 2023-07-19 RX ADMIN — Medication 10 ML: at 21:33

## 2023-07-19 RX ADMIN — SODIUM CHLORIDE, POTASSIUM CHLORIDE, SODIUM LACTATE AND CALCIUM CHLORIDE 1000 ML: 600; 310; 30; 20 INJECTION, SOLUTION INTRAVENOUS at 07:19

## 2023-07-19 RX ADMIN — NICOTINE 1 PATCH: 21 PATCH, EXTENDED RELEASE TRANSDERMAL at 21:45

## 2023-07-19 RX ADMIN — FINASTERIDE 5 MG: 5 TABLET, FILM COATED ORAL at 17:20

## 2023-07-19 RX ADMIN — ROSUVASTATIN CALCIUM 5 MG: 10 TABLET, FILM COATED ORAL at 17:20

## 2023-07-19 RX ADMIN — TAMSULOSIN HYDROCHLORIDE 0.4 MG: 0.4 CAPSULE ORAL at 17:20

## 2023-07-19 RX ADMIN — ASPIRIN 81 MG: 81 TABLET, COATED ORAL at 17:20

## 2023-07-19 RX ADMIN — FOLIC ACID 1 MG: 1 TABLET ORAL at 17:20

## 2023-07-19 RX ADMIN — SODIUM CHLORIDE, POTASSIUM CHLORIDE, SODIUM LACTATE AND CALCIUM CHLORIDE 1000 ML: 600; 310; 30; 20 INJECTION, SOLUTION INTRAVENOUS at 07:21

## 2023-07-19 RX ADMIN — ACETAMINOPHEN 1000 MG: 500 TABLET, FILM COATED ORAL at 08:54

## 2023-07-19 RX ADMIN — CEFAZOLIN 2000 MG: 2 INJECTION, POWDER, FOR SOLUTION INTRAMUSCULAR; INTRAVENOUS at 17:36

## 2023-07-19 NOTE — OP NOTE
Van Mckeon  7/19/2023     PREOPERATIVE DIAGNOSIS: Abdominal aortic aneurysm [I71.40]  Preop testing [Z01.818]     POSTOPERATIVE DIAGNOSIS: Post-Op Diagnosis Codes:     * Abdominal aortic aneurysm [I71.40]     * Preop testing [Z01.818]     PROCEDURE PERFORMED:   1.  Ultrasound-guided cannulation of bilateral common femoral arteries  2.  Introduction of catheter/sheath into the aorta  3.  Aortoiliac angiogram  4.  Placement of a Medtronic Endurant modular bifurcated device with 2 docking limbs measuring 32 x 14 x 103  5.  Placement of a contralateral extension limb in the left common iliac artery measuring 16 x 16 x 93  6.  Placement of an ipsilateral extension limb in the right common iliac artery measuring 16 x 24 x 93  7.  Balloon angioplasty of the proximal and distal endograft using the Coda balloon  8.  Completion aortoiliac angiogram with radiographic supervision and interpretation  9.  Perclose closure of bilateral common femoral arteries     SURGEON: Collins Sauceda DO      ANESTHESIA: General.    PREPARATION: Routine.    STAFF: Circulator: Claudia Saenz RN; Ara Chávez RN  Scrub Person: Alma Redding Steven  Vendor Representative: Buddy Mcfarlane  Assistant: Luis El  Vascular Radiology Technician: Geni Castillo    Estimated Blood Loss: minimal    SPECIMENS: None    COMPLICATIONS: None    INDICATIONS: Van Mckeon is a 82 y.o. male who we are following for a small abdominal aortic aneurysm, and small right popliteal artery aneurysm.  He has previous TIA symptoms, but carotids found to be widely patent.  He was a former smoker but has quit.  He is maintained on aspirin.  He did have noninvasive testing performed today, which I did review in office.  The indications, risks, and possible complications of the procedure were explained to the patient, who voiced understanding and wished to proceed with surgery.     PROCEDURE IN DETAIL:   The patient was taken to the  operating room and placed on the operating table in a supine position. After general anesthesia was obtained, the abdomen and bilateral groins were prepped and draped in a sterile manner.  Under ultrasound guidance and using a micropuncture technique the right common femoral artery was cannulated and a micro-sheath was placed.  A small stab incision was made with 11 blade.  The Advantage Glidewire was advanced up into the aorta under fluoroscopic guidance.  The 7 Tongan sheath was placed for predilatation.  The first Perclose device was placed and deployed at the 2 o'clock position.  The second one was placed and deployed at the 10 o'clock position.  The 11 Tongan sheath was placed.  Patient was given 1000 units of intravenous heparin.  The same procedure was performed in the left groin.  Under ultrasound guidance and using a micropuncture technique the left common femoral artery was cannulated and a micro-sheath was placed.  A small stab incision was made with 11 blade.  The Advantage Glidewire was advanced into the aorta under fluoroscopic guidance.  The 7 Tongan sheath was placed for predilatation.  The first Perclose device was placed and deployed at the 2 o'clock position.  The second was placed and deployed at the 10 o'clock position.  The 11 Tongan sheath was placed.  The patient was given 4000 units of intravenous heparin.  Both flush catheters were advanced into the aorta and an aortoiliac angiogram was performed.  The appropriate measurements were taken.  The Medtronic Endurant modular bifurcated device with 2 docking limbs measuring 32 x 14 x 103 was placed from the right side up to the level of the renal arteries.  A magged up view of the renal arteries was performed.  The graft was then successfully deployed.  The suprarenal stent was successfully deployed.  Next, the flush catheter was captured from the left side and the gate was successful cannulated.  The catheter was spun to ensure that we were in  true lumen.  With the C-arm at 20° VIVAR a retrograde angiogram was performed from the left side.  The hypogastric was marked on the screen.  The contralateral limb measuring 16 x 16 x 93 was successfully placed and deployed in the left common iliac artery.  A 12 German sheath was then placed.  The rest of the main body device was successfully deployed.  The delivery device was captured and a 16 German sheath was placed.  With the C-arm at 20° Albanian a retrograde angiogram was performed from the right side.  Hypogastric was marked on the screen.  The ipsilateral extension limb measuring 16 x 24 x 93 was placed successfully in the right common iliac artery.  Lastly, the Coda balloon was used to balloon angioplasty the entire graft.  Completion angiogram was performed which showed rapid flow down through the graft and out through the iliac arteries without any evidence of stenosis or occlusion.    There was no evidence of type I, type II, type III, or type IV endoleaks.  At this point I felt no further intervention was warranted.  The sheaths were removed.  The Perclose devices were used to seal down the artery.  Direct pressure was held for an additional 10 minutes of ensure hemostasis.  5 mL of 0.5% Marcaine plain was used to infiltrate each groin for local anesthesia.  The skin was then reapproximated using a 4-0 Monocryl in a subcuticular fashion.  The patient was given 10 mg of protamine intravenously.  Sterile dressings were applied. The patient tolerated the procedure well. Sponge and needle counts were correct. The patient was then awakened and extubated in the operating room and taken to the recovery room in good condition.    Collins Sauceda,   Date: 7/19/2023 Time: 11:47 CDT    CC:Jean Marie Cabrera MD

## 2023-07-19 NOTE — PLAN OF CARE
Goal Outcome Evaluation:  Plan of Care Reviewed With: patient        Progress: improving  Outcome Evaluation: SURGERY TODAY.  BEDREST WITH BILATERAL LOWER EXTREMITIES STRAIGHT UNTIL 1630. GROIN DRESSING DRY AND INTACT @ BILATERAL GROINS WITH GAUZE AND TEGADERM DRY AND INTACT. DP AND PT PULSES PALPABLE BILATERALLY. COVARRUBIAS PATENT - URINE HAS BEEN BLOODY AND DR. KENDRICK AWARE PER REPORT FROM PACU NURSE. O2 ON PER NASAL CANNULA. CONT. PULSE OX INTACT. DIABETIC DIET.  NO DISTRESS NOTED.          PEDIATRIC SURGERY PROGRESS NOTE  2021    Subjective  No acute events overnight. VSS and afebrile. Mild irritability; received 1x PRN morphine. Voiding and stooling appropriately. Tolerated initial increase in feed rate.     Objective  Temp:  [97.8  F (36.6  C)-100.3  F (37.9  C)] 98  F (36.7  C)  Pulse:  [141-165] 147  Resp:  [61-83] 62  BP: (87-94)/(41-59) 94/59  Cuff Mean (mmHg):  [63-74] 74  SpO2:  [96 %-100 %] 96 %    General: alert, NAD, lying comfortably in bed  Pulm: no dyspnea, breathing comfortably on RA  Abd: soft, non-distended,appropriately tender.  Extremities: No edema    I/O last 3 completed shifts:  In: 606.41 [I.V.:0.8]  Out: 469 [Urine:447; Stool:22]    Assessment & Plan: 4mo old Boy s/p 09/29 ostomy takedown for prior creation 2/2 intestinal perforation/NEC, KALA, L inguinal hernia repair, ventral hernia repair progressing well.     - Continue slow advancement of feeds.    Note by Cliff Oliveira MS4, was reviewed and edited in it's entirety by me.     Will discuss with staff Dr. Dannielle Nagy MD PGY-4  Pager   - - - - - - - - - - - - - - - - -       Patient seen and examined by myself.  Agree with the above findings. Plan outlined with all physicians caring for this patient.  -

## 2023-07-19 NOTE — H&P
7/18/2023         Jean Marie Cabrera MD  [address]  [place] [address]     Van Mckeon  1940          Chief Complaint   Patient presents with    Follow-up       1 year follow up with testing. Pt was last seen on 5/24/22 for AAA. Pt states no changes or issues in the last year.          Dear Jean Marie Cabrera MD   HPI  I had the pleasure of seeing your patient Van Mckeon in the office today.  As you recall, Van Mckeon is a 82 y.o.  male who you are currently following for routine health maintenance.  We are following for a small abdominal aortic aneurysm, and small right popliteal artery aneurysm.  He has previous TIA symptoms, but carotids found to be widely patent.  He was a former smoker but has quit.  He is maintained on aspirin.  He did have noninvasive testing performed today, which I did review in office.        Past Medical History:   Diagnosis Date    Alcohol use 10/03/2019    Anxiety     Arthritis     Bronchiectasis without complication 04/09/2019    Bronchitis     COPD (chronic obstructive pulmonary disease)     COPD, group B, by GOLD 2017 classification 04/09/2019    Essential hypertension 04/09/2019    Hypertension     Personal history of nicotine dependence 04/09/2019    Prostate hypertrophy     Snoring     Thrombocytopenia     TIA (transient ischemic attack)     Tobacco user 10/03/2019     Past Surgical History:   Procedure Laterality Date    BRONCHOSCOPY      CATARACT EXTRACTION, BILATERAL      NASAL SINUS SURGERY      TOTAL HIP ARTHROPLASTY Left      Family History   Problem Relation Age of Onset    Emphysema Father     Heart attack Brother     Cancer Brother     Heart disease Brother     Cancer Other     Colon cancer Neg Hx     Colon polyps Neg Hx     Esophageal cancer Neg Hx      Social History     Tobacco Use    Smoking status: Every Day     Packs/day: 1.00     Years: 70.00     Pack years: 70.00     Types: Cigarettes    Smokeless tobacco: Former     Types: Snuff     " Quit date: 1970   Vaping Use    Vaping Use: Never used   Substance Use Topics    Alcohol use: Yes     Alcohol/week: 1.0 standard drink     Types: 1 Shots of liquor per week     Comment: daily - 2 mixed drinks    Drug use: No     Allergies   Allergen Reactions    Statins Myalgia    Other Unknown - High Severity     Horse Serum    Tetanus Toxoids Itching     Current Outpatient Medications   Medication Instructions    albuterol sulfate  (90 Base) MCG/ACT inhaler 2 puffs, Inhalation, Every 4 Hours PRN    aspirin 81 mg, Oral, Daily    cyanocobalamin (VITAMIN B-12) 500 mcg, Oral, Daily    empagliflozin (JARDIANCE) 10 mg, Oral, Daily    finasteride (PROSCAR) 5 mg, Oral, Daily    folic acid (FOLVITE) 1 mg, Oral, Daily    furosemide (LASIX) 40 mg, Oral, 3 Times Weekly, Take on Mondays, Wednesdays, and Fridays    lisinopril (PRINIVIL,ZESTRIL) 40 mg, Oral, Daily, Takes 1/2 tablet po QD    metoprolol tartrate (LOPRESSOR) 12.5 mg, Oral, 2 Times Daily    rosuvastatin (CRESTOR) 5 mg, Oral, Daily    spironolactone (ALDACTONE) 25 mg, Oral, Daily    tamsulosin (FLOMAX) 0.4 MG capsule 24 hr capsule 1 capsule, Oral, Daily    tiotropium bromide-olodaterol (Stiolto Respimat) 2.5-2.5 MCG/ACT aerosol solution inhaler 2 puffs, Inhalation, Daily - RT         Review of Systems   Constitutional: Negative.    HENT: Negative.     Eyes: Negative.    Respiratory: Negative.     Cardiovascular:  Positive for leg swelling.   Gastrointestinal: Negative.    Endocrine: Negative.    Genitourinary: Negative.    Musculoskeletal: Negative.    Skin: Negative.    Allergic/Immunologic: Negative.    Neurological: Negative.    Hematological: Negative.    Psychiatric/Behavioral: Negative.     All other systems reviewed and are negative.      /74   Pulse 72   Ht 182.9 cm (72\")   Wt 68.9 kg (152 lb)   SpO2 98%   BMI 20.61 kg/m²      Physical Exam  Vitals and nursing note reviewed.   Constitutional:       General: He is not in acute distress.     " Appearance: Normal appearance. He is well-developed and normal weight. He is not diaphoretic.   HENT:      Head: Normocephalic and atraumatic.   Eyes:      General: No scleral icterus.     Pupils: Pupils are equal, round, and reactive to light.   Neck:      Thyroid: No thyromegaly.      Vascular: No carotid bruit or JVD.   Cardiovascular:      Rate and Rhythm: Normal rate and regular rhythm.      Pulses: Normal pulses.           Carotid pulses are 2+ on the right side and 2+ on the left side.       Femoral pulses are 2+ on the right side and 2+ on the left side.       Popliteal pulses are 2+ on the right side and 2+ on the left side.        Dorsalis pedis pulses are 2+ on the right side and 2+ on the left side.        Posterior tibial pulses are 2+ on the right side and 2+ on the left side.      Heart sounds: Normal heart sounds, S1 normal and S2 normal. No murmur heard.    No friction rub. No gallop.      Comments: Prominent popliteals bilaterally  Pulmonary:      Effort: Pulmonary effort is normal.      Breath sounds: Normal breath sounds.   Abdominal:      General: Bowel sounds are normal. There is no distension or abdominal bruit.      Palpations: Abdomen is soft. There is pulsatile mass. There is no mass.      Tenderness: There is no abdominal tenderness.   Musculoskeletal:         General: Normal range of motion.      Cervical back: Neck supple.   Lymphadenopathy:      Cervical: No cervical adenopathy.   Skin:     General: Skin is warm and dry.   Neurological:      General: No focal deficit present.      Mental Status: He is alert and oriented to person, place, and time.      Cranial Nerves: No cranial nerve deficit.      Sensory: No sensory deficit.   Psychiatric:         Mood and Affect: Mood normal.         Behavior: Behavior normal.         Thought Content: Thought content normal.         Judgment: Judgment normal.      Diagnostic Data:  CT Angiogram Abdomen Pelvis     Result Date: 5/16/2023  Narrative:  EXAMINATION: CT ANGIOGRAM ABDOMEN PELVIS-   5/16/2023 8:25 AM CDT  HISTORY: Aortic aneurysm (AAA), surveillance; I71.40-Abdominal aortic aneurysm, without rupture, unspecified  In order to have a CT radiation dose as low as reasonably achievable Automated Exposure Control was utilized for adjustment of the mA and/or KV according to patient size.  DLP in mGycm= 476  The CT angiography of the abdomen and pelvis is performed after intravenous contrast enhancement.  Images are acquired in axial plane and subsequent 2-D reconstruction in coronal and sagittal planes and 3-D maximum intensity projection reconstruction.  There is no previous similar study for comparison. Correlation is made with CT scan of the abdomen dated 05/18/2020.  There are atheromatous changes of the abdominal aorta iliac and femoral arteries.  There is fusiform aneurysmal dilatation of the infrarenal abdominal aorta involving 9 cm of the distal abdominal aorta. Maximum lumen diameter is 4.1 cm. It previously measured 3.8 x 3.7 cm at the same level. There is prefrontal partial lumen mural thrombosis with effective lumen of the aorta measuring 2.9 x 2.1 cm. No dissection.  Moderate ectasia of the right common iliac artery is stable and measures 2.1 cm. No change.  Atheromatous changes of both common internal and external iliac arteries is seen. No aneurysmal dilatation. No stenosis. Atheromatous plaques are seen in the common femoral arteries bilaterally without stenosis. Both divides into normal-appearing superficial deep femoral arteries.  Atheromatous plaques are seen at the origin of the celiac and superior mesenteric artery. No significant stenosis. Atheromatous plaques is seen at the origin of both renal arteries. No stenosis. 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  The lung bases included in the study are unremarkable except for mild atelectatic changes at calcified granulomas.  The liver and spleen are unremarkable.   No radiopaque gallstones.  Pancreas is normal.  Adrenal glands bilaterally are unremarkable.  Moderate lobulation of the kidneys bilaterally seen. There are several well-defined sharply marginated low-density nodule/mass in both kidneys which are similar to the previous study. The largest nodule in the right kidney posteriorly measures 2.9 cm in maximum dimension in axial plane images. It measured 4.1 cm in the previous study. The CT density suggests a cyst. The largest nodule in the left kidney, laterally in the lower pole, measures 2.9 cm in maximum dimension. It previously measured 2 cm. CT density suggests a cyst. No radiopaque calculi. No hydronephrosis. The ureters are nondilated. The urinary bladder is poorly distended with moderate thickening of the walls. No intrinsic abnormality.  Prostate is significantly enlarged and measures 6.1 x 5.2 x 5.3 cm. No significant change when measured at the same level and same dimension.  There are small fat-containing inguinal hernias. A tiny fat-containing umbilical hernia.  The stomach and duodenum and small bowel are normal. Cecum lies low in the pelvis. Appendix is normal. There is significant large volume stool in the entire colon. No obstruction.  There is no evidence of abdominal or pelvic lymphadenopathy.  Images are reviewed in bone window show chronic degenerative changes of the thoracolumbar spine with multilevel Schmorl's nodes. No acute bony abnormality.       Impression: 1. Fusiform aneurysmal dilatation of the infrarenal abdominal aorta which now measures 41 cm in diameter. It previously measured 3.8 x 3.7 cm at the same level. No dissection. Partial thrombosis. 2. 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 3. Bilateral renal cysts as described above. 4. A large volume stool in the colon. No obstruction. 5. Enlarged prostate. 6. Moderate thickening of the wall of the incompletely distended urinary bladder may partly be due to incomplete  distention. There is probability of chronic partial outlet obstruction or chronic cystitis.          This report was finalized on 05/16/2023 10:22 by Dr. Glory Sumner MD.         Noninvasive testing including a carotid duplex shows less than 50% carotid stenosis bilaterally.  Popliteal duplex shows small popliteal artery aneurysms 1.14 on the right 1.05 on the left.         Patient Active Problem List   Diagnosis    Asymmetry of tonsils    Enlarged tonsils    Neoplasm of tonsil    COPD, group B, by GOLD 2017 classification    Bronchiectasis without complication    Essential (primary) hypertension    Personal history of nicotine dependence    Temporary cerebral vascular dysfunction    Alcohol use    Alcohol dependence, uncomplicated    Benign prostatic hyperplasia    Anxiety    Claustrophobia    Thrombocytopenia    Anatoly sprayers' lung    Benign hypertension    Myopathy    Bradycardia    Calcification of abdominal aorta    Gastroesophageal reflux disease    Hypercalcemia    Hypertrophy of prostate    Infarction of left basal ganglia    Knee deformity, acquired, left    Left cataract    PAD (peripheral artery disease)    Pericarditis associated with severe chronic anemia    Pseudophakia    Statin myopathy    Stasis edema of both lower extremities    History of adenomatous polyp of colon    Abdominal aortic aneurysm (AAA) without rupture    TIA (transient ischemic attack)           Diagnosis Plan   1. Infrarenal abdominal aortic aneurysm (AAA) without rupture          2. Essential (primary) hypertension          3. Popliteal artery aneurysm, bilateral          4. Personal history of nicotine dependence                Plan: After thoroughly evaluating Van Mckeon, I believe the best course of action is to proceed with endovascular abdominal aortic aneurysm repair.  Risks/benefits were explained at great length to the patient which include but are not limited to bleeding, infection, vessel rupture, bowel  damage, renal failure, MI, stroke, and death.   I did review his testing closely and his aneurysm is slightly larger measuring about 4.2 cm,  I will send to my rep as I am worried about losing the ability to fix minimally invasive as the landing zone is becoming aneurysmal.  He has small popliteal artery aneurysms bilaterally measuring just over 1 cm.  His carotid duplex shows less than 50% carotid stenosis bilaterally.  I will repeat his carotid and popliteal duplex in 1 year.   I did discuss vascular risk factors as they pertain to the progression of vascular disease including controlling hypertension.  This risk factors currently stable and controlled.  The patient is to continue taking their medications as previously discussed.   This was all discussed in full with complete understanding.  Thank you for allowing me to participate in the care of your patient.  Please do not hesitate to call with any questions or concerns.  We will keep you aware of any further encounters with Van Mckeon.         Sincerely yours,           Collins Sauceda, DO

## 2023-07-20 VITALS
WEIGHT: 153.22 LBS | BODY MASS INDEX: 21.45 KG/M2 | SYSTOLIC BLOOD PRESSURE: 113 MMHG | DIASTOLIC BLOOD PRESSURE: 50 MMHG | TEMPERATURE: 98 F | HEART RATE: 64 BPM | HEIGHT: 71 IN | OXYGEN SATURATION: 94 % | RESPIRATION RATE: 16 BRPM

## 2023-07-20 LAB
ANION GAP SERPL CALCULATED.3IONS-SCNC: 10 MMOL/L (ref 5–15)
BUN SERPL-MCNC: 28 MG/DL (ref 8–23)
BUN/CREAT SERPL: 17.3 (ref 7–25)
CALCIUM SPEC-SCNC: 10 MG/DL (ref 8.6–10.5)
CHLORIDE SERPL-SCNC: 106 MMOL/L (ref 98–107)
CO2 SERPL-SCNC: 22 MMOL/L (ref 22–29)
CREAT SERPL-MCNC: 1.62 MG/DL (ref 0.76–1.27)
EGFRCR SERPLBLD CKD-EPI 2021: 42.1 ML/MIN/1.73
GLUCOSE SERPL-MCNC: 94 MG/DL (ref 65–99)
POTASSIUM SERPL-SCNC: 4.7 MMOL/L (ref 3.5–5.2)
SODIUM SERPL-SCNC: 138 MMOL/L (ref 136–145)

## 2023-07-20 PROCEDURE — 25010000002 CEFAZOLIN PER 500 MG: Performed by: SURGERY

## 2023-07-20 PROCEDURE — 99024 POSTOP FOLLOW-UP VISIT: CPT | Performed by: SURGERY

## 2023-07-20 PROCEDURE — 80048 BASIC METABOLIC PNL TOTAL CA: CPT | Performed by: SURGERY

## 2023-07-20 RX ORDER — CLOPIDOGREL BISULFATE 75 MG/1
75 TABLET ORAL DAILY
Qty: 30 TABLET | Refills: 2 | Status: SHIPPED | OUTPATIENT
Start: 2023-07-20 | End: 2023-10-18

## 2023-07-20 RX ORDER — ALBUTEROL SULFATE 90 UG/1
2 AEROSOL, METERED RESPIRATORY (INHALATION) 4 TIMES DAILY PRN
COMMUNITY

## 2023-07-20 RX ORDER — LISINOPRIL 40 MG/1
20 TABLET ORAL DAILY
COMMUNITY

## 2023-07-20 RX ADMIN — CEFAZOLIN 2000 MG: 2 INJECTION, POWDER, FOR SOLUTION INTRAMUSCULAR; INTRAVENOUS at 02:55

## 2023-07-20 NOTE — PLAN OF CARE
Goal Outcome Evaluation:  Plan of Care Reviewed With: patient, spouse        Progress: improving     Pt denies c/o pain. IID between abx. Bose to bedside with clear yellow output. Gauze and teg bilateral groin dressings intact. DP and PT pulses palpable bilaterally. Resting comfortably, VSS safety maintained.

## 2023-07-20 NOTE — DISCHARGE SUMMARY
Date of Discharge:  7/20/2023    Discharge Diagnosis: Infrarenal abdominal aortic aneurysm without rupture    Presenting Problem/History of Present Illness  Abdominal aortic aneurysm [I71.40]  Preop testing [Z01.818]  Infrarenal abdominal aortic aneurysm (AAA) without rupture [I71.43]       Hospital Course  Van Mckeon is a 82 y.o.  male who we are following for a small abdominal aortic aneurysm, and small right popliteal artery aneurysm.  He has previous TIA symptoms, but carotids found to be widely patent.  He was a former smoker but has quit.  He is maintained on aspirin.  He did have noninvasive testing performed today, which I did review in office.  He was found to have an infrarenal abdominal aortic aneurysm without rupture that needed endovascular repair.  He presented yesterday for elective EVAR.  The surgery went well and was transferred to  for continued monitoring care.  Throughout his stay he has done well without any complaints.  His abdomen and groins are both soft without hematoma or pain.  His pulses are intact.  He is stable for discharge home.  He will go home on the addition of Plavix.  I will see him back in 2 weeks time for continued monitoring and care.    Procedures Performed  Procedure(s):  ABDOMINAL AORTIC ANEURYSM REPAIR WITH ENDOGRAFT       Consults:   Consults       No orders found for last 30 day(s).              Condition on Discharge: Good    Discharge Medications     Discharge Medications        New Medications        Instructions Start Date   clopidogrel 75 MG tablet  Commonly known as: PLAVIX   75 mg, Oral, Daily             Continue These Medications        Instructions Start Date   aspirin 81 MG EC tablet   81 mg, Oral, Daily      empagliflozin 10 MG tablet tablet  Commonly known as: Jardiance   10 mg, Oral, Daily      finasteride 5 MG tablet  Commonly known as: PROSCAR   5 mg, Oral, Daily      folic acid 1 MG tablet  Commonly known as: FOLVITE   1 mg, Oral, Daily       metoprolol tartrate 25 MG tablet  Commonly known as: LOPRESSOR   12.5 mg, Oral, 2 Times Daily      rosuvastatin 5 MG tablet  Commonly known as: CRESTOR   5 mg, Oral, Daily      spironolactone 25 MG tablet  Commonly known as: ALDACTONE   25 mg, Oral, Daily      Stiolto Respimat 2.5-2.5 MCG/ACT aerosol solution inhaler  Generic drug: tiotropium bromide-olodaterol   2 puffs, Inhalation, Daily - RT      tamsulosin 0.4 MG capsule 24 hr capsule  Commonly known as: FLOMAX   1 capsule, Oral, Daily             ASK your doctor about these medications        Instructions Start Date   albuterol sulfate  (90 Base) MCG/ACT inhaler  Commonly known as: PROVENTIL HFA;VENTOLIN HFA;PROAIR HFA  Ask about: Which instructions should I use?   2 puffs, Inhalation, 4 Times Daily PRN      cyanocobalamin 500 MCG tablet  Commonly known as: VITAMIN B-12   500 mcg, Oral, Daily      lisinopril 40 MG tablet  Commonly known as: PRINIVIL,ZESTRIL  Ask about: Which instructions should I use?   20 mg, Oral, Daily               Discharge Diet:   Diet Instructions       Advance Diet As Tolerated -Target Diet: regular      Target Diet: regular            Activity at Discharge:   Activity Instructions       Bathing Restrictions      Type of Restriction: Bathing    Bathing Restrictions: Other    Explain Bathing Restrictions: ok to shower starting tomorrow    Driving Restrictions      Type of Restriction: Driving    Driving Restrictions: No Driving (Time Limited)    Length: 1 Week    Gradually Increase Activity Until at Pre-Hospitalization Level      Lifting Restrictions      Type of Restriction: Lifting    Lifting Restrictions: Lifting Restriction (Indicate Limit)    Weight Limit (Pounds): 10    Length of Lifting Restriction: 1 week    Work Restrictions      Type of Restriction: Work    May Return to Work: In 1 Week    With / Without Restrictions: Without Restrictions            Follow-up Appointments  Future Appointments   Date Time Provider  Department Center   8/4/2023 10:00 AM Natalie David APRN [facility]   8/16/2023  9:00 AM  [facility] None   1/30/2024  2:00 PM Josephine Max APRN [facility]   5/7/2024 10:00 AM Collins Sauceda DO [facility]     Additional Instructions for the Follow-ups that You Need to Schedule       Discharge Follow-up with Specified Provider: Komal; 2 Weeks   As directed      To: Komal    Follow Up: 2 Weeks                  I did spend more than 30 minutes reviewing the chart, face to face encounter, and organizing discharge.    Collins Sauceda DO  07/20/23  09:05 CDT

## 2023-07-20 NOTE — NURSING NOTE
Pt is alert and oriented, vitals are stable, no s/s of distress, pt sitting at bedside with spouse.  Pt states he is ready to go home.  Awaiting for offices to open to make follow up appts.  Pt will take morning meds when he gets home/ pt request

## 2023-07-25 ENCOUNTER — READMISSION MANAGEMENT (OUTPATIENT)
Dept: CALL CENTER | Facility: HOSPITAL | Age: 83
End: 2023-07-25
Payer: MEDICARE

## 2023-07-25 NOTE — OUTREACH NOTE
General Surgery Week 1 Survey      Flowsheet Row Responses   [facility] patient discharged from? [facility]   Does the patient have one of the following disease processes/diagnoses(primary or secondary)? General Surgery   Week 1 attempt successful? Yes   Call start time 1716   Call end time 1722   Discharge diagnosis : Infrarenal abdominal aortic aneurysm without rupture   Person spoke with today (if not patient) and relationship Sheila Daughter   Medication alerts for this patient Plavix   Meds reviewed with patient/caregiver? Yes   Is the patient having any side effects they believe may be caused by any medication additions or changes? No   Does the patient have all medications related to this admission filled (includes all antibiotics, pain medications, etc.) Yes   Is the patient taking all medications as directed (includes completed medication regime)? Yes   Does the patient have a follow up appointment scheduled with their surgeon? Yes   Has the patient kept scheduled appointments due by today? N/A   Comments Aug 4 with surgeon   Psychosocial issues? No   Did the patient receive a copy of their discharge instructions? Yes   Nursing interventions Reviewed instructions with patient   What is the patient's perception of their health status since discharge? New symptoms unrelated to diagnosis   Nursing interventions Nurse provided patient education   Is the patient /caregiver able to teach back basic post-op care? Practice 'cough and deep breath', Continue use of incentive spirometry at least 1 week post discharge, Drive as instructed by MD in discharge instructions, Lifting as instructed by MD in discharge instructions, Keep incision areas clean,dry and protected   Is the patient/caregiver able to teach back signs and symptoms of incisional infection? Increased redness, swelling or pain at the incisonal site, Increased drainage or bleeding, Incisional warmth, Pus or odor from incision, Fever   Is the  patient/caregiver able to teach back steps to recovery at home? Set small, achievable goals for return to baseline health, Rest and rebuild strength, gradually increase activity   If the patient is a current smoker, are they able to teach back resources for cessation? Not a smoker   Is the patient/caregiver able to teach back the hierarchy of who to call/visit for symptoms/problems? PCP, Specialist, Home health nurse, Urgent Care, ED, 911 Yes   Additional teach back comments Daughter is on her way to his home.  He had complaints of being dizzy and has happend before.  They will check his bp and advised to notify surgeon.  Daughter is NP and they are going to make sure he is drinking plenty of fluids.   Week 1 call completed? Yes   Wrap up additional comments Denies questions or needs at this time   Call end time [id number]            Snehal EDGE - Licensed Nurse

## 2023-08-03 ENCOUNTER — READMISSION MANAGEMENT (OUTPATIENT)
Dept: CALL CENTER | Facility: HOSPITAL | Age: 83
End: 2023-08-03
Payer: MEDICARE

## 2023-08-03 ENCOUNTER — TELEPHONE (OUTPATIENT)
Dept: VASCULAR SURGERY | Facility: CLINIC | Age: 83
End: 2023-08-03
Payer: MEDICARE

## 2023-08-04 ENCOUNTER — OFFICE VISIT (OUTPATIENT)
Dept: VASCULAR SURGERY | Facility: CLINIC | Age: 83
End: 2023-08-04
Payer: MEDICARE

## 2023-08-04 VITALS
DIASTOLIC BLOOD PRESSURE: 72 MMHG | HEIGHT: 72 IN | SYSTOLIC BLOOD PRESSURE: 108 MMHG | OXYGEN SATURATION: 98 % | HEART RATE: 78 BPM | BODY MASS INDEX: 20.59 KG/M2 | WEIGHT: 152 LBS

## 2023-08-04 DIAGNOSIS — I71.43 INFRARENAL ABDOMINAL AORTIC ANEURYSM (AAA) WITHOUT RUPTURE: Primary | ICD-10-CM

## 2023-08-04 PROCEDURE — 1159F MED LIST DOCD IN RCRD: CPT | Performed by: NURSE PRACTITIONER

## 2023-08-04 PROCEDURE — 3074F SYST BP LT 130 MM HG: CPT | Performed by: NURSE PRACTITIONER

## 2023-08-04 PROCEDURE — 3078F DIAST BP <80 MM HG: CPT | Performed by: NURSE PRACTITIONER

## 2023-08-04 PROCEDURE — 1160F RVW MEDS BY RX/DR IN RCRD: CPT | Performed by: NURSE PRACTITIONER

## 2023-08-04 PROCEDURE — 99024 POSTOP FOLLOW-UP VISIT: CPT | Performed by: NURSE PRACTITIONER

## 2023-08-20 ENCOUNTER — HOSPITAL ENCOUNTER (EMERGENCY)
Facility: HOSPITAL | Age: 83
Discharge: HOME | End: 2023-08-20
Attending: FAMILY MEDICINE
Payer: MEDICARE

## 2023-08-20 ENCOUNTER — APPOINTMENT (OUTPATIENT)
Dept: ULTRASOUND IMAGING | Facility: HOSPITAL | Age: 83
End: 2023-08-20
Payer: MEDICARE

## 2023-08-20 VITALS
RESPIRATION RATE: 20 BRPM | OXYGEN SATURATION: 99 % | SYSTOLIC BLOOD PRESSURE: 130 MMHG | HEART RATE: 66 BPM | DIASTOLIC BLOOD PRESSURE: 85 MMHG | HEIGHT: 72 IN | BODY MASS INDEX: 20.59 KG/M2 | WEIGHT: 152 LBS | TEMPERATURE: 98.1 F

## 2023-08-20 DIAGNOSIS — S80.10XA CONTUSION OF LOWER LEG, UNSPECIFIED LATERALITY, INITIAL ENCOUNTER: Primary | ICD-10-CM

## 2023-08-20 LAB
ANION GAP SERPL CALCULATED.3IONS-SCNC: 8 MMOL/L (ref 5–15)
BUN SERPL-MCNC: 24 MG/DL (ref 8–23)
BUN/CREAT SERPL: 15.9 (ref 7–25)
CALCIUM SPEC-SCNC: 9.8 MG/DL (ref 8.6–10.5)
CHLORIDE SERPL-SCNC: 110 MMOL/L (ref 98–107)
CO2 SERPL-SCNC: 23 MMOL/L (ref 22–29)
CREAT SERPL-MCNC: 1.51 MG/DL (ref 0.76–1.27)
DEPRECATED RDW RBC AUTO: 49.1 FL (ref 37–54)
EGFRCR SERPLBLD CKD-EPI 2021: 45.5 ML/MIN/1.73
EOSINOPHIL # BLD MANUAL: 0.05 10*3/MM3 (ref 0–0.4)
EOSINOPHIL NFR BLD MANUAL: 1 % (ref 0.3–6.2)
ERYTHROCYTE [DISTWIDTH] IN BLOOD BY AUTOMATED COUNT: 13.8 % (ref 12.3–15.4)
GLUCOSE SERPL-MCNC: 82 MG/DL (ref 65–99)
HCT VFR BLD AUTO: 30.5 % (ref 37.5–51)
HGB BLD-MCNC: 9.5 G/DL (ref 13–17.7)
LYMPHOCYTES # BLD MANUAL: 1.65 10*3/MM3 (ref 0.7–3.1)
LYMPHOCYTES NFR BLD MANUAL: 3 % (ref 5–12)
MCH RBC QN AUTO: 30.4 PG (ref 26.6–33)
MCHC RBC AUTO-ENTMCNC: 31.1 G/DL (ref 31.5–35.7)
MCV RBC AUTO: 97.4 FL (ref 79–97)
MONOCYTES # BLD: 0.15 10*3/MM3 (ref 0.1–0.9)
NEUTROPHILS # BLD AUTO: 3.09 10*3/MM3 (ref 1.7–7)
NEUTROPHILS NFR BLD MANUAL: 62.6 % (ref 42.7–76)
PLATELET # BLD AUTO: 92 10*3/MM3 (ref 140–450)
PMV BLD AUTO: 12.2 FL (ref 6–12)
POIKILOCYTOSIS BLD QL SMEAR: ABNORMAL
POTASSIUM SERPL-SCNC: 4 MMOL/L (ref 3.5–5.2)
RBC # BLD AUTO: 3.13 10*6/MM3 (ref 4.14–5.8)
SMALL PLATELETS BLD QL SMEAR: ABNORMAL
SODIUM SERPL-SCNC: 141 MMOL/L (ref 136–145)
VARIANT LYMPHS NFR BLD MANUAL: 33.3 % (ref 19.6–45.3)
WBC MORPH BLD: NORMAL
WBC NRBC COR # BLD: 4.94 10*3/MM3 (ref 3.4–10.8)

## 2023-08-20 PROCEDURE — 93970 EXTREMITY STUDY: CPT | Performed by: SURGERY

## 2023-08-20 PROCEDURE — 85007 BL SMEAR W/DIFF WBC COUNT: CPT | Performed by: FAMILY MEDICINE

## 2023-08-20 PROCEDURE — 99284 EMERGENCY DEPT VISIT MOD MDM: CPT

## 2023-08-20 PROCEDURE — 85025 COMPLETE CBC W/AUTO DIFF WBC: CPT | Performed by: FAMILY MEDICINE

## 2023-08-20 PROCEDURE — 80048 BASIC METABOLIC PNL TOTAL CA: CPT | Performed by: FAMILY MEDICINE

## 2023-08-20 PROCEDURE — 93970 EXTREMITY STUDY: CPT

## 2023-08-20 RX ORDER — SODIUM CHLORIDE 0.9 % (FLUSH) 0.9 %
10 SYRINGE (ML) INJECTION AS NEEDED
Status: DISCONTINUED | OUTPATIENT
Start: 2023-08-20 | End: 2023-08-20 | Stop reason: HOSPADM

## 2023-08-21 NOTE — ED PROVIDER NOTES
Subjective   History of Present Illness  83-year-old male comes with some bruising of his lower extremities.  Patient had a recent AAA repair.  Patient states that he has no chest pain or shortness of breath out of the ordinary.  Patient states that he has no worsening shortness of breath.  Patient does have chronic shortness of breath.  Patient states that he has no fevers or chills.  Patient states that he does not member any injury or trauma to the legs.  Patient states he also does have some pain about his left knee.  Patient denies any other symptoms at this time.    Review of Systems   All other systems reviewed and are negative.    Past Medical History:   Diagnosis Date    Alcohol use     Anxiety     Arthritis     Bronchiectasis without complication 04/09/2019    Bronchitis     COPD (chronic obstructive pulmonary disease)     COPD, group B, by GOLD 2017 classification 04/09/2019    Essential hypertension 04/09/2019    Hypertension     Personal history of nicotine dependence 04/09/2019    Prostate hypertrophy     Snoring     Thrombocytopenia     TIA (transient ischemic attack)     Tobacco user 10/03/2019       Allergies   Allergen Reactions    Statins Myalgia    Tetanus Toxoids Other (See Comments)     STATES HAD A REACTION TO TETANUS SHOT AT AGE 8; STATES HAS TOLERATED SINCE        Past Surgical History:   Procedure Laterality Date    ABDOMINAL AORTIC ANEURYSM REPAIR WITH ENDOGRAFT N/A 7/19/2023    Procedure: ABDOMINAL AORTIC ANEURYSM REPAIR WITH ENDOGRAFT;  Surgeon: Collins Sauceda DO;  Location: [place] [address];  Service: Vascular;  Laterality: N/A;    BRONCHOSCOPY      CATARACT EXTRACTION, BILATERAL      NASAL SINUS SURGERY      TOTAL HIP ARTHROPLASTY Left        Family History   Problem Relation Age of Onset    Emphysema Father     Heart attack Brother     Cancer Brother     Heart disease Brother     Cancer Other     Colon cancer Neg Hx     Colon polyps Neg Hx     Esophageal cancer Neg Hx         Social History     Socioeconomic History    Marital status:    Tobacco Use    Smoking status: Former     Packs/day: 1.00     Years: 70.00     Pack years: 70.00     Types: Cigarettes     Quit date: 2023     Years since quittin.0    Smokeless tobacco: Former     Types: Snuff     Quit date:    Vaping Use    Vaping Use: Never used   Substance and Sexual Activity    Alcohol use: Yes     Alcohol/week: 1.0 standard drink     Types: 1 Shots of liquor per week     Comment: daily - 2 mixed drinks    Drug use: No    Sexual activity: Defer           Objective   Physical Exam  Vitals and nursing note reviewed.   Constitutional:       Appearance: Normal appearance.   HENT:      Head: Normocephalic and atraumatic.      Mouth/Throat:      Mouth: Mucous membranes are moist.   Eyes:      Extraocular Movements: Extraocular movements intact.      Pupils: Pupils are equal, round, and reactive to light.   Cardiovascular:      Rate and Rhythm: Normal rate and regular rhythm.      Heart sounds: Normal heart sounds.   Pulmonary:      Effort: Pulmonary effort is normal.      Breath sounds: Normal breath sounds.   Musculoskeletal:      Comments: Ecchymosis bilateral lower extremities on the left and right thigh region.   Skin:     General: Skin is warm and dry.   Neurological:      General: No focal deficit present.      Mental Status: He is alert and oriented to person, place, and time.   Psychiatric:         Mood and Affect: Mood normal.         Behavior: Behavior normal.       Procedures           ED Course  ED Course as of 23   Sun Aug 20, 2023   [address] on 2023  9:29 PM CDT  BLE: No thrombus vis.     [RP]      ED Course User Index  [RP] Anam Pratt MD                                         Lab Results (last 24 hours)       Procedure Component Value Units Date/Time    CBC & Differential [[id number]]  (Abnormal) Collected: 23    Specimen: Blood Updated: 23     Narrative:      The following orders were created for panel order CBC & Differential.  Procedure                               Abnormality         Status                     ---------                               -----------         ------                     CBC Auto Differential[[id number]]        Abnormal            Final result                 Please view results for these tests on the individual orders.    Basic Metabolic Panel [[id number]]  (Abnormal) Collected: 08/20/23 2034    Specimen: Blood Updated: 08/20/23 2100     Glucose 82 mg/dL      BUN 24 mg/dL      Creatinine 1.51 mg/dL      Sodium 141 mmol/L      Potassium 4.0 mmol/L      Chloride 110 mmol/L      CO2 23.0 mmol/L      Calcium 9.8 mg/dL      BUN/Creatinine Ratio 15.9     Anion Gap 8.0 mmol/L      eGFR 45.5 mL/min/1.73     Narrative:      GFR Normal >60  Chronic Kidney Disease <60  Kidney Failure <15    The GFR formula is only valid for adults with stable renal function between ages 18 and 70.    CBC Auto Differential [[id number]]  (Abnormal) Collected: 08/20/23 2034    Specimen: Blood Updated: 08/20/23 2103     WBC 4.94 10*3/mm3      RBC 3.13 10*6/mm3      Hemoglobin 9.5 g/dL      Hematocrit 30.5 %      MCV 97.4 fL      MCH 30.4 pg      MCHC 31.1 g/dL      RDW 13.8 %      RDW-SD 49.1 fl      MPV 12.2 fL      Platelets 92 10*3/mm3     Manual Differential [[id number]]  (Abnormal) Collected: 08/20/23 2034    Specimen: Blood Updated: 08/20/23 2103     Neutrophil % 62.6 %      Lymphocyte % 33.3 %      Monocyte % 3.0 %      Eosinophil % 1.0 %      Neutrophils Absolute 3.09 10*3/mm3      Lymphocytes Absolute 1.65 10*3/mm3      Monocytes Absolute 0.15 10*3/mm3      Eosinophils Absolute 0.05 10*3/mm3      Poikilocytes Slight/1+     WBC Morphology Normal     Platelet Estimate Decreased            Medical Decision Making  83-year-old male was found to have some bruising of his lower extremities.  Patient is on Plavix.  Patient has no thrombus on ultrasound of his  lower extremities.  Patient does follow-up with his vascular surgeon this week.  Patient will be discharged home in stable condition.  All questions were answered to the patient and his family present bedside prior to discharge.  Patient was advised to return the emergency room with new or worsening symptoms.  Patient verbalized understanding was agreeable plan as discussed.    Problems Addressed:  Contusion of lower leg, unspecified laterality, initial encounter: complicated acute illness or injury    Amount and/or Complexity of Data Reviewed  Labs: ordered. Decision-making details documented in ED Course.  Radiology: ordered. Decision-making details documented in ED Course.    Risk  Prescription drug management.        Final diagnoses:   Contusion of lower leg, unspecified laterality, initial encounter       ED Disposition  ED Disposition       ED Disposition   Discharge    Condition   Stable    Comment   --               Jean Marie Cabrera MD  [address]  [facility] [address] [phone]    Schedule an appointment as soon as possible for a visit       [facility] EMERGENCY DEPARTMENT  [address] [place] [id number] [phone]    As needed, If symptoms worsen         Medication List      No changes were made to your prescriptions during this visit.            Anam Pratt MD  08/20/23 [id number]

## 2023-08-22 ENCOUNTER — TELEPHONE (OUTPATIENT)
Dept: VASCULAR SURGERY | Facility: CLINIC | Age: 83
End: 2023-08-22
Payer: MEDICARE

## 2023-08-22 NOTE — TELEPHONE ENCOUNTER
Call ,placed to patient and reminded of appointment for CTA at 0800 andn with APRN at 0930, patient verbalized understanding.

## 2023-08-23 ENCOUNTER — OFFICE VISIT (OUTPATIENT)
Dept: VASCULAR SURGERY | Facility: CLINIC | Age: 83
End: 2023-08-23
Payer: MEDICARE

## 2023-08-23 ENCOUNTER — HOSPITAL ENCOUNTER (OUTPATIENT)
Dept: CT IMAGING | Facility: HOSPITAL | Age: 83
Discharge: HOME | End: 2023-08-23
Admitting: NURSE PRACTITIONER
Payer: MEDICARE

## 2023-08-23 ENCOUNTER — TELEPHONE (OUTPATIENT)
Dept: PULMONOLOGY | Facility: CLINIC | Age: 83
End: 2023-08-23
Payer: MEDICARE

## 2023-08-23 VITALS
HEART RATE: 74 BPM | SYSTOLIC BLOOD PRESSURE: 106 MMHG | OXYGEN SATURATION: 98 % | DIASTOLIC BLOOD PRESSURE: 62 MMHG | BODY MASS INDEX: 21.08 KG/M2 | WEIGHT: 155.6 LBS | HEIGHT: 72 IN

## 2023-08-23 DIAGNOSIS — J44.9 COPD, GROUP B, BY GOLD 2017 CLASSIFICATION: Primary | ICD-10-CM

## 2023-08-23 DIAGNOSIS — I71.43 INFRARENAL ABDOMINAL AORTIC ANEURYSM (AAA) WITHOUT RUPTURE: Primary | ICD-10-CM

## 2023-08-23 DIAGNOSIS — I71.43 INFRARENAL ABDOMINAL AORTIC ANEURYSM (AAA) WITHOUT RUPTURE: ICD-10-CM

## 2023-08-23 DIAGNOSIS — I10 ESSENTIAL (PRIMARY) HYPERTENSION: ICD-10-CM

## 2023-08-23 DIAGNOSIS — I72.4 ANEURYSM OF RIGHT POPLITEAL ARTERY: ICD-10-CM

## 2023-08-23 LAB — CREAT BLDA-MCNC: 1.6 MG/DL (ref 0.6–1.3)

## 2023-08-23 PROCEDURE — 99024 POSTOP FOLLOW-UP VISIT: CPT | Performed by: NURSE PRACTITIONER

## 2023-08-23 PROCEDURE — 1159F MED LIST DOCD IN RCRD: CPT | Performed by: NURSE PRACTITIONER

## 2023-08-23 PROCEDURE — 1160F RVW MEDS BY RX/DR IN RCRD: CPT | Performed by: NURSE PRACTITIONER

## 2023-08-23 PROCEDURE — 3078F DIAST BP <80 MM HG: CPT | Performed by: NURSE PRACTITIONER

## 2023-08-23 PROCEDURE — 25510000001 IOPAMIDOL PER 1 ML: Performed by: NURSE PRACTITIONER

## 2023-08-23 PROCEDURE — 82565 ASSAY OF CREATININE: CPT

## 2023-08-23 PROCEDURE — 74174 CTA ABD&PLVS W/CONTRAST: CPT

## 2023-08-23 PROCEDURE — 3074F SYST BP LT 130 MM HG: CPT | Performed by: NURSE PRACTITIONER

## 2023-08-23 RX ORDER — FLUTICASONE FUROATE, UMECLIDINIUM BROMIDE AND VILANTEROL TRIFENATATE 100; 62.5; 25 UG/1; UG/1; UG/1
1 POWDER RESPIRATORY (INHALATION)
Qty: 1 EACH | Refills: 0 | COMMUNITY
Start: 2023-08-23

## 2023-08-23 RX ADMIN — IOPAMIDOL 100 ML: 755 INJECTION, SOLUTION INTRAVENOUS at 08:12

## 2023-08-23 NOTE — TELEPHONE ENCOUNTER
Patient came into the office stated he had increased shortness of breath for about a month.  He was routinely taking Stiolto 2 puffs daily, as well as his albuterol inhaler as needed.  He states he does not feel the Stiolto is effective anymore and wanted a trial of Trelegy.  Patient does not have a pulse ox at home to check his O2 and in the office it was 97%.  The patient states it seems to be worse on exertion not at rest.  Per JAE Robbins ok to give a trial of trelegy 100. Patient is to let us know if this helps and we will send a script.  If it does not help we will schedule him a nurse visit for a walking oximetry.    Rx Refill Note  Requested Prescriptions     Pending Prescriptions Disp Refills    Fluticasone-Umeclidin-Vilant (Trelegy Ellipta) 100-62.5-25 MCG/ACT inhaler 1 each 0     Sig: Inhale 1 puff Daily.      Last office visit with prescribing clinician: 1/24/2023   Last telemedicine visit with prescribing clinician: Visit date not found   Next office visit with prescribing clinician: 1/30/2024                         Would you like a call back once the refill request has been completed: [] Yes [] No    If the office needs to give you a call back, can they leave a voicemail: [] Yes [] No    Claudia Chadwick MA  08/23/23, 10:34 CDT

## 2023-08-23 NOTE — LETTER
August 23, 2023     Jean Marie Cabrera MD  [address]  [facility] [address]    Patient: Van Mckeon   YOB: 1940   Date of Visit: 8/23/2023     Dear Jean Marie Cabrera MD:       Thank you for referring Van Mckeon to me for evaluation. Below are the relevant portions of my assessment and plan of care.    If you have questions, please do not hesitate to call me. I look forward to following Van along with you.         Sincerely,        JAE Spencer        CC: No Recipients    Natalie David APRN  08/23/23 0946  Sign when Signing Visit  8/23/2023       Jean Marie Cabrera MD  [address]  [facility] [address]    Van Mckeon  1940    Chief Complaint   Patient presents with    Follow-up     2 week follow up. Last seen 8/4/23. Patient denies any changes since last visit, but did mention ed visit Sunday for what he thought was a blockage in leg.        Dear Jean Marie Cabrera MD   HPI  I had the pleasure of seeing your patient Van Mckeon in the office today.  As you recall, Van Mckeon is a 83 y.o.  male who you are currently following for routine health maintenance.  We are following for an abdominal aortic aneurysm, and small right popliteal artery aneurysm.  He has previous TIA symptoms, but carotids found to be widely patent.  He did undergo endovascular abdominal aortic aneurysm repair on 7/19/2023.  He has done well.  He is maintained on aspirin, Plavix, and Crestor.  He does have some scattered bruising to his legs and actually was seen in the emergency department.  He did have noninvasive testing performed, which I did review in office.    Review of Systems   Constitutional: Negative.    HENT: Negative.     Eyes: Negative.    Respiratory: Negative.     Cardiovascular:  Positive for leg swelling.   Gastrointestinal: Negative.    Endocrine: Negative.    Genitourinary: Negative.    Musculoskeletal: Negative.    Skin: Negative.   "  Allergic/Immunologic: Negative.    Neurological: Negative.    Hematological:  Bruises/bleeds easily.   Psychiatric/Behavioral: Negative.     All other systems reviewed and are negative.      /62   Pulse 74   Ht 182.9 cm (72\")   Wt 70.6 kg (155 lb 9.6 oz)   SpO2 98%   BMI 21.10 kg/mý     Physical Exam  Vitals and nursing note reviewed.   Constitutional:       General: He is not in acute distress.     Appearance: Normal appearance. He is well-developed and normal weight. He is not diaphoretic.   HENT:      Head: Normocephalic and atraumatic.   Eyes:      General: No scleral icterus.     Pupils: Pupils are equal, round, and reactive to light.   Neck:      Thyroid: No thyromegaly.      Vascular: No carotid bruit or JVD.   Cardiovascular:      Rate and Rhythm: Normal rate and regular rhythm.      Pulses: Normal pulses.           Carotid pulses are 2+ on the right side and 2+ on the left side.       Femoral pulses are 2+ on the right side and 2+ on the left side.       Popliteal pulses are 2+ on the right side and 2+ on the left side.        Dorsalis pedis pulses are 2+ on the right side and 2+ on the left side.        Posterior tibial pulses are 2+ on the right side and 2+ on the left side.      Heart sounds: Normal heart sounds, S1 normal and S2 normal. No murmur heard.    No friction rub. No gallop.      Comments: Bilateral groins healed   prominent popliteals bilaterally  Pulmonary:      Effort: Pulmonary effort is normal.      Breath sounds: Normal breath sounds.   Abdominal:      General: Bowel sounds are normal. There is no distension or abdominal bruit.      Palpations: Abdomen is soft. There is no mass or pulsatile mass.      Tenderness: There is no abdominal tenderness.      Comments: Nontender over repair   Musculoskeletal:         General: Swelling present. Normal range of motion.      Cervical back: Neck supple.   Lymphadenopathy:      Cervical: No cervical adenopathy.   Skin:     General: Skin " is warm and dry.      Findings: Bruising (Left thigh and right posterior knee) present.   Neurological:      General: No focal deficit present.      Mental Status: He is alert and oriented to person, place, and time.      Cranial Nerves: No cranial nerve deficit.      Sensory: No sensory deficit.   Psychiatric:         Mood and Affect: Mood normal.         Behavior: Behavior normal.         Thought Content: Thought content normal.         Judgment: Judgment normal.     Diagnostic Data:  CT Angiogram Abdomen Pelvis    Result Date: 8/23/2023  Narrative: EXAM/TECHNIQUE: CT angiography with 3D MIP images abdomen and pelvis with IV contrast  INDICATION: Aortic aneurysm (AAA), post-op; I71.43-Infrarenal abdominal aortic aneurysm, without rupture  COMPARISON: 05/16/2023 (preoperative CT)  DLP: 546 mGy cm. Automated exposure control was also utilized to decrease patient radiation dose.  FINDINGS:  Redemonstrated infrarenal abdominal aortic aneurysm status post aortobiiliac endograft repair. Graft begins just beyond the SMA takeoff and terminates in the distal bilateral common iliac arteries. No evidence of endoleak. No change in aneurysm sac size measuring 4.2 x 4.1 cm. No change in ectasia of the RIGHT common iliac artery status post stenting measuring 2.1 cm diameter.  Celiac artery and SMA are widely patent. Renal arteries are widely patent. Internal iliac arteries, external iliac arteries, and common femoral arteries contain scattered atherosclerotic plaque but are widely patent. Focal ectasia of the RIGHT internal iliac artery with prominent eccentric atherosclerotic plaque is noted.  Lung bases are clear. No arterial enhancing liver lesion. No cholelithiasis or biliary ductal dilatation. Pancreas appears normal. Spleen is unremarkable. No adrenal gland nodule.  Multiple bilateral renal cysts. No solid enhancing renal mass. No urolithiasis or hydronephrosis. No focal urinary bladder wall thickening.  Colonic  diverticulosis without diverticulitis. No colonic wall thickening or pericolonic fat stranding. No abnormal small bowel distention or evidence of active small bowel inflammation.  No ascites or free pelvic fluid. No pelvic mass or pelvic collection. Prostate is enlarged measuring 6.3 cm transverse dimension. No abdominal or pelvic lymphadenopathy.  No acute soft tissue finding. Degenerative osseous changes without suspicious focal lesion.      Impression:  1.  Postoperative change of aortobiiliac endograft placement for repair of abdominal aortic aneurysm. No evidence of endoleak. No change in aneurysm sac size measuring 4.2 x 4.1 cm.  2.  Markedly enlarged prostate.  This report was finalized on 08/23/2023 08:40 by Dr. Mike Steinberg MD.    US Venous Doppler Lower Extremity Bilateral (duplex)    Result Date: 8/21/2023  Narrative: History: Swelling      Impression: Impression: There is no evidence of deep venous thrombosis or superficial thrombophlebitis of right or left lower extremities.  Comments: Bilateral lower extremity venous duplex exam was performed using color Doppler flow, Doppler waveform analysis, and grayscale imaging, with and without compression. There is no evidence of deep venous thrombosis in the common femoral, superficial femoral, popliteal, peroneal, anterior tibial, and posterior tibial veins bilaterally. No thrombus is identified in the saphenofemoral junctions and greater saphenous veins bilaterally.   This report was finalized on 08/21/2023 15:09 by Dr. Collins Sauceda MD.      Patient Active Problem List   Diagnosis    Asymmetry of tonsils    Enlarged tonsils    Neoplasm of tonsil    COPD, group B, by GOLD 2017 classification    Bronchiectasis without complication    Essential (primary) hypertension    Personal history of nicotine dependence    Temporary cerebral vascular dysfunction    Alcohol use    Alcohol dependence, uncomplicated    Benign prostatic hyperplasia    Anxiety     Claustrophobia    Thrombocytopenia    Anatoly sprayers' lung    Benign hypertension    Myopathy    Bradycardia    Calcification of abdominal aorta    Gastroesophageal reflux disease    Hypercalcemia    Hypertrophy of prostate    Infarction of left basal ganglia    Knee deformity, acquired, left    Left cataract    PAD (peripheral artery disease)    Pericarditis associated with severe chronic anemia    Pseudophakia    Statin myopathy    Stasis edema of both lower extremities    History of adenomatous polyp of colon    Abdominal aortic aneurysm (AAA) without rupture    TIA (transient ischemic attack)    Preop testing    Abdominal aortic aneurysm    Infrarenal abdominal aortic aneurysm (AAA) without rupture        Diagnosis Plan   1. Infrarenal abdominal aortic aneurysm (AAA) without rupture  CT Angiogram Abdomen Pelvis      2. Essential (primary) hypertension        3. Aneurysm of right popliteal artery              Plan: After thoroughly evaluating Van Mckeon, I believe the best course of action is to remain conservative from vascular surgery standpoint.  Currently he is doing well and denies any abdominal or back pain.  He continues to be nontender over repair.  I did review his testing today and aneurysm appears to be well excluded with no evidence of endoleak.  He is having bruising to his thighs since being on Plavix as well as his arms.  If this continues we may have to stop either Plavix or aspirin.  They can let me know if this is persistent.  He has small popliteal artery aneurysms bilaterally measuring just over 1 cm.  His carotid duplex shows less than 50% carotid stenosis bilaterally.   He is also interested in home lymphedema pumps and we can arrange to see [facility] medical at his next follow-up appointment.  I did discuss vascular risk factors as they pertain to the progression of vascular disease including controlling hypertension.  This risk factors currently stable  and controlled.  The patient is to continue taking their medications as previously discussed.   This was all discussed in full with complete understanding.  Thank you for allowing me to participate in the care of your patient.  Please do not hesitate to call with any questions or concerns.  We will keep you aware of any further encounters with Van Mckeon.        Sincerely yours,         JAE Spencer David R, MD

## 2023-08-23 NOTE — PROGRESS NOTES
"8/23/2023       Jean Marie Cabrera MD  [address]  [facility] [address]    Van Mckeon  1940    Chief Complaint   Patient presents with    Follow-up     2 week follow up. Last seen 8/4/23. Patient denies any changes since last visit, but did mention ed visit Sunday for what he thought was a blockage in leg.        Dear Jean Marie Cabrera MD   HPI  I had the pleasure of seeing your patient Van Mckeon in the office today.  As you recall, Van Mckeon is a 83 y.o.  male who you are currently following for routine health maintenance.  We are following for an abdominal aortic aneurysm, and small right popliteal artery aneurysm.  He has previous TIA symptoms, but carotids found to be widely patent.  He did undergo endovascular abdominal aortic aneurysm repair on 7/19/2023.  He has done well.  He is maintained on aspirin, Plavix, and Crestor.  He does have some scattered bruising to his legs and actually was seen in the emergency department.  He did have noninvasive testing performed, which I did review in office.    Review of Systems   Constitutional: Negative.    HENT: Negative.     Eyes: Negative.    Respiratory: Negative.     Cardiovascular:  Positive for leg swelling.   Gastrointestinal: Negative.    Endocrine: Negative.    Genitourinary: Negative.    Musculoskeletal: Negative.    Skin: Negative.    Allergic/Immunologic: Negative.    Neurological: Negative.    Hematological:  Bruises/bleeds easily.   Psychiatric/Behavioral: Negative.     All other systems reviewed and are negative.      /62   Pulse 74   Ht 182.9 cm (72\")   Wt 70.6 kg (155 lb 9.6 oz)   SpO2 98%   BMI 21.10 kg/mý     Physical Exam  Vitals and nursing note reviewed.   Constitutional:       General: He is not in acute distress.     Appearance: Normal appearance. He is well-developed and normal weight. He is not diaphoretic.   HENT:      Head: Normocephalic and atraumatic.   Eyes:      General: No scleral icterus.     " Pupils: Pupils are equal, round, and reactive to light.   Neck:      Thyroid: No thyromegaly.      Vascular: No carotid bruit or JVD.   Cardiovascular:      Rate and Rhythm: Normal rate and regular rhythm.      Pulses: Normal pulses.           Carotid pulses are 2+ on the right side and 2+ on the left side.       Femoral pulses are 2+ on the right side and 2+ on the left side.       Popliteal pulses are 2+ on the right side and 2+ on the left side.        Dorsalis pedis pulses are 2+ on the right side and 2+ on the left side.        Posterior tibial pulses are 2+ on the right side and 2+ on the left side.      Heart sounds: Normal heart sounds, S1 normal and S2 normal. No murmur heard.    No friction rub. No gallop.      Comments: Bilateral groins healed   prominent popliteals bilaterally  Pulmonary:      Effort: Pulmonary effort is normal.      Breath sounds: Normal breath sounds.   Abdominal:      General: Bowel sounds are normal. There is no distension or abdominal bruit.      Palpations: Abdomen is soft. There is no mass or pulsatile mass.      Tenderness: There is no abdominal tenderness.      Comments: Nontender over repair   Musculoskeletal:         General: Swelling present. Normal range of motion.      Cervical back: Neck supple.   Lymphadenopathy:      Cervical: No cervical adenopathy.   Skin:     General: Skin is warm and dry.      Findings: Bruising (Left thigh and right posterior knee) present.   Neurological:      General: No focal deficit present.      Mental Status: He is alert and oriented to person, place, and time.      Cranial Nerves: No cranial nerve deficit.      Sensory: No sensory deficit.   Psychiatric:         Mood and Affect: Mood normal.         Behavior: Behavior normal.         Thought Content: Thought content normal.         Judgment: Judgment normal.     Diagnostic Data:  CT Angiogram Abdomen Pelvis    Result Date: 8/23/2023  Narrative: EXAM/TECHNIQUE: CT angiography with 3D MIP  images abdomen and pelvis with IV contrast  INDICATION: Aortic aneurysm (AAA), post-op; I71.43-Infrarenal abdominal aortic aneurysm, without rupture  COMPARISON: 05/16/2023 (preoperative CT)  DLP: 546 mGy cm. Automated exposure control was also utilized to decrease patient radiation dose.  FINDINGS:  Redemonstrated infrarenal abdominal aortic aneurysm status post aortobiiliac endograft repair. Graft begins just beyond the SMA takeoff and terminates in the distal bilateral common iliac arteries. No evidence of endoleak. No change in aneurysm sac size measuring 4.2 x 4.1 cm. No change in ectasia of the RIGHT common iliac artery status post stenting measuring 2.1 cm diameter.  Celiac artery and SMA are widely patent. Renal arteries are widely patent. Internal iliac arteries, external iliac arteries, and common femoral arteries contain scattered atherosclerotic plaque but are widely patent. Focal ectasia of the RIGHT internal iliac artery with prominent eccentric atherosclerotic plaque is noted.  Lung bases are clear. No arterial enhancing liver lesion. No cholelithiasis or biliary ductal dilatation. Pancreas appears normal. Spleen is unremarkable. No adrenal gland nodule.  Multiple bilateral renal cysts. No solid enhancing renal mass. No urolithiasis or hydronephrosis. No focal urinary bladder wall thickening.  Colonic diverticulosis without diverticulitis. No colonic wall thickening or pericolonic fat stranding. No abnormal small bowel distention or evidence of active small bowel inflammation.  No ascites or free pelvic fluid. No pelvic mass or pelvic collection. Prostate is enlarged measuring 6.3 cm transverse dimension. No abdominal or pelvic lymphadenopathy.  No acute soft tissue finding. Degenerative osseous changes without suspicious focal lesion.      Impression:  1.  Postoperative change of aortobiiliac endograft placement for repair of abdominal aortic aneurysm. No evidence of endoleak. No change in aneurysm  sac size measuring 4.2 x 4.1 cm.  2.  Markedly enlarged prostate.  This report was finalized on 08/23/2023 08:40 by Dr. Mike Steinberg MD.    US Venous Doppler Lower Extremity Bilateral (duplex)    Result Date: 8/21/2023  Narrative: History: Swelling      Impression: Impression: There is no evidence of deep venous thrombosis or superficial thrombophlebitis of right or left lower extremities.  Comments: Bilateral lower extremity venous duplex exam was performed using color Doppler flow, Doppler waveform analysis, and grayscale imaging, with and without compression. There is no evidence of deep venous thrombosis in the common femoral, superficial femoral, popliteal, peroneal, anterior tibial, and posterior tibial veins bilaterally. No thrombus is identified in the saphenofemoral junctions and greater saphenous veins bilaterally.   This report was finalized on 08/21/2023 15:09 by Dr. Collins Sauceda MD.      Patient Active Problem List   Diagnosis    Asymmetry of tonsils    Enlarged tonsils    Neoplasm of tonsil    COPD, group B, by GOLD 2017 classification    Bronchiectasis without complication    Essential (primary) hypertension    Personal history of nicotine dependence    Temporary cerebral vascular dysfunction    Alcohol use    Alcohol dependence, uncomplicated    Benign prostatic hyperplasia    Anxiety    Claustrophobia    Thrombocytopenia    Anatoly sprayers' lung    Benign hypertension    Myopathy    Bradycardia    Calcification of abdominal aorta    Gastroesophageal reflux disease    Hypercalcemia    Hypertrophy of prostate    Infarction of left basal ganglia    Knee deformity, acquired, left    Left cataract    PAD (peripheral artery disease)    Pericarditis associated with severe chronic anemia    Pseudophakia    Statin myopathy    Stasis edema of both lower extremities    History of adenomatous polyp of colon    Abdominal aortic aneurysm (AAA) without rupture    TIA (transient ischemic attack)    Preop  testing    Abdominal aortic aneurysm    Infrarenal abdominal aortic aneurysm (AAA) without rupture        Diagnosis Plan   1. Infrarenal abdominal aortic aneurysm (AAA) without rupture  CT Angiogram Abdomen Pelvis      2. Essential (primary) hypertension        3. Aneurysm of right popliteal artery              Plan: After thoroughly evaluating Van Mckeon, I believe the best course of action is to remain conservative from vascular surgery standpoint.  Currently he is doing well and denies any abdominal or back pain.  He continues to be nontender over repair.  I did review his testing today and aneurysm appears to be well excluded with no evidence of endoleak.  He is having bruising to his thighs since being on Plavix as well as his arms.  If this continues we may have to stop either Plavix or aspirin.  They can let me know if this is persistent.  He has small popliteal artery aneurysms bilaterally measuring just over 1 cm.  His carotid duplex shows less than 50% carotid stenosis bilaterally.   He is also interested in home lymphedema pumps and we can arrange to see [facility] at his next follow-up appointment.  I did discuss vascular risk factors as they pertain to the progression of vascular disease including controlling hypertension.  This risk factors currently stable and controlled.  The patient is to continue taking their medications as previously discussed.   This was all discussed in full with complete understanding.  Thank you for allowing me to participate in the care of your patient.  Please do not hesitate to call with any questions or concerns.  We will keep you aware of any further encounters with Van Mckeon.        Sincerely yours,         JAE Spencer David R, MD

## 2023-08-30 ENCOUNTER — TELEPHONE (OUTPATIENT)
Dept: PODIATRY | Facility: CLINIC | Age: 83
End: 2023-08-30
Payer: MEDICARE

## 2023-08-30 NOTE — TELEPHONE ENCOUNTER
Tez from [facility] called wanting clarification on leg pumps. U put order in for half leg but order carlitos put in states for full legs. Please advise      [phone]

## 2023-08-31 ENCOUNTER — TELEPHONE (OUTPATIENT)
Dept: CARDIOLOGY | Facility: HOSPITAL | Age: 83
End: 2023-08-31
Payer: MEDICARE

## 2023-08-31 ENCOUNTER — HOSPITAL ENCOUNTER (OUTPATIENT)
Dept: CARDIOLOGY | Facility: HOSPITAL | Age: 83
Discharge: HOME | End: 2023-08-31
Payer: OTHER GOVERNMENT

## 2023-08-31 VITALS
DIASTOLIC BLOOD PRESSURE: 63 MMHG | HEART RATE: 53 BPM | SYSTOLIC BLOOD PRESSURE: 143 MMHG | BODY MASS INDEX: 21.81 KG/M2 | WEIGHT: 160.8 LBS | OXYGEN SATURATION: 96 %

## 2023-08-31 DIAGNOSIS — I10 ESSENTIAL (PRIMARY) HYPERTENSION: ICD-10-CM

## 2023-08-31 DIAGNOSIS — J44.9 CHRONIC OBSTRUCTIVE PULMONARY DISEASE, UNSPECIFIED COPD TYPE: ICD-10-CM

## 2023-08-31 DIAGNOSIS — D64.9 ANEMIA, UNSPECIFIED TYPE: ICD-10-CM

## 2023-08-31 DIAGNOSIS — I73.9 PAD (PERIPHERAL ARTERY DISEASE): ICD-10-CM

## 2023-08-31 DIAGNOSIS — I50.32 CHRONIC HEART FAILURE WITH PRESERVED EJECTION FRACTION (HFPEF): Primary | ICD-10-CM

## 2023-08-31 LAB
ALBUMIN SERPL-MCNC: 4.1 G/DL (ref 3.5–5.2)
ALBUMIN/GLOB SERPL: 1.8 G/DL
ALP SERPL-CCNC: 69 U/L (ref 39–117)
ALT SERPL W P-5'-P-CCNC: 7 U/L (ref 1–41)
ANION GAP SERPL CALCULATED.3IONS-SCNC: 9 MMOL/L (ref 5–15)
AST SERPL-CCNC: 13 U/L (ref 1–40)
BILIRUB SERPL-MCNC: 0.4 MG/DL (ref 0–1.2)
BUN SERPL-MCNC: 18 MG/DL (ref 8–23)
BUN/CREAT SERPL: 12.4 (ref 7–25)
CALCIUM SPEC-SCNC: 9.5 MG/DL (ref 8.6–10.5)
CHLORIDE SERPL-SCNC: 110 MMOL/L (ref 98–107)
CO2 SERPL-SCNC: 23 MMOL/L (ref 22–29)
CREAT SERPL-MCNC: 1.45 MG/DL (ref 0.76–1.27)
DEPRECATED RDW RBC AUTO: 52 FL (ref 37–54)
EGFRCR SERPLBLD CKD-EPI 2021: 47.8 ML/MIN/1.73
ERYTHROCYTE [DISTWIDTH] IN BLOOD BY AUTOMATED COUNT: 14.6 % (ref 12.3–15.4)
GLOBULIN UR ELPH-MCNC: 2.3 GM/DL
GLUCOSE SERPL-MCNC: 86 MG/DL (ref 65–99)
HCT VFR BLD AUTO: 33.3 % (ref 37.5–51)
HGB BLD-MCNC: 10.2 G/DL (ref 13–17.7)
MCH RBC QN AUTO: 30 PG (ref 26.6–33)
MCHC RBC AUTO-ENTMCNC: 30.6 G/DL (ref 31.5–35.7)
MCV RBC AUTO: 97.9 FL (ref 79–97)
PLATELET # BLD AUTO: 121 10*3/MM3 (ref 140–450)
PMV BLD AUTO: 11.8 FL (ref 6–12)
POTASSIUM SERPL-SCNC: 3.9 MMOL/L (ref 3.5–5.2)
PROT SERPL-MCNC: 6.4 G/DL (ref 6–8.5)
RBC # BLD AUTO: 3.4 10*6/MM3 (ref 4.14–5.8)
SODIUM SERPL-SCNC: 142 MMOL/L (ref 136–145)
WBC NRBC COR # BLD: 4.81 10*3/MM3 (ref 3.4–10.8)

## 2023-08-31 PROCEDURE — 85027 COMPLETE CBC AUTOMATED: CPT | Performed by: HOSPITALIST

## 2023-08-31 PROCEDURE — G0463 HOSPITAL OUTPT CLINIC VISIT: HCPCS | Performed by: HOSPITALIST

## 2023-08-31 PROCEDURE — 80053 COMPREHEN METABOLIC PANEL: CPT | Performed by: HOSPITALIST

## 2023-08-31 RX ORDER — LISINOPRIL 5 MG/1
5 TABLET ORAL DAILY
Start: 2023-08-31

## 2023-08-31 RX ORDER — FUROSEMIDE 40 MG/1
40 TABLET ORAL AS NEEDED
Qty: 30 TABLET | Refills: 11 | Status: SHIPPED | OUTPATIENT
Start: 2023-08-31

## 2023-08-31 RX ORDER — SPIRONOLACTONE 25 MG/1
25 TABLET ORAL DAILY
Qty: 30 TABLET | Refills: 3 | Status: SHIPPED | OUTPATIENT
Start: 2023-08-31

## 2023-08-31 RX ORDER — FUROSEMIDE 40 MG/1
40 TABLET ORAL 3 TIMES WEEKLY
COMMUNITY
End: 2023-08-31 | Stop reason: SDUPTHER

## 2023-08-31 RX ORDER — CLOPIDOGREL BISULFATE 75 MG/1
75 TABLET ORAL DAILY
Qty: 90 TABLET | Refills: 1 | Status: SHIPPED | OUTPATIENT
Start: 2023-08-31

## 2023-08-31 NOTE — TELEPHONE ENCOUNTER
----- Message from Jean Marie Díaz MD sent at 8/31/2023 10:34 AM CDT -----  Please let Mr. Mckeon know that his CBC came back with slightly improved hemoglobin, which is reassuring.  I would still recommend that he follow-up with vascular surgery if having persistent bruising concerns on his thighs.  Thanks!

## 2023-08-31 NOTE — PROGRESS NOTES
Reason For Visit:  HFpEF    Subjective        Van Mckeon is a 83 y.o. male with the below pertinent PMH who presents for follow-up of CHF.    Mr. Mckeon ultimately underwent his AAA surgery by Dr. Sauceda in late July, which reportedly went well. Just after surgery, he had issues with low BP and lightheadedness. He followed up with his PCP where lisinopril was decreased from 20 mg to 5 mg daily and lasix was transitioned to PRN. He did run out of Jardiance and [facility]; he was out of Jardiance for about 2 weeks they believe and started to have more leg swelling and shortness of breath while not taking it.  He did get a sample of Jardiance for 5 days and felt like his swelling and shortness of breath were better when taking it, but he again ran out until [facility].  During this time, the patient has required some as needed Lasix, which has been helpful when having more swelling or shortness of breath.  He has not taken Lasix in about a week, and currently feels that his volume is a little better.  He also has had issues with bruising of his left thigh below one of the access sites for his procedure; vascular surgery did mention possibly stopping aspirin or Plavix if this persisted.  He had been seen in the ED for this a little over a week ago and was noted to have a drop in hemoglobin to 9.5.    ROS: Pertinent findings are included above.    Prior history:  - Referred 6/6 for preop assessment of vascular surgery and endorsed chronic dyspnea on exertion and intermittent lower extremity swelling for which she had been taking Lasix; this had been held earlier in the year due to an RODRÍGUEZ but resumed at every other day.  Lasix was transitioned to 2M/W/F, KCl was stopped, and spironolactone 25 mg daily was started.  Also started Crestor 5 mg daily (previously not on statin due to myalgias on atorvastatin 40 mg daily).  - 6/23: Tolerating  statin well.  Started Jardiance 10 mg daily.  -7/6: Swelling improved but still comes and goes, dyspnea improved but still waxing and waning with exertion.  Consider more frequent diuretic use, but weight was overall down and ultimately continued diuretic regimen with monitoring.     Pertinent PMH  - HFpEF  - Pulmonary hypertension  - History of TIA   - Former tobacco use  - Hypertension  - COPD  - AAA  - Bilateral popliteal artery aneurysms    Pertinent past medical, surgical, family, and social history were reviewed.      Current Outpatient Medications:     albuterol sulfate  (90 Base) MCG/ACT inhaler, Inhale 2 puffs 4 (Four) Times a Day As Needed for Wheezing., Disp: , Rfl:     aspirin 81 MG EC tablet, Take 1 tablet by mouth Daily., Disp: , Rfl:     clopidogrel (PLAVIX) 75 MG tablet, Take 1 tablet by mouth Daily for 90 days., Disp: 30 tablet, Rfl: 2    empagliflozin (Jardiance) 10 MG tablet tablet, Take 1 tablet by mouth Daily., Disp: 30 tablet, Rfl: 11    finasteride (PROSCAR) 5 MG tablet, Take 1 tablet by mouth Daily., Disp: , Rfl:     Fluticasone-Umeclidin-Vilant (Trelegy Ellipta) 100-62.5-25 MCG/ACT inhaler, Inhale 1 puff Daily., Disp: 1 each, Rfl: 0    folic acid (FOLVITE) 1 MG tablet, Take 1 tablet by mouth Daily., Disp: 30 tablet, Rfl: 2    furosemide (LASIX) 40 MG tablet, Take 1 tablet by mouth 3 (Three) Times a Week. Pt reports taking prn, Disp: , Rfl:     lisinopril (PRINIVIL,ZESTRIL) 40 MG tablet, Take 0.5 tablets by mouth Daily. PCP changed to 5MG daily, Disp: , Rfl:     metoprolol tartrate (LOPRESSOR) 25 MG tablet, Take 0.5 tablets by mouth 2 (Two) Times a Day., Disp: , Rfl:     rosuvastatin (CRESTOR) 5 MG tablet, Take 1 tablet by mouth Daily., Disp: 30 tablet, Rfl: 11    spironolactone (ALDACTONE) 25 MG tablet, Take 1 tablet by mouth Daily., Disp: 30 tablet, Rfl: 11    tamsulosin (FLOMAX) 0.4 MG capsule 24 hr capsule, Take 1 capsule by mouth Daily., Disp: , Rfl:      Objective   Vital  "Signs:  /63 (BP Location: Right arm, Patient Position: Sitting)   Pulse 53   Wt 72.9 kg (160 lb 12.8 oz)   SpO2 96%   BMI 21.81 kg/mý   Estimated body mass index is 21.81 kg/mý as calculated from the following:    Height as of 8/23/23: 182.9 cm (72\").    Weight as of this encounter: 72.9 kg (160 lb 12.8 oz).      Constitutional:       Appearance: Healthy appearance. Not in distress.   Neck:      Vascular: JVD normal.   Pulmonary:      Effort: Pulmonary effort is normal.      Breath sounds: Normal breath sounds.   Cardiovascular:      Normal rate. Regular rhythm.      Murmurs: There is no murmur.      No gallop.  No click. No rub.   Edema:     Peripheral edema absent.   Abdominal:      General: There is no distension.      Palpations: Abdomen is soft.      Tenderness: There is no abdominal tenderness.   Skin:     General: Skin is warm and dry.      Comments: No palpable groin hematoma bilaterally.   Neurological:      Mental Status: Alert and oriented to person, place and time.      Result Review :  The following data was reviewed by: Jean Marie Díaz MD on 08/31/2023:  CMP   CMP          8/20/2023    20:34 8/23/2023    08:13 8/31/2023    08:51   CMP   Glucose 82   86    BUN 24   18    Creatinine 1.51  1.60  1.45    EGFR 45.5   47.8    Sodium 141   142    Potassium 4.0   3.9    Chloride 110   110    Calcium 9.8   9.5    Total Protein   6.4    Albumin   4.1    Globulin   2.3    Total Bilirubin   0.4    Alkaline Phosphatase   69    AST (SGOT)   13    ALT (SGPT)   7    Albumin/Globulin Ratio   1.8    BUN/Creatinine Ratio 15.9   12.4    Anion Gap 8.0   9.0      CBC   CBC          7/14/2023    13:44 8/20/2023    20:34 8/31/2023    09:58   CBC   WBC 6.79  4.94  4.81    RBC 4.27  3.13  3.40    Hemoglobin 13.1  9.5  10.2    Hematocrit 41.8  30.5  33.3    MCV 97.9  97.4  97.9    MCH 30.7  30.4  30.0    MCHC 31.3  31.1  30.6    RDW 13.2  13.8  14.6    Platelets 114  92  121               Previously Reviewed     Adult " Transthoracic Echo Complete w/ Color, Spectral and Contrast if necessary per protocol (06/23/2023 09:49)     Left ventricular systolic function is normal. Left ventricular ejection fraction appears to be 61 - 65%.    Left ventricular diastolic function was normal.    The right ventricular cavity is borderline dilated with normal systolic function.    The right atrial cavity is borderline dilated.    Mild pulmonary hypertension is present with estimated RVSP 42 mmHg.    No hemodynamically significant valve disease.     Adult Transthoracic Echo Complete W/ Cont if Necessary Per Protocol (09/04/2019 13:33)   Left ventricular wall thickness is consistent with hypertrophy. Sigmoid-shaped ventricular septum is present.  Left ventricular diastolic dysfunction (grade I) consistent with impaired relaxation.  Left ventricular systolic function is normal.  NO CLOTS, SHUNTS OR MASSES  NORMAL LV AND RV SYSTOLIC FUNCTION  NO SIGNIFICANT VALVULAR DYSFUNCTION       Assessment and Plan   Diagnoses and all orders for this visit:    1. Chronic heart failure with preserved ejection fraction (HFpEF) (Primary)  2. Chronic obstructive pulmonary disease, unspecified COPD type  3. Essential (primary) hypertension  Has had waxing and waning volume status recently that may impart be related to having been out of Jardiance, which she is now restarting.  He has had some increased shortness of breath, which may be related some to CHF, but also may be related to COPD.  He has reached out to pulmonology for follow-up there as he mentions some symptoms more with difficulty breathing air out.  He does appear relatively euvolemic on exam today, so I will continue his current plan of PRN Lasix while restarting Jardiance and then monitor for frequency of Lasix use.  - Resuming Jardiance 10 mg daily  - Continue spironolactone 25 mg daily  - Continue Lasix as needed for weight gain or swelling  - Continue metoprolol tartrate 12.5 mg twice daily and  lisinopril 5 mg daily for BP; BP is elevated in clinic today but more recently had been running on the lower side, so I will hold off on increasing the dose of either of these medications today.      4. PAD (peripheral artery disease)  5. Anemia, unspecified type  Overall doing well since his recent procedure.  He has had bruising of his thighs but no clear hematoma.  I rechecked a CBC today and hemoglobin does appear to be improving again.  Patient no longer having any pain around the site.  For now, plan to continue current regimen, but I have advised that he follow-up with vascular surgery if continuing to have bruising issues as they were considering holding either aspirin or Plavix if this persists.  - Continuing aspirin and Plavix per vascular surgery  - Continue rosuvastatin 5 mg daily; consider increasing this in the future when patient is more stable (currently on low-dose due to prior statin intolerance).    Follow Up   Return in about 4 weeks (around 9/28/2023).  Patient was given instructions and counseling regarding his condition or for health maintenance advice. Please see specific information pulled into the AVS if appropriate.       EMR Dragon/Transcription disclaimer: Much of this encounter note is an electronic transcription/translation of spoken language to printed text. The electronic translation of spoken language may permit erroneous, or at times, nonsensical words or phrases to be inadvertently transcribed; although I have reviewed the note for such errors, some may still exist.

## 2023-09-01 ENCOUNTER — TELEPHONE (OUTPATIENT)
Dept: CARDIOLOGY | Facility: HOSPITAL | Age: 83
End: 2023-09-01
Payer: MEDICARE

## 2023-09-28 ENCOUNTER — HOSPITAL ENCOUNTER (OUTPATIENT)
Dept: CARDIOLOGY | Facility: HOSPITAL | Age: 83
Discharge: HOME | End: 2023-09-28
Payer: MEDICARE

## 2023-09-28 VITALS
WEIGHT: 150.8 LBS | SYSTOLIC BLOOD PRESSURE: 106 MMHG | OXYGEN SATURATION: 98 % | BODY MASS INDEX: 20.45 KG/M2 | DIASTOLIC BLOOD PRESSURE: 52 MMHG | HEART RATE: 90 BPM

## 2023-09-28 DIAGNOSIS — J44.9 CHRONIC OBSTRUCTIVE PULMONARY DISEASE, UNSPECIFIED COPD TYPE: ICD-10-CM

## 2023-09-28 DIAGNOSIS — I50.32 CHRONIC HEART FAILURE WITH PRESERVED EJECTION FRACTION (HFPEF): Primary | ICD-10-CM

## 2023-09-28 DIAGNOSIS — I10 ESSENTIAL (PRIMARY) HYPERTENSION: ICD-10-CM

## 2023-09-28 DIAGNOSIS — I73.9 PAD (PERIPHERAL ARTERY DISEASE): ICD-10-CM

## 2023-09-28 DIAGNOSIS — N18.31 STAGE 3A CHRONIC KIDNEY DISEASE: ICD-10-CM

## 2023-09-28 DIAGNOSIS — E83.52 HYPERCALCEMIA: ICD-10-CM

## 2023-09-28 LAB
ANION GAP SERPL CALCULATED.3IONS-SCNC: 9 MMOL/L (ref 5–15)
BUN SERPL-MCNC: 32 MG/DL (ref 8–23)
BUN/CREAT SERPL: 22.1 (ref 7–25)
CALCIUM SPEC-SCNC: 11.5 MG/DL (ref 8.6–10.5)
CHLORIDE SERPL-SCNC: 103 MMOL/L (ref 98–107)
CO2 SERPL-SCNC: 24 MMOL/L (ref 22–29)
CREAT SERPL-MCNC: 1.45 MG/DL (ref 0.76–1.27)
EGFRCR SERPLBLD CKD-EPI 2021: 47.8 ML/MIN/1.73
GLUCOSE SERPL-MCNC: 105 MG/DL (ref 65–99)
POTASSIUM SERPL-SCNC: 4.9 MMOL/L (ref 3.5–5.2)
SODIUM SERPL-SCNC: 136 MMOL/L (ref 136–145)

## 2023-09-28 PROCEDURE — 80048 BASIC METABOLIC PNL TOTAL CA: CPT | Performed by: HOSPITALIST

## 2023-09-28 PROCEDURE — G0463 HOSPITAL OUTPT CLINIC VISIT: HCPCS | Performed by: HOSPITALIST

## 2023-09-28 RX ORDER — METOPROLOL SUCCINATE 25 MG/1
12.5 TABLET, EXTENDED RELEASE ORAL EVERY EVENING
Qty: 15 TABLET | Refills: 0 | Status: SHIPPED | OUTPATIENT
Start: 2023-09-28

## 2023-09-28 RX ORDER — METOPROLOL SUCCINATE 25 MG/1
12.5 TABLET, EXTENDED RELEASE ORAL EVERY EVENING
Qty: 30 TABLET | Refills: 5 | Status: SHIPPED | OUTPATIENT
Start: 2023-09-28

## 2023-09-28 NOTE — PROGRESS NOTES
Reason For Visit:  CHF    Subjective        Van Mckeon is a 83 y.o. male with the below pertinent PMH who presents for follow-up of CHF.    His most recent visit on 8/31, the patient had been back on Jardiance for about a week with improvement in leg swelling and shortness of breath and no PRN Lasix use in the prior week.  Plan was to get back on Jardiance stably/consistently and to follow-up with pulmonology due to concern that COPD could be contributing to some of his shortness of breath.    Doing well since last visit overall.  Has required Lasix anywhere from 0-2 times per week with no usage in the past week.  He has been using Lasix when he has swelling in his feet, but he has not been weighing himself.  He also has not been checking his BP.  He has dyspnea on exertion that remained stable, but he denies any chest pain.  He has been dealing with a respiratory infection and got some steroid injections last week; he has been slowly improving.  He gets occasional orthostatic lightheadedness.  He denies significant palpitations or orthopnea.  He does feel like he has ongoing issues with fatigue.    ROS: Pertinent findings are included above.    Prior history:  - Referred 6/6 for preop assessment of vascular surgery and endorsed chronic dyspnea on exertion and intermittent lower extremity swelling for which she had been taking Lasix; this had been held earlier in the year due to an RODRÍGUEZ but resumed at every other day.  Lasix was transitioned to 2M/W/F, KCl was stopped, and spironolactone 25 mg daily was started.  Also started Crestor 5 mg daily (previously not on statin due to myalgias on atorvastatin 40 mg daily).  - 6/23: Tolerating statin well.  Started Jardiance 10 mg daily.  -7/6: Swelling improved but still comes and goes, dyspnea improved but still waxing and waning with exertion.  Consider more frequent diuretic use, but weight was overall down and ultimately continued diuretic regimen with  monitoring.  - Late July: Underwent AAA surgery by Dr. Sauceda.  Just after surgery, had issues with low BP and lightheadedness; PCP decreased lisinopril from 20 mg to 5 mg daily and Lasix was transitioned to PRN.  Ran out of Jardiance for 2 weeks and developed leg swelling and shortness of breath.     Pertinent PMH  - HFpEF  - Pulmonary hypertension  - History of TIA   - Former tobacco use  - Hypertension  - COPD  - AAA  - Bilateral popliteal artery aneurysms    Pertinent past medical, surgical, family, and social history were reviewed.      Current Outpatient Medications:     albuterol sulfate  (90 Base) MCG/ACT inhaler, Inhale 2 puffs 4 (Four) Times a Day As Needed for Wheezing., Disp: , Rfl:     aspirin 81 MG EC tablet, Take 1 tablet by mouth Daily., Disp: , Rfl:     clopidogrel (PLAVIX) 75 MG tablet, Take 1 tablet by mouth Daily., Disp: 90 tablet, Rfl: 1    empagliflozin (Jardiance) 10 MG tablet tablet, Take 1 tablet by mouth Daily., Disp: 30 tablet, Rfl: 11    finasteride (PROSCAR) 5 MG tablet, Take 1 tablet by mouth Daily., Disp: , Rfl:     Fluticasone-Umeclidin-Vilant (Trelegy Ellipta) 100-62.5-25 MCG/ACT inhaler, Inhale 1 puff Daily., Disp: 1 each, Rfl: 0    folic acid (FOLVITE) 1 MG tablet, Take 1 tablet by mouth Daily., Disp: 30 tablet, Rfl: 2    furosemide (LASIX) 40 MG tablet, Take 1 tablet by mouth As Needed (Take for weight gain or swelling). Pt reports taking prn, Disp: 30 tablet, Rfl: 11    lisinopril (PRINIVIL,ZESTRIL) 5 MG tablet, Take 1 tablet by mouth Daily. PCP changed to 5MG daily, Disp: , Rfl:     metoprolol tartrate (LOPRESSOR) 25 MG tablet, Take 0.5 tablets by mouth 2 (Two) Times a Day., Disp: , Rfl:     rosuvastatin (CRESTOR) 5 MG tablet, Take 1 tablet by mouth Daily., Disp: 30 tablet, Rfl: 11    spironolactone (ALDACTONE) 25 MG tablet, Take 1 tablet by mouth Daily., Disp: 30 tablet, Rfl: 3    tamsulosin (FLOMAX) 0.4 MG capsule 24 hr capsule, Take 1 capsule by mouth Daily., Disp: ,  "Rfl:      Objective   Vital Signs:  /52 (BP Location: Left arm, Patient Position: Sitting)   Pulse 90   Wt 68.4 kg (150 lb 12.8 oz)   SpO2 98%   BMI 20.45 kg/m²   Estimated body mass index is 20.45 kg/m² as calculated from the following:    Height as of 8/23/23: 182.9 cm (72\").    Weight as of this encounter: 68.4 kg (150 lb 12.8 oz).      Constitutional:       Appearance: Healthy appearance. Not in distress.   Neck:      Vascular: JVD normal.   Pulmonary:      Effort: Pulmonary effort is normal.      Breath sounds: Normal breath sounds.   Cardiovascular:      Normal rate. Regular rhythm.      Murmurs: There is no murmur.      No gallop.  No click. No rub.   Edema:     Peripheral edema absent.   Abdominal:      General: There is no distension.      Palpations: Abdomen is soft.      Tenderness: There is no abdominal tenderness.   Skin:     General: Skin is warm and dry.   Neurological:      Mental Status: Alert and oriented to person, place and time.      Result Review :  The following data was reviewed by: Jean Marie Díaz MD on 09/28/2023:  BMP   BMP          8/23/2023    08:13 8/31/2023    08:51 9/28/2023    09:48   BMP   BUN  18  32    Creatinine 1.60  1.45  1.45    Sodium  142  136    Potassium  3.9  4.9    Chloride  110  103    CO2  23.0  24.0    Calcium  9.5  11.5                Assessment and Plan   Diagnoses and all orders for this visit:    1. Chronic heart failure with preserved ejection fraction (HFpEF) (Primary)  2. Stage 3a chronic kidney disease  3. Essential (primary) hypertension  4. Chronic obstructive pulmonary disease, unspecified COPD type  - Appears euvolemic  - Dyspnea exertion likely multifactorial with suspicion that there is significant contribution from COPD.  - BP on the lower side and patient previously has had issues with relatively low HR, which may contribute some to fatigue.  - Continue Jardiance 10 mg daily  - Continue spironolactone 25 mg daily  - Continue lisinopril 5 mg " daily  - Stop metoprolol tartrate 12.5 mg twice daily  - Start Toprol-XL 12.5 mg qpm  - BMP at follow-up    5. PAD (peripheral artery disease)  - Aspirin and Plavix per vascular surgery  - Rosuvastatin 5 mg daily    6. Hypercalcemia  - Asymptomatic elevation in calcium today  - Monitor closely on repeat BMP at follow-up and recommend further work-up if persistently elevated/worsening    Other orders  -     metoprolol succinate XL (TOPROL-XL) 25 MG 24 hr tablet; Take 0.5 tablets by mouth Every Evening.  Dispense: 15 tablet; Refill: 0  -     metoprolol succinate XL (TOPROL-XL) 25 MG 24 hr tablet; Take 0.5 tablets by mouth Every Evening.  Dispense: 30 tablet; Refill: 5          Follow Up   Return in about 3 weeks (around 10/19/2023).  Patient was given instructions and counseling regarding his condition or for health maintenance advice. Please see specific information pulled into the AVS if appropriate.       EMR Dragon/Transcription disclaimer: Much of this encounter note is an electronic transcription/translation of spoken language to printed text. The electronic translation of spoken language may permit erroneous, or at times, nonsensical words or phrases to be inadvertently transcribed; although I have reviewed the note for such errors, some may still exist.

## 2023-09-28 NOTE — PATIENT INSTRUCTIONS
Medication Changes  Please make the following changes to your medications:  Metoprolol succinate - Take 1/2 (a half) 25 mg tablet every evening.  Metoprolol tartrate - stop taking this medication    Weight Monitoring  Please weigh yourself every morning after you use the restroom while wearing the same amount of clothing.  Please write down your weight readings in a log and bring that log with you to your next heart failure clinic appointment.  If your weight increases by 2 lbs in 1 day OR 4 lbs in 1 week OR if you have increased leg swelling, you should take lasix. If your weight or swelling does not come down, please call the heart failure clinic.

## 2023-09-29 ENCOUNTER — TELEPHONE (OUTPATIENT)
Dept: CARDIOLOGY | Facility: HOSPITAL | Age: 83
End: 2023-09-29
Payer: MEDICARE

## 2023-09-29 NOTE — TELEPHONE ENCOUNTER
Mr. Mckeon's daughter called and said he has bruising to the inner soles of his feet. Please advise.

## 2023-10-20 ENCOUNTER — HOSPITAL ENCOUNTER (OUTPATIENT)
Dept: CARDIOLOGY | Facility: HOSPITAL | Age: 83
Discharge: HOME | End: 2023-10-20
Payer: OTHER GOVERNMENT

## 2023-10-20 VITALS
SYSTOLIC BLOOD PRESSURE: 98 MMHG | HEART RATE: 92 BPM | WEIGHT: 142.8 LBS | DIASTOLIC BLOOD PRESSURE: 59 MMHG | OXYGEN SATURATION: 95 % | BODY MASS INDEX: 19.37 KG/M2

## 2023-10-20 DIAGNOSIS — I50.32 CHRONIC HEART FAILURE WITH PRESERVED EJECTION FRACTION (HFPEF): Primary | ICD-10-CM

## 2023-10-20 DIAGNOSIS — I10 ESSENTIAL (PRIMARY) HYPERTENSION: ICD-10-CM

## 2023-10-20 DIAGNOSIS — I73.9 PAD (PERIPHERAL ARTERY DISEASE): ICD-10-CM

## 2023-10-20 DIAGNOSIS — E83.52 HYPERCALCEMIA: ICD-10-CM

## 2023-10-20 DIAGNOSIS — N18.31 STAGE 3A CHRONIC KIDNEY DISEASE: ICD-10-CM

## 2023-10-20 DIAGNOSIS — J44.9 CHRONIC OBSTRUCTIVE PULMONARY DISEASE, UNSPECIFIED COPD TYPE: ICD-10-CM

## 2023-10-20 LAB
ANION GAP SERPL CALCULATED.3IONS-SCNC: 8 MMOL/L (ref 5–15)
BUN SERPL-MCNC: 23 MG/DL (ref 8–23)
BUN/CREAT SERPL: 16.5 (ref 7–25)
CALCIUM SPEC-SCNC: 12.7 MG/DL (ref 8.6–10.5)
CHLORIDE SERPL-SCNC: 107 MMOL/L (ref 98–107)
CO2 SERPL-SCNC: 25 MMOL/L (ref 22–29)
CREAT SERPL-MCNC: 1.39 MG/DL (ref 0.76–1.27)
EGFRCR SERPLBLD CKD-EPI 2021: 50.3 ML/MIN/1.73
GLUCOSE SERPL-MCNC: 104 MG/DL (ref 65–99)
POTASSIUM SERPL-SCNC: 4.6 MMOL/L (ref 3.5–5.2)
SODIUM SERPL-SCNC: 140 MMOL/L (ref 136–145)

## 2023-10-20 PROCEDURE — G0463 HOSPITAL OUTPT CLINIC VISIT: HCPCS | Performed by: HOSPITALIST

## 2023-10-20 PROCEDURE — 80048 BASIC METABOLIC PNL TOTAL CA: CPT

## 2023-10-20 NOTE — PROGRESS NOTES
[facility]  Heart Failure Clinic  Subjective:     Encounter Date:10/20/2023      Patient ID: Van Mckeon is a 83 y.o. male     Chief Complaint:  History of Present Illness  Van Mckeon is a 83 y.o. male with the below pertinent PMH who presents for follow-up of CHF.    Patient was last seen 9/28/2023.  At that time he reported doing well.  Lasix use 0-2 times per week.  Dyspnea exertion stable.  Appeared euvolemic on exam.  He was transition from metoprolol tartrate to metoprolol succinate.    Since his previous appointment, from a heart failure standpoint, has been doing well.  His weights have been stable and he has not needed any as needed Lasix.  His main issues have been panic attacks as of late.  He is followed with his PCP regarding this.  He started on SSRI and has as needed benzo.  The patient's daughter stopped the patient's metoprolol about a week ago because they are wondering if it was causing panic attacks.  They have not improved since then.  Patient states that he is sometimes unaware whether the panic attack starts or he has shortness of breath that leads to a panic attack.  He denies any signs of peripheral overload at this time.    Blood pressure log reveals systolic still in the 90s 100s at home.    ROS: Pertinent findings as noted above    Pertinent PMH  - HFpEF  - Pulmonary hypertension  - History of TIA   - Former tobacco use  - Hypertension  - COPD  - AAA  - Bilateral popliteal artery aneurysms    The following portions of the patient's history were reviewed and updated as appropriate: allergies, current medications, past medical history, past social history, past and problem list.    Allergies   Allergen Reactions    Statins Myalgia    Tetanus Toxoids Other (See Comments)     STATES HAD A REACTION TO TETANUS SHOT AT AGE 8; STATES HAS TOLERATED SINCE        Current Outpatient Medications:     albuterol sulfate  (90 Base) MCG/ACT inhaler, Inhale 2 puffs 4 (Four)  "Times a Day As Needed for Wheezing., Disp: , Rfl:     aspirin 81 MG EC tablet, Take 1 tablet by mouth Daily., Disp: , Rfl:     clopidogrel (PLAVIX) 75 MG tablet, Take 1 tablet by mouth Daily., Disp: 90 tablet, Rfl: 1    empagliflozin (Jardiance) 10 MG tablet tablet, Take 1 tablet by mouth Daily., Disp: 30 tablet, Rfl: 11    finasteride (PROSCAR) 5 MG tablet, Take 1 tablet by mouth Daily., Disp: , Rfl:     Fluticasone-Umeclidin-Vilant (Trelegy Ellipta) 100-62.5-25 MCG/ACT inhaler, Inhale 1 puff Daily., Disp: 1 each, Rfl: 0    folic acid (FOLVITE) 1 MG tablet, Take 1 tablet by mouth Daily., Disp: 30 tablet, Rfl: 2    furosemide (LASIX) 40 MG tablet, Take 1 tablet by mouth As Needed (Take for weight gain or swelling). Pt reports taking prn, Disp: 30 tablet, Rfl: 11    lisinopril (PRINIVIL,ZESTRIL) 5 MG tablet, Take 1 tablet by mouth Daily. PCP changed to 5MG daily, Disp: , Rfl:     metoprolol succinate XL (TOPROL-XL) 25 MG 24 hr tablet, Take 0.5 tablets by mouth Every Evening. (Patient taking differently: Take 0.5 tablets by mouth Every Evening. Pt holding \"due to panic attacks\".), Disp: 30 tablet, Rfl: 5    rosuvastatin (CRESTOR) 5 MG tablet, Take 1 tablet by mouth Daily., Disp: 30 tablet, Rfl: 11    spironolactone (ALDACTONE) 25 MG tablet, Take 1 tablet by mouth Daily., Disp: 30 tablet, Rfl: 3    tamsulosin (FLOMAX) 0.4 MG capsule 24 hr capsule, Take 1 capsule by mouth Daily., Disp: , Rfl:     Social History     Socioeconomic History    Marital status:    Tobacco Use    Smoking status: Former     Packs/day: 1.00     Years: 70.00     Additional pack years: 0.00     Total pack years: 70.00     Types: Cigarettes     Quit date: 2023     Years since quittin.2    Smokeless tobacco: Former     Types: Snuff     Quit date:    Vaping Use    Vaping Use: Never used   Substance and Sexual Activity    Alcohol use: Yes     Alcohol/week: 1.0 standard drink of alcohol     Types: 1 Shots of liquor per week     " Comment: daily - 2 mixed drinks    Drug use: No    Sexual activity: Defer                Objective:     Constitutional:       Appearance: Healthy appearance. Not in distress.   Neck:      Vascular: JVD normal.   Pulmonary:      Effort: Pulmonary effort is normal.      Breath sounds: Normal breath sounds. No wheezing. No rhonchi. No rales.   Cardiovascular:      PMI at left midclavicular line. Normal rate. Regular rhythm. Normal S1. Normal S2.       Murmurs: There is no murmur.      No gallop.  No click. No rub.   Edema:     Peripheral edema absent.   Musculoskeletal: Normal range of motion.      Cervical back: Normal range of motion. Skin:     General: Skin is warm and dry.   Neurological:      Mental Status: Alert and oriented to person, place and time.           Procedures  BP 98/59 (BP Location: Right arm, Patient Position: Sitting)   Pulse 92   Wt 64.8 kg (142 lb 12.8 oz)   SpO2 95%   BMI 19.37 kg/m²       10/20/23  1020   Weight: 64.8 kg (142 lb 12.8 oz)      Lab Review:   I have reviewed      BMP          8/31/2023    08:51 9/28/2023    09:48 10/20/2023    10:17   BMP   BUN 18  32  23    Creatinine 1.45  1.45  1.39    Sodium 142  136  140    Potassium 3.9  4.9  4.6    Chloride 110  103  107    CO2 23.0  24.0  25.0    Calcium 9.5  11.5  12.7       Lab Results   Component Value Date    CHOL 130 06/06/2023    CHLPL 147 06/11/2020    TRIG 91 06/06/2023    HDL 51 06/06/2023    LDL 62 06/06/2023      Results for orders placed during the hospital encounter of 06/23/23    Adult Transthoracic Echo Complete w/ Color, Spectral and Contrast if necessary per protocol    Interpretation Summary    Left ventricular systolic function is normal. Left ventricular ejection fraction appears to be 61 - 65%.    Left ventricular diastolic function was normal.    The right ventricular cavity is borderline dilated with normal systolic function.    The right atrial cavity is borderline dilated.    Mild pulmonary hypertension is  present with estimated RVSP 42 mmHg.    No hemodynamically significant valve disease.       Assessment and Plan   Diagnoses and all orders for this visit:    1. Chronic heart failure with preserved ejection fraction (HFpEF) (Primary)  2. Stage 3a chronic kidney disease  3. Essential (primary) hypertension  4. Chronic obstructive pulmonary disease, unspecified COPD type  -Patient appears euvolemic and tolerating current regimen well  - I will have him resume his Toprol-XL given that he does not have improvement in pain Symptoms with this  - I will stop the lisinopril completely given his hypotensive readings  - Continue Jardiance 10 mg daily, spironolactone 25 mg daily, and as needed Lasix for weight gain or swelling.  -Dyspnea on exertion most likely now due to COPD      5. PAD (peripheral artery disease)  -Patient on aspirin and Plavix per vascular surgery team  - Continue rosuvastatin 5 mg daily    6. Hypercalcemia  -Calcium elevated compared to previous appointment  - Patient follows with ENT in [place], we will fax lab results to them and I have educated the patient's daughter to contact that office to set up potential appointment for treatment of hypercalcemia.             Follow Up   No follow-ups on file.  Patient was given instructions and counseling regarding his condition or for health maintenance advice. Please see specific information pulled into the AVS if appropriate.       EMR Dragon/Transcription disclaimer: Much of this encounter note is an electronic transcription/translation of spoken language to printed text. The electronic translation of spoken language may permit erroneous, or at times, nonsensical words or phrases to be inadvertently transcribed; although I have reviewed the note for such errors, some may still exist.

## 2023-10-22 ENCOUNTER — APPOINTMENT (OUTPATIENT)
Dept: GENERAL RADIOLOGY | Facility: HOSPITAL | Age: 83
End: 2023-10-22
Payer: MEDICARE

## 2023-10-22 ENCOUNTER — HOSPITAL ENCOUNTER (INPATIENT)
Facility: HOSPITAL | Age: 83
LOS: 3 days | Discharge: HOME | End: 2023-10-25
Attending: INTERNAL MEDICINE | Admitting: INTERNAL MEDICINE
Payer: MEDICARE

## 2023-10-22 DIAGNOSIS — J44.1 COPD EXACERBATION: ICD-10-CM

## 2023-10-22 DIAGNOSIS — R00.1 BRADYCARDIA: ICD-10-CM

## 2023-10-22 DIAGNOSIS — E83.52 HYPERCALCEMIA: Primary | ICD-10-CM

## 2023-10-22 PROBLEM — N18.30 CKD (CHRONIC KIDNEY DISEASE) STAGE 3, GFR 30-59 ML/MIN: Status: ACTIVE | Noted: 2023-10-22

## 2023-10-22 LAB
ALBUMIN SERPL-MCNC: 4.3 G/DL (ref 3.5–5.2)
ALBUMIN/GLOB SERPL: 1.5 G/DL
ALP SERPL-CCNC: 65 U/L (ref 39–117)
ALT SERPL W P-5'-P-CCNC: 9 U/L (ref 1–41)
ANION GAP SERPL CALCULATED.3IONS-SCNC: 4 MMOL/L (ref 5–15)
AST SERPL-CCNC: 12 U/L (ref 1–40)
BASOPHILS # BLD AUTO: 0.02 10*3/MM3 (ref 0–0.2)
BASOPHILS NFR BLD AUTO: 0.4 % (ref 0–1.5)
BILIRUB SERPL-MCNC: 0.4 MG/DL (ref 0–1.2)
BUN SERPL-MCNC: 28 MG/DL (ref 8–23)
BUN/CREAT SERPL: 20.9 (ref 7–25)
CA-I BLD-MCNC: 7.19 MG/DL (ref 4.6–5.4)
CALCIUM SPEC-SCNC: 12.1 MG/DL (ref 8.6–10.5)
CALCIUM SPEC-SCNC: 12.7 MG/DL (ref 8.6–10.5)
CALCIUM SPEC-SCNC: 13.7 MG/DL (ref 8.6–10.5)
CHLORIDE SERPL-SCNC: 107 MMOL/L (ref 98–107)
CO2 SERPL-SCNC: 28 MMOL/L (ref 22–29)
CREAT SERPL-MCNC: 1.34 MG/DL (ref 0.76–1.27)
DEPRECATED RDW RBC AUTO: 49.9 FL (ref 37–54)
EGFRCR SERPLBLD CKD-EPI 2021: 52.6 ML/MIN/1.73
EOSINOPHIL # BLD AUTO: 0.04 10*3/MM3 (ref 0–0.4)
EOSINOPHIL NFR BLD AUTO: 0.8 % (ref 0.3–6.2)
ERYTHROCYTE [DISTWIDTH] IN BLOOD BY AUTOMATED COUNT: 13.7 % (ref 12.3–15.4)
ETHANOL UR QL: <0.01 %
GEN 5 2HR TROPONIN T REFLEX: 43 NG/L
GLOBULIN UR ELPH-MCNC: 2.8 GM/DL
GLUCOSE SERPL-MCNC: 113 MG/DL (ref 65–99)
HCT VFR BLD AUTO: 39.6 % (ref 37.5–51)
HGB BLD-MCNC: 12.5 G/DL (ref 13–17.7)
IMM GRANULOCYTES # BLD AUTO: 0.01 10*3/MM3 (ref 0–0.05)
IMM GRANULOCYTES NFR BLD AUTO: 0.2 % (ref 0–0.5)
LYMPHOCYTES # BLD AUTO: 1.22 10*3/MM3 (ref 0.7–3.1)
LYMPHOCYTES NFR BLD AUTO: 24.5 % (ref 19.6–45.3)
Lab: ABNORMAL
Lab: ABNORMAL
MCH RBC QN AUTO: 31.1 PG (ref 26.6–33)
MCHC RBC AUTO-ENTMCNC: 31.6 G/DL (ref 31.5–35.7)
MCV RBC AUTO: 98.5 FL (ref 79–97)
MONOCYTES # BLD AUTO: 0.26 10*3/MM3 (ref 0.1–0.9)
MONOCYTES NFR BLD AUTO: 5.2 % (ref 5–12)
NEUTROPHILS NFR BLD AUTO: 3.43 10*3/MM3 (ref 1.7–7)
NEUTROPHILS NFR BLD AUTO: 68.9 % (ref 42.7–76)
NOTIFIED BY: ABNORMAL
NOTIFIED WHO: ABNORMAL
NRBC BLD AUTO-RTO: 0 /100 WBC (ref 0–0.2)
NT-PROBNP SERPL-MCNC: 58.6 PG/ML (ref 0–1800)
PLATELET # BLD AUTO: 104 10*3/MM3 (ref 140–450)
PMV BLD AUTO: 12.3 FL (ref 6–12)
POTASSIUM SERPL-SCNC: 4.4 MMOL/L (ref 3.5–5.2)
PROT SERPL-MCNC: 7.1 G/DL (ref 6–8.5)
PTH-INTACT SERPL-MCNC: 518.8 PG/ML (ref 15–65)
RBC # BLD AUTO: 4.02 10*6/MM3 (ref 4.14–5.8)
SODIUM SERPL-SCNC: 139 MMOL/L (ref 136–145)
TROPONIN T DELTA: -3 NG/L
TROPONIN T SERPL HS-MCNC: 46 NG/L
WBC NRBC COR # BLD: 4.98 10*3/MM3 (ref 3.4–10.8)

## 2023-10-22 PROCEDURE — 82397 CHEMILUMINESCENT ASSAY: CPT | Performed by: INTERNAL MEDICINE

## 2023-10-22 PROCEDURE — 84484 ASSAY OF TROPONIN QUANT: CPT | Performed by: PHYSICIAN ASSISTANT

## 2023-10-22 PROCEDURE — 94799 UNLISTED PULMONARY SVC/PX: CPT

## 2023-10-22 PROCEDURE — 25010000002 ENOXAPARIN PER 10 MG: Performed by: INTERNAL MEDICINE

## 2023-10-22 PROCEDURE — 25810000003 SODIUM CHLORIDE 0.9 % SOLUTION: Performed by: INTERNAL MEDICINE

## 2023-10-22 PROCEDURE — 25010000002 ZOLEDRONIC ACID 4 MG/100ML SOLUTION: Performed by: INTERNAL MEDICINE

## 2023-10-22 PROCEDURE — 93005 ELECTROCARDIOGRAM TRACING: CPT

## 2023-10-22 PROCEDURE — 83970 ASSAY OF PARATHORMONE: CPT | Performed by: PHYSICIAN ASSISTANT

## 2023-10-22 PROCEDURE — 71045 X-RAY EXAM CHEST 1 VIEW: CPT

## 2023-10-22 PROCEDURE — 93005 ELECTROCARDIOGRAM TRACING: CPT | Performed by: INTERNAL MEDICINE

## 2023-10-22 PROCEDURE — 94640 AIRWAY INHALATION TREATMENT: CPT

## 2023-10-22 PROCEDURE — 82310 ASSAY OF CALCIUM: CPT | Performed by: INTERNAL MEDICINE

## 2023-10-22 PROCEDURE — 82652 VIT D 1 25-DIHYDROXY: CPT | Performed by: INTERNAL MEDICINE

## 2023-10-22 PROCEDURE — 83880 ASSAY OF NATRIURETIC PEPTIDE: CPT | Performed by: PHYSICIAN ASSISTANT

## 2023-10-22 PROCEDURE — 93010 ELECTROCARDIOGRAM REPORT: CPT | Performed by: INTERNAL MEDICINE

## 2023-10-22 PROCEDURE — 82330 ASSAY OF CALCIUM: CPT

## 2023-10-22 PROCEDURE — 80053 COMPREHEN METABOLIC PANEL: CPT | Performed by: PHYSICIAN ASSISTANT

## 2023-10-22 PROCEDURE — 93005 ELECTROCARDIOGRAM TRACING: CPT | Performed by: PHYSICIAN ASSISTANT

## 2023-10-22 PROCEDURE — 85025 COMPLETE CBC W/AUTO DIFF WBC: CPT | Performed by: PHYSICIAN ASSISTANT

## 2023-10-22 PROCEDURE — 36415 COLL VENOUS BLD VENIPUNCTURE: CPT

## 2023-10-22 PROCEDURE — 25810000003 SODIUM CHLORIDE 0.9 % SOLUTION: Performed by: PHYSICIAN ASSISTANT

## 2023-10-22 PROCEDURE — 99285 EMERGENCY DEPT VISIT HI MDM: CPT

## 2023-10-22 PROCEDURE — 82306 VITAMIN D 25 HYDROXY: CPT | Performed by: INTERNAL MEDICINE

## 2023-10-22 PROCEDURE — 82077 ASSAY SPEC XCP UR&BREATH IA: CPT | Performed by: INTERNAL MEDICINE

## 2023-10-22 RX ORDER — AMOXICILLIN 250 MG
2 CAPSULE ORAL 2 TIMES DAILY
Status: DISCONTINUED | OUTPATIENT
Start: 2023-10-22 | End: 2023-10-25 | Stop reason: HOSPADM

## 2023-10-22 RX ORDER — ROSUVASTATIN CALCIUM 5 MG/1
5 TABLET, COATED ORAL DAILY
Status: DISCONTINUED | OUTPATIENT
Start: 2023-10-22 | End: 2023-10-25 | Stop reason: HOSPADM

## 2023-10-22 RX ORDER — SODIUM CHLORIDE 0.9 % (FLUSH) 0.9 %
10 SYRINGE (ML) INJECTION EVERY 12 HOURS SCHEDULED
Status: DISCONTINUED | OUTPATIENT
Start: 2023-10-22 | End: 2023-10-25 | Stop reason: HOSPADM

## 2023-10-22 RX ORDER — BISACODYL 5 MG/1
5 TABLET, DELAYED RELEASE ORAL DAILY PRN
Status: DISCONTINUED | OUTPATIENT
Start: 2023-10-22 | End: 2023-10-25 | Stop reason: HOSPADM

## 2023-10-22 RX ORDER — SODIUM CHLORIDE 0.9 % (FLUSH) 0.9 %
10 SYRINGE (ML) INJECTION AS NEEDED
Status: DISCONTINUED | OUTPATIENT
Start: 2023-10-22 | End: 2023-10-25 | Stop reason: HOSPADM

## 2023-10-22 RX ORDER — FINASTERIDE 5 MG/1
5 TABLET, FILM COATED ORAL DAILY
Status: DISCONTINUED | OUTPATIENT
Start: 2023-10-23 | End: 2023-10-25 | Stop reason: HOSPADM

## 2023-10-22 RX ORDER — ZOLEDRONIC ACID 0.04 MG/ML
4 INJECTION, SOLUTION INTRAVENOUS ONCE
Status: COMPLETED | OUTPATIENT
Start: 2023-10-22 | End: 2023-10-22

## 2023-10-22 RX ORDER — SODIUM CHLORIDE 9 MG/ML
40 INJECTION, SOLUTION INTRAVENOUS AS NEEDED
Status: DISCONTINUED | OUTPATIENT
Start: 2023-10-22 | End: 2023-10-25 | Stop reason: HOSPADM

## 2023-10-22 RX ORDER — TAMSULOSIN HYDROCHLORIDE 0.4 MG/1
0.4 CAPSULE ORAL DAILY
Status: DISCONTINUED | OUTPATIENT
Start: 2023-10-22 | End: 2023-10-25 | Stop reason: HOSPADM

## 2023-10-22 RX ORDER — ENOXAPARIN SODIUM 100 MG/ML
30 INJECTION SUBCUTANEOUS EVERY 12 HOURS SCHEDULED
Status: DISCONTINUED | OUTPATIENT
Start: 2023-10-22 | End: 2023-10-25 | Stop reason: HOSPADM

## 2023-10-22 RX ORDER — CLOPIDOGREL BISULFATE 75 MG/1
75 TABLET ORAL DAILY
Status: DISCONTINUED | OUTPATIENT
Start: 2023-10-23 | End: 2023-10-25

## 2023-10-22 RX ORDER — BISACODYL 10 MG
10 SUPPOSITORY, RECTAL RECTAL DAILY PRN
Status: DISCONTINUED | OUTPATIENT
Start: 2023-10-22 | End: 2023-10-25 | Stop reason: HOSPADM

## 2023-10-22 RX ORDER — ACETAMINOPHEN 325 MG/1
650 TABLET ORAL EVERY 4 HOURS PRN
Status: DISCONTINUED | OUTPATIENT
Start: 2023-10-22 | End: 2023-10-25 | Stop reason: HOSPADM

## 2023-10-22 RX ORDER — POLYETHYLENE GLYCOL 3350 17 G/17G
17 POWDER, FOR SOLUTION ORAL DAILY PRN
Status: DISCONTINUED | OUTPATIENT
Start: 2023-10-22 | End: 2023-10-25 | Stop reason: HOSPADM

## 2023-10-22 RX ORDER — CALCITONIN SALMON 200 [USP'U]/ML
4 INJECTION, SOLUTION INTRAMUSCULAR; SUBCUTANEOUS EVERY 12 HOURS
Status: DISPENSED | OUTPATIENT
Start: 2023-10-22 | End: 2023-10-24

## 2023-10-22 RX ORDER — ALPRAZOLAM 0.5 MG/1
0.25 TABLET ORAL DAILY
COMMUNITY

## 2023-10-22 RX ORDER — FOLIC ACID 1 MG/1
1 TABLET ORAL DAILY
Status: DISCONTINUED | OUTPATIENT
Start: 2023-10-22 | End: 2023-10-25 | Stop reason: HOSPADM

## 2023-10-22 RX ORDER — ASPIRIN 81 MG/1
81 TABLET ORAL DAILY
Status: DISCONTINUED | OUTPATIENT
Start: 2023-10-23 | End: 2023-10-25 | Stop reason: HOSPADM

## 2023-10-22 RX ORDER — FUROSEMIDE 20 MG/1
40 TABLET ORAL DAILY PRN
Status: DISCONTINUED | OUTPATIENT
Start: 2023-10-22 | End: 2023-10-25 | Stop reason: HOSPADM

## 2023-10-22 RX ORDER — SODIUM CHLORIDE 9 MG/ML
100 INJECTION, SOLUTION INTRAVENOUS CONTINUOUS
Status: DISCONTINUED | OUTPATIENT
Start: 2023-10-22 | End: 2023-10-25 | Stop reason: HOSPADM

## 2023-10-22 RX ORDER — ALPRAZOLAM 0.25 MG/1
0.25 TABLET ORAL 2 TIMES DAILY PRN
Status: DISCONTINUED | OUTPATIENT
Start: 2023-10-22 | End: 2023-10-25 | Stop reason: HOSPADM

## 2023-10-22 RX ORDER — TIOTROPIUM BROMIDE AND OLODATEROL 3.124; 2.736 UG/1; UG/1
2 SPRAY, METERED RESPIRATORY (INHALATION)
COMMUNITY

## 2023-10-22 RX ORDER — NICOTINE 21 MG/24HR
1 PATCH, TRANSDERMAL 24 HOURS TRANSDERMAL
Status: DISCONTINUED | OUTPATIENT
Start: 2023-10-22 | End: 2023-10-25 | Stop reason: HOSPADM

## 2023-10-22 RX ORDER — BUDESONIDE AND FORMOTEROL FUMARATE DIHYDRATE 160; 4.5 UG/1; UG/1
2 AEROSOL RESPIRATORY (INHALATION)
Status: DISCONTINUED | OUTPATIENT
Start: 2023-10-22 | End: 2023-10-25 | Stop reason: HOSPADM

## 2023-10-22 RX ADMIN — SODIUM CHLORIDE 160 ML/HR: 900 INJECTION INTRAVENOUS at 23:19

## 2023-10-22 RX ADMIN — ENOXAPARIN SODIUM 30 MG: 100 INJECTION SUBCUTANEOUS at 20:25

## 2023-10-22 RX ADMIN — ROSUVASTATIN CALCIUM 5 MG: 5 TABLET, FILM COATED ORAL at 15:33

## 2023-10-22 RX ADMIN — SODIUM CHLORIDE 125 ML/HR: 900 INJECTION INTRAVENOUS at 10:52

## 2023-10-22 RX ADMIN — BUDESONIDE AND FORMOTEROL FUMARATE DIHYDRATE 2 PUFF: 160; 4.5 AEROSOL RESPIRATORY (INHALATION) at 18:19

## 2023-10-22 RX ADMIN — TAMSULOSIN HYDROCHLORIDE 0.4 MG: 0.4 CAPSULE ORAL at 15:33

## 2023-10-22 RX ADMIN — FOLIC ACID 1 MG: 1 TABLET ORAL at 15:33

## 2023-10-22 RX ADMIN — NICOTINE 1 PATCH: 14 PATCH, EXTENDED RELEASE TRANSDERMAL at 17:31

## 2023-10-22 RX ADMIN — IPRATROPIUM BROMIDE 0.5 MG: 0.5 SOLUTION RESPIRATORY (INHALATION) at 23:33

## 2023-10-22 RX ADMIN — IPRATROPIUM BROMIDE 0.5 MG: 0.5 SOLUTION RESPIRATORY (INHALATION) at 13:25

## 2023-10-22 RX ADMIN — Medication 10 ML: at 15:34

## 2023-10-22 RX ADMIN — IPRATROPIUM BROMIDE 0.5 MG: 0.5 SOLUTION RESPIRATORY (INHALATION) at 18:18

## 2023-10-22 RX ADMIN — SODIUM CHLORIDE 160 ML/HR: 900 INJECTION INTRAVENOUS at 17:04

## 2023-10-22 RX ADMIN — ENOXAPARIN SODIUM 30 MG: 100 INJECTION SUBCUTANEOUS at 15:33

## 2023-10-22 RX ADMIN — ZOLEDRONIC ACID 4 MG: 0.04 INJECTION, SOLUTION INTRAVENOUS at 15:46

## 2023-10-22 NOTE — CONSULTS
Nephrology ([facility]) Consult Note      Patient:  Van Mckeon  YOB: 1940  Date of Service: 10/22/2023  MRN: [id number]   Acct: [id number]   Primary Care Physician: Jean Marie Cabrera MD  Advance Directive:   Code Status and Medical Interventions:   Ordered at: 10/22/23 1237     Medical Intervention Limits:    NO intubation (DNI)     Level Of Support Discussed With:    Patient    Health Care Surrogate     Code Status (Patient has no pulse and is not breathing):    No CPR (Do Not Attempt to Resuscitate)     Medical Interventions (Patient has pulse or is breathing):    Limited Support     Admit Date: 10/22/2023       Hospital Day: 0  Referring Provider: No ref. provider found      Patient personally seen and examined.  Complete chart including Consults, Notes, Operative Reports, Labs, Cardiology, and Radiology studies reviewed as able.        Subjective:  Van Mckeon is a 83 y.o. male for whom we were consulted for evaluation and treatment of hypercalcemia.  Patient recalls prior diagnosis of hypercalcemia due to primary hyperparathyroidism and had followed with a local surgeon for this without operative planning.  More recently, he had developed some shortness of breath and decreased appetite.  There was reports of confusion but appeared alert and oriented at time of evaluation.  He denied current chest pain, shortness of air at rest, polyuria, dysuria, hemoptysis, rash.  Recalled no calcium supplements or other over-the-counter or prescription medication changes recently.    Allergies:  Statins and Tetanus toxoids    Home Meds:  Medications Prior to Admission   Medication Sig Dispense Refill Last Dose    albuterol sulfate  (90 Base) MCG/ACT inhaler Inhale 2 puffs 4 (Four) Times a Day As Needed for Wheezing.   10/22/2023    ALPRAZolam (XANAX) 0.25 MG tablet Take 1 tablet by mouth 2 (Two) Times a Day As Needed for Anxiety.   10/22/2023    aspirin 81 MG EC  "tablet Take 1 tablet by mouth Daily.   10/22/2023    clopidogrel (PLAVIX) 75 MG tablet Take 1 tablet by mouth Daily. 90 tablet 1 10/22/2023    empagliflozin (Jardiance) 10 MG tablet tablet Take 1 tablet by mouth Daily. 30 tablet 11 10/22/2023    finasteride (PROSCAR) 5 MG tablet Take 1 tablet by mouth Daily.   10/22/2023    Fluticasone-Umeclidin-Vilant (Trelegy Ellipta) 100-62.5-25 MCG/ACT inhaler Inhale 1 puff Daily. 1 each 0 10/22/2023    folic acid (FOLVITE) 1 MG tablet Take 1 tablet by mouth Daily. 30 tablet 2 10/22/2023    furosemide (LASIX) 40 MG tablet Take 1 tablet by mouth As Needed (Take for weight gain or swelling). Pt reports taking prn 30 tablet 11 10/22/2023    metoprolol succinate XL (TOPROL-XL) 25 MG 24 hr tablet Take 0.5 tablets by mouth Every Evening. (Patient taking differently: Take 0.5 tablets by mouth Every Evening. Pt holding \"due to panic attacks\".) 30 tablet 5 10/21/2023    rosuvastatin (CRESTOR) 5 MG tablet Take 1 tablet by mouth Daily. 30 tablet 11 10/21/2023    sertraline (ZOLOFT) 25 MG tablet Take 1 tablet by mouth Daily.   10/21/2023    spironolactone (ALDACTONE) 25 MG tablet Take 1 tablet by mouth Daily. 30 tablet 3 10/22/2023    tamsulosin (FLOMAX) 0.4 MG capsule 24 hr capsule Take 1 capsule by mouth Daily.   10/22/2023    tiotropium bromide-olodaterol (Stiolto Respimat) 2.5-2.5 MCG/ACT aerosol solution inhaler Inhale Daily.   Unknown       Medicines:  Current Facility-Administered Medications   Medication Dose Route Frequency Provider Last Rate Last Admin    acetaminophen (TYLENOL) tablet 650 mg  650 mg Oral Q4H PRN Bradley Nassar DO        ALPRAZolam (XANAX) tablet 0.25 mg  0.25 mg Oral BID PRN Bradley Nassar DO        [START ON 10/23/2023] aspirin EC tablet 81 mg  81 mg Oral Daily Bradley Nassar DO        sennosides-docusate (PERICOLACE) 8.6-50 MG per tablet 2 tablet  2 tablet Oral BID Bradley Nassar DO        And    polyethylene glycol (MIRALAX) packet 17 g  17 g Oral " Daily PRN Bradley Nassar DO        And    bisacodyl (DULCOLAX) EC tablet 5 mg  5 mg Oral Daily PRN Bradley Nassar DO        And    bisacodyl (DULCOLAX) suppository 10 mg  10 mg Rectal Daily PRN Bradley Nassar DO        budesonide-formoterol (SYMBICORT) 160-4.5 MCG/ACT inhaler 2 puff  2 puff Inhalation BID - RT Bradley Nassar DO        And    ipratropium (ATROVENT) nebulizer solution 0.5 mg  0.5 mg Nebulization Q6H - RT Bradley Nassar DO   0.5 mg at 10/22/23 1325    [START ON 10/23/2023] clopidogrel (PLAVIX) tablet 75 mg  75 mg Oral Daily Bradley Nassar DO        Enoxaparin Sodium (LOVENOX) syringe 30 mg  30 mg Subcutaneous Q12H Bradley Nassar DO        [START ON 10/23/2023] finasteride (PROSCAR) tablet 5 mg  5 mg Oral Daily Bradley Nassar DO        folic acid (FOLVITE) tablet 1 mg  1 mg Oral Daily Bradley Nassar DO        furosemide (LASIX) tablet 40 mg  40 mg Oral Daily PRN Bradley Nassar DO        rosuvastatin (CRESTOR) tablet 5 mg  5 mg Oral Daily Bradley Nassar DO        [START ON 10/23/2023] sertraline (ZOLOFT) tablet 25 mg  25 mg Oral Daily Bradley Nassar DO        sodium chloride 0.9 % flush 10 mL  10 mL Intravenous Q12H Bradley Nassar DO        sodium chloride 0.9 % flush 10 mL  10 mL Intravenous PRN Bradley Nassar DO        sodium chloride 0.9 % infusion 40 mL  40 mL Intravenous PRN Bradley Nassar DO        sodium chloride 0.9 % infusion  125 mL/hr Intravenous Cam Judd  mL/hr at 10/22/23 1052 125 mL/hr at 10/22/23 1052    tamsulosin (FLOMAX) 24 hr capsule 0.4 mg  0.4 mg Oral Daily Bradley Nassar DO           Past Medical History:  Past Medical History:   Diagnosis Date    Alcohol use     Anxiety     Arthritis     Bronchiectasis without complication 04/09/2019    Bronchitis     COPD (chronic obstructive pulmonary disease)     COPD, group B, by GOLD 2017 classification 04/09/2019    Essential hypertension 04/09/2019     Hypertension     Personal history of nicotine dependence 2019    Prostate hypertrophy     Snoring     Thrombocytopenia     TIA (transient ischemic attack)     Tobacco user 10/03/2019       Past Surgical History:  Past Surgical History:   Procedure Laterality Date    ABDOMINAL AORTIC ANEURYSM REPAIR WITH ENDOGRAFT N/A 2023    Procedure: ABDOMINAL AORTIC ANEURYSM REPAIR WITH ENDOGRAFT;  Surgeon: Collins Sauceda DO;  Location:  [facility];  Service: Vascular;  Laterality: N/A;    BRONCHOSCOPY      CATARACT EXTRACTION, BILATERAL      NASAL SINUS SURGERY      TOTAL HIP ARTHROPLASTY Left        Family History  Family History   Problem Relation Age of Onset    Emphysema Father     Heart attack Brother     Cancer Brother     Heart disease Brother     Cancer Other     Colon cancer Neg Hx     Colon polyps Neg Hx     Esophageal cancer Neg Hx        Social History  Social History     Socioeconomic History    Marital status:    Tobacco Use    Smoking status: Former     Packs/day: 1.00     Years: 70.00     Additional pack years: 0.00     Total pack years: 70.00     Types: Cigarettes     Quit date: 2023     Years since quittin.2    Smokeless tobacco: Former     Types: Snuff     Quit date:    Vaping Use    Vaping Use: Never used   Substance and Sexual Activity    Alcohol use: Yes     Alcohol/week: 1.0 standard drink of alcohol     Types: 1 Shots of liquor per week     Comment: daily - 2 mixed drinks    Drug use: No    Sexual activity: Defer         Review of Systems:  History obtained from chart review and the patient  General ROS: No fever or chills  Respiratory ROS: No cough, shortness of breath, wheezing  Cardiovascular ROS: No chest pain or palpitations  Gastrointestinal ROS: No abdominal pain or melena  Genito-Urinary ROS: No dysuria or hematuria  Psych ROS: No anxiety and depression  14 point ROS reviewed with the patient and negative except as noted above and in the HPI unless  "unable to obtain.      Objective:  Patient Vitals for the past 24 hrs:   BP Temp Temp src Pulse Resp SpO2 Height Weight   10/22/23 1329 -- -- -- 65 18 -- -- --   10/22/23 1325 -- -- -- 63 18 93 % -- --   10/22/23 1248 126/65 98.1 °F (36.7 °C) Oral 67 18 98 % 182.9 cm (72.01\") 63.4 kg (139 lb 12.8 oz)   10/22/23 1203 -- -- -- -- -- 96 % -- --   10/22/23 1202 117/71 -- -- 55 -- -- -- --   10/22/23 1146 113/69 -- -- 55 -- 93 % -- --   10/22/23 1046 101/56 -- -- 64 -- 95 % -- --   10/22/23 1010 118/74 97.2 °F (36.2 °C) Oral 73 20 94 % 182.9 cm (72\") 64.4 kg (142 lb)       Intake/Output Summary (Last 24 hours) at 10/22/2023 1448  Last data filed at 10/22/2023 1300  Gross per 24 hour   Intake 626.67 ml   Output --   Net 626.67 ml     General: awake/alert   Chest:  clear to auscultation bilaterally without respiratory distress  CVS: regular rate and rhythm  Abdominal: soft, nontender, positive bowel sounds  Extremities: no cyanosis or edema  Skin: warm and dry without rash      Labs:  Results from last 7 days   Lab Units 10/22/23  1038   WBC 10*3/mm3 4.98   HEMOGLOBIN g/dL 12.5*   HEMATOCRIT % 39.6   PLATELETS 10*3/mm3 104*         Results from last 7 days   Lab Units 10/22/23  1038 10/20/23  1017   SODIUM mmol/L 139 140   POTASSIUM mmol/L 4.4 4.6   CHLORIDE mmol/L 107 107   CO2 mmol/L 28.0 25.0   BUN mg/dL 28* 23   CREATININE mg/dL 1.34* 1.39*   CALCIUM mg/dL 13.7* 12.7*   EGFR mL/min/1.73 52.6* 50.3*   BILIRUBIN mg/dL 0.4  --    ALK PHOS U/L 65  --    ALT (SGPT) U/L 9  --    AST (SGOT) U/L 12  --    GLUCOSE mg/dL 113* 104*       Radiology:   Imaging Results (Last 72 Hours)       Procedure Component Value Units Date/Time    XR Chest 1 View [[id number]] Collected: 10/22/23 1053     Updated: 10/22/23 1058    Narrative:      EXAMINATION: XR CHEST 1 VW- 10/22/2023 10:53 AM CDT     HISTORY: Shortness of breath.     COMPARISON: Chest x-ray 7/14/2023.     REPORT:  A frontal view of the chest was obtained. The lungs are clear and " "mildly  hyperinflated. The cardiac and mediastinal silhouettes are within normal  limits. Skinfold artifact is noted over the right upper hemithorax. No  pneumothorax is identified. The visualized bony thorax and upper abdomen  are unremarkable. 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                                                                                                                                                   Impression:             No acute abnormality. COPD                                                                       This report was signed and finalized on 10/22/2023 10:55 AM CDT by Dr. Francisco Deluna MD.               Culture:  No results found for: \"BLOODCX\", \"URINECX\", \"WOUNDCX\", \"MRSACX\", \"RESPCX\", \"STOOLCX\"      Assessment   Hypercalcemia  Primary hyperparathyroidism  Chronic kidney disease stage IIIa  Chronic diastolic congestive heart failure  Anemia    Plan:  Discussed with patient, nursing, family, Dr. Nassar  Work-up ordered ongoing on monitor labs  Continue medical management with IV fluids and medications  Monitor labs closely  We will likely need definitive ENT evaluation and surgical treatment given the profound hypercalcemia with PTH over 500      Thank you for the consult, we appreciate the opportunity to provide care to your patients.  Feel free to contact me if I can be of any further assistance.      Vicente Peña MD  10/22/2023  14:48 CDT    "

## 2023-10-22 NOTE — ED PROVIDER NOTES
Subjective   History of Present Illness    Patient is a pleasant 83-year-old gentleman who presents to ED with daughter.  Daughter is apartment historian.  Chief complaint is hypercalcemia with symptoms.    Patient has a known history of hyperparathyroidism.  Patient was hospitalized at [facility] about a year ago due to hypercalcemia at level 16.  Patient was markedly confused, weak, and short of breath.  Because of his history of hyperparathyroidism and hypercalcemia, this is monitored closely.    In the past couple weeks, the patient has been increasingly short of breath.  Shortness of breath more so with exertion but has been present with rest in the past couple days.  He complains of orthopnea.  He denies PND.  His daughter has been elevating him at 45 degree angle and when she is sleeping although he normally lie supine.  He denies any associated chest pain, pressure, tightness.  He does have a chronic cough.  He has a history of COPD without oxygen dependency.  The patient has been increasingly weak.  He denies any focal deficits.  Daughter noticed intermittent confusion.  She describes patient's cardiologist ordered laboratory data past few weeks noted his calcium is increased from 9.5 about a month ago to 11.5 three weeks ago.  2 days ago, his calcium returned at 12.7.  With his history of marked hypercalcemia and his symptoms, his daughter brought him to ER today to be further evaluated. He also complains of decreased appetite.     In regards to medication changes, she describes that Lasix was recently removed by his cardiologist.  His metoprolol tart Mckenzie switched to metoprolol succinate.  Patient started Zoloft this past 1 week.    Review of Systems   Constitutional:  Positive for activity change.   HENT: Negative.     Respiratory:  Positive for shortness of breath and wheezing. Negative for chest tightness.    Cardiovascular: Negative.  Negative for chest pain.   Gastrointestinal:  Negative.  Negative for constipation.   Endocrine: Negative for polydipsia, polyphagia and polyuria.   Genitourinary: Negative.    Musculoskeletal: Negative.    Neurological:  Positive for weakness.   Psychiatric/Behavioral:  Positive for confusion.        Past Medical History:   Diagnosis Date    Alcohol use     Anxiety     Arthritis     Bronchiectasis without complication 2019    Bronchitis     COPD (chronic obstructive pulmonary disease)     COPD, group B, by GOLD 2017 classification 2019    Essential hypertension 2019    Hypertension     Personal history of nicotine dependence 2019    Prostate hypertrophy     Snoring     Thrombocytopenia     TIA (transient ischemic attack)     Tobacco user 10/03/2019       Allergies   Allergen Reactions    Statins Myalgia    Tetanus Toxoids Other (See Comments)     STATES HAD A REACTION TO TETANUS SHOT AT AGE 8; STATES HAS TOLERATED SINCE        Past Surgical History:   Procedure Laterality Date    ABDOMINAL AORTIC ANEURYSM REPAIR WITH ENDOGRAFT N/A 2023    Procedure: ABDOMINAL AORTIC ANEURYSM REPAIR WITH ENDOGRAFT;  Surgeon: Collins Sauceda DO;  Location: [facility] OR ;  Service: Vascular;  Laterality: N/A;    BRONCHOSCOPY      CATARACT EXTRACTION, BILATERAL      NASAL SINUS SURGERY      TOTAL HIP ARTHROPLASTY Left        Family History   Problem Relation Age of Onset    Emphysema Father     Heart attack Brother     Cancer Brother     Heart disease Brother     Cancer Other     Colon cancer Neg Hx     Colon polyps Neg Hx     Esophageal cancer Neg Hx        Social History     Socioeconomic History    Marital status:    Tobacco Use    Smoking status: Former     Packs/day: 1.00     Years: 70.00     Additional pack years: 0.00     Total pack years: 70.00     Types: Cigarettes     Quit date: 2023     Years since quittin.2    Smokeless tobacco: Former     Types: Snuff     Quit date:    Vaping Use    Vaping Use: Never used  "  Substance and Sexual Activity    Alcohol use: Yes     Alcohol/week: 1.0 standard drink of alcohol     Types: 1 Shots of liquor per week     Comment: daily - 2 mixed drinks    Drug use: No    Sexual activity: Defer       Prior to Admission medications    Medication Sig Start Date End Date Taking? Authorizing Provider   albuterol sulfate  (90 Base) MCG/ACT inhaler Inhale 2 puffs 4 (Four) Times a Day As Needed for Wheezing.    Dylon Bobo MD   aspirin 81 MG EC tablet Take 1 tablet by mouth Daily.    Dylon Bobo MD   clopidogrel (PLAVIX) 75 MG tablet Take 1 tablet by mouth Daily. 8/31/23   Jean Marie Díaz MD   empagliflozin (Jardiance) 10 MG tablet tablet Take 1 tablet by mouth Daily. 8/23/23   Jean Marie Díaz MD   finasteride (PROSCAR) 5 MG tablet Take 1 tablet by mouth Daily. 2/2/17   Dylon Bobo MD   Fluticasone-Umeclidin-Vilant (Trelegy Ellipta) 100-62.5-25 MCG/ACT inhaler Inhale 1 puff Daily. 8/23/23   Josephine Max APRN   folic acid (FOLVITE) 1 MG tablet Take 1 tablet by mouth Daily. 9/6/19   Clara Ambrosio APRN   furosemide (LASIX) 40 MG tablet Take 1 tablet by mouth As Needed (Take for weight gain or swelling). Pt reports taking prn 8/31/23   Jean Marie Díaz MD   metoprolol succinate XL (TOPROL-XL) 25 MG 24 hr tablet Take 0.5 tablets by mouth Every Evening.  Patient taking differently: Take 0.5 tablets by mouth Every Evening. Pt holding \"due to panic attacks\". 9/28/23   Jean Marie Díaz MD   rosuvastatin (CRESTOR) 5 MG tablet Take 1 tablet by mouth Daily. 6/26/23   Jean Marie Díaz MD   spironolactone (ALDACTONE) 25 MG tablet Take 1 tablet by mouth Daily. 8/31/23   Jean Marie Díaz MD   tamsulosin (FLOMAX) 0.4 MG capsule 24 hr capsule Take 1 capsule by mouth Daily. 2/21/19   Dylon Bobo MD       Medications   sodium chloride 0.9 % infusion (125 mL/hr Intravenous New Bag 10/22/23 1052)       /56   Pulse 64   Temp 97.2 °F (36.2 °C) (Oral)   Resp 20  " " Ht 182.9 cm (72\")   Wt 64.4 kg (142 lb)   SpO2 95%   BMI 19.26 kg/m²       Objective   Physical Exam  Vitals reviewed.   Constitutional:       Appearance: He is well-developed.   HENT:      Head: Normocephalic and atraumatic.      Right Ear: External ear normal.      Left Ear: External ear normal.      Nose: Nose normal.   Eyes:      Conjunctiva/sclera: Conjunctivae normal.      Pupils: Pupils are equal, round, and reactive to light.   Neck:      Trachea: No tracheal deviation.   Cardiovascular:      Rate and Rhythm: Normal rate and regular rhythm.      Heart sounds: Normal heart sounds. No murmur heard.     No friction rub. No gallop.   Pulmonary:      Effort: Pulmonary effort is normal. No respiratory distress.      Breath sounds: Normal breath sounds. Examination of the right-lower field reveals wheezing. Examination of the left-lower field reveals wheezing. No wheezing or rales.   Chest:      Chest wall: No tenderness.   Abdominal:      General: Bowel sounds are normal. There is no distension.      Palpations: Abdomen is soft. There is no mass.      Tenderness: There is no abdominal tenderness. There is no guarding or rebound.   Musculoskeletal:         General: No tenderness or deformity. Normal range of motion.      Cervical back: Normal range of motion and neck supple.   Skin:     General: Skin is warm and dry.      Coloration: Skin is not pale.      Findings: No erythema or rash.   Neurological:      General: No focal deficit present.      Mental Status: He is alert and oriented to person, place, and time.   Psychiatric:         Mood and Affect: Mood normal.         Behavior: Behavior normal.         Thought Content: Thought content normal.         Judgment: Judgment normal.         Procedures         Lab Results (last 24 hours)       Procedure Component Value Units Date/Time    CBC & Differential [[id number]]  (Abnormal) Collected: 10/22/23 1038    Specimen: Blood Updated: 10/22/23 1045    Narrative:   "    The following orders were created for panel order CBC & Differential.  Procedure                               Abnormality         Status                     ---------                               -----------         ------                     CBC Auto Differential[[id number]]        Abnormal            Final result                 Please view results for these tests on the individual orders.    Comprehensive Metabolic Panel [[id number]]  (Abnormal) Collected: 10/22/23 1038    Specimen: Blood Updated: 10/22/23 1112     Glucose 113 mg/dL      BUN 28 mg/dL      Creatinine 1.34 mg/dL      Sodium 139 mmol/L      Potassium 4.4 mmol/L      Chloride 107 mmol/L      CO2 28.0 mmol/L      Calcium 13.7 mg/dL      Total Protein 7.1 g/dL      Albumin 4.3 g/dL      ALT (SGPT) 9 U/L      AST (SGOT) 12 U/L      Alkaline Phosphatase 65 U/L      Total Bilirubin 0.4 mg/dL      Globulin 2.8 gm/dL      A/G Ratio 1.5 g/dL      BUN/Creatinine Ratio 20.9     Anion Gap 4.0 mmol/L      eGFR 52.6 mL/min/1.73     Narrative:      GFR Normal >60  Chronic Kidney Disease <60  Kidney Failure <15    The GFR formula is only valid for adults with stable renal function between ages 18 and 70.    High Sensitivity Troponin T [[id number]]  (Abnormal) Collected: 10/22/23 1038    Specimen: Blood Updated: 10/22/23 1101     HS Troponin T 46 ng/L     Narrative:      High Sensitive Troponin T Reference Range:  <10.0 ng/L- Negative Female for AMI  <15.0 ng/L- Negative Male for AMI  >=10 - Abnormal Female indicating possible myocardial injury.  >=15 - Abnormal Male indicating possible myocardial injury.   Clinicians would have to utilize clinical acumen, EKG, Troponin, and serial changes to determine if it is an Acute Myocardial Infarction or myocardial injury due to an underlying chronic condition.         CBC Auto Differential [[id number]]  (Abnormal) Collected: 10/22/23 1038    Specimen: Blood Updated: 10/22/23 1045     WBC 4.98 10*3/mm3      RBC 4.02 10*6/mm3       Hemoglobin 12.5 g/dL      Hematocrit 39.6 %      MCV 98.5 fL      MCH 31.1 pg      MCHC 31.6 g/dL      RDW 13.7 %      RDW-SD 49.9 fl      MPV 12.3 fL      Platelets 104 10*3/mm3      Neutrophil % 68.9 %      Lymphocyte % 24.5 %      Monocyte % 5.2 %      Eosinophil % 0.8 %      Basophil % 0.4 %      Immature Grans % 0.2 %      Neutrophils, Absolute 3.43 10*3/mm3      Lymphocytes, Absolute 1.22 10*3/mm3      Monocytes, Absolute 0.26 10*3/mm3      Eosinophils, Absolute 0.04 10*3/mm3      Basophils, Absolute 0.02 10*3/mm3      Immature Grans, Absolute 0.01 10*3/mm3      nRBC 0.0 /100 WBC     BNP [[id number]]  (Normal) Collected: 10/22/23 1038    Specimen: Blood Updated: 10/22/23 1102     proBNP 58.6 pg/mL     Narrative:      This assay is used as an aid in the diagnosis of individuals suspected of having heart failure. It can be used as an aid in the diagnosis of acute decompensated heart failure (ADHF) in patients presenting with signs and symptoms of ADHF to the emergency department (ED). In addition, NT-proBNP of <300 pg/mL indicates ADHF is not likely.    Age Range Result Interpretation  NT-proBNP Concentration (pg/mL:      <50             Positive            >450                   Gray                 300-450                    Negative             <300    50-75           Positive            >900                  Gray                300-900                  Negative            <300      >75             Positive            >1800                  Gray                300-1800                  Negative            <300            XR Chest 1 View    Result Date: 10/22/2023  Narrative: EXAMINATION: XR CHEST 1 VW- 10/22/2023 10:53 AM CDT  HISTORY: Shortness of breath.  COMPARISON: Chest x-ray 7/14/2023.  REPORT: A frontal view of the chest was obtained. The lungs are clear and mildly hyperinflated. The cardiac and mediastinal silhouettes are within normal limits. Skinfold artifact is noted over the right upper  hemithorax. No pneumothorax is identified. The visualized bony thorax and upper abdomen are unremarkable. There is evidence of previous abdominal aortic endograft repair.                                                                                                                                             Impression:      No acute abnormality. COPD                                                           This report was signed and finalized on 10/22/2023 10:55 AM CDT by Dr. Francisco Deluna MD.       ED Course  ED Course as of 10/22/23 1142   Sun Oct 22, 2023   1140 Daughter and patient about laboratory data with elevated calcium 13.7.  Saline had already been administered.  Patient's heart rate is being monitored.  Dropping the 50s.  I did speak with Dr. Nassar, hospitalist, who is going to admit the patient.  He request the ER call nephrology for further advice.  I spoke with JAE Flores, who will reach out to Dr. Peña for further discussion. [TK]      ED Course User Index  [TK] Cam Reyes PA          MDM     Amount and/or Complexity of Data Reviewed  Decide to obtain previous medical records or to obtain history from someone other than the patient: yes        Final diagnoses:   Hypercalcemia   Bradycardia   COPD exacerbation       Disposition: Patient be admitted under [facility] services.       Cam Reyes PA  10/22/23 1141       Cam Reyes PA  10/22/23 1142

## 2023-10-22 NOTE — Clinical Note
Level of Care: Telemetry [5]   Diagnosis: Hypercalcemia [275.42.ICD-9-CM]   Certification: I Certify That Inpatient Hospital Services Are Medically Necessary For Greater Than 2 Midnights

## 2023-10-22 NOTE — PLAN OF CARE
Problem: Fall Injury Risk  Goal: Absence of Fall and Fall-Related Injury  Outcome: Ongoing, Progressing  Intervention: Identify and Manage Contributors  Recent Flowsheet Documentation  Taken 10/22/2023 1300 by Humera Washburn, RN  Medication Review/Management: medications reviewed  Intervention: Promote Injury-Free Environment  Recent Flowsheet Documentation  Taken 10/22/2023 1300 by Humera Washburn, RN  Safety Promotion/Fall Prevention:   lighting adjusted   safety round/check completed   room organization consistent     Problem: Fluid Volume Deficit  Goal: Fluid Balance  Outcome: Ongoing, Progressing   Goal Outcome Evaluation:

## 2023-10-22 NOTE — H&P
[facility] Medicine Services  HISTORY AND PHYSICAL    Date of Admission: 10/22/2023  Primary Care Physician: Jean Marie Cabrera MD    Subjective   Primary Historian: patient/daughter    Chief Complaint: Sob/confusion    History of Present Illness  Patient seen in the ER for with daughter at bedside.  He is an 83-year-old male with a history of COPD, hypertension, thrombocytopenia, hyperparathyroidism.  He also follows with Dr. Díaz for chronic preserved EF CHF.  He presented to the hospital today for shortness of breath and some confusion.  Daughter states he has done this in the past and is calcium levels are high and he recently had some labs drawn and it was 12.7 so she decided to bring him in for evaluation.  His calcium is actually 13.7 here today.  We are starting IV fluids and asked me to come see him for admission.  When I went to evaluate the patient in ER for he was awake alert and oriented.  Did not appear confused.  Daughter did say he was doing better than he was earlier from mental standpoint.  Patient otherwise says he feels fine.  Denies fevers or chills.  No chest pain or shortness of breath.  No nausea, vomiting, diarrhea.  No overt muscle cramps or aches.  Gait not tested.        Review of Systems   Otherwise complete ROS reviewed and negative except as mentioned in the HPI.    Past Medical History:   Past Medical History:   Diagnosis Date    Alcohol use     Anxiety     Arthritis     Bronchiectasis without complication 04/09/2019    Bronchitis     COPD (chronic obstructive pulmonary disease)     COPD, group B, by GOLD 2017 classification 04/09/2019    Essential hypertension 04/09/2019    Hypertension     Personal history of nicotine dependence 04/09/2019    Prostate hypertrophy     Snoring     Thrombocytopenia     TIA (transient ischemic attack)     Tobacco user 10/03/2019     Past Surgical History:  Past Surgical History:   Procedure Laterality Date     ABDOMINAL AORTIC ANEURYSM REPAIR WITH ENDOGRAFT N/A 7/19/2023    Procedure: ABDOMINAL AORTIC ANEURYSM REPAIR WITH ENDOGRAFT;  Surgeon: Collins Sauceda DO;  Location: [facility] OR ;  Service: Vascular;  Laterality: N/A;    BRONCHOSCOPY      CATARACT EXTRACTION, BILATERAL      NASAL SINUS SURGERY      TOTAL HIP ARTHROPLASTY Left      Social History:  reports that he quit smoking about 3 months ago. His smoking use included cigarettes. He has a 70.00 pack-year smoking history. He quit smokeless tobacco use about 53 years ago.  His smokeless tobacco use included snuff. He reports current alcohol use of about 1.0 standard drink of alcohol per week. He reports that he does not use drugs.    Family History: family history includes Cancer in his brother and another family member; Emphysema in his father; Heart attack in his brother; Heart disease in his brother.       Allergies:  Allergies   Allergen Reactions    Statins Myalgia    Tetanus Toxoids Other (See Comments)     STATES HAD A REACTION TO TETANUS SHOT AT AGE 8; STATES HAS TOLERATED SINCE        Medications:  Prior to Admission medications    Medication Sig Start Date End Date Taking? Authorizing Provider   albuterol sulfate  (90 Base) MCG/ACT inhaler Inhale 2 puffs 4 (Four) Times a Day As Needed for Wheezing.    Provider, MD Dylon   aspirin 81 MG EC tablet Take 1 tablet by mouth Daily.    Provider, MD Dylon   clopidogrel (PLAVIX) 75 MG tablet Take 1 tablet by mouth Daily. 8/31/23   Jean Marie Díaz MD   empagliflozin (Jardiance) 10 MG tablet tablet Take 1 tablet by mouth Daily. 8/23/23   Jean Marie Díaz MD   finasteride (PROSCAR) 5 MG tablet Take 1 tablet by mouth Daily. 2/2/17   ProviderDylon MD   Fluticasone-Umeclidin-Vilant (Trelegy Ellipta) 100-62.5-25 MCG/ACT inhaler Inhale 1 puff Daily. 8/23/23   Josephine Max APRN   folic acid (FOLVITE) 1 MG tablet Take 1 tablet by mouth Daily. 9/6/19   Clara Ambrosio APRN  "  furosemide (LASIX) 40 MG tablet Take 1 tablet by mouth As Needed (Take for weight gain or swelling). Pt reports taking prn 8/31/23   Jean Marie Díaz MD   metoprolol succinate XL (TOPROL-XL) 25 MG 24 hr tablet Take 0.5 tablets by mouth Every Evening.  Patient taking differently: Take 0.5 tablets by mouth Every Evening. Pt holding \"due to panic attacks\". 9/28/23   Jean Marie Díaz MD   rosuvastatin (CRESTOR) 5 MG tablet Take 1 tablet by mouth Daily. 6/26/23   Jean Marie Díaz MD   spironolactone (ALDACTONE) 25 MG tablet Take 1 tablet by mouth Daily. 8/31/23   Jean Marie Díaz MD   tamsulosin (FLOMAX) 0.4 MG capsule 24 hr capsule Take 1 capsule by mouth Daily. 2/21/19   Provider, MD PATI Osborne have utilized all available immediate resources to obtain, update, or review the patient's current medications (including all prescriptions, over-the-counter products, herbals, cannabis/cannabidiol products, and vitamin/mineral/dietary (nutritional) supplements).    Objective     Vital Signs: /65 (BP Location: Right arm, Patient Position: Sitting)   Pulse 67   Temp 98.1 °F (36.7 °C) (Oral)   Resp 18   Ht 182.9 cm (72.01\")   Wt 63.4 kg (139 lb 12.8 oz)   SpO2 98%   BMI 18.96 kg/m²   Physical Exam   GEN: Awake, alert, interactive, in NAD  HEENT: PERRLA, EOMI, Anicteric, Trachea midline  Lungs: no wheezing/rales/rhonchi  Heart: RRR, +S1/s2, no rub  ABD: soft, nt/nd, +BS, no guarding/rebound  Extremities: atraumatic, no cyanosis, no edema  Skin: no rashes or lesions  Neuro: AAOx3, no focal deficits  Psych: normal mood & affect        Results Reviewed:  Lab Results (last 24 hours)       Procedure Component Value Units Date/Time    Ethanol [[id number]] Collected: 10/22/23 1205    Specimen: Blood Updated: 10/22/23 1256    PTH, Intact [[id number]]  (Abnormal) Collected: 10/22/23 1205    Specimen: Blood Updated: 10/22/23 1238     PTH, Intact 518.8 pg/mL     Narrative:      Results may be falsely decreased if patient " taking Biotin.      High Sensitivity Troponin T 2Hr [[id number]]  (Abnormal) Collected: 10/22/23 1205    Specimen: Blood Updated: 10/22/23 1232     HS Troponin T 43 ng/L      Troponin T Delta -3 ng/L     Narrative:      High Sensitive Troponin T Reference Range:  <10.0 ng/L- Negative Female for AMI  <15.0 ng/L- Negative Male for AMI  >=10 - Abnormal Female indicating possible myocardial injury.  >=15 - Abnormal Male indicating possible myocardial injury.   Clinicians would have to utilize clinical acumen, EKG, Troponin, and serial changes to determine if it is an Acute Myocardial Infarction or myocardial injury due to an underlying chronic condition.         Comprehensive Metabolic Panel [[id number]]  (Abnormal) Collected: 10/22/23 1038    Specimen: Blood Updated: 10/22/23 1112     Glucose 113 mg/dL      BUN 28 mg/dL      Creatinine 1.34 mg/dL      Sodium 139 mmol/L      Potassium 4.4 mmol/L      Chloride 107 mmol/L      CO2 28.0 mmol/L      Calcium 13.7 mg/dL      Total Protein 7.1 g/dL      Albumin 4.3 g/dL      ALT (SGPT) 9 U/L      AST (SGOT) 12 U/L      Alkaline Phosphatase 65 U/L      Total Bilirubin 0.4 mg/dL      Globulin 2.8 gm/dL      A/G Ratio 1.5 g/dL      BUN/Creatinine Ratio 20.9     Anion Gap 4.0 mmol/L      eGFR 52.6 mL/min/1.73     Narrative:      GFR Normal >60  Chronic Kidney Disease <60  Kidney Failure <15    The GFR formula is only valid for adults with stable renal function between ages 18 and 70.    BNP [[id number]]  (Normal) Collected: 10/22/23 1038    Specimen: Blood Updated: 10/22/23 1102     proBNP 58.6 pg/mL     Narrative:      This assay is used as an aid in the diagnosis of individuals suspected of having heart failure. It can be used as an aid in the diagnosis of acute decompensated heart failure (ADHF) in patients presenting with signs and symptoms of ADHF to the emergency department (ED). In addition, NT-proBNP of <300 pg/mL indicates ADHF is not likely.    Age Range Result  Interpretation  NT-proBNP Concentration (pg/mL:      <50             Positive            >450                   Gray                 300-450                    Negative             <300    50-75           Positive            >900                  Gray                300-900                  Negative            <300      >75             Positive            >1800                  Gray                300-1800                  Negative            <300    High Sensitivity Troponin T [[id number]]  (Abnormal) Collected: 10/22/23 1038    Specimen: Blood Updated: 10/22/23 1101     HS Troponin T 46 ng/L     Narrative:      High Sensitive Troponin T Reference Range:  <10.0 ng/L- Negative Female for AMI  <15.0 ng/L- Negative Male for AMI  >=10 - Abnormal Female indicating possible myocardial injury.  >=15 - Abnormal Male indicating possible myocardial injury.   Clinicians would have to utilize clinical acumen, EKG, Troponin, and serial changes to determine if it is an Acute Myocardial Infarction or myocardial injury due to an underlying chronic condition.         CBC & Differential [[id number]]  (Abnormal) Collected: 10/22/23 1038    Specimen: Blood Updated: 10/22/23 1045    Narrative:      The following orders were created for panel order CBC & Differential.  Procedure                               Abnormality         Status                     ---------                               -----------         ------                     CBC Auto Differential[[id number]]        Abnormal            Final result                 Please view results for these tests on the individual orders.    CBC Auto Differential [[id number]]  (Abnormal) Collected: 10/22/23 1038    Specimen: Blood Updated: 10/22/23 1045     WBC 4.98 10*3/mm3      RBC 4.02 10*6/mm3      Hemoglobin 12.5 g/dL      Hematocrit 39.6 %      MCV 98.5 fL      MCH 31.1 pg      MCHC 31.6 g/dL      RDW 13.7 %      RDW-SD 49.9 fl      MPV 12.3 fL      Platelets 104 10*3/mm3       Neutrophil % 68.9 %      Lymphocyte % 24.5 %      Monocyte % 5.2 %      Eosinophil % 0.8 %      Basophil % 0.4 %      Immature Grans % 0.2 %      Neutrophils, Absolute 3.43 10*3/mm3      Lymphocytes, Absolute 1.22 10*3/mm3      Monocytes, Absolute 0.26 10*3/mm3      Eosinophils, Absolute 0.04 10*3/mm3      Basophils, Absolute 0.02 10*3/mm3      Immature Grans, Absolute 0.01 10*3/mm3      nRBC 0.0 /100 WBC           Imaging Results (Last 24 Hours)       Procedure Component Value Units Date/Time    XR Chest 1 View [[id number]] Collected: 10/22/23 1053     Updated: 10/22/23 1058    Narrative:      EXAMINATION: XR CHEST 1 VW- 10/22/2023 10:53 AM CDT     HISTORY: Shortness of breath.     COMPARISON: Chest x-ray 7/14/2023.     REPORT:  A frontal view of the chest was obtained. The lungs are clear and mildly  hyperinflated. The cardiac and mediastinal silhouettes are within normal  limits. Skinfold artifact is noted over the right upper hemithorax. No  pneumothorax is identified. The visualized bony thorax and upper abdomen  are unremarkable. 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                                                                                                                                                   Impression:             No acute abnormality. COPD                                                                       This report was signed and finalized on 10/22/2023 10:55 AM CDT by Dr. Francisco Deluna MD.             I have personally reviewed and interpreted the radiology studies and ECG obtained at time of admission.     Assessment / Plan   Assessment:   Active Hospital Problems    Diagnosis     **Hypercalcemia     Thrombocytopenia     COPD, group B, by GOLD 2017 classification     Essential (primary) hypertension        Treatment Plan  The patient will be admitted to my service here at [facility].     #1 hypercalcemia -high-end of moderate 13.7 bordering  on severe.  There was some question about possible confusion prior to arrival although he clinically seems to be oriented at this time.  He is currently getting normal saline.  We will monitor closely and ask nephrology to eval for possible pamidronate or calcitonin if felt appropriate.  Continue normal saline at this time and interval.  Lasix as needed.    #2 hyperparathyroidism -per prior diagnosis.  Awaiting some labs ordered by the ER.    #3 CKD 3 -renal function appears around baseline    #4 chronic preserved EF CHF -no signs of overload or heart failure.    #5 COPD -lungs are clear.  No wheezing.  No signs of exacerbation.    Medical Decision Making  Number and Complexity of problems: 1 acute with risk for bodily harm, multiple chronic  Differential Diagnosis: As above    Conditions and Status        Patient is new to me     MDM Data  External documents reviewed: Dr. Díaz's last office note  Cardiac tracing (EKG, telemetry) interpretation: Sinus rhythm without any acute ST-T changes  Radiology interpretation: Chest x-ray appears clear with no infiltrates or edema  Labs reviewed: As above  Any tests that were considered but not ordered: None     Decision rules/scores evaluated (example [id number], Wells, etc): None     Discussed with: Patient, daughter, ER provider     Care Planning  Shared decision making: Patient apprised of current labs, vitals, imaging and treatment plan.  They are agreeable with proceeding with plans as discussed.    Code status and discussions: DNR/DNI.  Daughter concurs.    Disposition  Social Determinants of Health that impact treatment or disposition: None  Estimated length of stay is 2 to 3 days.     I confirmed that the patient's advanced care plan is present, code status is documented, and a surrogate decision maker is listed in the patient's medical record.     The patient's surrogate decision maker is his POA/daughter Akosua.     The patient was seen and examined by me on  10/22/2023 at 12:35 PM.    Electronically signed by Bradley Nassar DO, 10/22/23, 13:12 CDT.

## 2023-10-22 NOTE — ED NOTES
"Nursing report ED to floor  Van Mckeon  83 y.o.  male    HPI:   Chief Complaint   Patient presents with    Shortness of Breath    Abnormal Lab       Admitting doctor:   Bradley Nassar DO    Consulting provider(s):  Consults       Date and Time Order Name Status Description    10/22/2023 11:40 AM Nephrology (on-call MD unless specified)               Admitting diagnosis:   The primary encounter diagnosis was Hypercalcemia. Diagnoses of Bradycardia and COPD exacerbation were also pertinent to this visit.    Code status:   Current Code Status       Date Active Code Status Order ID Comments User Context       Prior            Allergies:   Statins and Tetanus toxoids    Intake and Output  No intake or output data in the 24 hours ending 10/22/23 1207    Weight:       10/22/23  1010   Weight: 64.4 kg (142 lb)       Most recent vitals:   Vitals:    10/22/23 1010 10/22/23 1046 10/22/23 1146   BP: 118/74 101/56 113/69   Pulse: 73 64 55   Resp: 20     Temp: 97.2 °F (36.2 °C)     TempSrc: Oral     SpO2: 94% 95% 93%   Weight: 64.4 kg (142 lb)     Height: 182.9 cm (72\")       Oxygen Therapy: .    Active LDAs/IV Access:   Lines, Drains & Airways       Active LDAs       Name Placement date Placement time Site Days    Peripheral IV 10/22/23 1052 Anterior;Left Forearm 10/22/23  1052  Forearm  less than 1                    Labs (abnormal labs have a star):   Labs Reviewed   COMPREHENSIVE METABOLIC PANEL - Abnormal; Notable for the following components:       Result Value    Glucose 113 (*)     BUN 28 (*)     Creatinine 1.34 (*)     Calcium 13.7 (*)     Anion Gap 4.0 (*)     eGFR 52.6 (*)     All other components within normal limits    Narrative:     GFR Normal >60  Chronic Kidney Disease <60  Kidney Failure <15    The GFR formula is only valid for adults with stable renal function between ages 18 and 70.   TROPONIN - Abnormal; Notable for the following components:    HS Troponin T 46 (*)     All other components within " normal limits    Narrative:     High Sensitive Troponin T Reference Range:  <10.0 ng/L- Negative Female for AMI  <15.0 ng/L- Negative Male for AMI  >=10 - Abnormal Female indicating possible myocardial injury.  >=15 - Abnormal Male indicating possible myocardial injury.   Clinicians would have to utilize clinical acumen, EKG, Troponin, and serial changes to determine if it is an Acute Myocardial Infarction or myocardial injury due to an underlying chronic condition.        CBC WITH AUTO DIFFERENTIAL - Abnormal; Notable for the following components:    RBC 4.02 (*)     Hemoglobin 12.5 (*)     MCV 98.5 (*)     MPV 12.3 (*)     Platelets 104 (*)     All other components within normal limits   BNP (IN-HOUSE) - Normal    Narrative:     This assay is used as an aid in the diagnosis of individuals suspected of having heart failure. It can be used as an aid in the diagnosis of acute decompensated heart failure (ADHF) in patients presenting with signs and symptoms of ADHF to the emergency department (ED). In addition, NT-proBNP of <300 pg/mL indicates ADHF is not likely.    Age Range Result Interpretation  NT-proBNP Concentration (pg/mL:      <50             Positive            >450                   Gray                 300-450                    Negative             <300    50-75           Positive            >900                  Gray                300-900                  Negative            <300      >75             Positive            >1800                  Gray                300-1800                  Negative            <300   HIGH SENSITIVITIY TROPONIN T 2HR   PTH, INTACT   CALCIUM, IONIZED   CBC AND DIFFERENTIAL    Narrative:     The following orders were created for panel order CBC & Differential.  Procedure                               Abnormality         Status                     ---------                               -----------         ------                     CBC Auto Differential[[id number]]         Abnormal            Final result                 Please view results for these tests on the individual orders.       Meds given in ED:   Medications   sodium chloride 0.9 % infusion (125 mL/hr Intravenous New Bag 10/22/23 1052)     sodium chloride, 125 mL/hr, Last Rate: 125 mL/hr (10/22/23 1052)         NIH Stroke Scale:       Isolation/Infection(s):  No active isolations   COVID Screen (preop/placement)     COVID Testing  Collected .  Resulted .    Nursing report ED to floor:  Mental status: .  Ambulatory status: .  Precautions: .    ED nurse phone extentsion- ..

## 2023-10-23 LAB
25(OH)D3 SERPL-MCNC: 28.4 NG/ML (ref 30–100)
ALBUMIN SERPL-MCNC: 3.6 G/DL (ref 3.5–5.2)
ALBUMIN/GLOB SERPL: 1.6 G/DL
ALP SERPL-CCNC: 57 U/L (ref 39–117)
ALT SERPL W P-5'-P-CCNC: 6 U/L (ref 1–41)
ANION GAP SERPL CALCULATED.3IONS-SCNC: 7 MMOL/L (ref 5–15)
AST SERPL-CCNC: 13 U/L (ref 1–40)
BILIRUB SERPL-MCNC: 0.3 MG/DL (ref 0–1.2)
BUN SERPL-MCNC: 19 MG/DL (ref 8–23)
BUN/CREAT SERPL: 17.1 (ref 7–25)
CALCIUM SPEC-SCNC: 12 MG/DL (ref 8.6–10.5)
CHLORIDE SERPL-SCNC: 112 MMOL/L (ref 98–107)
CO2 SERPL-SCNC: 21 MMOL/L (ref 22–29)
CREAT SERPL-MCNC: 1.11 MG/DL (ref 0.76–1.27)
EGFRCR SERPLBLD CKD-EPI 2021: 65.9 ML/MIN/1.73
GLOBULIN UR ELPH-MCNC: 2.3 GM/DL
GLUCOSE SERPL-MCNC: 89 MG/DL (ref 65–99)
MAGNESIUM SERPL-MCNC: 1.9 MG/DL (ref 1.6–2.4)
PHOSPHATE SERPL-MCNC: 1.9 MG/DL (ref 2.5–4.5)
POTASSIUM SERPL-SCNC: 4.5 MMOL/L (ref 3.5–5.2)
PROT SERPL-MCNC: 5.9 G/DL (ref 6–8.5)
QT INTERVAL: 358 MS
QT INTERVAL: 378 MS
QTC INTERVAL: 358 MS
QTC INTERVAL: 378 MS
SODIUM SERPL-SCNC: 140 MMOL/L (ref 136–145)

## 2023-10-23 PROCEDURE — 25810000003 SODIUM CHLORIDE 0.9 % SOLUTION 250 ML FLEX CONT: Performed by: NURSE PRACTITIONER

## 2023-10-23 PROCEDURE — 83735 ASSAY OF MAGNESIUM: CPT | Performed by: INTERNAL MEDICINE

## 2023-10-23 PROCEDURE — 63710000001 CALCITONIN PER 400 UNITS: Performed by: INTERNAL MEDICINE

## 2023-10-23 PROCEDURE — 36415 COLL VENOUS BLD VENIPUNCTURE: CPT | Performed by: INTERNAL MEDICINE

## 2023-10-23 PROCEDURE — 25810000003 SODIUM CHLORIDE 0.9 % SOLUTION: Performed by: INTERNAL MEDICINE

## 2023-10-23 PROCEDURE — 94799 UNLISTED PULMONARY SVC/PX: CPT

## 2023-10-23 PROCEDURE — 80053 COMPREHEN METABOLIC PANEL: CPT | Performed by: INTERNAL MEDICINE

## 2023-10-23 PROCEDURE — 25010000002 ENOXAPARIN PER 10 MG: Performed by: INTERNAL MEDICINE

## 2023-10-23 PROCEDURE — 94664 DEMO&/EVAL PT USE INHALER: CPT

## 2023-10-23 PROCEDURE — 84100 ASSAY OF PHOSPHORUS: CPT | Performed by: INTERNAL MEDICINE

## 2023-10-23 PROCEDURE — 25010000002 FUROSEMIDE PER 20 MG: Performed by: INTERNAL MEDICINE

## 2023-10-23 RX ORDER — FUROSEMIDE 10 MG/ML
40 INJECTION INTRAMUSCULAR; INTRAVENOUS EVERY 12 HOURS SCHEDULED
Status: DISCONTINUED | OUTPATIENT
Start: 2023-10-23 | End: 2023-10-24

## 2023-10-23 RX ORDER — CALCIUM CARBONATE 500 MG/1
2 TABLET, CHEWABLE ORAL 3 TIMES DAILY PRN
Status: DISCONTINUED | OUTPATIENT
Start: 2023-10-23 | End: 2023-10-24

## 2023-10-23 RX ORDER — LISINOPRIL 10 MG/1
5 TABLET ORAL DAILY
Status: ON HOLD | COMMUNITY
End: 2023-10-31

## 2023-10-23 RX ORDER — PANTOPRAZOLE SODIUM 40 MG/1
40 TABLET, DELAYED RELEASE ORAL
Status: DISCONTINUED | OUTPATIENT
Start: 2023-10-23 | End: 2023-10-24

## 2023-10-23 RX ORDER — ROSUVASTATIN CALCIUM 10 MG/1
5 TABLET, COATED ORAL NIGHTLY
COMMUNITY

## 2023-10-23 RX ORDER — FUROSEMIDE 40 MG/1
40 TABLET ORAL DAILY PRN
COMMUNITY

## 2023-10-23 RX ADMIN — SODIUM CHLORIDE 150 ML/HR: 900 INJECTION INTRAVENOUS at 13:30

## 2023-10-23 RX ADMIN — SODIUM CHLORIDE 160 ML/HR: 900 INJECTION INTRAVENOUS at 05:59

## 2023-10-23 RX ADMIN — CALCITONIN SALMON 254 UNITS: 200 INJECTION, SOLUTION INTRAMUSCULAR; SUBCUTANEOUS at 16:31

## 2023-10-23 RX ADMIN — SERTRALINE HYDROCHLORIDE 25 MG: 50 TABLET, FILM COATED ORAL at 08:48

## 2023-10-23 RX ADMIN — ASPIRIN 81 MG: 81 TABLET, COATED ORAL at 08:48

## 2023-10-23 RX ADMIN — CALCITONIN SALMON 254 UNITS: 200 INJECTION, SOLUTION INTRAMUSCULAR; SUBCUTANEOUS at 03:50

## 2023-10-23 RX ADMIN — IPRATROPIUM BROMIDE 0.5 MG: 0.5 SOLUTION RESPIRATORY (INHALATION) at 05:31

## 2023-10-23 RX ADMIN — FINASTERIDE 5 MG: 5 TABLET, FILM COATED ORAL at 08:48

## 2023-10-23 RX ADMIN — IPRATROPIUM BROMIDE 0.5 MG: 0.5 SOLUTION RESPIRATORY (INHALATION) at 10:50

## 2023-10-23 RX ADMIN — Medication 10 ML: at 21:18

## 2023-10-23 RX ADMIN — ANTACID TABLETS 2 TABLET: 500 TABLET, CHEWABLE ORAL at 22:57

## 2023-10-23 RX ADMIN — Medication 10 ML: at 08:52

## 2023-10-23 RX ADMIN — CLOPIDOGREL BISULFATE 75 MG: 75 TABLET, FILM COATED ORAL at 08:48

## 2023-10-23 RX ADMIN — IPRATROPIUM BROMIDE 0.5 MG: 0.5 SOLUTION RESPIRATORY (INHALATION) at 18:59

## 2023-10-23 RX ADMIN — ENOXAPARIN SODIUM 30 MG: 100 INJECTION SUBCUTANEOUS at 21:04

## 2023-10-23 RX ADMIN — FUROSEMIDE 40 MG: 10 INJECTION, SOLUTION INTRAVENOUS at 16:46

## 2023-10-23 RX ADMIN — ROSUVASTATIN CALCIUM 5 MG: 5 TABLET, FILM COATED ORAL at 08:48

## 2023-10-23 RX ADMIN — FOLIC ACID 1 MG: 1 TABLET ORAL at 08:48

## 2023-10-23 RX ADMIN — BUDESONIDE AND FORMOTEROL FUMARATE DIHYDRATE 2 PUFF: 160; 4.5 AEROSOL RESPIRATORY (INHALATION) at 18:59

## 2023-10-23 RX ADMIN — PANTOPRAZOLE SODIUM 40 MG: 40 TABLET, DELAYED RELEASE ORAL at 22:12

## 2023-10-23 RX ADMIN — ALPRAZOLAM 0.25 MG: 0.25 TABLET ORAL at 22:12

## 2023-10-23 RX ADMIN — IPRATROPIUM BROMIDE 0.5 MG: 0.5 SOLUTION RESPIRATORY (INHALATION) at 23:42

## 2023-10-23 RX ADMIN — TAMSULOSIN HYDROCHLORIDE 0.4 MG: 0.4 CAPSULE ORAL at 08:48

## 2023-10-23 RX ADMIN — NICOTINE 1 PATCH: 14 PATCH, EXTENDED RELEASE TRANSDERMAL at 08:51

## 2023-10-23 RX ADMIN — SODIUM CHLORIDE 150 ML/HR: 900 INJECTION INTRAVENOUS at 22:12

## 2023-10-23 RX ADMIN — BUDESONIDE AND FORMOTEROL FUMARATE DIHYDRATE 2 PUFF: 160; 4.5 AEROSOL RESPIRATORY (INHALATION) at 05:31

## 2023-10-23 RX ADMIN — ENOXAPARIN SODIUM 30 MG: 100 INJECTION SUBCUTANEOUS at 08:47

## 2023-10-23 RX ADMIN — SODIUM PHOSPHATE, MONOBASIC, MONOHYDRATE AND SODIUM PHOSPHATE, DIBASIC, ANHYDROUS 10 MMOL: 142; 276 INJECTION, SOLUTION INTRAVENOUS at 11:13

## 2023-10-23 NOTE — PROGRESS NOTES
Nephrology ([facility]) Progress Note      Patient:  Van Mckeon  YOB: 1940  Date of Service: 10/23/2023  MRN: [id number]   Acct: [id number]   Primary Care Physician: Jean Marie Cabrera MD  Advance Directive:   Code Status and Medical Interventions:   Ordered at: 10/22/23 1237     Medical Intervention Limits:    NO intubation (DNI)     Level Of Support Discussed With:    Patient    Health Care Surrogate     Code Status (Patient has no pulse and is not breathing):    No CPR (Do Not Attempt to Resuscitate)     Medical Interventions (Patient has pulse or is breathing):    Limited Support     Admit Date: 10/22/2023       Hospital Day: 1  Referring Provider: No ref. provider found      Patient personally seen and examined.  Complete chart including Consults, Notes, Operative Reports, Labs, Cardiology, and Radiology studies reviewed as able.        Subjective:  Van Mckeon is a 83 y.o. male for whom we were consulted for evaluation and treatment of hypercalcemia. Prior diagnosis of hypercalcemia due to primary hyperparathyroidism, had followed with a local surgeon but patient recalled no plans for surgery. Recently developed altered mentation, dyspnea, poor appetite. Symptoms similar to prior episode of hypercalcemia so family brought patient into ER for evaluation. In ER labs revealed calcium of 13.7, creatinine 1.34, .  Patient received IV fluids, Zometa, and Calcitonin. Denied chest pain or dyspnea at rest. No urinary changes, no dysuria or hematuria. Denied n/v/d. Denied calcium supplements or recent medication changes.    Today is awake and alert. Complaint of general malaise but no specific issues. No new overnight issues. Urine output nonoliguric    Allergies:  Statins and Tetanus toxoids    Home Meds:  Medications Prior to Admission   Medication Sig Dispense Refill Last Dose    albuterol sulfate  (90 Base) MCG/ACT inhaler Inhale 2 puffs 4 (Four)  Times a Day.   10/22/2023    ALPRAZolam (XANAX) 0.5 MG tablet Take 1 tablet by mouth 2 (Two) Times a Day As Needed for Anxiety.   10/22/2023    aspirin 81 MG EC tablet Take 1 tablet by mouth Daily.   10/22/2023    clopidogrel (PLAVIX) 75 MG tablet Take 1 tablet by mouth Daily. 90 tablet 1 10/22/2023    empagliflozin (Jardiance) 10 MG tablet tablet Take 1 tablet by mouth Daily. 30 tablet 11 10/22/2023    finasteride (PROSCAR) 5 MG tablet Take 1 tablet by mouth Daily.   10/22/2023    folic acid (FOLVITE) 1 MG tablet Take 1 tablet by mouth Daily. 30 tablet 2 10/22/2023    furosemide (LASIX) 40 MG tablet Take 1 tablet by mouth Daily As Needed (Weight gain or swelling).   10/22/2023    metoprolol succinate XL (TOPROL-XL) 25 MG 24 hr tablet Take 0.5 tablets by mouth Every Evening. 30 tablet 5 Past Week    rosuvastatin (CRESTOR) 10 MG tablet Take 0.5 tablets by mouth Every Night. Takes 1/2 tab every evening   10/22/2023    sertraline (ZOLOFT) 25 MG tablet Take 1 tablet by mouth Daily.   10/21/2023    spironolactone (ALDACTONE) 25 MG tablet Take 1 tablet by mouth Daily. 30 tablet 3 10/22/2023    tamsulosin (FLOMAX) 0.4 MG capsule 24 hr capsule Take 1 capsule by mouth Every Evening.   10/22/2023    tiotropium bromide-olodaterol (Stiolto Respimat) 2.5-2.5 MCG/ACT aerosol solution inhaler Inhale 2 puffs Daily.   10/22/2023    lisinopril (PRINIVIL,ZESTRIL) 10 MG tablet Take 0.5 tablets by mouth Daily.          Medicines:  Current Facility-Administered Medications   Medication Dose Route Frequency Provider Last Rate Last Admin    acetaminophen (TYLENOL) tablet 650 mg  650 mg Oral Q4H PRN Bradley Nassar DO        ALPRAZolam (XANAX) tablet 0.25 mg  0.25 mg Oral BID PRN Bradley Nassar DO        aspirin EC tablet 81 mg  81 mg Oral Daily Bradley Nassar DO   81 mg at 10/23/23 0848    sennosides-docusate (PERICOLACE) 8.6-50 MG per tablet 2 tablet  2 tablet Oral BID Bradley Nassar DO        And    dia addison  (MIRALAX) packet 17 g  17 g Oral Daily PRN Bradley Nassar DO        And    bisacodyl (DULCOLAX) EC tablet 5 mg  5 mg Oral Daily PRN Bradley Nassar DO        And    bisacodyl (DULCOLAX) suppository 10 mg  10 mg Rectal Daily PRN Bradley Nassar DO        budesonide-formoterol (SYMBICORT) 160-4.5 MCG/ACT inhaler 2 puff  2 puff Inhalation BID - RT Bradley Nassar DO   2 puff at 10/23/23 0531    And    ipratropium (ATROVENT) nebulizer solution 0.5 mg  0.5 mg Nebulization Q6H - RT Bradley Nassar DO   0.5 mg at 10/23/23 0531    calcitonin (MIACALCIN) injection 254 Units  4 Units/kg Intramuscular Q12H Vicente Peña MD   254 Units at 10/23/23 0350    clopidogrel (PLAVIX) tablet 75 mg  75 mg Oral Daily Bradley Nassar DO   75 mg at 10/23/23 0848    Enoxaparin Sodium (LOVENOX) syringe 30 mg  30 mg Subcutaneous Q12H Bradley Nassar DO   30 mg at 10/23/23 0847    finasteride (PROSCAR) tablet 5 mg  5 mg Oral Daily Bradley Nassar DO   5 mg at 10/23/23 0848    folic acid (FOLVITE) tablet 1 mg  1 mg Oral Daily Bradley Nassar DO   1 mg at 10/23/23 0848    furosemide (LASIX) tablet 40 mg  40 mg Oral Daily PRN Bradley Nassar DO        Influenza Vac High-Dose Quad (FLUZONE HIGH DOSE) injection 0.7 mL  0.7 mL Intramuscular During Hospitalization Bradley Nassar DO        nicotine (NICODERM CQ) 14 MG/24HR patch 1 patch  1 patch Transdermal Q24H Bradley Nassar DO   1 patch at 10/23/23 0851    rosuvastatin (CRESTOR) tablet 5 mg  5 mg Oral Daily Bradley Nassar DO   5 mg at 10/23/23 0848    sertraline (ZOLOFT) tablet 25 mg  25 mg Oral Daily Bradley Nassar DO   25 mg at 10/23/23 0848    sodium chloride 0.9 % flush 10 mL  10 mL Intravenous Q12H Bradley Nassar, DO   10 mL at 10/23/23 0852    sodium chloride 0.9 % flush 10 mL  10 mL Intravenous PRN Bradley Nassar,         sodium chloride 0.9 % infusion 40 mL  40 mL Intravenous PRN Bradley Nassar,         sodium chloride 0.9 % infusion   160 mL/hr Intravenous Continuous Vicente Peña  mL/hr at 10/23/23 0559 160 mL/hr at 10/23/23 0559    sodium phosphates 10 mmol in sodium chloride 0.9 % 250 mL IVPB  10 mmol Intravenous Once Kaz Nugent APRN        tamsulosin (FLOMAX) 24 hr capsule 0.4 mg  0.4 mg Oral Daily Bradley Nassar DO   0.4 mg at 10/23/23 0848       Past Medical History:  Past Medical History:   Diagnosis Date    Alcohol use     Anxiety     Arthritis     Bronchiectasis without complication 04/09/2019    Bronchitis     COPD (chronic obstructive pulmonary disease)     COPD, group B, by GOLD 2017 classification 04/09/2019    Essential hypertension 04/09/2019    Hypertension     Personal history of nicotine dependence 04/09/2019    Prostate hypertrophy     Snoring     Thrombocytopenia     TIA (transient ischemic attack)     Tobacco user 10/03/2019       Past Surgical History:  Past Surgical History:   Procedure Laterality Date    ABDOMINAL AORTIC ANEURYSM REPAIR WITH ENDOGRAFT N/A 7/19/2023    Procedure: ABDOMINAL AORTIC ANEURYSM REPAIR WITH ENDOGRAFT;  Surgeon: Collins Sauceda DO;  Location: [facility] OR 12;  Service: Vascular;  Laterality: N/A;    BRONCHOSCOPY      CATARACT EXTRACTION, BILATERAL      NASAL SINUS SURGERY      TOTAL HIP ARTHROPLASTY Left        Family History  Family History   Problem Relation Age of Onset    Emphysema Father     Heart attack Brother     Cancer Brother     Heart disease Brother     Cancer Other     Colon cancer Neg Hx     Colon polyps Neg Hx     Esophageal cancer Neg Hx        Social History  Social History     Socioeconomic History    Marital status:    Tobacco Use    Smoking status: Every Day     Packs/day: 0.50     Years: 70.00     Additional pack years: 0.00     Total pack years: 35.00     Types: Cigarettes     Passive exposure: Current    Smokeless tobacco: Former     Types: Snuff     Quit date: 1970   Vaping Use    Vaping Use: Never used   Substance and Sexual  "Activity    Alcohol use: Yes     Alcohol/week: 2.0 standard drinks of alcohol     Types: 2 Shots of liquor per week     Comment: daily - 2 mixed drinks    Drug use: No    Sexual activity: Defer       Review of Systems:  History obtained from chart review and the patient  General ROS: No fever or chills  Respiratory ROS: No cough, shortness of breath, wheezing  Cardiovascular ROS: No chest pain or palpitations  Gastrointestinal ROS: No abdominal pain or melena  Genito-Urinary ROS: No dysuria or hematuria  Psych ROS: No anxiety and depression  14 point ROS reviewed with the patient and negative except as noted above and in the HPI unless unable to obtain.    Objective:  Patient Vitals for the past 24 hrs:   BP Temp Temp src Pulse Resp SpO2 Height Weight   10/23/23 0800 128/68 98.2 °F (36.8 °C) Oral 78 16 92 % -- --   10/23/23 0531 -- -- -- 64 16 96 % -- --   10/23/23 0421 121/64 98 °F (36.7 °C) Oral 61 16 97 % -- --   10/23/23 0022 124/63 98.2 °F (36.8 °C) Oral 60 16 94 % -- --   10/22/23 2338 -- -- -- 59 -- 100 % -- --   10/22/23 2333 -- -- -- 58 16 96 % -- --   10/22/23 2104 120/62 98.1 °F (36.7 °C) Oral 67 16 94 % -- 63.5 kg (140 lb)   10/22/23 1824 -- -- -- 64 -- 100 % -- --   10/22/23 1819 -- -- -- 67 16 92 % -- --   10/22/23 1602 109/82 98 °F (36.7 °C) Oral 60 17 98 % -- --   10/22/23 1329 -- -- -- 65 18 -- -- --   10/22/23 1325 -- -- -- 63 18 93 % -- --   10/22/23 1248 126/65 98.1 °F (36.7 °C) Oral 67 18 98 % 182.9 cm (72.01\") 63.4 kg (139 lb 12.8 oz)   10/22/23 1203 -- -- -- -- -- 96 % -- --   10/22/23 1202 117/71 -- -- 55 -- -- -- --   10/22/23 1146 113/69 -- -- 55 -- 93 % -- --       Intake/Output Summary (Last 24 hours) at 10/23/2023 1049  Last data filed at 10/23/2023 0921  Gross per 24 hour   Intake 1400.92 ml   Output 675 ml   Net 725.92 ml     General: awake/alert   Chest:  clear to auscultation bilaterally without respiratory distress  CVS: regular rate and rhythm  Abdominal: soft, nontender, positive " bowel sounds  Extremities: no cyanosis or edema  Skin: warm and dry without rash      Labs:  Results from last 7 days   Lab Units 10/22/23  1038   WBC 10*3/mm3 4.98   HEMOGLOBIN g/dL 12.5*   HEMATOCRIT % 39.6   PLATELETS 10*3/mm3 104*         Results from last 7 days   Lab Units 10/23/23  0558 10/22/23  2315 10/22/23  1725 10/22/23  1038 10/20/23  1017   SODIUM mmol/L 140  --   --  139 140   POTASSIUM mmol/L 4.5  --   --  4.4 4.6   CHLORIDE mmol/L 112*  --   --  107 107   CO2 mmol/L 21.0*  --   --  28.0 25.0   BUN mg/dL 19  --   --  28* 23   CREATININE mg/dL 1.11  --   --  1.34* 1.39*   CALCIUM mg/dL 12.0* 12.1* 12.7* 13.7* 12.7*   EGFR mL/min/1.73 65.9  --   --  52.6* 50.3*   BILIRUBIN mg/dL 0.3  --   --  0.4  --    ALK PHOS U/L 57  --   --  65  --    ALT (SGPT) U/L 6  --   --  9  --    AST (SGOT) U/L 13  --   --  12  --    GLUCOSE mg/dL 89  --   --  113* 104*       Radiology:   Imaging Results (Last 72 Hours)       Procedure Component Value Units Date/Time    XR Chest 1 View [[id number]] Collected: 10/22/23 1053     Updated: 10/22/23 1058    Narrative:      EXAMINATION: XR CHEST 1 VW- 10/22/2023 10:53 AM CDT     HISTORY: Shortness of breath.     COMPARISON: Chest x-ray 7/14/2023.     REPORT:  A frontal view of the chest was obtained. The lungs are clear and mildly  hyperinflated. The cardiac and mediastinal silhouettes are within normal  limits. Skinfold artifact is noted over the right upper hemithorax. No  pneumothorax is identified. The visualized bony thorax and upper abdomen  are unremarkable. 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                                                                                                                                                   Impression:             No acute abnormality. COPD                                                                       This report was signed and finalized on 10/22/2023 10:55 AM CDT by Dr. Francisco Deluna,  "MD.               Culture:  No results found for: \"BLOODCX\", \"URINECX\", \"WOUNDCX\", \"MRSACX\", \"RESPCX\", \"STOOLCX\"      Assessment    Hypercalcemia--improving  Baseline chronic kidney disease stage 3a  Chronic diastolic congestive heart failure  Anemia  Primary hyperparathyroidism  Hypophosphatemia     Plan:   Adjust rate of IV fluids  Replace phosphorus  Continue Calcitonin  Consider ENT evaluation for definitive treatment of primary hyperparathyroidism      Kaz Nugent, APRN  10/23/2023  10:49 CDT    "

## 2023-10-23 NOTE — PROGRESS NOTES
[facility] Medicine Services  INPATIENT PROGRESS NOTE    Patient Name: Van Mckeon  Date of Admission: 10/22/2023  Today's Date: 10/23/23  Length of Stay: 1  Primary Care Physician: Jean Marie Cabrera MD    Subjective   Chief Complaint: Follow-up hypercalcemia  HPI   Mr. Mckeon is an 83-year-old male who [facility] for shortness of breath and confusion.  Patient recalls prior diagnosis of hypercalcemia due to primary hyperparathyroidism and had followed with a local surgeon for this without operative planning. History of present illness provided by his daughter.  No calcium supplements or other over-the-counter or prescription medication changes recently. His daughter states that he has had some confusion in the past when his calcium levels were high.  He recently had some labs drawn and it was 12.7.  In the ED, calcium 13.7.    Lying in bed.  He was asleep upon arrival.  He awoke easily to his name.  He tells me that he is not feeling well today.  He is not able to identify specific complaint.  He had some nausea earlier which has since resolved.  He was able to eat lunch.  Calcium 12.0 (13.7 on admission).    Review of Systems   All pertinent negatives and positives are as above. All other systems have been reviewed and are negative unless otherwise stated.     Objective    Temp:  [98 °F (36.7 °C)-98.2 °F (36.8 °C)] 98 °F (36.7 °C)  Heart Rate:  [58-78] 74  Resp:  [16-18] 16  BP: (109-129)/(62-82) 129/71  Physical Exam  Vitals reviewed.   Constitutional:       General: He is not in acute distress.     Appearance: He is not toxic-appearing.      Comments: Lying in bed.  No acute distress.  On room air.  No family at bedside.   HENT:      Head: Normocephalic and atraumatic.      Mouth/Throat:      Mouth: Mucous membranes are moist.      Pharynx: Oropharynx is clear.   Eyes:      Extraocular Movements: Extraocular movements intact.       "Conjunctiva/sclera: Conjunctivae normal.      Pupils: Pupils are equal, round, and reactive to light.   Cardiovascular:      Rate and Rhythm: Normal rate and regular rhythm.      Pulses: Normal pulses.   Pulmonary:      Effort: Pulmonary effort is normal. No respiratory distress.      Breath sounds: Normal breath sounds.   Abdominal:      General: Bowel sounds are normal. There is no distension.      Palpations: Abdomen is soft.      Tenderness: There is no abdominal tenderness.   Musculoskeletal:         General: No swelling or tenderness. Normal range of motion.      Cervical back: Normal range of motion and neck supple. No muscular tenderness.   Skin:     General: Skin is warm and dry.      Findings: No erythema or rash.   Neurological:      General: No focal deficit present.      Mental Status: He is alert and oriented to person, place, and time.      Cranial Nerves: No cranial nerve deficit.   Psychiatric:         Mood and Affect: Mood normal.         Behavior: Behavior normal.       Results Review:  I have reviewed the labs, radiology results, and diagnostic studies.    Laboratory Data:   Results from last 7 days   Lab Units 10/22/23  1038   WBC 10*3/mm3 4.98   HEMOGLOBIN g/dL 12.5*   HEMATOCRIT % 39.6   PLATELETS 10*3/mm3 104*        Results from last 7 days   Lab Units 10/23/23  0558 10/22/23  2315 10/22/23  1725 10/22/23  1038 10/20/23  1017   SODIUM mmol/L 140  --   --  139 140   POTASSIUM mmol/L 4.5  --   --  4.4 4.6   CHLORIDE mmol/L 112*  --   --  107 107   CO2 mmol/L 21.0*  --   --  28.0 25.0   BUN mg/dL 19  --   --  28* 23   CREATININE mg/dL 1.11  --   --  1.34* 1.39*   CALCIUM mg/dL 12.0* 12.1* 12.7* 13.7* 12.7*   BILIRUBIN mg/dL 0.3  --   --  0.4  --    ALK PHOS U/L 57  --   --  65  --    ALT (SGPT) U/L 6  --   --  9  --    AST (SGOT) U/L 13  --   --  12  --    GLUCOSE mg/dL 89  --   --  113* 104*       Culture Data:   No results found for: \"ACANTHNAEG\", \"AFBCX\", \"BPERTUSSISCX\", \"BLOODCX\"  No " "results found for: \"BCIDPCR\", \"CXREFLEX\", \"CSFCX\", \"CULTURETIS\"  No results found for: \"CULTURES\", \"HSVCX\", \"URCX\"  No results found for: \"EYECULTURE\", \"GCCX\", \"HSVCULTURE\", \"LABHSV\"  No results found for: \"LEGIONELLA\", \"MRSACX\", \"MUMPSCX\", \"MYCOPLASCX\"  No results found for: \"NOCARDIACX\", \"STOOLCX\"  No results found for: \"THROATCX\", \"UNSTIMCULT\", \"URINECX\", \"CULTURE\", \"VZVCULTUR\"  No results found for: \"VIRALCULTU\", \"WOUNDCX\"    Radiology Data:   Imaging Results (Last 24 Hours)       ** No results found for the last 24 hours. **            I have reviewed the patient's current medications.     Assessment/Plan   Assessment  Active Hospital Problems    Diagnosis     **Hypercalcemia     CKD (chronic kidney disease) stage 3, GFR 30-59 ml/min     Thrombocytopenia     COPD, group B, by GOLD 2017 classification     Essential (primary) hypertension        Treatment Plan  Nephrology consulted for hypercalcemia.  Calcium 13.7 on admission.  Medical management of calcium with calcitonin, Zometa and IV fluid per nephrology.  Calcium 12.0 today.    He has a history of primary hyperparathyroidism. He had followed with a local surgeon in [place] for this without operative planning.  Parathyroid hormone 518.  Discussed with Dr. Frye -consult ENT.    Baseline chronic kidney disease stage IIIa.  Creatinine stable.    Phosphorus IV replacement.    Chronic preserved EF CHF -no signs of overload or heart failure.     COPD -lungs are clear.  No wheezing.  No signs of exacerbation.  Continue Symbicort, Atrovent.    Lovenox for VTE prophylaxis.    Tobacco cessation counseling.  Offer nicotine patch.    Medical Decision Making  Number and Complexity of problems: 1 acute with risk for bodily harm, multiple chronic   Differential Diagnosis: None considered at present    Conditions and Status        Condition is improving.     MDM Data  External documents reviewed: Prior [facility] records  Cardiac tracing (EKG, telemetry) interpretation:  " Sinus rhythm without any acute ST-T changes   Radiology interpretation: Interpreted by radiology  Labs reviewed: As above  Any tests that were considered but not ordered: None considered at present     Decision rules/scores evaluated (example [id number], Wells, etc): none considered at present     Discussed with: Patient and Dr. Frye     Care Planning  Shared decision making: Patient is agreeable to ongoing work-up and treatment  Code status and discussions: No CPR with limited support to include no intubation    Disposition  Social Determinants of Health that impact treatment or disposition: None identified at present  I expect the patient to be discharged to home in 1-2 days.     Electronically signed by JAE Loving, 10/23/23, 13:12 CDT.

## 2023-10-23 NOTE — CASE MANAGEMENT/SOCIAL WORK
Discharge Planning Assessment  [facility]     Patient Name: Van Mckeon  MRN: [id number]  Today's Date: 10/23/2023    Admit Date: 10/22/2023        Discharge Needs Assessment       Row Name 10/23/23 0932       Living Environment    People in Home child(shira), adult    Name(s) of People in Home Akosua    Current Living Arrangements home    Primary Care Provided by child(shira)    Provides Primary Care For no one    Family Caregiver if Needed child(shira), adult    Family Caregiver Names Akosua    Able to Return to Prior Arrangements yes       Resource/Environmental Concerns    Resource/Environmental Concerns none       Transition Planning    Patient/Family Anticipates Transition to home with family    Transportation Anticipated family or friend will provide       Discharge Needs Assessment    Readmission Within the Last 30 Days no previous admission in last 30 days    Equipment Currently Used at Home walker, rolling;wheelchair;cane, straight;commode    Concerns to be Addressed discharge planning    Equipment Needed After Discharge none    Discharge Facility/Level of Care Needs     Discharge Coordination/Progress spoke to patient who lives with daughter; has RX coverage and PCP; independent at home prior to illness will follow for DC needs                   Discharge Plan    No documentation.                 Continued Care and Services - Admitted Since 10/22/2023    Coordination has not been started for this encounter.          Demographic Summary    No documentation.                  Functional Status    No documentation.                  Psychosocial    No documentation.                  Abuse/Neglect    No documentation.                  Legal    No documentation.                  Substance Abuse    No documentation.                  Patient Forms    No documentation.                     Jaqueline Abdul RN

## 2023-10-23 NOTE — PLAN OF CARE
Goal Outcome Evaluation:  Plan of Care Reviewed With: patient        Progress: no change  Outcome Evaluation: No co pain. Calcium level was 12. Phosphorus was 1.9, sodium phosphate was given. IV Lasix ordered. ENT consulted. cont to monitor.

## 2023-10-23 NOTE — PLAN OF CARE
Goal Outcome Evaluation:  Plan of Care Reviewed With: patient           Outcome Evaluation: VSS. RA. A&Ox4. Calcitonin injection given. Rested well overnight. S/SB/SA 47-63 on tele. Safety maintained.

## 2023-10-24 ENCOUNTER — APPOINTMENT (OUTPATIENT)
Dept: NUCLEAR MEDICINE | Facility: HOSPITAL | Age: 83
End: 2023-10-24
Payer: MEDICARE

## 2023-10-24 ENCOUNTER — APPOINTMENT (OUTPATIENT)
Dept: ULTRASOUND IMAGING | Facility: HOSPITAL | Age: 83
End: 2023-10-24
Payer: MEDICARE

## 2023-10-24 PROBLEM — E04.1 THYROID NODULE: Status: ACTIVE | Noted: 2023-10-24

## 2023-10-24 PROBLEM — D35.1 PARATHYROID ADENOMA: Status: ACTIVE | Noted: 2023-10-24

## 2023-10-24 PROBLEM — E07.89 THYROID MASS OF UNCLEAR ETIOLOGY: Status: ACTIVE | Noted: 2023-10-24

## 2023-10-24 LAB
ANION GAP SERPL CALCULATED.3IONS-SCNC: 10 MMOL/L (ref 5–15)
BUN SERPL-MCNC: 16 MG/DL (ref 8–23)
BUN/CREAT SERPL: 13.1 (ref 7–25)
CALCIUM SPEC-SCNC: 10.7 MG/DL (ref 8.6–10.5)
CHLORIDE SERPL-SCNC: 108 MMOL/L (ref 98–107)
CO2 SERPL-SCNC: 21 MMOL/L (ref 22–29)
CREAT SERPL-MCNC: 1.22 MG/DL (ref 0.76–1.27)
EGFRCR SERPLBLD CKD-EPI 2021: 58.8 ML/MIN/1.73
GLUCOSE SERPL-MCNC: 102 MG/DL (ref 65–99)
PHOSPHATE SERPL-MCNC: 1.5 MG/DL (ref 2.5–4.5)
POTASSIUM SERPL-SCNC: 4.2 MMOL/L (ref 3.5–5.2)
SODIUM SERPL-SCNC: 139 MMOL/L (ref 136–145)

## 2023-10-24 PROCEDURE — 94664 DEMO&/EVAL PT USE INHALER: CPT

## 2023-10-24 PROCEDURE — 25010000002 ENOXAPARIN PER 10 MG: Performed by: INTERNAL MEDICINE

## 2023-10-24 PROCEDURE — 78071 PARATHYRD PLANAR W/WO SUBTRJ: CPT

## 2023-10-24 PROCEDURE — 25810000003 SODIUM CHLORIDE 0.9 % SOLUTION: Performed by: INTERNAL MEDICINE

## 2023-10-24 PROCEDURE — 99222 1ST HOSP IP/OBS MODERATE 55: CPT | Performed by: OTOLARYNGOLOGY

## 2023-10-24 PROCEDURE — 25810000003 SODIUM CHLORIDE 0.9 % SOLUTION 500 ML FLEX CONT: Performed by: NURSE PRACTITIONER

## 2023-10-24 PROCEDURE — 25010000002 FUROSEMIDE PER 20 MG: Performed by: INTERNAL MEDICINE

## 2023-10-24 PROCEDURE — A9500 TC99M SESTAMIBI: HCPCS | Performed by: INTERNAL MEDICINE

## 2023-10-24 PROCEDURE — 94799 UNLISTED PULMONARY SVC/PX: CPT

## 2023-10-24 PROCEDURE — 0 TECHNETIUM SESTAMIBI: Performed by: INTERNAL MEDICINE

## 2023-10-24 PROCEDURE — 76536 US EXAM OF HEAD AND NECK: CPT

## 2023-10-24 PROCEDURE — 84100 ASSAY OF PHOSPHORUS: CPT | Performed by: NURSE PRACTITIONER

## 2023-10-24 PROCEDURE — 80048 BASIC METABOLIC PNL TOTAL CA: CPT | Performed by: INTERNAL MEDICINE

## 2023-10-24 PROCEDURE — 63710000001 CALCITONIN PER 400 UNITS: Performed by: INTERNAL MEDICINE

## 2023-10-24 RX ORDER — FUROSEMIDE 10 MG/ML
40 INJECTION INTRAMUSCULAR; INTRAVENOUS DAILY
Status: DISCONTINUED | OUTPATIENT
Start: 2023-10-25 | End: 2023-10-25 | Stop reason: HOSPADM

## 2023-10-24 RX ORDER — LIDOCAINE HYDROCHLORIDE 20 MG/ML
10 SOLUTION OROPHARYNGEAL
Status: DISCONTINUED | OUTPATIENT
Start: 2023-10-24 | End: 2023-10-25 | Stop reason: HOSPADM

## 2023-10-24 RX ORDER — PANTOPRAZOLE SODIUM 40 MG/1
40 TABLET, DELAYED RELEASE ORAL
Status: DISCONTINUED | OUTPATIENT
Start: 2023-10-24 | End: 2023-10-25 | Stop reason: HOSPADM

## 2023-10-24 RX ADMIN — CLOPIDOGREL BISULFATE 75 MG: 75 TABLET, FILM COATED ORAL at 08:41

## 2023-10-24 RX ADMIN — SERTRALINE HYDROCHLORIDE 25 MG: 50 TABLET, FILM COATED ORAL at 08:40

## 2023-10-24 RX ADMIN — ENOXAPARIN SODIUM 30 MG: 100 INJECTION SUBCUTANEOUS at 20:29

## 2023-10-24 RX ADMIN — ENOXAPARIN SODIUM 30 MG: 100 INJECTION SUBCUTANEOUS at 08:46

## 2023-10-24 RX ADMIN — FOLIC ACID 1 MG: 1 TABLET ORAL at 08:40

## 2023-10-24 RX ADMIN — ROSUVASTATIN CALCIUM 5 MG: 5 TABLET, FILM COATED ORAL at 08:40

## 2023-10-24 RX ADMIN — PANTOPRAZOLE SODIUM 40 MG: 40 TABLET, DELAYED RELEASE ORAL at 15:46

## 2023-10-24 RX ADMIN — SODIUM CHLORIDE 150 ML/HR: 900 INJECTION INTRAVENOUS at 05:49

## 2023-10-24 RX ADMIN — FINASTERIDE 5 MG: 5 TABLET, FILM COATED ORAL at 08:41

## 2023-10-24 RX ADMIN — PANTOPRAZOLE SODIUM 40 MG: 40 TABLET, DELAYED RELEASE ORAL at 05:49

## 2023-10-24 RX ADMIN — FUROSEMIDE 40 MG: 10 INJECTION, SOLUTION INTRAVENOUS at 05:49

## 2023-10-24 RX ADMIN — CALCITONIN SALMON 254 UNITS: 200 INJECTION, SOLUTION INTRAMUSCULAR; SUBCUTANEOUS at 03:30

## 2023-10-24 RX ADMIN — TECHNETIUM TC 99M SESTAMIBI 1 DOSE: 1 INJECTION INTRAVENOUS at 07:55

## 2023-10-24 RX ADMIN — NICOTINE 1 PATCH: 14 PATCH, EXTENDED RELEASE TRANSDERMAL at 08:41

## 2023-10-24 RX ADMIN — TAMSULOSIN HYDROCHLORIDE 0.4 MG: 0.4 CAPSULE ORAL at 08:40

## 2023-10-24 RX ADMIN — LIDOCAINE HYDROCHLORIDE 10 ML: 20 SOLUTION ORAL at 20:29

## 2023-10-24 RX ADMIN — IPRATROPIUM BROMIDE 0.5 MG: 0.5 SOLUTION RESPIRATORY (INHALATION) at 19:18

## 2023-10-24 RX ADMIN — Medication 10 ML: at 20:30

## 2023-10-24 RX ADMIN — SODIUM PHOSPHATE, MONOBASIC, MONOHYDRATE AND SODIUM PHOSPHATE, DIBASIC, ANHYDROUS 30 MMOL: 142; 276 INJECTION, SOLUTION INTRAVENOUS at 12:05

## 2023-10-24 RX ADMIN — IPRATROPIUM BROMIDE 0.5 MG: 0.5 SOLUTION RESPIRATORY (INHALATION) at 23:43

## 2023-10-24 RX ADMIN — BUDESONIDE AND FORMOTEROL FUMARATE DIHYDRATE 2 PUFF: 160; 4.5 AEROSOL RESPIRATORY (INHALATION) at 19:19

## 2023-10-24 RX ADMIN — IPRATROPIUM BROMIDE 0.5 MG: 0.5 SOLUTION RESPIRATORY (INHALATION) at 05:30

## 2023-10-24 RX ADMIN — BUDESONIDE AND FORMOTEROL FUMARATE DIHYDRATE 2 PUFF: 160; 4.5 AEROSOL RESPIRATORY (INHALATION) at 05:30

## 2023-10-24 RX ADMIN — ASPIRIN 81 MG: 81 TABLET, COATED ORAL at 08:46

## 2023-10-24 NOTE — PROGRESS NOTES
[facility] Medicine Services  INPATIENT PROGRESS NOTE    Patient Name: Van Mckeon  Date of Admission: 10/22/2023  Today's Date: 10/24/23  Length of Stay: 2  Primary Care Physician: Jean Marie Cabrera MD    Subjective   Chief Complaint: Follow-up hypercalcemia  HPI   Mr. Mckeon is an 83-year-old male who presented to [facility] on 10/22 for shortness of breath and confusion.  Patient recalls prior diagnosis of hypercalcemia due to primary hyperparathyroidism and had followed with a local surgeon for this without operative planning. History of present illness provided by his daughter.  No calcium supplements or other over-the-counter or prescription medication changes recently. His daughter states that he has had some confusion in the past when his calcium levels were high.  He recently had some labs drawn and it was 12.7.  In the ED, calcium 13.7.    Lying in bed with family at bedside.  Calcium 12.7 today with phosphorus 1.5.    He went for NM parathyroid scan and ultrasound thyroid today.    Awaiting ENT consult.    Review of Systems   All pertinent negatives and positives are as above. All other systems have been reviewed and are negative unless otherwise stated.     Objective    Temp:  [97.5 °F (36.4 °C)-98.2 °F (36.8 °C)] 97.9 °F (36.6 °C)  Heart Rate:  [76-93] 85  Resp:  [16-19] 18  BP: (107-139)/(56-88) 138/79  Physical Exam  Vitals reviewed.   Constitutional:       General: He is not in acute distress.     Appearance: He is not toxic-appearing.      Comments: Lying in bed.  No acute distress.  On room air.  No family at bedside.   HENT:      Head: Normocephalic and atraumatic.      Mouth/Throat:      Mouth: Mucous membranes are moist.      Pharynx: Oropharynx is clear.   Eyes:      Extraocular Movements: Extraocular movements intact.      Conjunctiva/sclera: Conjunctivae normal.      Pupils: Pupils are equal, round, and reactive to light.  "  Cardiovascular:      Rate and Rhythm: Normal rate and regular rhythm.      Pulses: Normal pulses.   Pulmonary:      Effort: Pulmonary effort is normal. No respiratory distress.      Breath sounds: Normal breath sounds.   Abdominal:      General: Bowel sounds are normal. There is no distension.      Palpations: Abdomen is soft.      Tenderness: There is no abdominal tenderness.   Musculoskeletal:         General: No swelling or tenderness. Normal range of motion.      Cervical back: Normal range of motion and neck supple. No muscular tenderness.   Skin:     General: Skin is warm and dry.      Findings: No erythema or rash.   Neurological:      General: No focal deficit present.      Mental Status: He is alert and oriented to person, place, and time.      Cranial Nerves: No cranial nerve deficit.   Psychiatric:         Mood and Affect: Mood normal.         Behavior: Behavior normal.       Results Review:  I have reviewed the labs, radiology results, and diagnostic studies.    Laboratory Data:   Results from last 7 days   Lab Units 10/22/23  1038   WBC 10*3/mm3 4.98   HEMOGLOBIN g/dL 12.5*   HEMATOCRIT % 39.6   PLATELETS 10*3/mm3 104*        Results from last 7 days   Lab Units 10/24/23  0241 10/23/23  0558 10/22/23  2315 10/22/23  1725 10/22/23  1038   SODIUM mmol/L 139 140  --   --  139   POTASSIUM mmol/L 4.2 4.5  --   --  4.4   CHLORIDE mmol/L 108* 112*  --   --  107   CO2 mmol/L 21.0* 21.0*  --   --  28.0   BUN mg/dL 16 19  --   --  28*   CREATININE mg/dL 1.22 1.11  --   --  1.34*   CALCIUM mg/dL 10.7* 12.0* 12.1*   < > 13.7*   BILIRUBIN mg/dL  --  0.3  --   --  0.4   ALK PHOS U/L  --  57  --   --  65   ALT (SGPT) U/L  --  6  --   --  9   AST (SGOT) U/L  --  13  --   --  12   GLUCOSE mg/dL 102* 89  --   --  113*    < > = values in this interval not displayed.       Culture Data:   No results found for: \"ACANTHNAEG\", \"AFBCX\", \"BPERTUSSISCX\", \"BLOODCX\"  No results found for: \"BCIDPCR\", \"CXREFLEX\", \"CSFCX\", " "\"CULTURETIS\"  No results found for: \"CULTURES\", \"HSVCX\", \"URCX\"  No results found for: \"EYECULTURE\", \"GCCX\", \"HSVCULTURE\", \"LABHSV\"  No results found for: \"LEGIONELLA\", \"MRSACX\", \"MUMPSCX\", \"MYCOPLASCX\"  No results found for: \"NOCARDIACX\", \"STOOLCX\"  No results found for: \"THROATCX\", \"UNSTIMCULT\", \"URINECX\", \"CULTURE\", \"VZVCULTUR\"  No results found for: \"VIRALCULTU\", \"WOUNDCX\"    Radiology Data:   Imaging Results (Last 24 Hours)       Procedure Component Value Units Date/Time    NM Parathyroid Scan w SPECT [[id number]] Resulted: 10/24/23 0757     Updated: 10/24/23 1204    US Thyroid [[id number]] Resulted: 10/24/23 1041     Updated: 10/24/23 1056            I have reviewed the patient's current medications.     Assessment/Plan   Assessment  Active Hospital Problems    Diagnosis     **Hypercalcemia     CKD (chronic kidney disease) stage 3, GFR 30-59 ml/min     Thrombocytopenia     COPD, group B, by GOLD 2017 classification     Essential (primary) hypertension        Treatment Plan  Nephrology consulted for hypercalcemia.  Calcium 13.7 on admission.  Medical management of calcium with calcitonin, Zometa, lasix and IV fluid per nephrology.  He went for NM parathyroid scan and ultrasound thyroid today. Calcium 10.7 today.    He has a history of primary hyperparathyroidism. He had followed with a local surgeon in [place] for this without operative planning.  Parathyroid hormone 518.  Discussed with Dr. Frye -consult ENT.    Baseline chronic kidney disease stage IIIa.  Creatinine stable.    Phosphorus IV replacement.    Chronic preserved EF CHF -no signs of overload or heart failure.     COPD -lungs are clear.  No wheezing.  No signs of exacerbation.  Continue Symbicort, Atrovent.    Lovenox for VTE prophylaxis.    Tobacco cessation counseling.  Offer nicotine patch.    Medical Decision Making  Number and Complexity of problems: 1 acute with risk for bodily harm, multiple chronic   Differential Diagnosis: None considered " at present    Conditions and Status        Condition is improving.     [facility] Data  External documents reviewed: Prior epic records  Cardiac tracing (EKG, telemetry) interpretation:  Sinus rhythm without any acute ST-T changes   Radiology interpretation: Interpreted by radiology  Labs reviewed: As above  Any tests that were considered but not ordered: None considered at present     Decision rules/scores evaluated (example [id number], Wells, etc): none considered at present     Discussed with: Patient and Dr. Frye     Care Planning  Shared decision making: Patient is agreeable to ongoing work-up and treatment  Code status and discussions: No CPR with limited support to include no intubation    Disposition  Social Determinants of Health that impact treatment or disposition: None identified at present  I expect the patient to be discharged to home in 1-2 days.     Electronically signed by JAE Loving, 10/24/23, 13:11 CDT.

## 2023-10-24 NOTE — CONSULTS
[facility] Otolaryngology Head and Neck Surgery  CONSULT  Patient Name: Van Mckeon  : 1940  MRN: [id number]  Primary Care Physician:  Jean Marie Cabrera MD  Referring Physician: No ref. provider found  Date of admission: 10/22/2023  Length of Stay: 2  Room: [unfilled]      Subjective    Subjective   Shortness of Breath    Abnormal Lab      Chief Complaint/Reason for Consultation: Hypercalcemia, hyperparathyroidism  Accompanied by: Daughter earlier this morning, sons this evening  Van Mckeon is a 83 y.o. male is being admitted with hypercalcemia.  The patient was admitted to the emergency room.  He was symptomatic.  He was subsequently diagnosed with hypercalcemia and hyperparathyroidism.  ENT has been consulted to evaluate the patient for surgical correction of this.  Patient is seen, chart is reviewed.  Patient was seen x2 today, earlier this morning and later in the day after testing has returned.    Review of Systems   Respiratory:  Positive for shortness of breath.       Otherwise complete ROS is negative except as mentioned above.    Past Medical History:   Past Medical History:   Diagnosis Date    Alcohol use     Anxiety     Arthritis     Bronchiectasis without complication 2019    Bronchitis     COPD (chronic obstructive pulmonary disease)     COPD, group B, by GOLD 2017 classification 2019    Essential hypertension 2019    Hypertension     Personal history of nicotine dependence 2019    Prostate hypertrophy     Snoring     Thrombocytopenia     TIA (transient ischemic attack)     Tobacco user 10/03/2019     Past Surgical History:  Past Surgical History:   Procedure Laterality Date    ABDOMINAL AORTIC ANEURYSM REPAIR WITH ENDOGRAFT N/A 2023    Procedure: ABDOMINAL AORTIC ANEURYSM REPAIR WITH ENDOGRAFT;  Surgeon: Collins Sauceda DO;  Location: [place] [address];  Service: Vascular;  Laterality: N/A;    BRONCHOSCOPY      CATARACT  EXTRACTION, BILATERAL      NASAL SINUS SURGERY      TOTAL HIP ARTHROPLASTY Left        Family History: His family history includes Cancer in his brother and another family member; Emphysema in his father; Heart attack in his brother; Heart disease in his brother.   Social History: He  reports that he has been smoking cigarettes. He has a 35.00 pack-year smoking history. He has been exposed to tobacco smoke. He quit smokeless tobacco use about 53 years ago.  His smokeless tobacco use included snuff. He reports current alcohol use of about 2.0 standard drinks of alcohol per week. He reports that he does not use drugs.    Home Medications:  ALPRAZolam, albuterol sulfate HFA, aspirin, clopidogrel, empagliflozin, finasteride, folic acid, furosemide, lisinopril, metoprolol succinate XL, rosuvastatin, sertraline, spironolactone, tamsulosin, and tiotropium bromide-olodaterol    Allergies:  He is allergic to statins and tetanus toxoids.    Objective    Objective   Vitals:    Temp:  [97.8 °F (36.6 °C)-98.2 °F (36.8 °C)] 98.1 °F (36.7 °C)  Heart Rate:  [76-93] 76  Resp:  [16-19] 16  BP: (122-139)/(56-88) 128/77  Output by Drain (mL) 10/23/23 0701 - 10/23/23 1900 10/23/23 1901 - 10/24/23 0700 10/24/23 0701 - 10/24/23 1733 Range Total   Urethral Catheter 16 Fr.  1610 2350 [id number]       Physical Exam  Vitals reviewed.   Constitutional:       General: He is not in acute distress.     Appearance: Normal appearance. He is well-developed and underweight. He is not ill-appearing.      Comments: Lying in bed, appears comfortable   HENT:      Head: Atraumatic.      Comments: Mild temporal wasting     Right Ear: External ear normal. Decreased hearing noted.      Left Ear: External ear normal. Decreased hearing noted.      Nose: Nose normal.      Mouth/Throat:      Lips: Pink.      Mouth: Mucous membranes are dry.      Dentition: Abnormal dentition (Edentulous upper and lower). No gum lesions.      Tongue: No lesions. Tongue does not deviate  from midline.      Palate: No mass and lesions.      Pharynx: Oropharynx is clear.   Eyes:      General: Lids are normal. Gaze aligned appropriately.      Extraocular Movements: Extraocular movements intact.      Conjunctiva/sclera: Conjunctivae normal.   Neck:      Thyroid: Thyromegaly (Left side) present. No thyroid mass.   Pulmonary:      Effort: Pulmonary effort is normal. No tachypnea, respiratory distress or retractions.      Breath sounds: No stridor.   Musculoskeletal:         General: Normal range of motion.      Cervical back: No rigidity or crepitus. Decreased range of motion.   Lymphadenopathy:      Cervical: No cervical adenopathy.   Skin:     Findings: No rash.   Neurological:      General: No focal deficit present.      Mental Status: He is alert and oriented to person, place, and time.      Cranial Nerves: Cranial nerves 2-12 are intact. No cranial nerve deficit.   Psychiatric:         Attention and Perception: Attention and perception normal.         Mood and Affect: Mood and affect normal.         Speech: Speech normal.         Behavior: Behavior normal. Behavior is cooperative.         Thought Content: Thought content normal.         Cognition and Memory: Cognition normal.         Judgment: Judgment normal.         Result Review    Result Review:  I have personally reviewed the results from the time of this admission to 10/24/2023 17:33 CDT and agree with these findings:  [x]  Laboratory  []  Microbiology  [x]  Radiology  []  EKG/Telemetry   []  Cardiology/Vascular   []  Pathology  [x]  Old records  []  Other:  Most notable findings include: Hypercalcemia, hyperparathyroid hormone  Ultrasound reviewed independently, agree with radiology interpretation  Sestamibi scan reviewed independently, agree with radiology interpretation  Progress Notes by Mari Short APRN (10/24/2023 13:11)    Progress Notes by Kaz Nugent APRN (10/24/2023 10:44)    PTH, Intact (10/22/2023 12:05)    Calcium,  Ionized (10/22/2023 14:40)    Vitamin D,25-Hydroxy (10/22/2023 17:25)    Basic Metabolic Panel (10/24/2023 02:41)    NM Parathyroid Scan w SPECT (10/24/2023 12:04)    US Thyroid (10/24/2023 10:56)    Assessment & Plan    Assessment / Plan   Brief Patient Summary:  Van Mckeon is a 83 y.o. male who was admitted with hypercalcemia.  He was found to have primary hyperparathyroidism.  The patient is undergone evaluation with thyroid ultrasound, sestamibi scanning, repeat laboratory.  His calcium is decreased now.  He was found to have a left lower pole parathyroid mass with left sided thyroid mass.  I have discussed risk, benefits, alternative treatments, options with the patient.  I feel that a left thyroidectomy with left parathyroidectomy is indicated at this point in time.  Medical and surgical options were discussed including observation, continued medical management, medication modification, and surgical management. Risks, benefits and alternatives were discussed and questions were answered. After considering the options, the patient decided to proceed with surgical management.  Medical and surgical options were discussed including medical and surgical options. Risks, benefits and alternatives were discussed and questions were answered. After considering the options, the patient decided to proceed with surgery.     -----SURGERY SCHEDULING:-----  Plan to schedule left thyroid lobectomy, parathyroidectomy    ---INFORMED CONSENT DISCUSSION:---  THYROIDECTOMY: A thyroidectomy was recommended. The risks and benefits were explained including but not limited to bleeding, infection, persistent and/or recurrent disease, risks of the general anesthesia, pain, recurrent laryngeal nerve injury with hoarseness and airway loss, parathyroid injury and hypocalcemia. Operative possibilities including hemithyroidectomy, total thyroidectomy and gemma dissections were discussed. Possibilities for delayed need for total  thyroidectomy pending pathologic diagnosis were also discussed. Alternatives were discussed. No guarantees were made or implied. Questions were asked appropriately answered.    PARATHYROIDECTOMY: A parathyroid exploration and excision was recommended. The risks and benefits were explained including but not limited to bleeding, infection, risks of the general anesthesia, pain, recurrent laryngeal nerve injury with hoarseness and airway loss, hypocalcemia (temporary or permanent), and persistent hypercalcemia. Operative possibilities including 3/4 parathyroidectomy, parathyroid reimplantation, hemithyroidectomy, total thyroidectomy, and gemma dissections were discussed. Alternatives were discussed. Questions were asked appropriately answered.      ---PREOPERATIVE WORKUP:---  labs/ workup per anesthesia     Active Hospital Problems:  Active Hospital Problems    Diagnosis     **Hypercalcemia     Thyroid nodule     Parathyroid adenoma     CKD (chronic kidney disease) stage 3, GFR 30-59 ml/min     Thrombocytopenia     COPD, group B, by GOLD 2017 classification     Essential (primary) hypertension            Plan:   Hypercalcemia-this is being reduced.  The patient is down to 10.7 today.  I recommend continuing the IV fluids.  Hyperparathyroidism-May hyperparathyroidism.  He has elevated PTH.  I will see if this is improving out.  Parathyroid adenoma-the patient has a left-sided inferior parathyroid adenoma.  This probably would benefit from removal.  Thyroid mass-the patient has a left thyroid mass.  During surgery for hyperparathyroidism, the patient could undergo left thyroid lobectomy.  I have recommended this to the patient and his sons.    I discussed the patient's findings and my recommendations with patient, sons.  Following patient as in-patient. Further recommendations will be made based on serial examinations.    Medications/Orders for this encounter: No new medications ordered.  No New orders written.          DVT prophylaxis:  Medical DVT prophylaxis orders are present.    Discharge Planning: Per primary team    Electronically signed by Lion Cardenas Jr, MD, 10/24/23, 5:22 PM CDT.

## 2023-10-24 NOTE — H&P (VIEW-ONLY)
[facility] Otolaryngology Head and Neck Surgery  CONSULT  Patient Name: Van Mckeon  : 1940  MRN: [id number]  Primary Care Physician:  Jean Marie Cabrera MD  Referring Physician: No ref. provider found  Date of admission: 10/22/2023  Length of Stay: 2  Room: [unfilled]      Subjective    Subjective   Shortness of Breath    Abnormal Lab      Chief Complaint/Reason for Consultation: Hypercalcemia, hyperparathyroidism  Accompanied by: Daughter earlier this morning, sons this evening  Van Mckeon is a 83 y.o. male is being admitted with hypercalcemia.  The patient was admitted to the emergency room.  He was symptomatic.  He was subsequently diagnosed with hypercalcemia and hyperparathyroidism.  ENT has been consulted to evaluate the patient for surgical correction of this.  Patient is seen, chart is reviewed.  Patient was seen x2 today, earlier this morning and later in the day after testing has returned.    Review of Systems   Respiratory:  Positive for shortness of breath.       Otherwise complete ROS is negative except as mentioned above.    Past Medical History:   Past Medical History:   Diagnosis Date    Alcohol use     Anxiety     Arthritis     Bronchiectasis without complication 2019    Bronchitis     COPD (chronic obstructive pulmonary disease)     COPD, group B, by GOLD 2017 classification 2019    Essential hypertension 2019    Hypertension     Personal history of nicotine dependence 2019    Prostate hypertrophy     Snoring     Thrombocytopenia     TIA (transient ischemic attack)     Tobacco user 10/03/2019     Past Surgical History:  Past Surgical History:   Procedure Laterality Date    ABDOMINAL AORTIC ANEURYSM REPAIR WITH ENDOGRAFT N/A 2023    Procedure: ABDOMINAL AORTIC ANEURYSM REPAIR WITH ENDOGRAFT;  Surgeon: Collins Sauceda DO;  Location: [place] [address];  Service: Vascular;  Laterality: N/A;    BRONCHOSCOPY      CATARACT  EXTRACTION, BILATERAL      NASAL SINUS SURGERY      TOTAL HIP ARTHROPLASTY Left        Family History: His family history includes Cancer in his brother and another family member; Emphysema in his father; Heart attack in his brother; Heart disease in his brother.   Social History: He  reports that he has been smoking cigarettes. He has a 35.00 pack-year smoking history. He has been exposed to tobacco smoke. He quit smokeless tobacco use about 53 years ago.  His smokeless tobacco use included snuff. He reports current alcohol use of about 2.0 standard drinks of alcohol per week. He reports that he does not use drugs.    Home Medications:  ALPRAZolam, albuterol sulfate HFA, aspirin, clopidogrel, empagliflozin, finasteride, folic acid, furosemide, lisinopril, metoprolol succinate XL, rosuvastatin, sertraline, spironolactone, tamsulosin, and tiotropium bromide-olodaterol    Allergies:  He is allergic to statins and tetanus toxoids.    Objective    Objective   Vitals:    Temp:  [97.8 °F (36.6 °C)-98.2 °F (36.8 °C)] 98.1 °F (36.7 °C)  Heart Rate:  [76-93] 76  Resp:  [16-19] 16  BP: (122-139)/(56-88) 128/77  Output by Drain (mL) 10/23/23 0701 - 10/23/23 1900 10/23/23 1901 - 10/24/23 0700 10/24/23 0701 - 10/24/23 1733 Range Total   Urethral Catheter 16 Fr.  1610 2350 3960       Physical Exam  Vitals reviewed.   Constitutional:       General: He is not in acute distress.     Appearance: Normal appearance. He is well-developed and underweight. He is not ill-appearing.      Comments: Lying in bed, appears comfortable   HENT:      Head: Atraumatic.      Comments: Mild temporal wasting     Right Ear: External ear normal. Decreased hearing noted.      Left Ear: External ear normal. Decreased hearing noted.      Nose: Nose normal.      Mouth/Throat:      Lips: Pink.      Mouth: Mucous membranes are dry.      Dentition: Abnormal dentition (Edentulous upper and lower). No gum lesions.      Tongue: No lesions. Tongue does not deviate  from midline.      Palate: No mass and lesions.      Pharynx: Oropharynx is clear.   Eyes:      General: Lids are normal. Gaze aligned appropriately.      Extraocular Movements: Extraocular movements intact.      Conjunctiva/sclera: Conjunctivae normal.   Neck:      Thyroid: Thyromegaly (Left side) present. No thyroid mass.   Pulmonary:      Effort: Pulmonary effort is normal. No tachypnea, respiratory distress or retractions.      Breath sounds: No stridor.   Musculoskeletal:         General: Normal range of motion.      Cervical back: No rigidity or crepitus. Decreased range of motion.   Lymphadenopathy:      Cervical: No cervical adenopathy.   Skin:     Findings: No rash.   Neurological:      General: No focal deficit present.      Mental Status: He is alert and oriented to person, place, and time.      Cranial Nerves: Cranial nerves 2-12 are intact. No cranial nerve deficit.   Psychiatric:         Attention and Perception: Attention and perception normal.         Mood and Affect: Mood and affect normal.         Speech: Speech normal.         Behavior: Behavior normal. Behavior is cooperative.         Thought Content: Thought content normal.         Cognition and Memory: Cognition normal.         Judgment: Judgment normal.         Result Review    Result Review:  I have personally reviewed the results from the time of this admission to 10/24/2023 17:33 CDT and agree with these findings:  [x]  Laboratory  []  Microbiology  [x]  Radiology  []  EKG/Telemetry   []  Cardiology/Vascular   []  Pathology  [x]  Old records  []  Other:  Most notable findings include: Hypercalcemia, hyperparathyroid hormone  Ultrasound reviewed independently, agree with radiology interpretation  Sestamibi scan reviewed independently, agree with radiology interpretation  Progress Notes by Mari Short APRN (10/24/2023 13:11)    Progress Notes by Kaz Nugent APRN (10/24/2023 10:44)    PTH, Intact (10/22/2023 12:05)    Calcium,  Ionized (10/22/2023 14:40)    Vitamin D,25-Hydroxy (10/22/2023 17:25)    Basic Metabolic Panel (10/24/2023 02:41)    NM Parathyroid Scan w SPECT (10/24/2023 12:04)    US Thyroid (10/24/2023 10:56)    Assessment & Plan    Assessment / Plan   Brief Patient Summary:  Van Mckeon is a 83 y.o. male who was admitted with hypercalcemia.  He was found to have primary hyperparathyroidism.  The patient is undergone evaluation with thyroid ultrasound, sestamibi scanning, repeat laboratory.  His calcium is decreased now.  He was found to have a left lower pole parathyroid mass with left sided thyroid mass.  I have discussed risk, benefits, alternative treatments, options with the patient.  I feel that a left thyroidectomy with left parathyroidectomy is indicated at this point in time.  Medical and surgical options were discussed including observation, continued medical management, medication modification, and surgical management. Risks, benefits and alternatives were discussed and questions were answered. After considering the options, the patient decided to proceed with surgical management.  Medical and surgical options were discussed including medical and surgical options. Risks, benefits and alternatives were discussed and questions were answered. After considering the options, the patient decided to proceed with surgery.     -----SURGERY SCHEDULING:-----  Plan to schedule left thyroid lobectomy, parathyroidectomy    ---INFORMED CONSENT DISCUSSION:---  THYROIDECTOMY: A thyroidectomy was recommended. The risks and benefits were explained including but not limited to bleeding, infection, persistent and/or recurrent disease, risks of the general anesthesia, pain, recurrent laryngeal nerve injury with hoarseness and airway loss, parathyroid injury and hypocalcemia. Operative possibilities including hemithyroidectomy, total thyroidectomy and gemma dissections were discussed. Possibilities for delayed need for total  thyroidectomy pending pathologic diagnosis were also discussed. Alternatives were discussed. No guarantees were made or implied. Questions were asked appropriately answered.    PARATHYROIDECTOMY: A parathyroid exploration and excision was recommended. The risks and benefits were explained including but not limited to bleeding, infection, risks of the general anesthesia, pain, recurrent laryngeal nerve injury with hoarseness and airway loss, hypocalcemia (temporary or permanent), and persistent hypercalcemia. Operative possibilities including 3/4 parathyroidectomy, parathyroid reimplantation, hemithyroidectomy, total thyroidectomy, and gemam dissections were discussed. Alternatives were discussed. Questions were asked appropriately answered.      ---PREOPERATIVE WORKUP:---  labs/ workup per anesthesia     Active Hospital Problems:  Active Hospital Problems    Diagnosis     **Hypercalcemia     Thyroid nodule     Parathyroid adenoma     CKD (chronic kidney disease) stage 3, GFR 30-59 ml/min     Thrombocytopenia     COPD, group B, by GOLD 2017 classification     Essential (primary) hypertension            Plan:   Hypercalcemia-this is being reduced.  The patient is down to 10.7 today.  I recommend continuing the IV fluids.  Hyperparathyroidism-May hyperparathyroidism.  He has elevated PTH.  I will see if this is improving out.  Parathyroid adenoma-the patient has a left-sided inferior parathyroid adenoma.  This probably would benefit from removal.  Thyroid mass-the patient has a left thyroid mass.  During surgery for hyperparathyroidism, the patient could undergo left thyroid lobectomy.  I have recommended this to the patient and his sons.    I discussed the patient's findings and my recommendations with patient, sons.  Following patient as in-patient. Further recommendations will be made based on serial examinations.    Medications/Orders for this encounter: No new medications ordered.  No New orders written.          DVT prophylaxis:  Medical DVT prophylaxis orders are present.    Discharge Planning: Per primary team    Electronically signed by Lion Cardenas Jr, MD, 10/24/23, 5:22 PM CDT.

## 2023-10-24 NOTE — PROGRESS NOTES
Nephrology ([facility]) Progress Note      Patient:  Van Mckeon  YOB: 1940  Date of Service: 10/24/2023  MRN: [id number]   Acct: [id number]   Primary Care Physician: Jean Marie Cabrera MD  Advance Directive:   Code Status and Medical Interventions:   Ordered at: 10/22/23 1237     Medical Intervention Limits:    NO intubation (DNI)     Level Of Support Discussed With:    Patient    Health Care Surrogate     Code Status (Patient has no pulse and is not breathing):    No CPR (Do Not Attempt to Resuscitate)     Medical Interventions (Patient has pulse or is breathing):    Limited Support     Admit Date: 10/22/2023       Hospital Day: 2  Referring Provider: No ref. provider found      Patient personally seen and examined.  Complete chart including Consults, Notes, Operative Reports, Labs, Cardiology, and Radiology studies reviewed as able.        Subjective:  Van Mckeon is a 83 y.o. male for whom we were consulted for evaluation and treatment of hypercalcemia. Prior diagnosis of hypercalcemia due to primary hyperparathyroidism, had followed with a local surgeon but patient recalled no plans for surgery. Recently developed altered mentation, dyspnea, poor appetite. Symptoms similar to prior episode of hypercalcemia so family brought patient into ER for evaluation. In ER labs revealed calcium of 13.7, creatinine 1.34, .  Patient received IV fluids, Zometa, and Calcitonin. Denied chest pain or dyspnea at rest. No urinary changes, no dysuria or hematuria. Denied n/v/d. Denied calcium supplements or recent medication changes. Calcium level has been improving with above treatments.    Today is awake and alert. No new complaints. Had nuclear parathyroid scan earlier this morning. Urine output nonoliguric.   Unfortunately, patient was given TUMS (calcium carbonate) overnight for indigestion.    Allergies:  Statins and Tetanus toxoids    Home Meds:  Medications Prior to  Admission   Medication Sig Dispense Refill Last Dose    albuterol sulfate  (90 Base) MCG/ACT inhaler Inhale 2 puffs 4 (Four) Times a Day.   10/22/2023    ALPRAZolam (XANAX) 0.5 MG tablet Take 1 tablet by mouth 2 (Two) Times a Day As Needed for Anxiety.   10/22/2023    aspirin 81 MG EC tablet Take 1 tablet by mouth Daily.   10/22/2023    clopidogrel (PLAVIX) 75 MG tablet Take 1 tablet by mouth Daily. 90 tablet 1 10/22/2023    empagliflozin (Jardiance) 10 MG tablet tablet Take 1 tablet by mouth Daily. 30 tablet 11 10/22/2023    finasteride (PROSCAR) 5 MG tablet Take 1 tablet by mouth Daily.   10/22/2023    folic acid (FOLVITE) 1 MG tablet Take 1 tablet by mouth Daily. 30 tablet 2 10/22/2023    furosemide (LASIX) 40 MG tablet Take 1 tablet by mouth Daily As Needed (Weight gain or swelling).   10/22/2023    metoprolol succinate XL (TOPROL-XL) 25 MG 24 hr tablet Take 0.5 tablets by mouth Every Evening. 30 tablet 5 Past Week    rosuvastatin (CRESTOR) 10 MG tablet Take 0.5 tablets by mouth Every Night. Takes 1/2 tab every evening   10/22/2023    sertraline (ZOLOFT) 25 MG tablet Take 1 tablet by mouth Daily.   10/21/2023    spironolactone (ALDACTONE) 25 MG tablet Take 1 tablet by mouth Daily. 30 tablet 3 10/22/2023    tamsulosin (FLOMAX) 0.4 MG capsule 24 hr capsule Take 1 capsule by mouth Every Evening.   10/22/2023    tiotropium bromide-olodaterol (Stiolto Respimat) 2.5-2.5 MCG/ACT aerosol solution inhaler Inhale 2 puffs Daily.   10/22/2023    lisinopril (PRINIVIL,ZESTRIL) 10 MG tablet Take 0.5 tablets by mouth Daily.          Medicines:  Current Facility-Administered Medications   Medication Dose Route Frequency Provider Last Rate Last Admin    acetaminophen (TYLENOL) tablet 650 mg  650 mg Oral Q4H PRN Bradley Nassar,         ALPRAZolam (XANAX) tablet 0.25 mg  0.25 mg Oral BID PRN Bradley Nassar DO   0.25 mg at 10/23/23 2212    aspirin EC tablet 81 mg  81 mg Oral Daily Bradley Nassar DO   81 mg at  10/24/23 0846    sennosides-docusate (PERICOLACE) 8.6-50 MG per tablet 2 tablet  2 tablet Oral BID Bradley Nassar DO        And    polyethylene glycol (MIRALAX) packet 17 g  17 g Oral Daily PRN Bradley Nassar DO        And    bisacodyl (DULCOLAX) EC tablet 5 mg  5 mg Oral Daily PRN Bradley Nassar DO        And    bisacodyl (DULCOLAX) suppository 10 mg  10 mg Rectal Daily PRN Bradley Nassar DO        budesonide-formoterol (SYMBICORT) 160-4.5 MCG/ACT inhaler 2 puff  2 puff Inhalation BID - RT Bradley Nassar DO   2 puff at 10/24/23 0530    And    ipratropium (ATROVENT) nebulizer solution 0.5 mg  0.5 mg Nebulization Q6H - RT Bradley Nassar DO   0.5 mg at 10/24/23 0530    calcitonin (MIACALCIN) injection 254 Units  4 Units/kg Intramuscular Q12H Vicente Peña MD   254 Units at 10/24/23 0330    clopidogrel (PLAVIX) tablet 75 mg  75 mg Oral Daily Bradley Nassar DO   75 mg at 10/24/23 0841    Enoxaparin Sodium (LOVENOX) syringe 30 mg  30 mg Subcutaneous Q12H Bradley Nassar DO   30 mg at 10/24/23 0846    finasteride (PROSCAR) tablet 5 mg  5 mg Oral Daily Bradley Nassar DO   5 mg at 10/24/23 0841    folic acid (FOLVITE) tablet 1 mg  1 mg Oral Daily Bradley Nassar DO   1 mg at 10/24/23 0840    [START ON 10/25/2023] furosemide (LASIX) injection 40 mg  40 mg Intravenous Daily Kaz Nugent APRN        furosemide (LASIX) tablet 40 mg  40 mg Oral Daily PRN Bradley Nassar DO        Influenza Vac High-Dose Quad (FLUZONE HIGH DOSE) injection 0.7 mL  0.7 mL Intramuscular During Hospitalization Bradley Nassar DO        nicotine (NICODERM CQ) 14 MG/24HR patch 1 patch  1 patch Transdermal Q24H Bradley Nassar DO   1 patch at 10/24/23 0841    pantoprazole (PROTONIX) EC tablet 40 mg  40 mg Oral Q AM Benita Cohn DO   40 mg at 10/24/23 0549    rosuvastatin (CRESTOR) tablet 5 mg  5 mg Oral Daily Bradley Nassar DO   5 mg at 10/24/23 0840    sertraline (ZOLOFT) tablet 25 mg  25  mg Oral Daily Bradley Nassar DO   25 mg at 10/24/23 0840    sodium chloride 0.9 % flush 10 mL  10 mL Intravenous Q12H Bradley Nassar, DO   10 mL at 10/23/23 2118    sodium chloride 0.9 % flush 10 mL  10 mL Intravenous PRN Bradley Nassar,         sodium chloride 0.9 % infusion 40 mL  40 mL Intravenous PRN Bradley Nassar,         sodium chloride 0.9 % infusion  100 mL/hr Intravenous Continuous Kaz Nugent APRN 150 mL/hr at 10/24/23 0549 150 mL/hr at 10/24/23 0549    sodium phosphates 30 mmol in sodium chloride 0.9 % 500 mL IVPB  30 mmol Intravenous Once Kaz Nugent APRN        tamsulosin (FLOMAX) 24 hr capsule 0.4 mg  0.4 mg Oral Daily Bradley Nassar DO   0.4 mg at 10/24/23 0840       Past Medical History:  Past Medical History:   Diagnosis Date    Alcohol use     Anxiety     Arthritis     Bronchiectasis without complication 04/09/2019    Bronchitis     COPD (chronic obstructive pulmonary disease)     COPD, group B, by GOLD 2017 classification 04/09/2019    Essential hypertension 04/09/2019    Hypertension     Personal history of nicotine dependence 04/09/2019    Prostate hypertrophy     Snoring     Thrombocytopenia     TIA (transient ischemic attack)     Tobacco user 10/03/2019       Past Surgical History:  Past Surgical History:   Procedure Laterality Date    ABDOMINAL AORTIC ANEURYSM REPAIR WITH ENDOGRAFT N/A 7/19/2023    Procedure: ABDOMINAL AORTIC ANEURYSM REPAIR WITH ENDOGRAFT;  Surgeon: Collins Sauceda DO;  Location: [facility] OR ;  Service: Vascular;  Laterality: N/A;    BRONCHOSCOPY      CATARACT EXTRACTION, BILATERAL      NASAL SINUS SURGERY      TOTAL HIP ARTHROPLASTY Left        Family History  Family History   Problem Relation Age of Onset    Emphysema Father     Heart attack Brother     Cancer Brother     Heart disease Brother     Cancer Other     Colon cancer Neg Hx     Colon polyps Neg Hx     Esophageal cancer Neg Hx        Social History  Social History      Socioeconomic History    Marital status:    Tobacco Use    Smoking status: Every Day     Packs/day: 0.50     Years: 70.00     Additional pack years: 0.00     Total pack years: 35.00     Types: Cigarettes     Passive exposure: Current    Smokeless tobacco: Former     Types: Snuff     Quit date: 1970   Vaping Use    Vaping Use: Never used   Substance and Sexual Activity    Alcohol use: Yes     Alcohol/week: 2.0 standard drinks of alcohol     Types: 2 Shots of liquor per week     Comment: daily - 2 mixed drinks    Drug use: No    Sexual activity: Defer       Review of Systems:  History obtained from chart review and the patient  General ROS: No fever or chills  Respiratory ROS: No cough, shortness of breath, wheezing  Cardiovascular ROS: No chest pain or palpitations  Gastrointestinal ROS: No abdominal pain or melena  Genito-Urinary ROS: No dysuria or hematuria  Psych ROS: No anxiety and depression  14 point ROS reviewed with the patient and negative except as noted above and in the HPI unless unable to obtain.    Objective:  Patient Vitals for the past 24 hrs:   BP Temp Temp src Pulse Resp SpO2 Weight   10/24/23 0745 129/87 97.9 °F (36.6 °C) Oral 90 18 95 % --   10/24/23 0530 -- -- -- 81 16 94 % --   10/24/23 0354 122/56 98.2 °F (36.8 °C) Oral 87 18 94 % --   10/23/23 2350 126/62 97.8 °F (36.6 °C) Oral 78 18 93 % 63.6 kg (140 lb 4.8 oz)   10/23/23 2342 -- -- -- 76 18 96 % --   10/23/23 2117 139/88 98.2 °F (36.8 °C) Oral 93 18 93 % --   10/23/23 1900 -- -- -- 89 19 95 % --   10/23/23 1600 107/71 97.5 °F (36.4 °C) Oral 84 18 95 % --   10/23/23 1050 129/71 98 °F (36.7 °C) Oral 74 16 94 % --       Intake/Output Summary (Last 24 hours) at 10/24/2023 1045  Last data filed at 10/24/2023 1005  Gross per 24 hour   Intake 840 ml   Output 3760 ml   Net -2920 ml     General: awake/alert   Chest:  clear to auscultation bilaterally without respiratory distress  CVS: regular rate and rhythm  Abdominal: soft, nontender,  positive bowel sounds  Extremities: no cyanosis or edema  Skin: warm and dry without rash      Labs:  Results from last 7 days   Lab Units 10/22/23  1038   WBC 10*3/mm3 4.98   HEMOGLOBIN g/dL 12.5*   HEMATOCRIT % 39.6   PLATELETS 10*3/mm3 104*         Results from last 7 days   Lab Units 10/24/23  0241 10/23/23  0558 10/22/23  2315 10/22/23  1725 10/22/23  1038   SODIUM mmol/L 139 140  --   --  139   POTASSIUM mmol/L 4.2 4.5  --   --  4.4   CHLORIDE mmol/L 108* 112*  --   --  107   CO2 mmol/L 21.0* 21.0*  --   --  28.0   BUN mg/dL 16 19  --   --  28*   CREATININE mg/dL 1.22 1.11  --   --  1.34*   CALCIUM mg/dL 10.7* 12.0* 12.1*   < > 13.7*   EGFR mL/min/1.73 58.8* 65.9  --   --  52.6*   BILIRUBIN mg/dL  --  0.3  --   --  0.4   ALK PHOS U/L  --  57  --   --  65   ALT (SGPT) U/L  --  6  --   --  9   AST (SGOT) U/L  --  13  --   --  12   GLUCOSE mg/dL 102* 89  --   --  113*    < > = values in this interval not displayed.       Radiology:   Imaging Results (Last 72 Hours)       Procedure Component Value Units Date/Time    US Thyroid [[id number]] Resulted: 10/24/23 1041     Updated: 10/24/23 1041    NM Parathyroid Scan w SPECT [[id number]] Resulted: 10/24/23 0757     Updated: 10/24/23 0757    XR Chest 1 View [[id number]] Collected: 10/22/23 1053     Updated: 10/22/23 1058    Narrative:      EXAMINATION: XR CHEST 1 VW- 10/22/2023 10:53 AM CDT     HISTORY: Shortness of breath.     COMPARISON: Chest x-ray 7/14/2023.     REPORT:  A frontal view of the chest was obtained. The lungs are clear and mildly  hyperinflated. The cardiac and mediastinal silhouettes are within normal  limits. Skinfold artifact is noted over the right upper hemithorax. No  pneumothorax is identified. The visualized bony thorax and upper abdomen  are unremarkable. 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                                                                                                                                    "                Impression:             No acute abnormality. COPD                                                                       This report was signed and finalized on 10/22/2023 10:55 AM CDT by Dr. Francisco Deluna MD.               Culture:  No results found for: \"BLOODCX\", \"URINECX\", \"WOUNDCX\", \"MRSACX\", \"RESPCX\", \"STOOLCX\"      Assessment    Hypercalcemia--improving  Baseline chronic kidney disease stage 3a  Chronic diastolic congestive heart failure  Anemia  Primary hyperparathyroidism  Hypophosphatemia--worse today    Plan:  Adjust rate of IV fluids  2.  Replace phosphorus  3.  ENT consult is pending. Results of parathyroid scan pending.  4.  Please do not give TUMS (Calcium carbonate) to patient admitted for hypercalcemia. Discussed with nursing staff.      Kaz Nugent, APRN  10/24/2023  10:45 CDT    "

## 2023-10-25 ENCOUNTER — READMISSION MANAGEMENT (OUTPATIENT)
Dept: CALL CENTER | Facility: HOSPITAL | Age: 83
End: 2023-10-25
Payer: MEDICARE

## 2023-10-25 VITALS
RESPIRATION RATE: 16 BRPM | WEIGHT: 146.9 LBS | HEIGHT: 72 IN | TEMPERATURE: 98.4 F | OXYGEN SATURATION: 91 % | BODY MASS INDEX: 19.9 KG/M2 | HEART RATE: 79 BPM | DIASTOLIC BLOOD PRESSURE: 59 MMHG | SYSTOLIC BLOOD PRESSURE: 120 MMHG

## 2023-10-25 LAB
1,25(OH)2D SERPL-MCNC: 44.5 PG/ML (ref 24.8–81.5)
ANION GAP SERPL CALCULATED.3IONS-SCNC: 9 MMOL/L (ref 5–15)
BUN SERPL-MCNC: 15 MG/DL (ref 8–23)
BUN/CREAT SERPL: 12.6 (ref 7–25)
CALCIUM SPEC-SCNC: 10.2 MG/DL (ref 8.6–10.5)
CHLORIDE SERPL-SCNC: 109 MMOL/L (ref 98–107)
CO2 SERPL-SCNC: 19 MMOL/L (ref 22–29)
CREAT SERPL-MCNC: 1.19 MG/DL (ref 0.76–1.27)
EGFRCR SERPLBLD CKD-EPI 2021: 60.6 ML/MIN/1.73
GLUCOSE SERPL-MCNC: 100 MG/DL (ref 65–99)
PHOSPHATE SERPL-MCNC: 1.9 MG/DL (ref 2.5–4.5)
POTASSIUM SERPL-SCNC: 3.8 MMOL/L (ref 3.5–5.2)
PTH-INTACT SERPL-MCNC: 466.1 PG/ML (ref 15–65)
SODIUM SERPL-SCNC: 137 MMOL/L (ref 136–145)

## 2023-10-25 PROCEDURE — 25010000002 ENOXAPARIN PER 10 MG: Performed by: INTERNAL MEDICINE

## 2023-10-25 PROCEDURE — 80048 BASIC METABOLIC PNL TOTAL CA: CPT | Performed by: OTOLARYNGOLOGY

## 2023-10-25 PROCEDURE — 25810000003 SODIUM CHLORIDE 0.9 % SOLUTION: Performed by: NURSE PRACTITIONER

## 2023-10-25 PROCEDURE — 94799 UNLISTED PULMONARY SVC/PX: CPT

## 2023-10-25 PROCEDURE — 25010000002 FUROSEMIDE PER 20 MG: Performed by: NURSE PRACTITIONER

## 2023-10-25 PROCEDURE — 84100 ASSAY OF PHOSPHORUS: CPT | Performed by: NURSE PRACTITIONER

## 2023-10-25 PROCEDURE — 99232 SBSQ HOSP IP/OBS MODERATE 35: CPT | Performed by: OTOLARYNGOLOGY

## 2023-10-25 PROCEDURE — 94664 DEMO&/EVAL PT USE INHALER: CPT

## 2023-10-25 PROCEDURE — 83970 ASSAY OF PARATHORMONE: CPT | Performed by: OTOLARYNGOLOGY

## 2023-10-25 RX ORDER — PANTOPRAZOLE SODIUM 40 MG/1
40 TABLET, DELAYED RELEASE ORAL
Qty: 90 TABLET | Refills: 0 | Status: SHIPPED | OUTPATIENT
Start: 2023-10-25

## 2023-10-25 RX ORDER — PANTOPRAZOLE SODIUM 40 MG/1
40 TABLET, DELAYED RELEASE ORAL
Start: 2023-10-25 | End: 2023-10-25 | Stop reason: SDUPTHER

## 2023-10-25 RX ORDER — ENOXAPARIN SODIUM 100 MG/ML
70 INJECTION SUBCUTANEOUS EVERY 12 HOURS SCHEDULED
Qty: 8 ML | Refills: 0 | Status: ON HOLD | OUTPATIENT
Start: 2023-10-26 | End: 2023-10-31

## 2023-10-25 RX ADMIN — FINASTERIDE 5 MG: 5 TABLET, FILM COATED ORAL at 08:38

## 2023-10-25 RX ADMIN — PANTOPRAZOLE SODIUM 40 MG: 40 TABLET, DELAYED RELEASE ORAL at 08:38

## 2023-10-25 RX ADMIN — CLOPIDOGREL BISULFATE 75 MG: 75 TABLET, FILM COATED ORAL at 08:37

## 2023-10-25 RX ADMIN — ASPIRIN 81 MG: 81 TABLET, COATED ORAL at 08:37

## 2023-10-25 RX ADMIN — NICOTINE 1 PATCH: 14 PATCH, EXTENDED RELEASE TRANSDERMAL at 08:38

## 2023-10-25 RX ADMIN — FUROSEMIDE 40 MG: 10 INJECTION, SOLUTION INTRAMUSCULAR; INTRAVENOUS at 08:38

## 2023-10-25 RX ADMIN — FOLIC ACID 1 MG: 1 TABLET ORAL at 08:37

## 2023-10-25 RX ADMIN — BUDESONIDE AND FORMOTEROL FUMARATE DIHYDRATE 2 PUFF: 160; 4.5 AEROSOL RESPIRATORY (INHALATION) at 05:58

## 2023-10-25 RX ADMIN — IPRATROPIUM BROMIDE 0.5 MG: 0.5 SOLUTION RESPIRATORY (INHALATION) at 05:52

## 2023-10-25 RX ADMIN — IPRATROPIUM BROMIDE 0.5 MG: 0.5 SOLUTION RESPIRATORY (INHALATION) at 13:11

## 2023-10-25 RX ADMIN — ROSUVASTATIN CALCIUM 5 MG: 5 TABLET, FILM COATED ORAL at 08:37

## 2023-10-25 RX ADMIN — ENOXAPARIN SODIUM 30 MG: 100 INJECTION SUBCUTANEOUS at 08:38

## 2023-10-25 RX ADMIN — LIDOCAINE HYDROCHLORIDE 10 ML: 20 SOLUTION ORAL at 06:04

## 2023-10-25 RX ADMIN — TAMSULOSIN HYDROCHLORIDE 0.4 MG: 0.4 CAPSULE ORAL at 08:37

## 2023-10-25 RX ADMIN — POTASSIUM & SODIUM PHOSPHATES POWDER PACK 280-160-250 MG 2 PACKET: 280-160-250 PACK at 13:05

## 2023-10-25 RX ADMIN — SODIUM CHLORIDE 100 ML/HR: 900 INJECTION INTRAVENOUS at 01:39

## 2023-10-25 RX ADMIN — SERTRALINE HYDROCHLORIDE 25 MG: 50 TABLET, FILM COATED ORAL at 08:37

## 2023-10-25 NOTE — PROGRESS NOTES
[facility] Otolaryngology Head and Neck Surgery  INPATIENT PROGRESS NOTE    Patient Name: Van Mckeon  : 1940   MRN: [id number]  Date of Admission: 10/22/2023  Today's Date: 10/25/23  Length of Stay: 3  [unfilled]   Jaye Frye MD   Primary Care Physician: Jean Marie Cabrera MD  Surgical Procedures Since Admission:    Subjective   Subjective   Chief Complaint: Hyperparathyroidism, parathyroid adenoma, thyroid mass  Shortness of Breath    Abnormal Lab       Accompanied by: Daughter  Since last examined, Van Mckeon has remained stable overnight.  He has no particular complaints except that he wishes that he could get this fixed, meaning hyperparathyroidism.  Patient seen, chart is reviewed    Review of Systems   Respiratory:  Positive for shortness of breath.       No change from prior inquiry  All pertinent negatives and positives are as above. All other systems have been reviewed and are negative unless otherwise stated.   Objective   Objective   Vitals:   Temp:  [97.3 °F (36.3 °C)-99.1 °F (37.3 °C)] 98.4 °F (36.9 °C)  Heart Rate:  [70-97] 70  Resp:  [16-18] 16  BP: (110-138)/(55-79) 120/59  Output by Drain (mL) 10/24/23 0701 - 10/24/23 1900 10/24/23 1901 - 10/25/23 0700 10/25/23 0701 - 10/25/23 0839 Range Total   Urethral Catheter 16 Fr. 2350 1700  4050       Physical Exam  Vitals reviewed.   Constitutional:       Appearance: Normal appearance. He is well-developed and underweight.   HENT:      Head: Atraumatic.      Comments: Temporal wasting     Right Ear: External ear normal. Decreased hearing noted.      Left Ear: External ear normal. Decreased hearing noted.      Nose: Nose normal.      Mouth/Throat:      Lips: Pink.      Mouth: Mucous membranes are dry.      Dentition: Normal dentition (Edentulous). No gum lesions.      Tongue: No lesions. Tongue does not deviate from midline.      Palate: No mass and lesions.   Eyes:      General: Lids are normal. Gaze aligned  appropriately.      Extraocular Movements: Extraocular movements intact.      Conjunctiva/sclera: Conjunctivae normal.   Neck:      Thyroid: Thyroid mass (Left side) present.     Pulmonary:      Effort: Pulmonary effort is normal. No respiratory distress or retractions.      Breath sounds: No stridor.   Musculoskeletal:         General: Normal range of motion.      Cervical back: No rigidity or crepitus. Decreased range of motion.   Lymphadenopathy:      Cervical: No cervical adenopathy.   Skin:     Findings: No rash.   Neurological:      General: No focal deficit present.      Mental Status: He is alert and oriented to person, place, and time.      Cranial Nerves: Cranial nerves 2-12 are intact. No cranial nerve deficit.   Psychiatric:         Attention and Perception: Attention and perception normal.         Mood and Affect: Mood and affect normal.         Speech: Speech normal.         Behavior: Behavior normal. Behavior is cooperative.         Thought Content: Thought content normal.         Cognition and Memory: Cognition normal.         Judgment: Judgment normal.           Result Review    Result Review:  I have personally reviewed the results from the time of this admission to 10/25/2023 08:39 CDT and agree with these findings:  [x]  Laboratory  []  Microbiology  []  Radiology  []  EKG/Telemetry   []  Cardiology/Vascular   []  Pathology  [x]  Old records  []  Other:  Most notable findings include: Normal calcium, elevated parathyroid hormone  Progress Notes by Mari Short APRN (10/25/2023 08:31)    PTH, Intact (10/25/2023 00:07)    Basic Metabolic Panel (10/25/2023 00:07)    PTH, Intact (10/25/2023 00:07)    Assessment & Plan   Assessment / Plan   Brief Patient Summary:  Van Mckeon is a 83 y.o. male who has stabilized from hypocalcemia.  The patient has hyperparathyroidism with parathyroid adenoma.  He also has a left thyroid mass.  I have discussed risk, benefits, alternative treatment, options with  the patient and his daughter this morning.  They wish to proceed with surgery.  I feel a left thyroidectomy with parathyroidectomy is indicated at this point in time.  I would like for the patient to stop his anticoagulation today.  I can plan his surgery for next Tuesday.  I would prefer to do this sooner rather than later because of the risk of hypercalcemia.  This has been going on for more than a year.    Active Hospital Problems:   Active Hospital Problems    Diagnosis     **Hypercalcemia     Thyroid nodule     Parathyroid adenoma     CKD (chronic kidney disease) stage 3, GFR 30-59 ml/min     Thrombocytopenia     COPD, group B, by GOLD 2017 classification     Essential (primary) hypertension      Plan:   Parathyroid adenoma-the patient has a left inferior parathyroid adenoma by studies.  I plan to remove this in the operating room.  Hyperparathyroidism-related to his parathyroid adenoma  Thyroid mass-the patient has left thyroid mass.  This will require biopsy.  Since I will be doing a parathyroid adenoma removal, I recommend left thyroid removal as well.  The right thyroid appears fairly normal.  Hypercalcemia-this has been controlled.  He is now down to 10.2.  Anticoagulation-patient will have stop anticoagulation for surgical procedure.  I recommend he stop today so that I can plan surgery on Tuesday.  Discussed Plan with patient, daughter  Following patient as in-patient. Further recommendations will be made based on serial examinations.    Medications/Orders for this encounter: Medications ordered- Plavix stopped   No New orders written.     Discharge Planning: Per primary team.  Patient is in satisfactory condition from an ENT standpoint to be discharged home, hold Plavix, bridged with Lovenox.  The patient remains inpatient, I will write orders this weekend for surgery on Tuesday.        DVT prophylaxis:  Medical DVT prophylaxis orders are present.     Electronically signed by Lion Cardenas Jr, MD,  10/25/23, 8:39 AM CDT.

## 2023-10-25 NOTE — PLAN OF CARE
Problem: Adult Inpatient Plan of Care  Goal: Optimal Comfort and Wellbeing  Outcome: Ongoing, Progressing     Problem: Adult Inpatient Plan of Care  Goal: Readiness for Transition of Care  Outcome: Ongoing, Progressing     Problem: Fluid Volume Deficit  Goal: Fluid Balance  Outcome: Ongoing, Progressing     Problem: Skin Injury Risk Increased  Goal: Skin Health and Integrity  Outcome: Ongoing, Progressing

## 2023-10-25 NOTE — PROGRESS NOTES
Mr. Mckeon is a member of the [employer], [facility] [employer] [facility].  His  has  been contacted and he received a visit from him.  I visited him a couple times during his hospital admission.  No needs were expressed during our conversations.

## 2023-10-25 NOTE — DISCHARGE SUMMARY
[facility] Medicine Services  DISCHARGE SUMMARY       Date of Admission: 10/22/2023  Date of Discharge:  10/25/2023  Primary Care Physician: Jean Marie Cabrera MD    Presenting Problem/History of Present Illness:  Shortness of breath and confusion    Final Discharge Diagnoses:  Active Hospital Problems    Diagnosis     **Hypercalcemia     Thyroid nodule     Parathyroid adenoma     CKD (chronic kidney disease) stage 3, GFR 30-59 ml/min     Thrombocytopenia     COPD, group B, by GOLD 2017 classification     Essential (primary) hypertension        Consults: Dr. Freedman with nephrology.    Dr. Cardenas with ENT.    Procedures Performed: none.    Pertinent Test Results:   Results for orders placed during the hospital encounter of 06/23/23    Adult Transthoracic Echo Complete w/ Color, Spectral and Contrast if necessary per protocol    Interpretation Summary    Left ventricular systolic function is normal. Left ventricular ejection fraction appears to be 61 - 65%.    Left ventricular diastolic function was normal.    The right ventricular cavity is borderline dilated with normal systolic function.    The right atrial cavity is borderline dilated.    Mild pulmonary hypertension is present with estimated RVSP 42 mmHg.    No hemodynamically significant valve disease.      Imaging Results (All)       Procedure Component Value Units Date/Time    NM Parathyroid Scan w SPECT [[id number]] Collected: 10/25/23 0924     Updated: 10/25/23 0957    Narrative:      EXAM: NM PARATHYROID SCAN WITH SPECT- - 10/24/2023 7:56 AM CDT     HISTORY: Hyperparathyroidism       COMPARISON: 5/21/2021.      RADIOPHARMACEUTICAL: 20.3 mCi of 99m-Technetium Sestamibi was  administered intravenously.     TECHNIQUE: Planar, SPECT and unenhanced CT images obtained per protocol.     FINDINGS:   There is a oval cystic structure exophytic from or posterior to the LEFT  inferior thyroid gland, about 3-4 cm in size, but  measurement is limited  due to technique. There is focal persistent tracer at the posterior  inferior aspect, greater than residual thyroid uptake. This could  represent cystic degeneration of a parathyroid adenoma with a possible  solid component at the posterior aspect. Recommend CT neck with contrast  (arterial and venous phase) to the level of the nicho for improved  anatomic correlation.          Impression:      1. Posterior to or exophytic from the LEFT inferior thyroid, there is a  3-4 cm cystic structure, which could represent exophytic thyroid nodule  or cystic degeneration of a parathyroid adenoma. There is persistent  focal tracer uptake at the posterior aspect, concerning for parathyroid  adenoma. Recommend CT neck without and with contrast (precontrast,  arterial and venous phases) from maxilla to the nicho for improved  anatomic correlation.     This report was signed and finalized on 10/25/2023 9:54 AM CDT by Dr Renee Claudio MD.       US Thyroid [[id number]] Collected: 10/24/23 1344     Updated: 10/24/23 1510    Narrative:      EXAM: US THYROID- - 10/24/2023 10:41 AM CDT     HISTORY: Hyperparathyroid; E83.52-Hypercalcemia; R00.1-Bradycardia,  unspecified; J44.1-Chronic obstructive pulmonary disease with (acute)  exacerbation       COMPARISON: No existing relevant imaging studies available     TECHNIQUE: Real-time ultrasound performed with representative images  saved to PACS.     FINDINGS:  RIGHT lobe. Measures 5.1 x 1.9 x 1.5 cm.  ISTHMUS. Measures 0.2 cm.  LEFT lobe. Measures 8.3 x 5.1 x 3.3 cm.     There is a macrocalcification in the RIGHT thyroid lobe measuring 0.4  cm.        NODULE 1 -  Located -LEFT  Size -4.6 x 5.2 x 3.2 cm  Density -mixed solid and cystic (1 point)  Echogenicity -hypoechoic (2 points)  Shape -taller than wide (3 points)  Margins -ill-defined (0 points)  Calcifications -none or large comet tail artifacts (0 points)     Note that the cystic component has echogenic debris  and there is a solid  component at the anterior/superficial aspect.     ACR TI-RADS 2017 risk category: TR4 (4-6 points)  FNA if greater than 1.5 cm.   Follow up if greater than 1 cm.        OTHER FINDINGS: Incidentally imaged RIGHT carotid artery with an  echogenic focus, which appears to be eccentric and nonobstructing, not  optimally evaluated on this exam. Comparison is made to 5/16/2023  carotid ultrasound, which demonstrated calcified plaque.          Impression:      1. Dominant LEFT thyroid nodule meeting criteria for ultrasound-guided  fine-needle aspiration with solid component superficially.  2. RIGHT carotid artery with an echogenic focus, which appears to be  eccentric and nonobstructing, most likely calcified atherosclerosis.  This is not optimally evaluated on this exam. had carotid ultrasound  5/16/2023. Recommend correlation with symptoms.              This report was signed and finalized on 10/24/2023 3:07 PM CDT by Dr Renee Claudio MD.       XR Chest 1 View [[id number]] Collected: 10/22/23 1053     Updated: 10/22/23 1058    Narrative:      EXAMINATION: XR CHEST 1 VW- 10/22/2023 10:53 AM CDT     HISTORY: Shortness of breath.     COMPARISON: Chest x-ray 7/14/2023.     REPORT:  A frontal view of the chest was obtained. The lungs are clear and mildly  hyperinflated. The cardiac and mediastinal silhouettes are within normal  limits. Skinfold artifact is noted over the right upper hemithorax. No  pneumothorax is identified. The visualized bony thorax and upper abdomen  are unremarkable. 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                                                                                                                                                   Impression:             No acute abnormality. COPD                                                                       This report was signed and finalized on 10/22/2023 10:55 AM CDT by Dr. Francisco Deluna  MD.             LAB RESULTS:      Lab 10/22/23  1038   WBC 4.98   HEMOGLOBIN 12.5*   HEMATOCRIT 39.6   PLATELETS 104*   NEUTROS ABS 3.43   IMMATURE GRANS (ABS) 0.01   LYMPHS ABS 1.22   MONOS ABS 0.26   EOS ABS 0.04   MCV 98.5*         Lab 10/25/23  0007 10/24/23  0241 10/23/23  0558 10/22/23  2315 10/22/23  1725 10/22/23  1440 10/22/23  1038 10/20/23  1017   SODIUM 137 139 140  --   --   --  139 140   POTASSIUM 3.8 4.2 4.5  --   --   --  4.4 4.6   CHLORIDE 109* 108* 112*  --   --   --  107 107   CO2 19.0* 21.0* 21.0*  --   --   --  28.0 25.0   ANION GAP 9.0 10.0 7.0  --   --   --  4.0* 8.0   BUN 15 16 19  --   --   --  28* 23   CREATININE 1.19 1.22 1.11  --   --   --  1.34* 1.39*   EGFR 60.6 58.8* 65.9  --   --   --  52.6* 50.3*   GLUCOSE 100* 102* 89  --   --   --  113* 104*   CALCIUM 10.2 10.7* 12.0* 12.1* 12.7*  --  13.7* 12.7*   IONIZED CALCIUM  --   --   --   --   --  7.19*  --   --    MAGNESIUM  --   --  1.9  --   --   --   --   --    PHOSPHORUS 1.9* 1.5* 1.9*  --   --   --   --   --          Lab 10/23/23  0558 10/22/23  1038   TOTAL PROTEIN 5.9* 7.1   ALBUMIN 3.6 4.3   GLOBULIN 2.3 2.8   ALT (SGPT) 6 9   AST (SGOT) 13 12   BILIRUBIN 0.3 0.4   ALK PHOS 57 65         Lab 10/22/23  1205 10/22/23  1038   PROBNP  --  58.6   HSTROP T 43* 46*                 Brief Urine Lab Results       None          Microbiology Results (last 10 days)       ** No results found for the last 240 hours. **            Hospital Course:   Mr. Mckeon is an 83-year-old male who presented to [facility] on 10/22 for shortness of breath and confusion.  Patient recalls prior diagnosis of hypercalcemia due to primary hyperparathyroidism and had followed with a local surgeon for this without operative planning. History of present illness provided by his daughter.  No calcium supplements or other over-the-counter or prescription medication changes recently. His daughter states that he has had some confusion in the past when his  "calcium levels were high.  He recently had some labs drawn and it was 12.7.  In the ED, calcium 13.7.      He has a history of primary hyperparathyroidism. He had followed with a local surgeon in [place] for this without operative planning.  Parathyroid hormone 518 on admission with repeat 466.  ENT consulted.  He had a thyroid ultrasound that showed a left sided thyroid mass and NM parathyroid scan that showed a left lower pole parathyroid mass.  ENT recommends left thyroid lobectomy, parathyroidectomy on Tuesday, 10/31/2023 after Plavix washout.  ENT recommends discontinue Plavix and bridge with Lovenox.  He is okay for discharge from ENT standpoint.    Nephrology consulted for hypercalcemia.  Calcium 13.7 on admission.  Medical management of calcium with calcitonin, Zometa, lasix and IV fluid per nephrology.  Calcium 10.2 today.  Phosphorus remains low at 1.9 -discussed with pharmacist - PHOS-NAK 1 packet 4 times daily with meals until he has surgery.     Baseline chronic kidney disease stage IIIa. Creatinine stable.     Chronic preserved EF CHF -no signs of overload or heart failure.     COPD -lungs are clear.  No wheezing.  No signs of exacerbation.  Continue Symbicort, Atrovent.     Lovenox for VTE prophylaxis.     Tobacco cessation counseling.  Offer nicotine patch.    He has reached maximum benefit of hospitalization.  He is medically stable and appropriate for discharge today.    Physical Exam on Discharge:  /59 (BP Location: Right arm, Patient Position: Lying)   Pulse 70   Temp 98.4 °F (36.9 °C) (Oral)   Resp 16   Ht 182.9 cm (72.01\")   Wt 66.6 kg (146 lb 14.4 oz)   SpO2 91%   BMI 19.92 kg/m²   Physical Exam  Vitals reviewed.   Constitutional:       General: He is not in acute distress.     Appearance: He is not toxic-appearing.      Comments: Lying in bed.  No acute distress.  On room air.  No family at bedside.  Discussed with his nurse Maxi BOSCH:      Head: Normocephalic and atraumatic. "      Mouth/Throat:      Mouth: Mucous membranes are moist.      Pharynx: Oropharynx is clear.   Eyes:      Extraocular Movements: Extraocular movements intact.      Conjunctiva/sclera: Conjunctivae normal.      Pupils: Pupils are equal, round, and reactive to light.   Cardiovascular:      Rate and Rhythm: Normal rate and regular rhythm.      Pulses: Normal pulses.   Pulmonary:      Effort: Pulmonary effort is normal. No respiratory distress.      Breath sounds: Normal breath sounds.   Abdominal:      General: Bowel sounds are normal. There is no distension.      Palpations: Abdomen is soft.      Tenderness: There is no abdominal tenderness.   Musculoskeletal:         General: No swelling or tenderness. Normal range of motion.      Cervical back: Normal range of motion and neck supple. No muscular tenderness.   Skin:     General: Skin is warm and dry.      Findings: No erythema or rash.   Neurological:      General: No focal deficit present.      Mental Status: He is alert and oriented to person, place, and time.      Cranial Nerves: No cranial nerve deficit.   Psychiatric:         Mood and Affect: Mood normal.         Behavior: Behavior normal.     Condition on Discharge: medically stable.    Discharge Disposition:  Home or Self Care    Discharge Medications:     Discharge Medications        New Medications        Instructions Start Date   Enoxaparin Sodium 80 MG/0.8ML solution prefilled syringe syringe  Commonly known as: LOVENOX   70 mg, Subcutaneous, Every 12 Hours Scheduled   Start Date: October 26, 2023     Influenza Vac High-Dose Quad 0.7 ML suspension prefilled syringe injection  Commonly known as: FLUZONE HIGH DOSE   0.7 mL, Intramuscular, Once      pantoprazole 40 MG EC tablet  Commonly known as: PROTONIX   40 mg, Oral, 2 Times Daily Before Meals, Obtain OTC      potassium & sodium phosphates 280-160-250 MG pack packet  Commonly known as: PHOS-NAK   1 packet, Oral, 4 Times Daily Before Meals & Nightly    Start Date: October 26, 2023            Continue These Medications        Instructions Start Date   albuterol sulfate  (90 Base) MCG/ACT inhaler  Commonly known as: PROVENTIL HFA;VENTOLIN HFA;PROAIR HFA   2 puffs, Inhalation, 4 Times Daily - RT      ALPRAZolam 0.5 MG tablet  Commonly known as: XANAX   0.5 mg, Oral, 2 Times Daily PRN      aspirin 81 MG EC tablet   81 mg, Oral, Daily      empagliflozin 10 MG tablet tablet  Commonly known as: Jardiance   10 mg, Oral, Daily      finasteride 5 MG tablet  Commonly known as: PROSCAR   5 mg, Oral, Daily      folic acid 1 MG tablet  Commonly known as: FOLVITE   1 mg, Oral, Daily      furosemide 40 MG tablet  Commonly known as: LASIX   40 mg, Oral, Daily PRN      lisinopril 10 MG tablet  Commonly known as: PRINIVIL,ZESTRIL   5 mg, Oral, Daily      metoprolol succinate XL 25 MG 24 hr tablet  Commonly known as: TOPROL-XL   12.5 mg, Oral, Every Evening      rosuvastatin 10 MG tablet  Commonly known as: CRESTOR   5 mg, Oral, Nightly, Takes 1/2 tab every evening       sertraline 25 MG tablet  Commonly known as: ZOLOFT   25 mg, Oral, Daily      Stiolto Respimat 2.5-2.5 MCG/ACT aerosol solution inhaler  Generic drug: tiotropium bromide-olodaterol   2 puffs, Inhalation, Daily - RT      tamsulosin 0.4 MG capsule 24 hr capsule  Commonly known as: FLOMAX   1 capsule, Oral, Every Evening             Stop These Medications      clopidogrel 75 MG tablet  Commonly known as: PLAVIX     spironolactone 25 MG tablet  Commonly known as: ALDACTONE            Discharge Diet:   Diet Instructions       Diet: Regular/House Diet; Regular Texture (IDDSI 7); Thin (IDDSI 0)      Discharge Diet: Regular/House Diet    Texture: Regular Texture (IDDSI 7)    Fluid Consistency: Thin (IDDSI 0)            Activity at Discharge:   Activity Instructions       Activity as Tolerated              Follow-up Appointments:   1.  Follow-up with primary care provider in 1 week.  2.  Follow-up with Dr. Cardenas for  left thyroid lobectomy, parathyroidectomy on Tuesday, 10/31/2023.  Future Appointments   Date Time Provider Department Center   12/20/2023 10:30 AM  [facility]  [facility] None   1/30/2024  2:00 PM Josephine Max APRN [facility]   2/23/2024  8:45 AM [facility] 1  [facility]   2/23/2024  9:30 AM Natalie David APRN [facility]       Test Results Pending at Discharge: none.    Electronically signed by JAE Loving, 10/25/23, 12:08 CDT.    Time: 35 minutes.

## 2023-10-25 NOTE — PROGRESS NOTES
Nephrology ([facility]) Progress Note      Patient:  Van Mckeon  YOB: 1940  Date of Service: 10/25/2023  MRN: [id number]   Acct: [id number]   Primary Care Physician: Jean Marie Cabrera MD  Advance Directive:   Code Status and Medical Interventions:   Ordered at: 10/22/23 1237     Medical Intervention Limits:    NO intubation (DNI)     Level Of Support Discussed With:    Patient    Health Care Surrogate     Code Status (Patient has no pulse and is not breathing):    No CPR (Do Not Attempt to Resuscitate)     Medical Interventions (Patient has pulse or is breathing):    Limited Support     Admit Date: 10/22/2023       Hospital Day: 3  Referring Provider: No ref. provider found      Patient personally seen and examined.  Complete chart including Consults, Notes, Operative Reports, Labs, Cardiology, and Radiology studies reviewed as able.        Subjective:  Van Mckeon is a 83 y.o. male for whom we were consulted for evaluation and treatment of hypercalcemia. Prior diagnosis of hypercalcemia due to primary hyperparathyroidism, had followed with a local surgeon but patient recalled no plans for surgery. Recently developed altered mentation, dyspnea, poor appetite. Symptoms similar to prior episode of hypercalcemia so family brought patient into ER for evaluation. In ER labs revealed calcium of 13.7, creatinine 1.34, .  Patient received IV fluids, Zometa, and Calcitonin. Denied chest pain or dyspnea at rest. No urinary changes, no dysuria or hematuria. Denied n/v/d. Denied calcium supplements or recent medication changes. Calcium level has been improving with above treatments.    Today is awake and alert. No new complaints. Had nuclear parathyroid scan that showed possible left lower parathyroid adenoma. Urine output nonoliguric.     Allergies:  Statins and Tetanus toxoids    Home Meds:  No medications prior to admission.       Medicines:  Current  Facility-Administered Medications   Medication Dose Route Frequency Provider Last Rate Last Admin    acetaminophen (TYLENOL) tablet 650 mg  650 mg Oral Q4H PRN Bradley Nassar DO        ALPRAZolam (XANAX) tablet 0.25 mg  0.25 mg Oral BID PRN Bradley Nassar DO   0.25 mg at 10/23/23 2212    aspirin EC tablet 81 mg  81 mg Oral Daily Bradley Nassar DO   81 mg at 10/25/23 0837    sennosides-docusate (PERICOLACE) 8.6-50 MG per tablet 2 tablet  2 tablet Oral BID Bradley Nassar DO        And    polyethylene glycol (MIRALAX) packet 17 g  17 g Oral Daily PRN Bradley Nassar DO        And    bisacodyl (DULCOLAX) EC tablet 5 mg  5 mg Oral Daily PRN Bradley Nassar DO        And    bisacodyl (DULCOLAX) suppository 10 mg  10 mg Rectal Daily PRN Bradley Nassar DO        budesonide-formoterol (SYMBICORT) 160-4.5 MCG/ACT inhaler 2 puff  2 puff Inhalation BID - RT Bradley Nassar DO   2 puff at 10/25/23 0558    And    ipratropium (ATROVENT) nebulizer solution 0.5 mg  0.5 mg Nebulization Q6H - RT Bradley Nassar DO   0.5 mg at 10/25/23 1311    Enoxaparin Sodium (LOVENOX) syringe 30 mg  30 mg Subcutaneous Q12H Bradley Nassar DO   30 mg at 10/25/23 0838    finasteride (PROSCAR) tablet 5 mg  5 mg Oral Daily Bradley Nassar DO   5 mg at 10/25/23 0838    folic acid (FOLVITE) tablet 1 mg  1 mg Oral Daily Bradley Nassar DO   1 mg at 10/25/23 0837    furosemide (LASIX) injection 40 mg  40 mg Intravenous Daily Kaz Nugent APRN   40 mg at 10/25/23 0838    furosemide (LASIX) tablet 40 mg  40 mg Oral Daily PRN Bradley Nassar DO        Influenza Vac High-Dose Quad (FLUZONE HIGH DOSE) injection 0.7 mL  0.7 mL Intramuscular During Hospitalization Bradley Nassar DO        Lidocaine Viscous HCl (XYLOCAINE) 2 % solution 10 mL  10 mL Mouth/Throat Q3H PRN Benita Cohn DO   10 mL at 10/25/23 0604    nicotine (NICODERM CQ) 14 MG/24HR patch 1 patch  1 patch Transdermal Q24H Bradley Nassar DO   1 patch  at 10/25/23 0838    pantoprazole (PROTONIX) EC tablet 40 mg  40 mg Oral BID AC ShortMari dasilva APRN   40 mg at 10/25/23 0838    rosuvastatin (CRESTOR) tablet 5 mg  5 mg Oral Daily Bradley Nassar DO   5 mg at 10/25/23 0837    sertraline (ZOLOFT) tablet 25 mg  25 mg Oral Daily Bradley Nassar DO   25 mg at 10/25/23 0837    sodium chloride 0.9 % flush 10 mL  10 mL Intravenous Q12H Bradley Nassar DO   10 mL at 10/24/23 2030    sodium chloride 0.9 % flush 10 mL  10 mL Intravenous PRN Bradley Nassar DO        sodium chloride 0.9 % infusion 40 mL  40 mL Intravenous PRN Bradley Nassar DO        sodium chloride 0.9 % infusion  100 mL/hr Intravenous Continuous Kaz Nugent APRN   Stopped at 10/25/23 1114    tamsulosin (FLOMAX) 24 hr capsule 0.4 mg  0.4 mg Oral Daily Bradley Nassar DO   0.4 mg at 10/25/23 0837     Current Outpatient Medications   Medication Sig Dispense Refill    albuterol sulfate  (90 Base) MCG/ACT inhaler Inhale 2 puffs 4 (Four) Times a Day.      ALPRAZolam (XANAX) 0.5 MG tablet Take 1 tablet by mouth 2 (Two) Times a Day As Needed for Anxiety.      aspirin 81 MG EC tablet Take 1 tablet by mouth Daily.      empagliflozin (Jardiance) 10 MG tablet tablet Take 1 tablet by mouth Daily. 30 tablet 11    finasteride (PROSCAR) 5 MG tablet Take 1 tablet by mouth Daily.      folic acid (FOLVITE) 1 MG tablet Take 1 tablet by mouth Daily. 30 tablet 2    furosemide (LASIX) 40 MG tablet Take 1 tablet by mouth Daily As Needed (Weight gain or swelling).      Influenza Vac High-Dose Quad (FLUZONE HIGH DOSE) 0.7 ML suspension prefilled syringe injection Inject 0.7 mL into the appropriate muscle as directed by prescriber 1 (One) Time for 1 dose.      metoprolol succinate XL (TOPROL-XL) 25 MG 24 hr tablet Take 0.5 tablets by mouth Every Evening. 30 tablet 5    pantoprazole (PROTONIX) 40 MG EC tablet Take 1 tablet by mouth 2 (Two) Times a Day Before Meals. 90 tablet 0    rosuvastatin (CRESTOR) 10 MG  tablet Take 0.5 tablets by mouth Every Night. Takes 1/2 tab every evening      sertraline (ZOLOFT) 25 MG tablet Take 1 tablet by mouth Daily.      tamsulosin (FLOMAX) 0.4 MG capsule 24 hr capsule Take 1 capsule by mouth Every Evening.      tiotropium bromide-olodaterol (Stiolto Respimat) 2.5-2.5 MCG/ACT aerosol solution inhaler Inhale 2 puffs Daily.      [START ON 10/26/2023] Enoxaparin Sodium (LOVENOX) 80 MG/0.8ML solution prefilled syringe syringe Inject 0.7 mL under the skin into the appropriate area as directed Every 12 (Twelve) Hours for 5 days. 8 mL 0    lisinopril (PRINIVIL,ZESTRIL) 10 MG tablet Take 0.5 tablets by mouth Daily.      [START ON 10/26/2023] potassium & sodium phosphates (PHOS-NAK) 280-160-250 MG pack packet Take 1 packet by mouth 4 (Four) Times a Day Before Meals & at Bedtime for 5 days. 20 packet 0       Past Medical History:  Past Medical History:   Diagnosis Date    Alcohol use     Anxiety     Arthritis     Bronchiectasis without complication 04/09/2019    Bronchitis     COPD (chronic obstructive pulmonary disease)     COPD, group B, by GOLD 2017 classification 04/09/2019    Essential hypertension 04/09/2019    Hypertension     Personal history of nicotine dependence 04/09/2019    Prostate hypertrophy     Snoring     Thrombocytopenia     TIA (transient ischemic attack)     Tobacco user 10/03/2019       Past Surgical History:  Past Surgical History:   Procedure Laterality Date    ABDOMINAL AORTIC ANEURYSM REPAIR WITH ENDOGRAFT N/A 7/19/2023    Procedure: ABDOMINAL AORTIC ANEURYSM REPAIR WITH ENDOGRAFT;  Surgeon: Collins Sauceda DO;  Location: [facility] OR ;  Service: Vascular;  Laterality: N/A;    BRONCHOSCOPY      CATARACT EXTRACTION, BILATERAL      NASAL SINUS SURGERY      TOTAL HIP ARTHROPLASTY Left        Family History  Family History   Problem Relation Age of Onset    Emphysema Father     Heart attack Brother     Cancer Brother     Heart disease Brother     Cancer Other      Colon cancer Neg Hx     Colon polyps Neg Hx     Esophageal cancer Neg Hx        Social History  Social History     Socioeconomic History    Marital status:    Tobacco Use    Smoking status: Every Day     Packs/day: 0.50     Years: 70.00     Additional pack years: 0.00     Total pack years: 35.00     Types: Cigarettes     Passive exposure: Current    Smokeless tobacco: Former     Types: Snuff     Quit date: 1970   Vaping Use    Vaping Use: Never used   Substance and Sexual Activity    Alcohol use: Yes     Alcohol/week: 2.0 standard drinks of alcohol     Types: 2 Shots of liquor per week     Comment: daily - 2 mixed drinks    Drug use: No    Sexual activity: Defer       Review of Systems:  History obtained from chart review and the patient  General ROS: No fever or chills  Respiratory ROS: No cough, shortness of breath, wheezing  Cardiovascular ROS: No chest pain or palpitations  Gastrointestinal ROS: No abdominal pain or melena  Genito-Urinary ROS: No dysuria or hematuria  Psych ROS: No anxiety and depression  14 point ROS reviewed with the patient and negative except as noted above and in the HPI unless unable to obtain.    Objective:  Patient Vitals for the past 24 hrs:   BP Temp Temp src Pulse Resp SpO2 Weight   10/25/23 1335 -- -- -- 79 16 -- --   10/25/23 1311 -- -- -- 75 16 -- --   10/25/23 0818 120/59 98.4 °F (36.9 °C) Oral 70 16 91 % --   10/25/23 0601 -- -- -- 86 16 -- --   10/25/23 0558 -- -- -- 75 16 -- --   10/25/23 0552 -- -- -- 82 16 93 % --   10/25/23 0455 110/61 99.1 °F (37.3 °C) Oral 97 16 94 % --   10/24/23 2348 114/55 97.3 °F (36.3 °C) Oral 76 18 93 % 66.6 kg (146 lb 14.4 oz)   10/24/23 2343 -- -- -- 87 17 93 % --   10/24/23 2050 114/62 98.7 °F (37.1 °C) Oral 89 18 93 % --   10/24/23 1918 -- -- -- 88 17 95 % --   10/24/23 1557 128/77 98.1 °F (36.7 °C) Oral 76 16 93 % --       Intake/Output Summary (Last 24 hours) at 10/25/2023 1554  Last data filed at 10/25/2023 1114  Gross per 24 hour    Intake 6842.41 ml   Output 3200 ml   Net 3642.41 ml     General: awake/alert   Chest:  clear to auscultation bilaterally without respiratory distress  CVS: regular rate and rhythm  Abdominal: soft, nontender, positive bowel sounds  Extremities: no cyanosis or edema  Skin: warm and dry without rash      Labs:  Results from last 7 days   Lab Units 10/22/23  1038   WBC 10*3/mm3 4.98   HEMOGLOBIN g/dL 12.5*   HEMATOCRIT % 39.6   PLATELETS 10*3/mm3 104*         Results from last 7 days   Lab Units 10/25/23  0007 10/24/23  0241 10/23/23  0558 10/22/23  1725 10/22/23  1038   SODIUM mmol/L 137 139 140  --  139   POTASSIUM mmol/L 3.8 4.2 4.5  --  4.4   CHLORIDE mmol/L 109* 108* 112*  --  107   CO2 mmol/L 19.0* 21.0* 21.0*  --  28.0   BUN mg/dL 15 16 19  --  28*   CREATININE mg/dL 1.19 1.22 1.11  --  1.34*   CALCIUM mg/dL 10.2 10.7* 12.0*   < > 13.7*   EGFR mL/min/1.73 60.6 58.8* 65.9  --  52.6*   BILIRUBIN mg/dL  --   --  0.3  --  0.4   ALK PHOS U/L  --   --  57  --  65   ALT (SGPT) U/L  --   --  6  --  9   AST (SGOT) U/L  --   --  13  --  12   GLUCOSE mg/dL 100* 102* 89  --  113*    < > = values in this interval not displayed.       Radiology:   Imaging Results (Last 72 Hours)       Procedure Component Value Units Date/Time    NM Parathyroid Scan w SPECT [[id number]] Collected: 10/25/23 0924     Updated: 10/25/23 0957    Narrative:      EXAM: NM PARATHYROID SCAN WITH SPECT- - 10/24/2023 7:56 AM CDT     HISTORY: Hyperparathyroidism       COMPARISON: 5/21/2021.      RADIOPHARMACEUTICAL: 20.3 mCi of 99m-Technetium Sestamibi was  administered intravenously.     TECHNIQUE: Planar, SPECT and unenhanced CT images obtained per protocol.     FINDINGS:   There is a oval cystic structure exophytic from or posterior to the LEFT  inferior thyroid gland, about 3-4 cm in size, but measurement is limited  due to technique. There is focal persistent tracer at the posterior  inferior aspect, greater than residual thyroid uptake. This  could  represent cystic degeneration of a parathyroid adenoma with a possible  solid component at the posterior aspect. Recommend CT neck with contrast  (arterial and venous phase) to the level of the nicho for improved  anatomic correlation.          Impression:      1. Posterior to or exophytic from the LEFT inferior thyroid, there is a  3-4 cm cystic structure, which could represent exophytic thyroid nodule  or cystic degeneration of a parathyroid adenoma. There is persistent  focal tracer uptake at the posterior aspect, concerning for parathyroid  adenoma. Recommend CT neck without and with contrast (precontrast,  arterial and venous phases) from maxilla to the nicho for improved  anatomic correlation.     This report was signed and finalized on 10/25/2023 9:54 AM CDT by Dr Renee Claudio MD.       US Thyroid [[id number]] Collected: 10/24/23 1344     Updated: 10/24/23 1510    Narrative:      EXAM: US THYROID- - 10/24/2023 10:41 AM CDT     HISTORY: Hyperparathyroid; E83.52-Hypercalcemia; R00.1-Bradycardia,  unspecified; J44.1-Chronic obstructive pulmonary disease with (acute)  exacerbation       COMPARISON: No existing relevant imaging studies available     TECHNIQUE: Real-time ultrasound performed with representative images  saved to PACS.     FINDINGS:  RIGHT lobe. Measures 5.1 x 1.9 x 1.5 cm.  ISTHMUS. Measures 0.2 cm.  LEFT lobe. Measures 8.3 x 5.1 x 3.3 cm.     There is a macrocalcification in the RIGHT thyroid lobe measuring 0.4  cm.        NODULE 1 -  Located -LEFT  Size -4.6 x 5.2 x 3.2 cm  Density -mixed solid and cystic (1 point)  Echogenicity -hypoechoic (2 points)  Shape -taller than wide (3 points)  Margins -ill-defined (0 points)  Calcifications -none or large comet tail artifacts (0 points)     Note that the cystic component has echogenic debris and there is a solid  component at the anterior/superficial aspect.     ACR TI-RADS 2017 risk category: TR4 (4-6 points)  FNA if greater than 1.5 cm.  "  Follow up if greater than 1 cm.        OTHER FINDINGS: Incidentally imaged RIGHT carotid artery with an  echogenic focus, which appears to be eccentric and nonobstructing, not  optimally evaluated on this exam. Comparison is made to 5/16/2023  carotid ultrasound, which demonstrated calcified plaque.          Impression:      1. Dominant LEFT thyroid nodule meeting criteria for ultrasound-guided  fine-needle aspiration with solid component superficially.  2. RIGHT carotid artery with an echogenic focus, which appears to be  eccentric and nonobstructing, most likely calcified atherosclerosis.  This is not optimally evaluated on this exam. had carotid ultrasound  5/16/2023. Recommend correlation with symptoms.              This report was signed and finalized on 10/24/2023 3:07 PM CDT by Dr Renee Claudio MD.               Culture:  No results found for: \"BLOODCX\", \"URINECX\", \"WOUNDCX\", \"MRSACX\", \"RESPCX\", \"STOOLCX\"      Assessment    Hypercalcemia--improving  Baseline chronic kidney disease stage 3a  Chronic diastolic congestive heart failure  Anemia  Primary hyperparathyroidism  Hypophosphatemia--worse today    Plan:  Follow up with ENT for neck exploration and parathyroidectomy  2.   Follow-up at the renal clinic within 2 weeks.      Rishabh Freedman MD  10/25/2023  15:54 CDT    "

## 2023-10-26 ENCOUNTER — PREP FOR SURGERY (OUTPATIENT)
Dept: OTHER | Facility: HOSPITAL | Age: 83
End: 2023-10-26
Payer: MEDICARE

## 2023-10-26 ENCOUNTER — HOSPITAL ENCOUNTER (EMERGENCY)
Facility: HOSPITAL | Age: 83
Discharge: HOME | End: 2023-10-26
Attending: EMERGENCY MEDICINE
Payer: MEDICARE

## 2023-10-26 VITALS
SYSTOLIC BLOOD PRESSURE: 98 MMHG | WEIGHT: 141.5 LBS | OXYGEN SATURATION: 96 % | RESPIRATION RATE: 18 BRPM | HEIGHT: 72 IN | HEART RATE: 62 BPM | BODY MASS INDEX: 19.16 KG/M2 | TEMPERATURE: 97.8 F | DIASTOLIC BLOOD PRESSURE: 70 MMHG

## 2023-10-26 DIAGNOSIS — R33.9 URINARY RETENTION: Primary | ICD-10-CM

## 2023-10-26 DIAGNOSIS — E04.1 THYROID NODULE: ICD-10-CM

## 2023-10-26 DIAGNOSIS — D35.1 PARATHYROID ADENOMA: ICD-10-CM

## 2023-10-26 LAB
ALBUMIN SERPL-MCNC: 3.7 G/DL (ref 3.5–5.2)
ALBUMIN/GLOB SERPL: 1.5 G/DL
ALP SERPL-CCNC: 58 U/L (ref 39–117)
ALT SERPL W P-5'-P-CCNC: 9 U/L (ref 1–41)
ANION GAP SERPL CALCULATED.3IONS-SCNC: 11 MMOL/L (ref 5–15)
AST SERPL-CCNC: 18 U/L (ref 1–40)
BACTERIA UR QL AUTO: ABNORMAL /HPF
BASOPHILS # BLD AUTO: 0.01 10*3/MM3 (ref 0–0.2)
BASOPHILS NFR BLD AUTO: 0.2 % (ref 0–1.5)
BILIRUB SERPL-MCNC: 0.4 MG/DL (ref 0–1.2)
BILIRUB UR QL STRIP: NEGATIVE
BUN SERPL-MCNC: 20 MG/DL (ref 8–23)
BUN/CREAT SERPL: 13.8 (ref 7–25)
CALCIUM SPEC-SCNC: 9.4 MG/DL (ref 8.6–10.5)
CHLORIDE SERPL-SCNC: 105 MMOL/L (ref 98–107)
CLARITY UR: CLEAR
CO2 SERPL-SCNC: 20 MMOL/L (ref 22–29)
COLOR UR: YELLOW
CREAT SERPL-MCNC: 1.45 MG/DL (ref 0.76–1.27)
DEPRECATED RDW RBC AUTO: 49.5 FL (ref 37–54)
EGFRCR SERPLBLD CKD-EPI 2021: 47.8 ML/MIN/1.73
EOSINOPHIL # BLD AUTO: 0.03 10*3/MM3 (ref 0–0.4)
EOSINOPHIL NFR BLD AUTO: 0.7 % (ref 0.3–6.2)
ERYTHROCYTE [DISTWIDTH] IN BLOOD BY AUTOMATED COUNT: 13.8 % (ref 12.3–15.4)
GLOBULIN UR ELPH-MCNC: 2.4 GM/DL
GLUCOSE SERPL-MCNC: 95 MG/DL (ref 65–99)
GLUCOSE UR STRIP-MCNC: ABNORMAL MG/DL
HCT VFR BLD AUTO: 32 % (ref 37.5–51)
HGB BLD-MCNC: 10.2 G/DL (ref 13–17.7)
HGB UR QL STRIP.AUTO: ABNORMAL
HYALINE CASTS UR QL AUTO: ABNORMAL /LPF
IMM GRANULOCYTES # BLD AUTO: 0.04 10*3/MM3 (ref 0–0.05)
IMM GRANULOCYTES NFR BLD AUTO: 0.9 % (ref 0–0.5)
KETONES UR QL STRIP: NEGATIVE
LEUKOCYTE ESTERASE UR QL STRIP.AUTO: NEGATIVE
LYMPHOCYTES # BLD AUTO: 0.75 10*3/MM3 (ref 0.7–3.1)
LYMPHOCYTES NFR BLD AUTO: 16.6 % (ref 19.6–45.3)
MAGNESIUM SERPL-MCNC: 1.7 MG/DL (ref 1.6–2.4)
MCH RBC QN AUTO: 30.9 PG (ref 26.6–33)
MCHC RBC AUTO-ENTMCNC: 31.9 G/DL (ref 31.5–35.7)
MCV RBC AUTO: 97 FL (ref 79–97)
MONOCYTES # BLD AUTO: 0.38 10*3/MM3 (ref 0.1–0.9)
MONOCYTES NFR BLD AUTO: 8.4 % (ref 5–12)
NEUTROPHILS NFR BLD AUTO: 3.31 10*3/MM3 (ref 1.7–7)
NEUTROPHILS NFR BLD AUTO: 73.2 % (ref 42.7–76)
NITRITE UR QL STRIP: NEGATIVE
NRBC BLD AUTO-RTO: 0 /100 WBC (ref 0–0.2)
PH UR STRIP.AUTO: 5.5 [PH] (ref 5–8)
PHOSPHATE SERPL-MCNC: 2.8 MG/DL (ref 2.5–4.5)
PLATELET # BLD AUTO: 103 10*3/MM3 (ref 140–450)
PMV BLD AUTO: 12 FL (ref 6–12)
POTASSIUM SERPL-SCNC: 4.2 MMOL/L (ref 3.5–5.2)
PROT SERPL-MCNC: 6.1 G/DL (ref 6–8.5)
PROT UR QL STRIP: NEGATIVE
RBC # BLD AUTO: 3.3 10*6/MM3 (ref 4.14–5.8)
RBC # UR STRIP: ABNORMAL /HPF
REF LAB TEST METHOD: ABNORMAL
SODIUM SERPL-SCNC: 136 MMOL/L (ref 136–145)
SP GR UR STRIP: 1.01 (ref 1–1.03)
SQUAMOUS #/AREA URNS HPF: ABNORMAL /HPF
UROBILINOGEN UR QL STRIP: ABNORMAL
WBC # UR STRIP: ABNORMAL /HPF
WBC NRBC COR # BLD: 4.52 10*3/MM3 (ref 3.4–10.8)

## 2023-10-26 PROCEDURE — 80053 COMPREHEN METABOLIC PANEL: CPT

## 2023-10-26 PROCEDURE — 93010 ELECTROCARDIOGRAM REPORT: CPT | Performed by: INTERNAL MEDICINE

## 2023-10-26 PROCEDURE — 84100 ASSAY OF PHOSPHORUS: CPT

## 2023-10-26 PROCEDURE — 51798 US URINE CAPACITY MEASURE: CPT

## 2023-10-26 PROCEDURE — 83735 ASSAY OF MAGNESIUM: CPT

## 2023-10-26 PROCEDURE — 51702 INSERT TEMP BLADDER CATH: CPT

## 2023-10-26 PROCEDURE — 99283 EMERGENCY DEPT VISIT LOW MDM: CPT

## 2023-10-26 PROCEDURE — 85025 COMPLETE CBC W/AUTO DIFF WBC: CPT

## 2023-10-26 PROCEDURE — 81001 URINALYSIS AUTO W/SCOPE: CPT

## 2023-10-26 PROCEDURE — 93005 ELECTROCARDIOGRAM TRACING: CPT

## 2023-10-26 RX ORDER — SODIUM CHLORIDE 0.9 % (FLUSH) 0.9 %
10 SYRINGE (ML) INJECTION AS NEEDED
Status: DISCONTINUED | OUTPATIENT
Start: 2023-10-26 | End: 2023-10-26 | Stop reason: HOSPADM

## 2023-10-26 NOTE — PROGRESS NOTES
Subjective    Mr. Mckeon is 83 y.o. male    Chief Complaint: Urinary Retention / has catheter    History of Present Illness    83-year-old male new patient referred in hospital follow-up after patient was recently hospitalized on 2 separate occurrences in October 1 of which being October 22, 2023 due to hypercalcemia for which patient developed acute urinary retention requiring indwelling catheter usage.  Patient reports prior to discharge had catheter removed and initially was able to urinate without difficulty however within less than 24 hours after discharge home was unable to urinate and reported back to the emergency room on December 26, 2023.  Patient reports since then has had the indwelling Bose catheter in place for at least 3 weeks at this time.  Prior to hospitalization patient on tamsulosin and finasteride due to history of BPH.    The following portions of the patient's history were reviewed and updated as appropriate: allergies, current medications, past family history, past medical history, past social history, past surgical history and problem list.    Review of Systems   Constitutional:  Negative for chills and fever.   Gastrointestinal:  Negative for abdominal pain, anal bleeding and blood in stool.   Genitourinary:  Negative for dysuria and hematuria.         Current Outpatient Medications:     albuterol sulfate  (90 Base) MCG/ACT inhaler, Inhale 2 puffs 4 (Four) Times a Day., Disp: , Rfl:     ALPRAZolam (XANAX) 0.5 MG tablet, Take 0.5 tablets by mouth Daily., Disp: , Rfl:     aspirin 81 MG EC tablet, Take 1 tablet by mouth Daily., Disp: , Rfl:     clopidogrel (PLAVIX) 75 MG tablet, Take 1 tablet by mouth Daily., Disp: , Rfl:     empagliflozin (Jardiance) 10 MG tablet tablet, Take 1 tablet by mouth Daily., Disp: 30 tablet, Rfl: 11    finasteride (PROSCAR) 5 MG tablet, Take 1 tablet by mouth Daily., Disp: , Rfl:     folic acid (FOLVITE) 1 MG tablet, Take 1 tablet by mouth Daily., Disp: 30  tablet, Rfl: 2    furosemide (LASIX) 40 MG tablet, Take 1 tablet by mouth Daily As Needed (Weight gain or swelling)., Disp: , Rfl:     HYDROcodone-acetaminophen (Norco) 5-325 MG per tablet, Take 1 tablet by mouth Every 6 (Six) Hours As Needed for Moderate Pain., Disp: 15 tablet, Rfl: 0    metoprolol succinate XL (TOPROL-XL) 25 MG 24 hr tablet, Take 0.5 tablets by mouth Every Evening., Disp: 30 tablet, Rfl: 5    pantoprazole (PROTONIX) 40 MG EC tablet, Take 1 tablet by mouth 2 (Two) Times a Day Before Meals., Disp: 90 tablet, Rfl: 0    rosuvastatin (CRESTOR) 10 MG tablet, Take 0.5 tablets by mouth Every Night., Disp: , Rfl:     sertraline (ZOLOFT) 50 MG tablet, Take 1 tablet by mouth Daily., Disp: , Rfl:     spironolactone (ALDACTONE) 25 MG tablet, Take 1 tablet by mouth Daily., Disp: , Rfl:     tamsulosin (FLOMAX) 0.4 MG capsule 24 hr capsule, Take 1 capsule by mouth 2 (Two) Times a Day., Disp: 90 capsule, Rfl: 3    tiotropium bromide-olodaterol (Stiolto Respimat) 2.5-2.5 MCG/ACT aerosol solution inhaler, Inhale 2 puffs Daily., Disp: , Rfl:     Past Medical History:   Diagnosis Date    AAA (abdominal aortic aneurysm) 07/2023    Alcohol use     Anxiety     Arthritis     BPH (benign prostatic hyperplasia)     Bronchiectasis without complication 04/09/2019    Bronchitis     CKD (chronic kidney disease)     COPD (chronic obstructive pulmonary disease)     COPD, group B, by GOLD 2017 classification 04/09/2019    Essential hypertension 04/09/2019    Personal history of nicotine dependence 04/09/2019    Snoring     Thrombocytopenia     Thyroid nodule 10/2023    TIA (transient ischemic attack)     Tobacco user 10/03/2019       Past Surgical History:   Procedure Laterality Date    ABDOMINAL AORTIC ANEURYSM REPAIR WITH ENDOGRAFT N/A 7/19/2023    Procedure: ABDOMINAL AORTIC ANEURYSM REPAIR WITH ENDOGRAFT;  Surgeon: Collins Sauceda DO;  Location:  [facility] HYBRID OR 12;  Service: Vascular;  Laterality: N/A;    BRONCHOSCOPY    "   CATARACT EXTRACTION, BILATERAL      NASAL SINUS SURGERY      PARATHYROIDECTOMY Left 10/31/2023    Procedure: PARATHYROIDECTOMY;  Surgeon: Lion Cardenas Jr., MD;  Location:  [facility] OR;  Service: ENT;  Laterality: Left;    THYROIDECTOMY Left 10/31/2023    Procedure: thyroid lobectomy (hemithyroidectomy);  Surgeon: Lion Cardenas Jr., MD;  Location:  [facility] OR;  Service: ENT;  Laterality: Left;    TOTAL HIP ARTHROPLASTY Left        Social History     Socioeconomic History    Marital status:    Tobacco Use    Smoking status: Every Day     Packs/day: 0.50     Years: 70.00     Additional pack years: 0.00     Total pack years: 35.00     Types: Cigarettes     Passive exposure: Current    Smokeless tobacco: Former     Types: Snuff     Quit date: 1970   Vaping Use    Vaping Use: Never used   Substance and Sexual Activity    Alcohol use: Yes     Alcohol/week: 2.0 standard drinks of alcohol     Types: 2 Shots of liquor per week     Comment: daily - 2 mixed drinks    Drug use: No    Sexual activity: Defer       Family History   Problem Relation Age of Onset    Emphysema Father     Heart attack Brother     Cancer Brother     Heart disease Brother     Cancer Other     Colon cancer Neg Hx     Colon polyps Neg Hx     Esophageal cancer Neg Hx        Objective    Temp 97.9 °F (36.6 °C)   Ht 180 cm (70.87\")   Wt 64.9 kg (143 lb 1.6 oz)   BMI 20.03 kg/m²     Physical Exam        Results for orders placed or performed during the hospital encounter of 10/31/23   Basic Metabolic Panel    Specimen: Blood   Result Value Ref Range    Glucose 94 65 - 99 mg/dL    BUN 16 8 - 23 mg/dL    Creatinine 1.25 0.76 - 1.27 mg/dL    Sodium 143 136 - 145 mmol/L    Potassium 4.5 3.5 - 5.2 mmol/L    Chloride 112 (H) 98 - 107 mmol/L    CO2 25.0 22.0 - 29.0 mmol/L    Calcium 9.3 8.6 - 10.5 mg/dL    BUN/Creatinine Ratio 12.8 7.0 - 25.0    Anion Gap 6.0 5.0 - 15.0 mmol/L    eGFR 57.1 (L) >60.0 mL/min/1.73   PTH, Intact    Specimen: " Blood   Result Value Ref Range    PTH, Intact 832.1 (H) 15.0 - 65.0 pg/mL   PTH, Intact    Specimen: Blood, Venous Line   Result Value Ref Range    PTH, Intact 132.1 (H) 15.0 - 65.0 pg/mL   PTH, Intact    Specimen: Arm, Left; Blood   Result Value Ref Range    PTH, Intact 58.8 15.0 - 65.0 pg/mL   Calcium    Specimen: Arm, Left; Blood   Result Value Ref Range    Calcium 8.7 8.6 - 10.5 mg/dL   PTH, Intact    Specimen: Blood   Result Value Ref Range    PTH, Intact 17.5 15.0 - 65.0 pg/mL   Basic Metabolic Panel    Specimen: Blood   Result Value Ref Range    Glucose 105 (H) 65 - 99 mg/dL    BUN 18 8 - 23 mg/dL    Creatinine 1.36 (H) 0.76 - 1.27 mg/dL    Sodium 141 136 - 145 mmol/L    Potassium 4.6 3.5 - 5.2 mmol/L    Chloride 110 (H) 98 - 107 mmol/L    CO2 25.0 22.0 - 29.0 mmol/L    Calcium 8.8 8.6 - 10.5 mg/dL    BUN/Creatinine Ratio 13.2 7.0 - 25.0    Anion Gap 6.0 5.0 - 15.0 mmol/L    eGFR 51.6 (L) >60.0 mL/min/1.73   Tissue Pathology Exam    Specimen: Parathyroid Gland; Tissue   Result Value Ref Range    Note to Patients       This report may contain a detailed description of human tissue sent by a health care provider to the laboratory for pathologic evaluation. The content of this report is essential for diagnosis and may provide important critical findings. This information may be unfamiliar to patients to review without a medical professional present. It is advised that the patient review this report in the presence of a health care provider who can answer questions and explain the results.      Case Report       Surgical Pathology Report                         Case: [id number]                                  Authorizing Provider:  Lion Cardenas Jr., Collected:           10/31/2023 09:23 AM                                 MD                                                                           Ordering Location:     [facility] OR  Received:            10/31/2023 10:56 AM         "  Pathologist:           Mitch Roman MD                                                      Specimen:    Parathyroid Gland, LEFT THYROID AND PARATHYROID, STITCH IN PARATHYROID FOR FROZEN          Final Diagnosis       Left thyroid lobe and parathyroid, left hemithyroidectomy:  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  Left thyroid with multinodular goiter.          Intraoperative Consultation       Frozen section:  Left thyroid and parathyroid, FS1A:  Parathyroid tissue present measuring at least 1.5 cm in greatest dimension.  This case was discussed with Dr. Lion Cardenas at 1106 on 10-.ALFREDITO      Gross Description       1. Parathyroid Gland.    Received container labeled, \"left thyroid and parathyroid \".  Received fresh is a 5.6 x 3.2 x 1.8 cm left lobe of thyroid with a stitch marking in the area of possible parathyroid.  Sectioning received reveals yellow-brown tissue adjacent to the stitch that measures approximately 1.6 x 1.2 x 0.4 cm.  There is a cystic area with the surrounding tissue composed of light brown tissue that resembles parathyroid.  This area is up to 2.7 cm in greatest dimension.  The remaining superior half of the thyroid has multiple nodular areas that are purple.  Representative sections are submitted cassettes 1A area of possible parathyroid, 2 pieces, 1B additional possible parathyroid, 2 pieces cassette 1C and 1D deflated cystic area and possibleparathyroid, 1 piece each, cassette 1E, superior thyroid, 1 piece.      Microscopic Description       Section of the lower half of the thyroid shows a enlarged parathyroid with associated cystic change.  The superior portion thyroid shows multiple nodules composed of varying amounts of colloid.  No malignancy is identified.       Assessment and Plan    Diagnoses and all orders for this visit:    1. BPH with obstruction/lower urinary tract symptoms (Primary)  -     tamsulosin (FLOMAX) 0.4 MG capsule 24 hr capsule; Take 1 capsule by " mouth 2 (Two) Times a Day.  Dispense: 90 capsule; Refill: 3      History of BPH currently on tamsulosin 0.4 mg daily and finasteride 5 mg.  Developed episode of acute urinary retention during recent hospitalization 10/22 through 10/25/2023.  Had catheter removed prior to DC and within less than 24 hours presented back to [facility] ER with inability to urinate requiring indwelling Bose catheter be put back in.    We will go ahead and remove indwelling Bose catheter at this time in hopes patient will be able to urinate on his own and will also increase the Flomax to twice daily dosing.    If unable to urinate within the next 6 to 8 hours patient to report back to our office to have an indwelling catheter replaced at which time patient will need to get scheduled for a cystoscopy with one of our providers further evaluation.

## 2023-10-26 NOTE — ED NOTES
Pt switched to a leg bag and was instructed on how  to properly use it. Pt was instructed to call and follow up with urology.

## 2023-10-26 NOTE — ED PROVIDER NOTES
Subjective   History of Present Illness  Patient is an 83-year-old male that presents to the emergency department for urinary retention.  Patient reports he was just seen and admitted at this facility on 10/22/2023 with a discharge on 10/25/2023 for hypercalcemia and bradycardia.  At time of admission patient's calcium was 13.7.  Upon discharge calcium was 10.2 yesterday.  Patient states during his admission to the hospital he did have an episode of urinary retention and had to have an indwelling catheter placed due to this.  Patient reports catheter was removed and he had no issues following this with urinating. Patient reports he was discharged around 230 yesterday afternoon with no issues of urinary retention.  He reports that his last urination was around the time he was discharged yesterday.  He states that he has been unable to have normal urination since then.  Patient reports he had a thyroid ultrasound during his admission that showed a left sided thyroid mass and NM parathyroid scan that showed a left lower pole parathyroid mass.  ENT recommended left thyroid lobectomy, parathyroidectomy which is scheduled for Tuesday, 10/31/2023 after Plavix washout.  ENT recommends discontinue Plavix and bridge with Lovenox. Patient reports he has been taking lovenox as prescribed. Patient complaining of no other complaints at this time.  He denies any chest pain, shortness of breath, abdominal pain, nausea, vomiting, constipation or diarrhea.        Review of Systems   Genitourinary:  Positive for decreased urine volume, difficulty urinating and dysuria.   All other systems reviewed and are negative.      Past Medical History:   Diagnosis Date    AAA (abdominal aortic aneurysm) 07/2023    Alcohol use     Anxiety     Arthritis     BPH (benign prostatic hyperplasia)     Bronchiectasis without complication 04/09/2019    Bronchitis     CKD (chronic kidney disease)     COPD (chronic obstructive pulmonary disease)     COPD,  group B, by GOLD 2017 classification 04/09/2019    Essential hypertension 04/09/2019    Personal history of nicotine dependence 04/09/2019    Snoring     Thrombocytopenia     Thyroid nodule 10/2023    TIA (transient ischemic attack)     Tobacco user 10/03/2019       Allergies   Allergen Reactions    Statins Myalgia    Tetanus Toxoids Other (See Comments)     STATES HAD A REACTION TO TETANUS SHOT AT AGE 8; STATES HAS TOLERATED SINCE        Past Surgical History:   Procedure Laterality Date    ABDOMINAL AORTIC ANEURYSM REPAIR WITH ENDOGRAFT N/A 7/19/2023    Procedure: ABDOMINAL AORTIC ANEURYSM REPAIR WITH ENDOGRAFT;  Surgeon: Collins Sauceda DO;  Location: [facility] HYBRID OR 12;  Service: Vascular;  Laterality: N/A;    BRONCHOSCOPY      CATARACT EXTRACTION, BILATERAL      NASAL SINUS SURGERY      TOTAL HIP ARTHROPLASTY Left        Family History   Problem Relation Age of Onset    Emphysema Father     Heart attack Brother     Cancer Brother     Heart disease Brother     Cancer Other     Colon cancer Neg Hx     Colon polyps Neg Hx     Esophageal cancer Neg Hx        Social History     Socioeconomic History    Marital status:    Tobacco Use    Smoking status: Every Day     Packs/day: 0.50     Years: 70.00     Additional pack years: 0.00     Total pack years: 35.00     Types: Cigarettes     Passive exposure: Current    Smokeless tobacco: Former     Types: Snuff     Quit date: 1970   Vaping Use    Vaping Use: Never used   Substance and Sexual Activity    Alcohol use: Yes     Alcohol/week: 2.0 standard drinks of alcohol     Types: 2 Shots of liquor per week     Comment: daily - 2 mixed drinks    Drug use: No    Sexual activity: Defer           Objective   Physical Exam  Vitals and nursing note reviewed.   Constitutional:       Appearance: Normal appearance.      Comments: Nontoxic appearing. In no acute distress.    HENT:      Head: Normocephalic and atraumatic.      Right Ear: External ear normal.      Left Ear:  External ear normal.      Nose: Nose normal.      Mouth/Throat:      Mouth: Mucous membranes are moist.      Pharynx: Oropharynx is clear.   Eyes:      Extraocular Movements: Extraocular movements intact.      Conjunctiva/sclera: Conjunctivae normal.      Pupils: Pupils are equal, round, and reactive to light.   Cardiovascular:      Rate and Rhythm: Regular rhythm. Bradycardia present.      Pulses: Normal pulses.      Heart sounds: Normal heart sounds.   Pulmonary:      Effort: Pulmonary effort is normal. No respiratory distress.      Breath sounds: Normal breath sounds. No wheezing.   Chest:      Chest wall: No tenderness.   Abdominal:      General: Abdomen is flat. Bowel sounds are normal. There is no distension.      Palpations: Abdomen is soft.      Tenderness: There is no abdominal tenderness. There is no right CVA tenderness, left CVA tenderness, guarding or rebound.   Musculoskeletal:         General: Normal range of motion.      Cervical back: Normal range of motion and neck supple.      Right lower leg: No edema.      Left lower leg: No edema.   Skin:     General: Skin is warm and dry.      Capillary Refill: Capillary refill takes less than 2 seconds.   Neurological:      General: No focal deficit present.      Mental Status: He is alert and oriented to person, place, and time. Mental status is at baseline.   Psychiatric:         Mood and Affect: Mood normal.         Behavior: Behavior normal.         Thought Content: Thought content normal.         Judgment: Judgment normal.       Labs Reviewed   COMPREHENSIVE METABOLIC PANEL - Abnormal; Notable for the following components:       Result Value    Creatinine 1.45 (*)     CO2 20.0 (*)     eGFR 47.8 (*)     All other components within normal limits    Narrative:     GFR Normal >60  Chronic Kidney Disease <60  Kidney Failure <15    The GFR formula is only valid for adults with stable renal function between ages 18 and 70.   CBC WITH AUTO DIFFERENTIAL -  Abnormal; Notable for the following components:    RBC 3.30 (*)     Hemoglobin 10.2 (*)     Hematocrit 32.0 (*)     Platelets 103 (*)     Lymphocyte % 16.6 (*)     Immature Grans % 0.9 (*)     All other components within normal limits   URINALYSIS W/ CULTURE IF INDICATED - Abnormal; Notable for the following components:    Glucose,  mg/dL (2+) (*)     Blood, UA Small (1+) (*)     All other components within normal limits    Narrative:     In absence of clinical symptoms, the presence of pyuria, bacteria, and/or nitrites on the urinalysis result does not correlate with infection.   URINALYSIS, MICROSCOPIC ONLY - Abnormal; Notable for the following components:    Bacteria, UA 2+ (*)     All other components within normal limits   MAGNESIUM - Normal   PHOSPHORUS - Normal   CBC AND DIFFERENTIAL    Narrative:     The following orders were created for panel order CBC & Differential.  Procedure                               Abnormality         Status                     ---------                               -----------         ------                     CBC Auto Differential[[id number]]        Abnormal            Final result                 Please view results for these tests on the individual orders.      No orders to display        Procedures           ED Course  ED Course as of 10/29/23 2213   Thu Oct 26, 2023   0927 Patient with a history of hypercalcemia came to the ED not feeling well and some urinary retention.  Lab work-up was initiated repeat potassium calcium and magnesium will be checked [TS]   0928 Bladder scan 899 [KF]   1010 EKG reveals a normal sinus rhythm with PACs, rate of 73 bpm. [KF]      ED Course User Index  [KF] Stephanie Villatoro, APRN  [TS] Franklin Keen MD                                           Medical Decision Making  Van Mckeon is a 83 y.o. male who presents to the ED for urinary retention.  Patient reports he was just seen and admitted at this facility on 10/22/2023 with a  discharge on 10/25/2023 for hypercalcemia and bradycardia.  At time of admission patient's calcium was 13.7.  Upon discharge calcium was 10.2 yesterday.  Patient states during his admission to the hospital he did have an episode of urinary retention and had to have an indwelling catheter placed due to this.  Patient reports catheter was removed and he had no issues following this with urinating. Patient reports he was discharged around 230 yesterday afternoon with no issues of urinary retention.  He reports that his last urination was around the time he was discharged yesterday.  He states that he has been unable to have normal urination since then.  Patient reports he had a thyroid ultrasound during his admission that showed a left sided thyroid mass and NM parathyroid scan that showed a left lower pole parathyroid mass.  ENT recommended left thyroid lobectomy, parathyroidectomy which is scheduled for Tuesday, 10/31/2023 after Plavix washout.  ENT recommends discontinue Plavix and bridge with Lovenox. Patient reports he has been taking lovenox as prescribed. Patient complaining of no other complaints at this time.  He denies any chest pain, shortness of breath, abdominal pain, nausea, vomiting, constipation or diarrhea.    Patient was non-toxic appearing on arrival. No acute distress was noted.  Vital signs stable.    Past medical history, surgical history, and medication regimen reviewed.     Previous notes, labs, imaging and more reviewed.    Patient's presentation raises suspicion for differentials including, but not limited to, urinary retention, hypercalcemia, arrhythmia, medication adverse reaction.    Please refer to above section of note for lab results that were reviewed and interpreted by radiology as well as attending physician.     Case discussed with attending physician, Dr. Keen who is in agreement with treatment plan.    Given findings described above, patient's presentation is likely consistent  with acute urinary retention.     I had an in-depth discussion with the patient as well as family present at bedside regarding all of the lab results completed during this ED encounter today.  I discussed that due to acute urinary retention patient will be discharged home with the Bose catheter we have inserted and changed to a leg bag.  Patient was educated on how to use and into the leg bag.  I discussed with patient and family member that patient will need to follow-up with urology for removal of Bose catheter and for further evaluation of etiology of urinary retention.  I also discussed with patient and family that the ENT office will contact him regarding a time for surgery next week and due to schedule hemodynamic it may be within 24 hours of the surgery. I answered all the questions regarding the emergency department evaluation, diagnosis, and treatment plan in plain and simple language that was understandable. We discussed that due to always having some diagnostic uncertainty while in the ER, there is always a chance that symptoms may change or new symptoms may reveal themselves after being discharged. Because of this, I stressed the importance of Van following up with their primary care provider, urology, and ENT. Patient informed that appointment will need to be done by calling their office to set up an appointment within the next few days or as soon as reasonably possible so that the symptoms can be re-evaluated for improvement or for any other questions. I also gave Van common sense return precautions and prompted patient to return to the emergency department within 24 - 48hrs if there are any new, worsening, or concerning symptoms. The patient verbalized understanding of the discharge instructions and agreed with them. Van was discharged in stable condition.     Dragon disclaimer:  Parts of this note may be an electronic transcription/translation of spoken language to printed text using  the Dragon dictation system.    Problems Addressed:  Urinary retention: complicated acute illness or injury    Amount and/or Complexity of Data Reviewed  Labs: ordered.  ECG/medicine tests: ordered.    Risk  Prescription drug management.        Final diagnoses:   Urinary retention       ED Disposition  ED Disposition       ED Disposition   Discharge    Condition   Stable    Comment   --               Jean Marie Cabrera MD  [address]  [facility] [id number] [phone]    Schedule an appointment as soon as possible for a visit       Lion Cardenas Jr., MD  [address] [facility]  [address]  [place] [address]  [phone]    Schedule an appointment as soon as possible for a visit       [facility] EMERGENCY DEPARTMENT  [address] [place] [id number] [phone]    If symptoms worsen    John Prado MD  [address] [facility]  [address]  [place] [address]  [phone]    Schedule an appointment as soon as possible for a visit            Medication List        Changed      empagliflozin 10 MG tablet tablet  Commonly known as: Jardiance  Take 1 tablet by mouth Daily.  What changed: additional instructions            Stop      Influenza Vac High-Dose Quad 0.7 ML suspension prefilled syringe injection  Commonly known as: FLUZONE HIGH DOSE                 Stephanie Villatoro, APRN  10/29/23 2212

## 2023-10-26 NOTE — OUTREACH NOTE
Prep Survey      Flowsheet Row Responses   [facility] patient discharged from? [facility]   Is LACE score < 7 ? No   Eligibility Readm Mgmt   Discharge diagnosis Hypercalcemia   Does the patient have one of the following disease processes/diagnoses(primary or secondary)? Other   Does the patient have Home health ordered? No   Is there a DME ordered? No   Prep survey completed? Yes            Kristine BURTON - Registered Nurse

## 2023-10-26 NOTE — DISCHARGE INSTRUCTIONS
It was very nice to meet you, Van. Thank you for allowing us to take care of you today at [facility].    Today you were seen in the emergency department for your symptoms. Please understand that an ER evaluation is just the start of your evaluation. We do the best we can, but we are often unable to fully find what is causing your symptoms from one evaluation.  Because of this, the goal is to determine whether you need to be evaluated in the hospital or if it is safe for you to go home and see other doctors provided such as primary care physicians or specialist on an outpatient basis.     Like we discussed, I strongly urge that you follow up with urology to assess need and removal for urinary catheter. Please plan to keep scheduled appointment for surgery with Dr. Cardenas on Tuesday, 11/2/23.  Please call Urology office to set up an appointment as soon as possible so that you can be re-evaluated for improvement in your symptoms or for any other questions.  I have provided the primary care provider information needed, including phone number, to call to set up an appointment below in these discharge papers.     Educational material has also been provided in the following pages regarding what we have discussed today.     Please return to the emergency room within 12-48 hours if you experience symptoms such as the following:   Fever, chills, chest pain or shortness of breath, pain with inspiration/expiration, pain that travels to your arms, neck or back, nausea, vomiting, severe headache, tearing pain in your chest, dizziness, feel as though you are about to pass out, OR if you have any worsening symptoms, or any other concerns.

## 2023-10-27 ENCOUNTER — TELEPHONE (OUTPATIENT)
Dept: OTOLARYNGOLOGY | Facility: CLINIC | Age: 83
End: 2023-10-27
Payer: MEDICARE

## 2023-10-27 LAB
QT INTERVAL: 366 MS
QTC INTERVAL: 403 MS

## 2023-10-27 NOTE — SIGNIFICANT NOTE
Home Rx;  Lovenox-last dose given 10/26/23 @ [facility].   Lisinopril-last dose given Sunday, 10/22/23.  Instructed to take Protonix, w/ sip of water the DOS-10/31/23.

## 2023-10-27 NOTE — TELEPHONE ENCOUNTER
Patient daughter called. She had a really bad nose bleed last night and is coughing up blood. She did not give him his Lovenox shot this morning. She wants to know if he should stop taking the shots.    I spoke to Dr Cardenas. He said to stop taking the lovenox shots. He should use afrin in both nostrils if he has another bad nose bleed. He can use 2-3 puffs of Afrin, apply pressure for 15 minutes by pinching nostrils closed tight. He may repeat this 2 times if bleeding continues.  Went over first aid for nosebleeds in detail with her. Debbie Lewis can contact our office until 5:00 if she has additional questions or if he has a nosebleed she can not get stopped. After hours if she calls they will contact the on call physician. Or they can go to the ED. Debbie Falk

## 2023-10-30 ENCOUNTER — TELEPHONE (OUTPATIENT)
Dept: OTOLARYNGOLOGY | Facility: CLINIC | Age: 83
End: 2023-10-30
Payer: MEDICARE

## 2023-10-30 LAB — PTH RELATED PROT SERPL-SCNC: <2 PMOL/L

## 2023-10-30 NOTE — TELEPHONE ENCOUNTER
Parent/guardian called with 5:00 surgery arrival time.  NPO after midnight, voiced understanding.

## 2023-10-31 ENCOUNTER — ANESTHESIA EVENT (OUTPATIENT)
Dept: PERIOP | Facility: HOSPITAL | Age: 83
End: 2023-10-31
Payer: MEDICARE

## 2023-10-31 ENCOUNTER — HOSPITAL ENCOUNTER (OUTPATIENT)
Facility: HOSPITAL | Age: 83
Discharge: HOME | End: 2023-11-01
Attending: OTOLARYNGOLOGY | Admitting: OTOLARYNGOLOGY
Payer: MEDICARE

## 2023-10-31 ENCOUNTER — ANESTHESIA (OUTPATIENT)
Dept: PERIOP | Facility: HOSPITAL | Age: 83
End: 2023-10-31
Payer: MEDICARE

## 2023-10-31 ENCOUNTER — READMISSION MANAGEMENT (OUTPATIENT)
Dept: CALL CENTER | Facility: HOSPITAL | Age: 83
End: 2023-10-31
Payer: MEDICARE

## 2023-10-31 DIAGNOSIS — E89.0 STATUS POST PARTIAL THYROIDECTOMY: Primary | ICD-10-CM

## 2023-10-31 DIAGNOSIS — E04.1 THYROID NODULE: ICD-10-CM

## 2023-10-31 DIAGNOSIS — D35.1 PARATHYROID ADENOMA: ICD-10-CM

## 2023-10-31 DIAGNOSIS — E89.2 STATUS POST PARATHYROIDECTOMY: ICD-10-CM

## 2023-10-31 PROBLEM — Z98.890 STATUS POST PARATHYROIDECTOMY: Status: ACTIVE | Noted: 2023-10-31

## 2023-10-31 PROBLEM — Z90.89 STATUS POST PARATHYROIDECTOMY: Status: ACTIVE | Noted: 2023-10-31

## 2023-10-31 PROBLEM — E21.3 HYPERPARATHYROIDISM: Status: ACTIVE | Noted: 2023-10-31

## 2023-10-31 LAB
ANION GAP SERPL CALCULATED.3IONS-SCNC: 6 MMOL/L (ref 5–15)
BUN SERPL-MCNC: 16 MG/DL (ref 8–23)
BUN/CREAT SERPL: 12.8 (ref 7–25)
CALCIUM SPEC-SCNC: 8.7 MG/DL (ref 8.6–10.5)
CALCIUM SPEC-SCNC: 9.3 MG/DL (ref 8.6–10.5)
CHLORIDE SERPL-SCNC: 112 MMOL/L (ref 98–107)
CO2 SERPL-SCNC: 25 MMOL/L (ref 22–29)
CREAT SERPL-MCNC: 1.25 MG/DL (ref 0.76–1.27)
EGFRCR SERPLBLD CKD-EPI 2021: 57.1 ML/MIN/1.73
GLUCOSE SERPL-MCNC: 94 MG/DL (ref 65–99)
POTASSIUM SERPL-SCNC: 4.5 MMOL/L (ref 3.5–5.2)
PTH-INTACT SERPL-MCNC: 132.1 PG/ML (ref 15–65)
PTH-INTACT SERPL-MCNC: 58.8 PG/ML (ref 15–65)
PTH-INTACT SERPL-MCNC: 832.1 PG/ML (ref 15–65)
SODIUM SERPL-SCNC: 143 MMOL/L (ref 136–145)

## 2023-10-31 PROCEDURE — 88331 PATH CONSLTJ SURG 1 BLK 1SPC: CPT | Performed by: OTOLARYNGOLOGY

## 2023-10-31 PROCEDURE — 60220 PARTIAL REMOVAL OF THYROID: CPT | Performed by: OTOLARYNGOLOGY

## 2023-10-31 PROCEDURE — 25010000002 ONDANSETRON PER 1 MG: Performed by: NURSE ANESTHETIST, CERTIFIED REGISTERED

## 2023-10-31 PROCEDURE — 94799 UNLISTED PULMONARY SVC/PX: CPT

## 2023-10-31 PROCEDURE — 82310 ASSAY OF CALCIUM: CPT | Performed by: OTOLARYNGOLOGY

## 2023-10-31 PROCEDURE — 63710000001 HYDROCODONE-ACETAMINOPHEN 10-325 MG TABLET: Performed by: ANESTHESIOLOGY

## 2023-10-31 PROCEDURE — 25010000002 FENTANYL CITRATE (PF) 100 MCG/2ML SOLUTION: Performed by: NURSE ANESTHETIST, CERTIFIED REGISTERED

## 2023-10-31 PROCEDURE — 25010000002 DEXAMETHASONE PER 1 MG: Performed by: NURSE ANESTHETIST, CERTIFIED REGISTERED

## 2023-10-31 PROCEDURE — 63710000001 CALCIUM CARBONATE 500 MG CHEWABLE TABLET: Performed by: OTOLARYNGOLOGY

## 2023-10-31 PROCEDURE — 94761 N-INVAS EAR/PLS OXIMETRY MLT: CPT

## 2023-10-31 PROCEDURE — 63710000001 POTASSIUM & SODIUM PHOSPHATES 280-160-250 MG PACK: Performed by: OTOLARYNGOLOGY

## 2023-10-31 PROCEDURE — 25810000003 SODIUM CHLORIDE PER 500 ML: Performed by: OTOLARYNGOLOGY

## 2023-10-31 PROCEDURE — 83970 ASSAY OF PARATHORMONE: CPT | Performed by: OTOLARYNGOLOGY

## 2023-10-31 PROCEDURE — 63710000001 FINASTERIDE 5 MG TABLET: Performed by: OTOLARYNGOLOGY

## 2023-10-31 PROCEDURE — 94640 AIRWAY INHALATION TREATMENT: CPT

## 2023-10-31 PROCEDURE — A9270 NON-COVERED ITEM OR SERVICE: HCPCS | Performed by: ANESTHESIOLOGY

## 2023-10-31 PROCEDURE — 25810000003 LACTATED RINGERS PER 1000 ML: Performed by: OTOLARYNGOLOGY

## 2023-10-31 PROCEDURE — 80048 BASIC METABOLIC PNL TOTAL CA: CPT | Performed by: OTOLARYNGOLOGY

## 2023-10-31 PROCEDURE — 25010000002 PROPOFOL 10 MG/ML EMULSION: Performed by: NURSE ANESTHETIST, CERTIFIED REGISTERED

## 2023-10-31 PROCEDURE — A9270 NON-COVERED ITEM OR SERVICE: HCPCS | Performed by: OTOLARYNGOLOGY

## 2023-10-31 PROCEDURE — 94762 N-INVAS EAR/PLS OXIMTRY CONT: CPT

## 2023-10-31 PROCEDURE — 25010000002 FENTANYL CITRATE (PF) 50 MCG/ML SOLUTION: Performed by: ANESTHESIOLOGY

## 2023-10-31 PROCEDURE — 63710000001 TAMSULOSIN 0.4 MG CAPSULE: Performed by: OTOLARYNGOLOGY

## 2023-10-31 PROCEDURE — 63710000001 SERTRALINE 50 MG TABLET: Performed by: OTOLARYNGOLOGY

## 2023-10-31 PROCEDURE — 88307 TISSUE EXAM BY PATHOLOGIST: CPT | Performed by: OTOLARYNGOLOGY

## 2023-10-31 PROCEDURE — 25010000002 HYDROMORPHONE PER 4 MG: Performed by: OTOLARYNGOLOGY

## 2023-10-31 PROCEDURE — 63710000001 METOPROLOL SUCCINATE XL 25 MG TABLET SUSTAINED-RELEASE 24 HOUR: Performed by: OTOLARYNGOLOGY

## 2023-10-31 PROCEDURE — 25010000002 CEFAZOLIN PER 500 MG: Performed by: NURSE ANESTHETIST, CERTIFIED REGISTERED

## 2023-10-31 PROCEDURE — 63710000001 PANTOPRAZOLE 40 MG TABLET DELAYED-RELEASE: Performed by: OTOLARYNGOLOGY

## 2023-10-31 PROCEDURE — 60500 EXPLORE PARATHYROID GLANDS: CPT | Performed by: OTOLARYNGOLOGY

## 2023-10-31 PROCEDURE — 63710000001 ASPIRIN 81 MG TABLET DELAYED-RELEASE: Performed by: OTOLARYNGOLOGY

## 2023-10-31 DEVICE — ABSORBABLE HEMOSTAT (OXIDIZED REGENERATED CELLULOSE, U.S.P.)
Type: IMPLANTABLE DEVICE | Site: NECK | Status: FUNCTIONAL
Brand: SURGICEL FIBRILLAR

## 2023-10-31 RX ORDER — ARFORMOTEROL TARTRATE 15 UG/2ML
15 SOLUTION RESPIRATORY (INHALATION)
Status: DISCONTINUED | OUTPATIENT
Start: 2023-10-31 | End: 2023-11-01 | Stop reason: HOSPADM

## 2023-10-31 RX ORDER — HYDROCODONE BITARTRATE AND ACETAMINOPHEN 5; 325 MG/1; MG/1
1 TABLET ORAL ONCE AS NEEDED
Status: DISCONTINUED | OUTPATIENT
Start: 2023-10-31 | End: 2023-10-31 | Stop reason: HOSPADM

## 2023-10-31 RX ORDER — ONDANSETRON 2 MG/ML
INJECTION INTRAMUSCULAR; INTRAVENOUS AS NEEDED
Status: DISCONTINUED | OUTPATIENT
Start: 2023-10-31 | End: 2023-10-31 | Stop reason: SURG

## 2023-10-31 RX ORDER — EPHEDRINE SULFATE 50 MG/ML
INJECTION, SOLUTION INTRAVENOUS AS NEEDED
Status: DISCONTINUED | OUTPATIENT
Start: 2023-10-31 | End: 2023-10-31 | Stop reason: SURG

## 2023-10-31 RX ORDER — PANTOPRAZOLE SODIUM 40 MG/1
40 TABLET, DELAYED RELEASE ORAL
Status: DISCONTINUED | OUTPATIENT
Start: 2023-10-31 | End: 2023-11-01 | Stop reason: HOSPADM

## 2023-10-31 RX ORDER — SODIUM CHLORIDE, SODIUM LACTATE, POTASSIUM CHLORIDE, CALCIUM CHLORIDE 600; 310; 30; 20 MG/100ML; MG/100ML; MG/100ML; MG/100ML
9 INJECTION, SOLUTION INTRAVENOUS CONTINUOUS
Status: DISCONTINUED | OUTPATIENT
Start: 2023-10-31 | End: 2023-10-31

## 2023-10-31 RX ORDER — SODIUM CHLORIDE 0.9 % (FLUSH) 0.9 %
10 SYRINGE (ML) INJECTION EVERY 12 HOURS SCHEDULED
Status: DISCONTINUED | OUTPATIENT
Start: 2023-10-31 | End: 2023-10-31 | Stop reason: HOSPADM

## 2023-10-31 RX ORDER — HYDROCODONE BITARTRATE AND ACETAMINOPHEN 7.5; 325 MG/1; MG/1
1 TABLET ORAL EVERY 4 HOURS PRN
Status: DISCONTINUED | OUTPATIENT
Start: 2023-10-31 | End: 2023-11-01 | Stop reason: HOSPADM

## 2023-10-31 RX ORDER — SODIUM CHLORIDE, SODIUM LACTATE, POTASSIUM CHLORIDE, CALCIUM CHLORIDE 600; 310; 30; 20 MG/100ML; MG/100ML; MG/100ML; MG/100ML
1000 INJECTION, SOLUTION INTRAVENOUS CONTINUOUS
Status: DISCONTINUED | OUTPATIENT
Start: 2023-10-31 | End: 2023-10-31

## 2023-10-31 RX ORDER — NALOXONE HCL 0.4 MG/ML
0.4 VIAL (ML) INJECTION
Status: DISCONTINUED | OUTPATIENT
Start: 2023-10-31 | End: 2023-11-01 | Stop reason: HOSPADM

## 2023-10-31 RX ORDER — LISINOPRIL 5 MG/1
5 TABLET ORAL DAILY
Status: DISCONTINUED | OUTPATIENT
Start: 2023-10-31 | End: 2023-11-01 | Stop reason: HOSPADM

## 2023-10-31 RX ORDER — METOPROLOL SUCCINATE 25 MG/1
12.5 TABLET, EXTENDED RELEASE ORAL EVERY EVENING
Status: DISCONTINUED | OUTPATIENT
Start: 2023-10-31 | End: 2023-11-01 | Stop reason: HOSPADM

## 2023-10-31 RX ORDER — FINASTERIDE 5 MG/1
5 TABLET, FILM COATED ORAL DAILY
Status: DISCONTINUED | OUTPATIENT
Start: 2023-10-31 | End: 2023-11-01 | Stop reason: HOSPADM

## 2023-10-31 RX ORDER — PROPOFOL 10 MG/ML
VIAL (ML) INTRAVENOUS AS NEEDED
Status: DISCONTINUED | OUTPATIENT
Start: 2023-10-31 | End: 2023-10-31 | Stop reason: SURG

## 2023-10-31 RX ORDER — NALOXONE HCL 0.4 MG/ML
0.1 VIAL (ML) INJECTION
Status: DISCONTINUED | OUTPATIENT
Start: 2023-10-31 | End: 2023-11-01 | Stop reason: HOSPADM

## 2023-10-31 RX ORDER — FLUMAZENIL 0.1 MG/ML
0.2 INJECTION INTRAVENOUS AS NEEDED
Status: DISCONTINUED | OUTPATIENT
Start: 2023-10-31 | End: 2023-10-31 | Stop reason: HOSPADM

## 2023-10-31 RX ORDER — SPIRONOLACTONE 25 MG/1
25 TABLET ORAL DAILY
COMMUNITY

## 2023-10-31 RX ORDER — DROPERIDOL 2.5 MG/ML
0.62 INJECTION, SOLUTION INTRAMUSCULAR; INTRAVENOUS ONCE AS NEEDED
Status: DISCONTINUED | OUTPATIENT
Start: 2023-10-31 | End: 2023-10-31 | Stop reason: HOSPADM

## 2023-10-31 RX ORDER — DEXTROSE MONOHYDRATE 25 G/50ML
12.5 INJECTION, SOLUTION INTRAVENOUS AS NEEDED
Status: DISCONTINUED | OUTPATIENT
Start: 2023-10-31 | End: 2023-10-31 | Stop reason: HOSPADM

## 2023-10-31 RX ORDER — IPRATROPIUM BROMIDE AND ALBUTEROL SULFATE 2.5; .5 MG/3ML; MG/3ML
3 SOLUTION RESPIRATORY (INHALATION)
Status: DISCONTINUED | OUTPATIENT
Start: 2023-10-31 | End: 2023-11-01 | Stop reason: HOSPADM

## 2023-10-31 RX ORDER — SODIUM CHLORIDE 0.9 % (FLUSH) 0.9 %
3 SYRINGE (ML) INJECTION AS NEEDED
Status: DISCONTINUED | OUTPATIENT
Start: 2023-10-31 | End: 2023-10-31 | Stop reason: HOSPADM

## 2023-10-31 RX ORDER — ASPIRIN 81 MG/1
81 TABLET ORAL DAILY
Status: DISCONTINUED | OUTPATIENT
Start: 2023-10-31 | End: 2023-11-01 | Stop reason: HOSPADM

## 2023-10-31 RX ORDER — CALCIUM CARBONATE 500 MG/1
1 TABLET, CHEWABLE ORAL 3 TIMES DAILY
Status: DISCONTINUED | OUTPATIENT
Start: 2023-10-31 | End: 2023-11-01 | Stop reason: HOSPADM

## 2023-10-31 RX ORDER — HYDROMORPHONE HYDROCHLORIDE 1 MG/ML
0.5 INJECTION, SOLUTION INTRAMUSCULAR; INTRAVENOUS; SUBCUTANEOUS
Status: DISCONTINUED | OUTPATIENT
Start: 2023-10-31 | End: 2023-11-01 | Stop reason: HOSPADM

## 2023-10-31 RX ORDER — ACETAMINOPHEN 325 MG/1
650 TABLET ORAL EVERY 4 HOURS PRN
Status: DISCONTINUED | OUTPATIENT
Start: 2023-10-31 | End: 2023-11-01 | Stop reason: HOSPADM

## 2023-10-31 RX ORDER — FUROSEMIDE 40 MG/1
40 TABLET ORAL DAILY PRN
Status: DISCONTINUED | OUTPATIENT
Start: 2023-10-31 | End: 2023-11-01 | Stop reason: HOSPADM

## 2023-10-31 RX ORDER — LABETALOL HYDROCHLORIDE 5 MG/ML
5 INJECTION, SOLUTION INTRAVENOUS
Status: DISCONTINUED | OUTPATIENT
Start: 2023-10-31 | End: 2023-10-31 | Stop reason: HOSPADM

## 2023-10-31 RX ORDER — SODIUM CHLORIDE 0.9 % (FLUSH) 0.9 %
10 SYRINGE (ML) INJECTION AS NEEDED
Status: DISCONTINUED | OUTPATIENT
Start: 2023-10-31 | End: 2023-10-31 | Stop reason: HOSPADM

## 2023-10-31 RX ORDER — BUPIVACAINE HCL/0.9 % NACL/PF 0.125 %
PLASTIC BAG, INJECTION (ML) EPIDURAL AS NEEDED
Status: DISCONTINUED | OUTPATIENT
Start: 2023-10-31 | End: 2023-10-31 | Stop reason: SURG

## 2023-10-31 RX ORDER — CEFAZOLIN SODIUM 1 G/3ML
INJECTION, POWDER, FOR SOLUTION INTRAMUSCULAR; INTRAVENOUS AS NEEDED
Status: DISCONTINUED | OUTPATIENT
Start: 2023-10-31 | End: 2023-10-31 | Stop reason: SURG

## 2023-10-31 RX ORDER — ALBUTEROL SULFATE 90 UG/1
2 AEROSOL, METERED RESPIRATORY (INHALATION)
Status: DISCONTINUED | OUTPATIENT
Start: 2023-10-31 | End: 2023-10-31 | Stop reason: SDUPTHER

## 2023-10-31 RX ORDER — MAGNESIUM HYDROXIDE 1200 MG/15ML
LIQUID ORAL AS NEEDED
Status: DISCONTINUED | OUTPATIENT
Start: 2023-10-31 | End: 2023-10-31 | Stop reason: HOSPADM

## 2023-10-31 RX ORDER — OXYCODONE AND ACETAMINOPHEN 7.5; 325 MG/1; MG/1
1 TABLET ORAL EVERY 4 HOURS PRN
Status: DISCONTINUED | OUTPATIENT
Start: 2023-10-31 | End: 2023-11-01 | Stop reason: HOSPADM

## 2023-10-31 RX ORDER — FENTANYL CITRATE 50 UG/ML
25 INJECTION, SOLUTION INTRAMUSCULAR; INTRAVENOUS
Status: DISCONTINUED | OUTPATIENT
Start: 2023-10-31 | End: 2023-10-31 | Stop reason: HOSPADM

## 2023-10-31 RX ORDER — LIDOCAINE HYDROCHLORIDE 10 MG/ML
0.5 INJECTION, SOLUTION EPIDURAL; INFILTRATION; INTRACAUDAL; PERINEURAL ONCE AS NEEDED
Status: DISCONTINUED | OUTPATIENT
Start: 2023-10-31 | End: 2023-10-31 | Stop reason: HOSPADM

## 2023-10-31 RX ORDER — ALBUTEROL SULFATE 2.5 MG/3ML
2.5 SOLUTION RESPIRATORY (INHALATION)
Status: DISCONTINUED | OUTPATIENT
Start: 2023-10-31 | End: 2023-10-31

## 2023-10-31 RX ORDER — DEXAMETHASONE SODIUM PHOSPHATE 4 MG/ML
INJECTION, SOLUTION INTRA-ARTICULAR; INTRALESIONAL; INTRAMUSCULAR; INTRAVENOUS; SOFT TISSUE AS NEEDED
Status: DISCONTINUED | OUTPATIENT
Start: 2023-10-31 | End: 2023-10-31 | Stop reason: SURG

## 2023-10-31 RX ORDER — IBUPROFEN 600 MG/1
600 TABLET ORAL ONCE AS NEEDED
Status: DISCONTINUED | OUTPATIENT
Start: 2023-10-31 | End: 2023-10-31

## 2023-10-31 RX ORDER — TAMSULOSIN HYDROCHLORIDE 0.4 MG/1
0.4 CAPSULE ORAL EVERY EVENING
Status: DISCONTINUED | OUTPATIENT
Start: 2023-10-31 | End: 2023-11-01 | Stop reason: HOSPADM

## 2023-10-31 RX ORDER — ROCURONIUM BROMIDE 10 MG/ML
INJECTION, SOLUTION INTRAVENOUS AS NEEDED
Status: DISCONTINUED | OUTPATIENT
Start: 2023-10-31 | End: 2023-10-31 | Stop reason: SURG

## 2023-10-31 RX ORDER — ALPRAZOLAM 0.5 MG/1
0.5 TABLET ORAL 2 TIMES DAILY PRN
Status: DISCONTINUED | OUTPATIENT
Start: 2023-10-31 | End: 2023-11-01 | Stop reason: HOSPADM

## 2023-10-31 RX ORDER — LIDOCAINE HYDROCHLORIDE 20 MG/ML
INJECTION, SOLUTION EPIDURAL; INFILTRATION; INTRACAUDAL; PERINEURAL AS NEEDED
Status: DISCONTINUED | OUTPATIENT
Start: 2023-10-31 | End: 2023-10-31 | Stop reason: SURG

## 2023-10-31 RX ORDER — CLOPIDOGREL BISULFATE 75 MG/1
75 TABLET ORAL DAILY
COMMUNITY

## 2023-10-31 RX ORDER — ONDANSETRON 2 MG/ML
4 INJECTION INTRAMUSCULAR; INTRAVENOUS ONCE AS NEEDED
Status: DISCONTINUED | OUTPATIENT
Start: 2023-10-31 | End: 2023-10-31 | Stop reason: HOSPADM

## 2023-10-31 RX ORDER — FENTANYL CITRATE 50 UG/ML
INJECTION, SOLUTION INTRAMUSCULAR; INTRAVENOUS AS NEEDED
Status: DISCONTINUED | OUTPATIENT
Start: 2023-10-31 | End: 2023-10-31 | Stop reason: SURG

## 2023-10-31 RX ORDER — ONDANSETRON 2 MG/ML
4 INJECTION INTRAMUSCULAR; INTRAVENOUS ONCE AS NEEDED
Status: DISCONTINUED | OUTPATIENT
Start: 2023-10-31 | End: 2023-11-01 | Stop reason: HOSPADM

## 2023-10-31 RX ORDER — LIDOCAINE HYDROCHLORIDE AND EPINEPHRINE 10; 10 MG/ML; UG/ML
INJECTION, SOLUTION INFILTRATION; PERINEURAL AS NEEDED
Status: DISCONTINUED | OUTPATIENT
Start: 2023-10-31 | End: 2023-10-31 | Stop reason: HOSPADM

## 2023-10-31 RX ORDER — HYDROCODONE BITARTRATE AND ACETAMINOPHEN 10; 325 MG/1; MG/1
1 TABLET ORAL EVERY 4 HOURS PRN
Status: DISCONTINUED | OUTPATIENT
Start: 2023-10-31 | End: 2023-10-31 | Stop reason: HOSPADM

## 2023-10-31 RX ORDER — NALOXONE HCL 0.4 MG/ML
0.4 VIAL (ML) INJECTION AS NEEDED
Status: DISCONTINUED | OUTPATIENT
Start: 2023-10-31 | End: 2023-10-31 | Stop reason: HOSPADM

## 2023-10-31 RX ORDER — SODIUM CHLORIDE 450 MG/100ML
50 INJECTION, SOLUTION INTRAVENOUS CONTINUOUS
Status: DISCONTINUED | OUTPATIENT
Start: 2023-10-31 | End: 2023-11-01 | Stop reason: HOSPADM

## 2023-10-31 RX ORDER — SODIUM CHLORIDE 9 MG/ML
INJECTION, SOLUTION INTRAVENOUS AS NEEDED
Status: DISCONTINUED | OUTPATIENT
Start: 2023-10-31 | End: 2023-10-31 | Stop reason: HOSPADM

## 2023-10-31 RX ADMIN — EPHEDRINE SULFATE 12.5 MG: 50 INJECTION INTRAVENOUS at 09:24

## 2023-10-31 RX ADMIN — FENTANYL CITRATE 25 MCG: 50 INJECTION, SOLUTION INTRAMUSCULAR; INTRAVENOUS at 13:02

## 2023-10-31 RX ADMIN — IPRATROPIUM BROMIDE 0.5 MG: 0.5 SOLUTION RESPIRATORY (INHALATION) at 15:33

## 2023-10-31 RX ADMIN — FENTANYL CITRATE 25 MCG: 50 INJECTION, SOLUTION INTRAMUSCULAR; INTRAVENOUS at 13:15

## 2023-10-31 RX ADMIN — SODIUM CHLORIDE 50 ML/HR: 4.5 INJECTION, SOLUTION INTRAVENOUS at 15:02

## 2023-10-31 RX ADMIN — Medication 50 MCG: at 10:13

## 2023-10-31 RX ADMIN — FENTANYL CITRATE 25 MCG: 50 INJECTION, SOLUTION INTRAMUSCULAR; INTRAVENOUS at 13:07

## 2023-10-31 RX ADMIN — CEFAZOLIN 2 G: 330 INJECTION, POWDER, FOR SOLUTION INTRAMUSCULAR; INTRAVENOUS at 08:56

## 2023-10-31 RX ADMIN — ALBUTEROL SULFATE 2.5 MG: 2.5 SOLUTION RESPIRATORY (INHALATION) at 15:49

## 2023-10-31 RX ADMIN — TAMSULOSIN HYDROCHLORIDE 0.4 MG: 0.4 CAPSULE ORAL at 16:58

## 2023-10-31 RX ADMIN — DEXAMETHASONE SODIUM PHOSPHATE 4 MG: 4 INJECTION, SOLUTION INTRA-ARTICULAR; INTRALESIONAL; INTRAMUSCULAR; INTRAVENOUS; SOFT TISSUE at 10:55

## 2023-10-31 RX ADMIN — ASPIRIN 81 MG: 81 TABLET, COATED ORAL at 16:58

## 2023-10-31 RX ADMIN — POTASSIUM & SODIUM PHOSPHATES POWDER PACK 280-160-250 MG 1 PACKET: 280-160-250 PACK at 16:57

## 2023-10-31 RX ADMIN — SERTRALINE HYDROCHLORIDE 25 MG: 50 TABLET, FILM COATED ORAL at 16:59

## 2023-10-31 RX ADMIN — FENTANYL CITRATE 100 MCG: 50 INJECTION, SOLUTION INTRAMUSCULAR; INTRAVENOUS at 08:50

## 2023-10-31 RX ADMIN — ANTACID TABLETS 1 TABLET: 500 TABLET, CHEWABLE ORAL at 21:22

## 2023-10-31 RX ADMIN — ARFORMOTEROL TARTRATE 15 MCG: 15 SOLUTION RESPIRATORY (INHALATION) at 18:47

## 2023-10-31 RX ADMIN — ONDANSETRON 4 MG: 2 INJECTION INTRAMUSCULAR; INTRAVENOUS at 10:55

## 2023-10-31 RX ADMIN — PANTOPRAZOLE SODIUM 40 MG: 40 TABLET, DELAYED RELEASE ORAL at 16:59

## 2023-10-31 RX ADMIN — PROPOFOL 150 MG: 10 INJECTION, EMULSION INTRAVENOUS at 08:50

## 2023-10-31 RX ADMIN — Medication 100 MCG: at 10:58

## 2023-10-31 RX ADMIN — IPRATROPIUM BROMIDE AND ALBUTEROL SULFATE 3 ML: .5; 3 SOLUTION RESPIRATORY (INHALATION) at 18:47

## 2023-10-31 RX ADMIN — SODIUM CHLORIDE, POTASSIUM CHLORIDE, SODIUM LACTATE AND CALCIUM CHLORIDE: 600; 310; 30; 20 INJECTION, SOLUTION INTRAVENOUS at 09:44

## 2023-10-31 RX ADMIN — ROCURONIUM BROMIDE 10 MG: 10 INJECTION, SOLUTION INTRAVENOUS at 08:50

## 2023-10-31 RX ADMIN — EPHEDRINE SULFATE 12.5 MG: 50 INJECTION INTRAVENOUS at 09:18

## 2023-10-31 RX ADMIN — LIDOCAINE HYDROCHLORIDE 60 MG: 20 INJECTION, SOLUTION EPIDURAL; INFILTRATION; INTRACAUDAL; PERINEURAL at 08:50

## 2023-10-31 RX ADMIN — EPHEDRINE SULFATE 25 MG: 50 INJECTION INTRAVENOUS at 08:56

## 2023-10-31 RX ADMIN — METOPROLOL SUCCINATE 12.5 MG: 25 TABLET, EXTENDED RELEASE ORAL at 17:00

## 2023-10-31 RX ADMIN — Medication 50 MCG: at 10:10

## 2023-10-31 RX ADMIN — HYDROCODONE BITARTRATE AND ACETAMINOPHEN 1 TABLET: 10; 325 TABLET ORAL at 13:02

## 2023-10-31 RX ADMIN — FINASTERIDE 5 MG: 5 TABLET, FILM COATED ORAL at 16:59

## 2023-10-31 RX ADMIN — HYDROMORPHONE HYDROCHLORIDE 0.5 MG: 1 INJECTION, SOLUTION INTRAMUSCULAR; INTRAVENOUS; SUBCUTANEOUS at 19:39

## 2023-10-31 RX ADMIN — ANTACID TABLETS 1 TABLET: 500 TABLET, CHEWABLE ORAL at 16:57

## 2023-10-31 RX ADMIN — POTASSIUM & SODIUM PHOSPHATES POWDER PACK 280-160-250 MG 1 PACKET: 280-160-250 PACK at 21:22

## 2023-10-31 RX ADMIN — SODIUM CHLORIDE, POTASSIUM CHLORIDE, SODIUM LACTATE AND CALCIUM CHLORIDE 1000 ML: 600; 310; 30; 20 INJECTION, SOLUTION INTRAVENOUS at 07:13

## 2023-10-31 NOTE — PLAN OF CARE
Goal Outcome Evaluation:  Plan of Care Reviewed With: patient, family        Progress: no change  Outcome Evaluation: Pt was still pretty sleepy on arrival to floor, is alert and oriented this evening eating supper currently; IVF initiated; denies pain, though he states his throat is sore and hurts a little to swallow; family at bedside; safety maintained.

## 2023-10-31 NOTE — OP NOTE
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    1 Blood, Venous Line Blood PTH, INTACT   Lion Cardenas Jr., MD 10/31/23 0930     Description: BLOOD    A Parathyroid Gland Tissue TISSUE PATHOLOGY EXAM   Lion Cardenas Jr., MD 10/31/23 0923 Yes    Description: LEFT THYROID AND PARATHYROID, STITCH IN PARATHYROID FOR FROZEN              Drains:   Fluted Manjit, 19 Macanese suprasternal notch    Findings:  Thyroid: Left thyroid lobe with large cystic mass approximately 4 cm dominating the left thyroid gland  Parathyroid: Normal left superior parathyroid gland; left inferior parathyroid gland 1.5 cm appears to be parathyroid adenoma  Recurrent nerve: Identified adjacent to the parathyroid adenoma and in normal position posterior to Berry's ligament  Frozen section-left inferior parathyroid was identified by frozen section.  PTH-132 after removal of the parathyroid gland  PTH, Intact (10/31/2023 09:30)     Complications:   none    Assistants:  none    Implants:  Fibrillar    Time Out::  A time out was performed to confirm the patient, procedure and laterality.    Reason for the Operation: Van Mckeon is a 83 y.o. male who has had a history of hyperparathyroidism with elevated calcium and PTH; left large thyroid nodule.  Preoperative discussion was carried out. Risks,  benefits, options for therapy and complications were explained in clear and simple language.    Procedure Description:  The patient was taken back to the operating room, positioned on the operating table and placed under satisfactory anesthesia by the anesthesia staff.      Injection- Lidocaine 1% with Epinephrine 1:100K     4.5 ml  Approach- Transcervical with lower neck incision in RSLT    Procedure detail: Thyroidectomy, left; left inferior parathyroid adenoma removal  Procedure Description:  The patient was taken back to the operating room, placed supine on the operating table and placed under anesthesia by the anesthesia staff. Once this was done a time out was performed to confirm the patient and the proper procedure. Nerve monitoring was established to monitor the recurrent laryngeal by placing a nerve monitoring endotracheal tube and placement of ground and reference leads. Nerve monitor was then tested and found to be in appropriate working order.     The patient was prepped and draped in a sterile fashion.   A handheld stimulating probe was attached and used for verifying nerve integrity.  A shoulder roll was placed under the patient's shoulders and the neck was extended     Approach: A horizontal incision was made one to two finger breadths above the sternal notch in a relaxed skin tension line.   Superior and inferior subplatysmal flaps were elevated.  The strap muscles were identified in the midline and dissection was carried out under the muscle over the left lobe of the thyroid. The strap muscles were retracted for exposure of the thyroid. The lateral aspect of the thyroid lobe was dissected from the overlying musculature. The middle thyroid vein was ligated using the Harmonic scalpel.  The inferior pole was elevated. The inferior pole vessels were identified and ligated using the Harmonic scalpel. The superior pole vessels were identified and ligated using the Harmonic scalpel.  The parathyroid  glands were identified.  The superior parathyroid gland appeared normal and was left in the thyroid fossa.  Left inferior parathyroid gland appeared to be enlarged and adjacent to the esophagus as well as the left recurrent nerve..  The lobe was retracted medially, exposing the trachea.  The Recurrent nerve was identified and preserved.  The left recurrent nerve was running along adjacent to the parathyroid adenoma.  Zamora's ligament was then isolated and divided, freeing the thyroid lobe.  The isthmus was left intact.    Recurrent nerve findings: Stimulated well after removal of the thyroid and parathyroid.    Closure:  The wound was irrigated until clean and dry.  Hemostasis was satisfactory.  Fibrillar was placed in the wound.  Drains were placed in the depths of the wound and brought out via stab incisions over the sternum. A Manjit drain was placed. This was secured with a silk suture.   The strap muscles were closed in the midline with 3-0 Vicryl suture interrupted.  The platysma flaps were closed with 3-0 Vicryl suture interrupted.  The skin was closed with 5-0 Monocryl subcuticular stitch.  The procedure was terminated.    At the end of the procedure, the operative site was found to be hemostatic.  The operative site was inspected for retained foreign bodies and instruments.   Sponge/needle count was Correct  Hemostasis was satisfactory.  The patient was then turned over to the anesthesia team and allowed to wake from anesthesia.     Disposition: The patient was transported to the PACU in Good condition.   Patient will be placed in observation following surgery for additional care.    Postoperative discussion held with: Daughters  Procedure and findings reviewed.  DVT ASSESSMENT CARRIED OUT : Patient is in the immediate post-operative period and is not a candidate for anticoagulation therapy  Patient is at increased risk for bleeding if anticoagulant therapy is used    Lion Cardenas Jr,  MD  10/31/2023  12:28 CDT

## 2023-10-31 NOTE — ANESTHESIA PREPROCEDURE EVALUATION
Anesthesia Evaluation     Patient summary reviewed and Nursing notes reviewed   no history of anesthetic complications:   NPO Solid Status: > 8 hours  NPO Liquid Status: > 8 hours           Airway   Mallampati: I  TM distance: >3 FB  Neck ROM: full  No difficulty expected  Dental      Pulmonary    (+) a smoker Current, COPD,  (-) asthma, sleep apnea  Cardiovascular   Exercise tolerance: poor (<4 METS)    (+) hypertension, CHF Diastolic >=55%, PVD (AAA)  (-) pacemaker, past MI, angina, cardiac stents    ROS comment: Echo 6/2023  ·  Left ventricular systolic function is normal. Left ventricular ejection fraction appears to be 61 - 65%.  ·  Left ventricular diastolic function was normal.  ·  The right ventricular cavity is borderline dilated with normal systolic function.  ·  The right atrial cavity is borderline dilated.  ·  Mild pulmonary hypertension is present with estimated RVSP 42 mmHg.  ·  No hemodynamically significant valve disease.         Neuro/Psych  (+) seizures, TIA  (-) CVA  GI/Hepatic/Renal/Endo    (+) GERD, renal disease- CRI  (-) liver disease, diabetes    Musculoskeletal     Abdominal    Substance History   (+) alcohol use (2 drinks per day)     OB/GYN          Other   arthritis, blood dyscrasia thrombocytopenia,                   Anesthesia Plan    ASA 3     general     intravenous induction     Anesthetic plan, risks, benefits, and alternatives have been provided, discussed and informed consent has been obtained with: patient.      CODE STATUS:

## 2023-10-31 NOTE — ANESTHESIA POSTPROCEDURE EVALUATION
"Patient: Van Mckeon    Procedure Summary       Date: 10/31/23 Room / Location:  [facility] OR 02 /  [facility]    Anesthesia Start: 0846 Anesthesia Stop: 1157    Procedures:       thyroid lobectomy (hemithyroidectomy) (Left: Neck)      PARATHYROIDECTOMY (Left: Neck) Diagnosis:       Parathyroid adenoma      Thyroid nodule      (Parathyroid adenoma [D35.1])      (Thyroid nodule [E04.1])    Surgeons: Lion Cardenas Jr., MD Provider: Dave Diaz CRNA    Anesthesia Type: general ASA Status: 3            Anesthesia Type: general    Vitals  Vitals Value Taken Time   /59 10/31/23 1357   Temp 98.2 °F (36.8 °C) 10/31/23 1230   Pulse 81 10/31/23 1358   Resp 14 10/31/23 1230   SpO2 95 % 10/31/23 1358   Vitals shown include unfiled device data.        Post Anesthesia Care and Evaluation    Patient location during evaluation: PHASE II  Patient participation: complete - patient participated  Level of consciousness: awake and awake and alert  Pain score: 0  Pain management: adequate    Airway patency: patent  Anesthetic complications: No anesthetic complications  PONV Status: none  Cardiovascular status: acceptable  Respiratory status: acceptable  Hydration status: acceptable    Comments: Patient discharged according to acceptable Héctor score per RN assessment. See nursing records for further information.     Blood pressure 108/53, pulse 84, temperature 98.2 °F (36.8 °C), temperature source Temporal, resp. rate 14, height 180 cm (70.87\"), weight 64.9 kg (143 lb 1.3 oz), SpO2 94%.      "

## 2023-10-31 NOTE — ANESTHESIA PROCEDURE NOTES
Airway  Urgency: elective    Date/Time: 10/31/2023 8:51 AM  Airway not difficult    General Information and Staff    Patient location during procedure: OR  CRNA/CAA: Dave Diaz CRNA    Indications and Patient Condition  Indications for airway management: airway protection    Preoxygenated: yes  Mask difficulty assessment: 1 - vent by mask    Final Airway Details  Final airway type: endotracheal airway      Successful airway: ETT     Successful intubation technique: direct laryngoscopy  Endotracheal tube insertion site: oral  Blade: Kimball  Blade size: 4 (3.5)  ETT size (mm): 7.5  Cormack-Lehane Classification: grade I - full view of glottis  Placement verified by: chest auscultation and capnometry   Measured from: gums  Number of attempts at approach: 1  Assessment: lips, teeth, and gum same as pre-op and atraumatic intubation

## 2023-11-01 VITALS
SYSTOLIC BLOOD PRESSURE: 95 MMHG | WEIGHT: 143.08 LBS | HEART RATE: 85 BPM | BODY MASS INDEX: 20.03 KG/M2 | OXYGEN SATURATION: 91 % | DIASTOLIC BLOOD PRESSURE: 43 MMHG | TEMPERATURE: 98.3 F | HEIGHT: 71 IN | RESPIRATION RATE: 15 BRPM

## 2023-11-01 LAB
ANION GAP SERPL CALCULATED.3IONS-SCNC: 6 MMOL/L (ref 5–15)
BUN SERPL-MCNC: 18 MG/DL (ref 8–23)
BUN/CREAT SERPL: 13.2 (ref 7–25)
CALCIUM SPEC-SCNC: 8.8 MG/DL (ref 8.6–10.5)
CHLORIDE SERPL-SCNC: 110 MMOL/L (ref 98–107)
CO2 SERPL-SCNC: 25 MMOL/L (ref 22–29)
CREAT SERPL-MCNC: 1.36 MG/DL (ref 0.76–1.27)
CYTO UR: NORMAL
EGFRCR SERPLBLD CKD-EPI 2021: 51.6 ML/MIN/1.73
GLUCOSE SERPL-MCNC: 105 MG/DL (ref 65–99)
LAB AP CASE REPORT: NORMAL
Lab: NORMAL
Lab: NORMAL
PATH REPORT.FINAL DX SPEC: NORMAL
PATH REPORT.GROSS SPEC: NORMAL
POTASSIUM SERPL-SCNC: 4.6 MMOL/L (ref 3.5–5.2)
PTH-INTACT SERPL-MCNC: 17.5 PG/ML (ref 15–65)
SODIUM SERPL-SCNC: 141 MMOL/L (ref 136–145)

## 2023-11-01 PROCEDURE — A9270 NON-COVERED ITEM OR SERVICE: HCPCS | Performed by: OTOLARYNGOLOGY

## 2023-11-01 PROCEDURE — 94664 DEMO&/EVAL PT USE INHALER: CPT

## 2023-11-01 PROCEDURE — 63710000001 POTASSIUM & SODIUM PHOSPHATES 280-160-250 MG PACK: Performed by: OTOLARYNGOLOGY

## 2023-11-01 PROCEDURE — 94761 N-INVAS EAR/PLS OXIMETRY MLT: CPT

## 2023-11-01 PROCEDURE — 80048 BASIC METABOLIC PNL TOTAL CA: CPT | Performed by: OTOLARYNGOLOGY

## 2023-11-01 PROCEDURE — 25010000002 HYDROMORPHONE PER 4 MG: Performed by: OTOLARYNGOLOGY

## 2023-11-01 PROCEDURE — 83970 ASSAY OF PARATHORMONE: CPT | Performed by: OTOLARYNGOLOGY

## 2023-11-01 PROCEDURE — 63710000001 CALCIUM CARBONATE 500 MG CHEWABLE TABLET: Performed by: OTOLARYNGOLOGY

## 2023-11-01 PROCEDURE — 94799 UNLISTED PULMONARY SVC/PX: CPT

## 2023-11-01 PROCEDURE — 63710000001 PANTOPRAZOLE 40 MG TABLET DELAYED-RELEASE: Performed by: OTOLARYNGOLOGY

## 2023-11-01 PROCEDURE — 63710000001 FINASTERIDE 5 MG TABLET: Performed by: OTOLARYNGOLOGY

## 2023-11-01 PROCEDURE — 63710000001 SERTRALINE 50 MG TABLET: Performed by: OTOLARYNGOLOGY

## 2023-11-01 PROCEDURE — 63710000001 ASPIRIN 81 MG TABLET DELAYED-RELEASE: Performed by: OTOLARYNGOLOGY

## 2023-11-01 RX ORDER — HYDROCODONE BITARTRATE AND ACETAMINOPHEN 5; 325 MG/1; MG/1
1 TABLET ORAL EVERY 6 HOURS PRN
Qty: 15 TABLET | Refills: 0 | Status: SHIPPED | OUTPATIENT
Start: 2023-11-01

## 2023-11-01 RX ADMIN — HYDROMORPHONE HYDROCHLORIDE 0.5 MG: 1 INJECTION, SOLUTION INTRAMUSCULAR; INTRAVENOUS; SUBCUTANEOUS at 02:42

## 2023-11-01 RX ADMIN — PANTOPRAZOLE SODIUM 40 MG: 40 TABLET, DELAYED RELEASE ORAL at 09:37

## 2023-11-01 RX ADMIN — ANTACID TABLETS 1 TABLET: 500 TABLET, CHEWABLE ORAL at 09:37

## 2023-11-01 RX ADMIN — SERTRALINE HYDROCHLORIDE 25 MG: 50 TABLET, FILM COATED ORAL at 09:37

## 2023-11-01 RX ADMIN — ARFORMOTEROL TARTRATE 15 MCG: 15 SOLUTION RESPIRATORY (INHALATION) at 06:39

## 2023-11-01 RX ADMIN — POTASSIUM & SODIUM PHOSPHATES POWDER PACK 280-160-250 MG 1 PACKET: 280-160-250 PACK at 09:37

## 2023-11-01 RX ADMIN — IPRATROPIUM BROMIDE AND ALBUTEROL SULFATE 3 ML: .5; 3 SOLUTION RESPIRATORY (INHALATION) at 06:33

## 2023-11-01 RX ADMIN — POTASSIUM & SODIUM PHOSPHATES POWDER PACK 280-160-250 MG 1 PACKET: 280-160-250 PACK at 11:52

## 2023-11-01 RX ADMIN — FINASTERIDE 5 MG: 5 TABLET, FILM COATED ORAL at 09:37

## 2023-11-01 RX ADMIN — ASPIRIN 81 MG: 81 TABLET, COATED ORAL at 09:37

## 2023-11-01 RX ADMIN — IPRATROPIUM BROMIDE AND ALBUTEROL SULFATE 3 ML: .5; 3 SOLUTION RESPIRATORY (INHALATION) at 10:27

## 2023-11-01 NOTE — DISCHARGE SUMMARY
[facility] Otolaryngology Head and Neck Surgery  DISCHARGE SUMMARY    Patient Name: Van Mckeon  : 1940  ACCOUNT NUMBER: [id number]  ADMISSION DATE:  10/31/2023  DISCHARGE DATE:  2023   ATTENDING PHYSICIAN: Lion Cardenas Jr*  PRIMARY CARE PHYSICIAN: Jean Marie Cabrera MD  CONDITION ON DISCHARGE: stable    ADMITTING DIAGNOSIS:   Present on Admission:   Parathyroid adenoma   Thyroid nodule   Hyperparathyroidism    PRESENTING PROBLEM:   Parathyroid adenoma [D35.1]  Thyroid nodule [E04.1]  Hyperparathyroidism [E21.3]    Consults       Date and Time Order Name Status Description    10/22/2023 11:40 AM Nephrology (on-call MD unless specified) Completed             Surgical Procedures Since Admission:  Procedure(s):  thyroid lobectomy (hemithyroidectomy)  PARATHYROIDECTOMY  Surgeon:  Lion Cardenas Jr., MD  Status:  1 Day Post-Op  -------------------      Active and Resolved Hospital Problems:  Active Hospital Problems    Diagnosis POA    **Hyperparathyroidism [E21.3] Yes    Status post partial thyroidectomy [E89.0] Unknown    Status post parathyroidectomy [E89.2] Unknown    Parathyroid adenoma [D35.1] Yes    Thyroid nodule [E04.1] Yes      Resolved Hospital Problems   No resolved problems to display.       ACCOMPANIED BY: Granddaughter  Hospital Course   Hospital Course:  Van Mckeon is a 83 y.o. male has remained stable since thyroid surgery and parathyroid surgery yesterday.  His laboratory evaluation has showed normalization of his calcium as well as his parathyroid hormone.  Patient has had minimal drainage.  His wound is clean and dry.  He is taking p.o. well.  He is ambulating.  He wishes to be evaluated by urology.  He currently has urinary retention and is wearing a leg bag.  He has an appointment with urology already.  The patient is in satisfactory condition from an ENT standpoint for discharge home this morning.  The patient has taken p.o. well.  His  pain is under good control.  He feels better.  Patient be discharged home with postoperative wound care instructions.  He is to take 3 calcium tablets 3 times daily until he returns.  He is to call for any hypocalcemic symptoms.  He is to continue wound care.  Drain will be removed this morning.  Patient is to follow-up with urology at his scheduled appointment.  Patient is to restart Plavix on postoperative day 2.      Day of Discharge   Vital Signs:  Temp:  [97.1 °F (36.2 °C)-98.3 °F (36.8 °C)] 98.3 °F (36.8 °C)  Heart Rate:  [60-87] 85  Resp:  [14-16] 15  BP: ()/(40-70) 95/43  Flow (L/min):  [2] 2  Output by Drain (mL) 10/31/23 0701 - 10/31/23 1900 10/31/23 1901 - 11/01/23 0700 11/01/23 0701 - 11/01/23 1315 Range Total   Urethral Catheter Non-latex 16 Fr. 150 800  950     Physical Exam  Vitals reviewed.   Constitutional:       Appearance: Normal appearance. He is well-developed and underweight.      Comments: Sitting up in bed  Drain in position   HENT:      Head: Atraumatic.      Comments: Temporal wasting     Right Ear: External ear normal. Decreased hearing noted.      Left Ear: External ear normal. Decreased hearing noted.      Nose: Nose normal.      Mouth/Throat:      Lips: Pink.      Mouth: Mucous membranes are dry.      Dentition: Normal dentition (Edentulous). No gum lesions.      Tongue: No lesions. Tongue does not deviate from midline.      Palate: No mass and lesions.   Eyes:      General: Lids are normal. Gaze aligned appropriately.      Extraocular Movements: Extraocular movements intact.      Conjunctiva/sclera: Conjunctivae normal.   Neck:      Thyroid: No thyroid mass.        Comments: Drain in position suprasternal notch  Cardiovascular:      Rate and Rhythm: Normal rate and regular rhythm.   Pulmonary:      Effort: Pulmonary effort is normal. No respiratory distress or retractions.      Breath sounds: Normal breath sounds. No stridor.   Musculoskeletal:         General: Normal range of  motion.      Cervical back: No rigidity or crepitus. Decreased range of motion.   Lymphadenopathy:      Cervical: No cervical adenopathy.   Skin:     Findings: No rash.   Neurological:      General: No focal deficit present.      Mental Status: He is alert and oriented to person, place, and time.      Cranial Nerves: Cranial nerves 2-12 are intact. No cranial nerve deficit.   Psychiatric:         Attention and Perception: Attention and perception normal.         Mood and Affect: Mood and affect normal.         Speech: Speech normal.         Behavior: Behavior normal. Behavior is cooperative.         Thought Content: Thought content normal.         Cognition and Memory: Cognition normal.         Judgment: Judgment normal.          Pertinent  and/or Most Recent Results   LAB RESULTS:       Lab 10/26/23  0929   WBC 4.52   HEMOGLOBIN 10.2*   HEMATOCRIT 32.0*   PLATELETS 103*   NEUTROS ABS 3.31   IMMATURE GRANS (ABS) 0.04   LYMPHS ABS 0.75   MONOS ABS 0.38   EOS ABS 0.03   MCV 97.0         Lab 11/01/23  0446 10/31/23  1237 10/31/23  0709 10/26/23  0929   SODIUM 141  --  143 136   POTASSIUM 4.6  --  4.5 4.2   CHLORIDE 110*  --  112* 105   CO2 25.0  --  25.0 20.0*   ANION GAP 6.0  --  6.0 11.0   BUN 18  --  16 20   CREATININE 1.36*  --  1.25 1.45*   EGFR 51.6*  --  57.1* 47.8*   GLUCOSE 105*  --  94 95   CALCIUM 8.8 8.7 9.3 9.4   MAGNESIUM  --   --   --  1.7   PHOSPHORUS  --   --   --  2.8         Lab 10/26/23  0929   TOTAL PROTEIN 6.1   ALBUMIN 3.7   GLOBULIN 2.4   ALT (SGPT) 9   AST (SGOT) 18   BILIRUBIN 0.4   ALK PHOS 58           URINALYSIS@  Microbiology Results (last 10 days)       ** No results found for the last 240 hours. **        NM Parathyroid Scan w SPECT    Result Date: 10/25/2023  Impression: 1. Posterior to or exophytic from the LEFT inferior thyroid, there is a 3-4 cm cystic structure, which could represent exophytic thyroid nodule or cystic degeneration of a parathyroid adenoma. There is persistent focal  tracer uptake at the posterior aspect, concerning for parathyroid adenoma. Recommend CT neck without and with contrast (precontrast, arterial and venous phases) from maxilla to the nicho for improved anatomic correlation.  This report was signed and finalized on 10/25/2023 9:54 AM CDT by Dr Renee Claudio MD.      US Thyroid    Result Date: 10/24/2023  Impression: 1. Dominant LEFT thyroid nodule meeting criteria for ultrasound-guided fine-needle aspiration with solid component superficially. 2. RIGHT carotid artery with an echogenic focus, which appears to be eccentric and nonobstructing, most likely calcified atherosclerosis. This is not optimally evaluated on this exam. had carotid ultrasound 5/16/2023. Recommend correlation with symptoms.     This report was signed and finalized on 10/24/2023 3:07 PM CDT by Dr Renee Claudio MD.      XR Chest 1 View    Result Date: 10/22/2023  Impression:      No acute abnormality. COPD                                                           This report was signed and finalized on 10/22/2023 10:55 AM CDT by Dr. Francisco Deluna MD.   Results for orders placed during the hospital encounter of 06/23/23    Adult Transthoracic Echo Complete w/ Color, Spectral and Contrast if necessary per protocol    Interpretation Summary    Left ventricular systolic function is normal. Left ventricular ejection fraction appears to be 61 - 65%.    Left ventricular diastolic function was normal.    The right ventricular cavity is borderline dilated with normal systolic function.    The right atrial cavity is borderline dilated.    Mild pulmonary hypertension is present with estimated RVSP 42 mmHg.    No hemodynamically significant valve disease.      Labs Pending at Discharge: None      Discharge Details        Discharge Medications        New Medications        Instructions Start Date   HYDROcodone-acetaminophen 5-325 MG per tablet  Commonly known as: Norco   1 tablet, Oral, Every 6 Hours PRN              Continue These Medications        Instructions Start Date   albuterol sulfate  (90 Base) MCG/ACT inhaler  Commonly known as: PROVENTIL HFA;VENTOLIN HFA;PROAIR HFA   2 puffs, Inhalation, 4 Times Daily - RT      ALPRAZolam 0.5 MG tablet  Commonly known as: XANAX   0.25 mg, Oral, Daily      aspirin 81 MG EC tablet   81 mg, Oral, Daily      clopidogrel 75 MG tablet  Commonly known as: PLAVIX   75 mg, Oral, Daily      empagliflozin 10 MG tablet tablet  Commonly known as: Jardiance   10 mg, Oral, Daily      finasteride 5 MG tablet  Commonly known as: PROSCAR   5 mg, Oral, Daily      folic acid 1 MG tablet  Commonly known as: FOLVITE   1 mg, Oral, Daily      furosemide 40 MG tablet  Commonly known as: LASIX   40 mg, Oral, Daily PRN      metoprolol succinate XL 25 MG 24 hr tablet  Commonly known as: TOPROL-XL   12.5 mg, Oral, Every Evening      pantoprazole 40 MG EC tablet  Commonly known as: PROTONIX   40 mg, Oral, 2 Times Daily Before Meals      rosuvastatin 10 MG tablet  Commonly known as: CRESTOR   5 mg, Oral, Nightly      sertraline 50 MG tablet  Commonly known as: ZOLOFT   50 mg, Oral, Daily      spironolactone 25 MG tablet  Commonly known as: ALDACTONE   25 mg, Oral, Daily      Stiolto Respimat 2.5-2.5 MCG/ACT aerosol solution inhaler  Generic drug: tiotropium bromide-olodaterol   2 puffs, Inhalation, Daily - RT      tamsulosin 0.4 MG capsule 24 hr capsule  Commonly known as: FLOMAX   1 capsule, Oral, Every Evening             ASK your doctor about these medications        Instructions Start Date   Dylan-Roverto 280-160-250 MG pack packet  Generic drug: potassium & sodium phosphates  Ask about: Should I take this medication?   1 packet, Oral, 4 Times Daily Before Meals & Nightly               Allergies   Allergen Reactions    Statins Myalgia    Tetanus Toxoids Other (See Comments)     STATES HAD A REACTION TO TETANUS SHOT AT AGE 8; STATES HAS TOLERATED SINCE        Discharge Disposition:   Home or Self  Care    Diet:   Hospital:  Diet Order   Procedures    Diet: Regular/House Diet; Texture: Regular Texture (IDDSI 7); Fluid Consistency: Thin (IDDSI 0)       Discharge Activity:       Future Appointments   Date Time Provider Department Center   11/9/2023  8:30 AM Yenifer Edwards APRN [facility]   11/15/2023  2:00 PM Tobias Chna APRN [facility]NT [facility]   12/20/2023 10:30 AM  [facility]  [facility] None   1/30/2024  2:00 PM Josephine Max APRN [facility]   2/23/2024  8:45 AM [facility] BIC CT 1 BH [facility] CT BI [facility]   2/23/2024  9:30 AM Natalie David APRN [facility]           Discharge discussion was held with the Patient, daughter and granddaughter .  Discharge instructions and discharge medications were reviewed with the Patient, daughter and granddaughter .  The Patient, daughter and granddaughter  expresses understanding of the discharge plan.  Follow-up has been arranged.  The Patient, daughter and granddaughter  has been instructed to call for any problems or questions.    FOLLOW UP:  Follow up in 2 weeks with Dr. Lino Cardenas MD    Time spent on Discharge including face to face service: 37 minutes    Electronically signed by Lion Cardenas Jr, MD, 11/01/23, 1:15 PM CDT.

## 2023-11-01 NOTE — OUTREACH NOTE
Medical Week 2 Survey      Flowsheet Row Responses   [facility] patient discharged from? [facility]   Does the patient have one of the following disease processes/diagnoses(primary or secondary)? Other   Week 2 attempt successful? No   Unsuccessful attempts Attempt 1   Revoke Readmitted            EDGAR LINDQUIST - Registered Nurse

## 2023-11-01 NOTE — DISCHARGE INSTRUCTIONS
Family has at home  Restart Eliquis 11/2/2023  Calcium or Tums tablets 3 tablets 3 times a day until he sees me in the office

## 2023-11-01 NOTE — PLAN OF CARE
Problem: Adult Inpatient Plan of Care  Goal: Plan of Care Review  Outcome: Met  Flowsheets (Taken 11/1/2023 0308)  Outcome Evaluation: Patient RA. IVF infusing per order. No c/o pain. Up standby. Bose intact, Draining within parameters. RITESH drain in neck pulled, covered with gauze and medi pore tape. VSS, safety maintained.

## 2023-11-09 ENCOUNTER — OFFICE VISIT (OUTPATIENT)
Dept: UROLOGY | Facility: CLINIC | Age: 83
End: 2023-11-09
Payer: MEDICARE

## 2023-11-09 ENCOUNTER — PROCEDURE VISIT (OUTPATIENT)
Dept: UROLOGY | Facility: CLINIC | Age: 83
End: 2023-11-09
Payer: MEDICARE

## 2023-11-09 ENCOUNTER — TELEPHONE (OUTPATIENT)
Dept: UROLOGY | Facility: CLINIC | Age: 83
End: 2023-11-09
Payer: MEDICARE

## 2023-11-09 VITALS — TEMPERATURE: 97.9 F | HEIGHT: 71 IN | BODY MASS INDEX: 20.03 KG/M2 | WEIGHT: 143.1 LBS

## 2023-11-09 DIAGNOSIS — N13.8 BPH WITH OBSTRUCTION/LOWER URINARY TRACT SYMPTOMS: Primary | ICD-10-CM

## 2023-11-09 DIAGNOSIS — R33.9 URINARY RETENTION: Primary | ICD-10-CM

## 2023-11-09 DIAGNOSIS — N40.1 BPH WITH OBSTRUCTION/LOWER URINARY TRACT SYMPTOMS: Primary | ICD-10-CM

## 2023-11-09 RX ORDER — TAMSULOSIN HYDROCHLORIDE 0.4 MG/1
1 CAPSULE ORAL 2 TIMES DAILY
Qty: 90 CAPSULE | Refills: 3 | Status: SHIPPED | OUTPATIENT
Start: 2023-11-09

## 2023-11-09 NOTE — PROGRESS NOTES
Van Mckeon is a 83 y.o. male who is here today for catheter removal. Patient of Yenifer ROWE. 10cc syringe used to deflate the balloon and the catheter was removed without difficulty.  The patient tolerated this well and will return on 12/8 to see Yenifer ROWE in the office for follow up. Yenifer ROWE was in the office at the time of procedure.     Patient was advised to drink clear fluids for the next couple hours and urinate. The patient was also advised he may experience some blood in the urine and burning with urination for the next couple days. If the patient is unable to urinate or develops fever, chills, N&V or suprapubic pain he will call to return for an appt at clinic or seek medical treatment at [facility] ER, PCP or Urgent Care after hours. Patient verbalized understanding and all questions were answered.   Og  I have reviewed and agree with medical assistance documentation above

## 2023-11-09 NOTE — PROGRESS NOTES
Estimation of residual urine via abdominal ultrasound  Residual Urine: 158 ml  Indication: urinary retention  Position: Supine  Examination: Incremental scanning of the suprapubic area using 3 MHz transducer using copious amounts of acoustic gel.   Findings: An anechoic area was demonstrated which represented the bladder, with measurement of residual urine as noted. I inspected this myself. In that the residual urine was stable or insignificant, no treatment will be necessary at this time.     The patient states He is here today for catheter insertion.  Patient of JAE Begum . Using sterile technique a new 16fr catheter was placed, balloon inflated using 10cc sterile water,  125cc of urine drained from patients bladder via catheter then catheter was connected to leg bag.  Patient tolerated the procedure well.  The patient was advised to return in 1 week for voiding trial. Patient verbalized understanding. Dr. Prado was in the office at the time of procedure. Lizz KNOWLES MA    I have reviewed and agree with medical assistance documentation above.    Electronically signed by John Prado MD, 11/09/23, 5:52 PM CST.

## 2023-11-09 NOTE — TELEPHONE ENCOUNTER
Patient was in this morning seen Yenifer and got a catheter removed. Patient called in stating he was only dribbling not actually able to urinate other than a dribble. Patient is on his way back in to get his bladder scanned and possible the catheter put back in.

## 2023-11-13 ENCOUNTER — TELEPHONE (OUTPATIENT)
Dept: UROLOGY | Facility: CLINIC | Age: 83
End: 2023-11-13
Payer: MEDICARE

## 2023-11-13 NOTE — TELEPHONE ENCOUNTER
"I spoke with the HUB they felt we needed to know why this patient was wanting to cancel their nurse visit with us. [facility] proceeded to tell me that patient's daughter was wanting to cancel the nurse visit because when he came in on 11/9/2023 to have his catheter put back in because he was unable to urinate, that we \"Punctured his Prostate\" putting the catheter back in and the patient had to go to his local ER and had surgery performed because they couldn't not get the bleeding to stop.   This was all information the daughter relayed to the HUB who then relayed it to me.     I let the nurses know this information.  "

## 2023-11-16 ENCOUNTER — TELEPHONE (OUTPATIENT)
Dept: PULMONOLOGY | Facility: CLINIC | Age: 83
End: 2023-11-16
Payer: MEDICARE

## 2023-11-16 DIAGNOSIS — J44.9 COPD, GROUP B, BY GOLD 2017 CLASSIFICATION: Primary | ICD-10-CM

## 2023-11-16 RX ORDER — METOPROLOL SUCCINATE 25 MG/1
12.5 TABLET, EXTENDED RELEASE ORAL EVERY EVENING
Qty: 90 TABLET | Refills: 3 | Status: SHIPPED | OUTPATIENT
Start: 2023-11-16

## 2023-11-16 RX ORDER — BUDESONIDE, GLYCOPYRROLATE, AND FORMOTEROL FUMARATE 160; 9; 4.8 UG/1; UG/1; UG/1
2 AEROSOL, METERED RESPIRATORY (INHALATION) 2 TIMES DAILY
Qty: 3 EACH | Refills: 3 | Status: SHIPPED | OUTPATIENT
Start: 2023-11-16 | End: 2024-02-14

## 2023-11-16 NOTE — TELEPHONE ENCOUNTER
Patient's daughter, Akosua, called asking if [facility]  Dr. Cabrera had put him on this, and [facility].

## 2023-11-17 NOTE — TELEPHONE ENCOUNTER
Per fax from [place] (in media) the patient does not have an auth for our office, and they cannot fill the Breztri inhaler.  Akosua was notified and states she will contact them.

## 2023-11-21 ENCOUNTER — HOSPITAL ENCOUNTER (EMERGENCY)
Facility: HOSPITAL | Age: 83
Discharge: HOME | End: 2023-11-21
Attending: STUDENT IN AN ORGANIZED HEALTH CARE EDUCATION/TRAINING PROGRAM | Admitting: STUDENT IN AN ORGANIZED HEALTH CARE EDUCATION/TRAINING PROGRAM
Payer: MEDICARE

## 2023-11-21 ENCOUNTER — APPOINTMENT (OUTPATIENT)
Dept: GENERAL RADIOLOGY | Facility: HOSPITAL | Age: 83
End: 2023-11-21
Payer: MEDICARE

## 2023-11-21 VITALS
HEART RATE: 63 BPM | WEIGHT: 147 LBS | TEMPERATURE: 98.3 F | RESPIRATION RATE: 16 BRPM | HEIGHT: 72 IN | OXYGEN SATURATION: 93 % | SYSTOLIC BLOOD PRESSURE: 109 MMHG | DIASTOLIC BLOOD PRESSURE: 60 MMHG | BODY MASS INDEX: 19.91 KG/M2

## 2023-11-21 DIAGNOSIS — N32.89 BLADDER SPASM: Primary | ICD-10-CM

## 2023-11-21 LAB
ABO GROUP BLD: NORMAL
ALBUMIN SERPL-MCNC: 3.6 G/DL (ref 3.5–5.2)
ALBUMIN/GLOB SERPL: 1.3 G/DL
ALP SERPL-CCNC: 79 U/L (ref 39–117)
ALT SERPL W P-5'-P-CCNC: 9 U/L (ref 1–41)
ANION GAP SERPL CALCULATED.3IONS-SCNC: 9 MMOL/L (ref 5–15)
AST SERPL-CCNC: 11 U/L (ref 1–40)
BACTERIA UR QL AUTO: ABNORMAL /HPF
BASOPHILS # BLD AUTO: 0.02 10*3/MM3 (ref 0–0.2)
BASOPHILS NFR BLD AUTO: 0.2 % (ref 0–1.5)
BILIRUB SERPL-MCNC: 0.4 MG/DL (ref 0–1.2)
BILIRUB UR QL STRIP: ABNORMAL
BLD GP AB SCN SERPL QL: NEGATIVE
BUN SERPL-MCNC: 18 MG/DL (ref 8–23)
BUN/CREAT SERPL: 11.4 (ref 7–25)
CALCIUM SPEC-SCNC: 7.7 MG/DL (ref 8.6–10.5)
CHLORIDE SERPL-SCNC: 104 MMOL/L (ref 98–107)
CLARITY UR: ABNORMAL
CO2 SERPL-SCNC: 25 MMOL/L (ref 22–29)
COLOR UR: ABNORMAL
CREAT SERPL-MCNC: 1.58 MG/DL (ref 0.76–1.27)
DEPRECATED RDW RBC AUTO: 58.6 FL (ref 37–54)
EGFRCR SERPLBLD CKD-EPI 2021: 43.1 ML/MIN/1.73
EOSINOPHIL # BLD AUTO: 0.07 10*3/MM3 (ref 0–0.4)
EOSINOPHIL NFR BLD AUTO: 0.8 % (ref 0.3–6.2)
ERYTHROCYTE [DISTWIDTH] IN BLOOD BY AUTOMATED COUNT: 16.5 % (ref 12.3–15.4)
GLOBULIN UR ELPH-MCNC: 2.8 GM/DL
GLUCOSE SERPL-MCNC: 98 MG/DL (ref 65–99)
GLUCOSE UR STRIP-MCNC: ABNORMAL MG/DL
HCT VFR BLD AUTO: 31 % (ref 37.5–51)
HGB BLD-MCNC: 9.4 G/DL (ref 13–17.7)
HGB UR QL STRIP.AUTO: ABNORMAL
HOLD SPECIMEN: NORMAL
HOLD SPECIMEN: NORMAL
HYALINE CASTS UR QL AUTO: ABNORMAL /LPF
IMM GRANULOCYTES # BLD AUTO: 0.04 10*3/MM3 (ref 0–0.05)
IMM GRANULOCYTES NFR BLD AUTO: 0.5 % (ref 0–0.5)
INR PPP: 0.98 (ref 0.91–1.09)
KETONES UR QL STRIP: NEGATIVE
LEUKOCYTE ESTERASE UR QL STRIP.AUTO: ABNORMAL
LYMPHOCYTES # BLD AUTO: 0.85 10*3/MM3 (ref 0.7–3.1)
LYMPHOCYTES NFR BLD AUTO: 10.2 % (ref 19.6–45.3)
MAGNESIUM SERPL-MCNC: 2.1 MG/DL (ref 1.6–2.4)
MCH RBC QN AUTO: 30 PG (ref 26.6–33)
MCHC RBC AUTO-ENTMCNC: 30.3 G/DL (ref 31.5–35.7)
MCV RBC AUTO: 99 FL (ref 79–97)
MONOCYTES # BLD AUTO: 0.58 10*3/MM3 (ref 0.1–0.9)
MONOCYTES NFR BLD AUTO: 7 % (ref 5–12)
NEUTROPHILS NFR BLD AUTO: 6.77 10*3/MM3 (ref 1.7–7)
NEUTROPHILS NFR BLD AUTO: 81.3 % (ref 42.7–76)
NITRITE UR QL STRIP: POSITIVE
NRBC BLD AUTO-RTO: 0 /100 WBC (ref 0–0.2)
PH UR STRIP.AUTO: 5.5 [PH] (ref 5–8)
PHOSPHATE SERPL-MCNC: 2.8 MG/DL (ref 2.5–4.5)
PLATELET # BLD AUTO: 176 10*3/MM3 (ref 140–450)
PMV BLD AUTO: 11.1 FL (ref 6–12)
POTASSIUM SERPL-SCNC: 4.2 MMOL/L (ref 3.5–5.2)
PROT SERPL-MCNC: 6.4 G/DL (ref 6–8.5)
PROT UR QL STRIP: ABNORMAL
PROTHROMBIN TIME: 13.1 SECONDS (ref 11.8–14.8)
PTH-INTACT SERPL-MCNC: 114.2 PG/ML (ref 15–65)
RBC # BLD AUTO: 3.13 10*6/MM3 (ref 4.14–5.8)
RBC # UR STRIP: ABNORMAL /HPF
REF LAB TEST METHOD: ABNORMAL
RH BLD: POSITIVE
SODIUM SERPL-SCNC: 138 MMOL/L (ref 136–145)
SP GR UR STRIP: 1.02 (ref 1–1.03)
SQUAMOUS #/AREA URNS HPF: ABNORMAL /HPF
T&S EXPIRATION DATE: NORMAL
UROBILINOGEN UR QL STRIP: ABNORMAL
WBC # UR STRIP: ABNORMAL /HPF
WBC NRBC COR # BLD AUTO: 8.33 10*3/MM3 (ref 3.4–10.8)
WHOLE BLOOD HOLD COAG: NORMAL
WHOLE BLOOD HOLD SPECIMEN: NORMAL
YEAST URNS QL MICRO: ABNORMAL /HPF

## 2023-11-21 PROCEDURE — 81001 URINALYSIS AUTO W/SCOPE: CPT | Performed by: STUDENT IN AN ORGANIZED HEALTH CARE EDUCATION/TRAINING PROGRAM

## 2023-11-21 PROCEDURE — 87186 SC STD MICRODIL/AGAR DIL: CPT | Performed by: STUDENT IN AN ORGANIZED HEALTH CARE EDUCATION/TRAINING PROGRAM

## 2023-11-21 PROCEDURE — 85025 COMPLETE CBC W/AUTO DIFF WBC: CPT | Performed by: STUDENT IN AN ORGANIZED HEALTH CARE EDUCATION/TRAINING PROGRAM

## 2023-11-21 PROCEDURE — 86901 BLOOD TYPING SEROLOGIC RH(D): CPT | Performed by: STUDENT IN AN ORGANIZED HEALTH CARE EDUCATION/TRAINING PROGRAM

## 2023-11-21 PROCEDURE — 87086 URINE CULTURE/COLONY COUNT: CPT | Performed by: STUDENT IN AN ORGANIZED HEALTH CARE EDUCATION/TRAINING PROGRAM

## 2023-11-21 PROCEDURE — 80053 COMPREHEN METABOLIC PANEL: CPT | Performed by: STUDENT IN AN ORGANIZED HEALTH CARE EDUCATION/TRAINING PROGRAM

## 2023-11-21 PROCEDURE — 71045 X-RAY EXAM CHEST 1 VIEW: CPT

## 2023-11-21 PROCEDURE — 93005 ELECTROCARDIOGRAM TRACING: CPT

## 2023-11-21 PROCEDURE — 86900 BLOOD TYPING SEROLOGIC ABO: CPT | Performed by: STUDENT IN AN ORGANIZED HEALTH CARE EDUCATION/TRAINING PROGRAM

## 2023-11-21 PROCEDURE — 99284 EMERGENCY DEPT VISIT MOD MDM: CPT

## 2023-11-21 PROCEDURE — 87077 CULTURE AEROBIC IDENTIFY: CPT | Performed by: STUDENT IN AN ORGANIZED HEALTH CARE EDUCATION/TRAINING PROGRAM

## 2023-11-21 PROCEDURE — 83970 ASSAY OF PARATHORMONE: CPT | Performed by: STUDENT IN AN ORGANIZED HEALTH CARE EDUCATION/TRAINING PROGRAM

## 2023-11-21 PROCEDURE — 84100 ASSAY OF PHOSPHORUS: CPT | Performed by: STUDENT IN AN ORGANIZED HEALTH CARE EDUCATION/TRAINING PROGRAM

## 2023-11-21 PROCEDURE — 93010 ELECTROCARDIOGRAM REPORT: CPT | Performed by: INTERNAL MEDICINE

## 2023-11-21 PROCEDURE — 86850 RBC ANTIBODY SCREEN: CPT | Performed by: STUDENT IN AN ORGANIZED HEALTH CARE EDUCATION/TRAINING PROGRAM

## 2023-11-21 PROCEDURE — 85610 PROTHROMBIN TIME: CPT | Performed by: STUDENT IN AN ORGANIZED HEALTH CARE EDUCATION/TRAINING PROGRAM

## 2023-11-21 PROCEDURE — 83735 ASSAY OF MAGNESIUM: CPT | Performed by: STUDENT IN AN ORGANIZED HEALTH CARE EDUCATION/TRAINING PROGRAM

## 2023-11-21 RX ORDER — CEFDINIR 300 MG/1
300 CAPSULE ORAL 2 TIMES DAILY
Qty: 14 CAPSULE | Refills: 0 | Status: SHIPPED | OUTPATIENT
Start: 2023-11-21 | End: 2023-11-28

## 2023-11-21 RX ORDER — OXYBUTYNIN CHLORIDE 5 MG/1
5 TABLET, EXTENDED RELEASE ORAL DAILY
Qty: 14 TABLET | Refills: 0 | Status: SHIPPED | OUTPATIENT
Start: 2023-11-21 | End: 2023-12-05

## 2023-11-21 RX ORDER — OXYBUTYNIN CHLORIDE 5 MG/1
5 TABLET ORAL ONCE
Status: COMPLETED | OUTPATIENT
Start: 2023-11-21 | End: 2023-11-21

## 2023-11-21 RX ORDER — OXYCODONE HYDROCHLORIDE AND ACETAMINOPHEN 5; 325 MG/1; MG/1
1 TABLET ORAL ONCE
Status: COMPLETED | OUTPATIENT
Start: 2023-11-21 | End: 2023-11-21

## 2023-11-21 RX ORDER — OXYBUTYNIN CHLORIDE 5 MG/1
5 TABLET, EXTENDED RELEASE ORAL DAILY
Qty: 14 TABLET | Refills: 0 | Status: SHIPPED | OUTPATIENT
Start: 2023-11-21 | End: 2023-11-21 | Stop reason: SDUPTHER

## 2023-11-21 RX ORDER — CEFDINIR 300 MG/1
300 CAPSULE ORAL 2 TIMES DAILY
Qty: 14 CAPSULE | Refills: 0 | Status: SHIPPED | OUTPATIENT
Start: 2023-11-21 | End: 2023-11-21 | Stop reason: SDUPTHER

## 2023-11-21 RX ADMIN — OXYCODONE HYDROCHLORIDE AND ACETAMINOPHEN 1 TABLET: 5; 325 TABLET ORAL at 20:34

## 2023-11-21 RX ADMIN — OXYBUTYNIN CHLORIDE 5 MG: 5 TABLET ORAL at 20:34

## 2023-11-22 LAB
QT INTERVAL: 442 MS
QTC INTERVAL: 469 MS

## 2023-11-22 NOTE — ED PROVIDER NOTES
Subjective   History of Present Illness  Patient presents due to blood in his catheter, bladder spasms.  He had a TURP 8 days ago after he was unable to have a Bose catheter taken out due to chronic recurrent obstruction.  He has had bleeding in the Bose catheter since it was placed about a month ago and has frequent bladder spasms.  He initially presented to the outside hospital 8 days ago after he experienced a severe bladder spasm and had a significant amount of bleeding around the catheter and this ultimately led to his TURP in the hospital.  He also has recent history of hypercalcemia with parathyroidectomy.  He comes in tonight because he has had continued bladder spasms; about every 15 minutes throughout the day he has spasm in his lower abdomen and his feel member states that he appears gray and in pain and this seems to be uncontrolled.  He has been on Flomax but no Ditropan, no opiate pain medications.  His Bose output continues to look red and looks darker than usual today and over the course the past month has had occasional clots but none today.  His output continues to be a normal rate.  No fevers.  No other abdominal pain.  Eating and drinking normally.  No vomiting.    Review of Systems   Constitutional:  Negative for chills and fever.   Respiratory:  Negative for cough and shortness of breath.    Cardiovascular:  Negative for chest pain and palpitations.   Gastrointestinal:  Positive for abdominal pain. Negative for nausea and vomiting.   Neurological:  Negative for syncope and light-headedness.       Past Medical History:   Diagnosis Date    AAA (abdominal aortic aneurysm) 07/2023    Alcohol use     Anxiety     Arthritis     BPH (benign prostatic hyperplasia)     Bronchiectasis without complication 04/09/2019    Bronchitis     CKD (chronic kidney disease)     COPD (chronic obstructive pulmonary disease)     COPD, group B, by GOLD 2017 classification 04/09/2019    Essential hypertension  04/09/2019    Personal history of nicotine dependence 04/09/2019    Snoring     Thrombocytopenia     Thyroid nodule 10/2023    TIA (transient ischemic attack)     Tobacco user 10/03/2019       Allergies   Allergen Reactions    Statins Myalgia    Tetanus Toxoids Other (See Comments)     STATES HAD A REACTION TO TETANUS SHOT AT AGE 8; STATES HAS TOLERATED SINCE        Past Surgical History:   Procedure Laterality Date    ABDOMINAL AORTIC ANEURYSM REPAIR WITH ENDOGRAFT N/A 7/19/2023    Procedure: ABDOMINAL AORTIC ANEURYSM REPAIR WITH ENDOGRAFT;  Surgeon: Collins Sauceda DO;  Location:  [facility] HYBRID OR 12;  Service: Vascular;  Laterality: N/A;    BRONCHOSCOPY      CATARACT EXTRACTION, BILATERAL      NASAL SINUS SURGERY      PARATHYROIDECTOMY Left 10/31/2023    Procedure: PARATHYROIDECTOMY;  Surgeon: Lion Cardenas Jr., MD;  Location:  [facility] OR;  Service: ENT;  Laterality: Left;    THYROIDECTOMY Left 10/31/2023    Procedure: thyroid lobectomy (hemithyroidectomy);  Surgeon: Lion Cardenas Jr., MD;  Location:  [facility] OR;  Service: ENT;  Laterality: Left;    TOTAL HIP ARTHROPLASTY Left        Family History   Problem Relation Age of Onset    Emphysema Father     Heart attack Brother     Cancer Brother     Heart disease Brother     Cancer Other     Colon cancer Neg Hx     Colon polyps Neg Hx     Esophageal cancer Neg Hx        Social History     Socioeconomic History    Marital status:    Tobacco Use    Smoking status: Every Day     Packs/day: 0.50     Years: 70.00     Additional pack years: 0.00     Total pack years: 35.00     Types: Cigarettes     Passive exposure: Current    Smokeless tobacco: Former     Types: Snuff     Quit date: 1970   Vaping Use    Vaping Use: Never used   Substance and Sexual Activity    Alcohol use: Yes     Alcohol/week: 2.0 standard drinks of alcohol     Types: 2 Shots of liquor per week     Comment: daily - 2 mixed drinks    Drug use: No    Sexual activity: Defer            Objective   Physical Exam  Vitals reviewed.   Constitutional:       General: He is not in acute distress.  HENT:      Head: Normocephalic and atraumatic.   Eyes:      Extraocular Movements: Extraocular movements intact.      Conjunctiva/sclera: Conjunctivae normal.   Cardiovascular:      Pulses: Normal pulses.      Heart sounds: Normal heart sounds.   Pulmonary:      Effort: Pulmonary effort is normal. No respiratory distress.   Abdominal:      General: Abdomen is flat. There is no distension.   Genitourinary:     Comments: Wallace catheter is in place, pinkish appearing urine, mild tenderness in the suprapubic area but no generalized abdominal tenderness palpation, penis and testicles are benign in appearance on exam  Musculoskeletal:      Cervical back: Normal range of motion and neck supple.   Skin:     General: Skin is warm and dry.   Neurological:      General: No focal deficit present.      Mental Status: He is alert. Mental status is at baseline.   Psychiatric:         Behavior: Behavior normal.         Thought Content: Thought content normal.         Procedures           ED Course  ED Course as of 11/21/23 2225 Tue Nov 21, 2023 2059 Reviewed records: not on ditropan, OSH Cr was 1.3, Hb was 9.1 at most recent check OSH. [AS]      ED Course User Index  [AS] Jc Mackenzie MD                                           Medical Decision Making  Problems Addressed:  Bladder spasm: complicated acute illness or injury    Amount and/or Complexity of Data Reviewed  Labs: ordered.  Radiology: ordered.    Risk  Prescription drug management.    \  Van Mckeon is a 83 y.o. male with PMH above who presents to the Emergency Department with bladder spasms and continued blood in his wallace catheter.  Significant hemorrhage is unlikely based on his coagulation studies as well as stable hemoglobin.  His calcium is slightly low, PTH is increased however, and let them know that he will need to follow-up  with Dr. Cardenas regarding this for further management.  Renal function is not significantly departed from his baseline.  His pain was controlled due to pain.  I discussed his case with Dr. Ordaz regarding the ongoing bleeding and bladder spasm control; he feels the patient would be safe for outpatient Ditropan given that he does not have active signs of hemorrhage here it is also reasonable to be discharged at this point.  He agrees to follow-up with his urologist outpatient.  All questions were answered, short course to depend prescribed, return precautions discussed and medication precautions were discussed and I discussed possible adverse effects. UA checked at Dr. Ordaz's request: infectious, so abx ordered.       Final diagnosis: bladder spasm    All questions answered. Patient/family was understanding and in agreement with today's assessment and plan. The patient was monitored during their stay in the ED and dispositioned without acute event.    Electronically signed by:  Jc Mackenzie MD 11/21/2023 22:25 CST      Note: Dragon medical dictation software was used in the creation of this note.        Final diagnoses:   Bladder spasm       ED Disposition  ED Disposition       ED Disposition   Discharge    Condition   Stable    Comment   --               Jean Marie Cabrera MD  [address]  [facility] [address]  [phone]          [facility]  [address], [address]  [facility] [id number] [phone]        Lion Cardenas Jr., MD  [address]  [address]  [facility] [address]  [phone]               Medication List        New Prescriptions      cefdinir 300 MG capsule  Commonly known as: OMNICEF  Take 1 capsule by mouth 2 (Two) Times a Day for 7 days.     oxybutynin XL 5 MG 24 hr tablet  Commonly known as: Ditropan XL  Take 1 tablet by mouth Daily for 14 days.               Where to Get Your Medications        These medications were sent to  [id number] - [place], [place] - [address] AT [address] - [phone]  - [phone] FX  [address] [place] [id number]      Phone: [phone]   cefdinir 300 MG capsule  oxybutynin XL 5 MG 24 hr tablet            Jc Mackenzie MD  11/21/23 9122

## 2023-11-22 NOTE — DISCHARGE INSTRUCTIONS
I was able to get a long acting formulation of ditropan so it can just be taken once a day; an extra dose is permissible if he has breakthrough bladder spasms.  Please come back for severe bleeding, lightheadedness or passing out, fast heart rate or low blood pressure, or other emergencies.  Otherwise follow-up with a primary urologist for further care.    Of note, the repeat check of the parathyroid hormone today was elevated. You will need to follow-up with Dr. Cardenas to check on your parathyroid gland again.     Your urine sample had possible signs of infection so I have prescribed an antibiotic for this reason per my discussion with our urology doctor.

## 2023-11-25 LAB
BACTERIA SPEC AEROBE CULT: ABNORMAL
BACTERIA SPEC AEROBE CULT: ABNORMAL

## 2023-11-27 ENCOUNTER — TELEPHONE (OUTPATIENT)
Dept: OTOLARYNGOLOGY | Facility: CLINIC | Age: 83
End: 2023-11-27
Payer: MEDICARE

## 2023-11-27 NOTE — TELEPHONE ENCOUNTER
I spoke to patients daughter to give results of lab work. He is in the hospital at [place], he went in last Friday with chest pain. They are aware of his low calcium and are treated it in the hospital. He has follow up on 12-6-23. She Vu

## 2023-12-05 ENCOUNTER — OFFICE VISIT (OUTPATIENT)
Dept: OTOLARYNGOLOGY | Facility: CLINIC | Age: 83
End: 2023-12-05
Payer: MEDICARE

## 2023-12-05 VITALS
DIASTOLIC BLOOD PRESSURE: 69 MMHG | TEMPERATURE: 98 F | BODY MASS INDEX: 19.91 KG/M2 | HEART RATE: 86 BPM | HEIGHT: 72 IN | SYSTOLIC BLOOD PRESSURE: 93 MMHG | WEIGHT: 147 LBS

## 2023-12-05 DIAGNOSIS — J35.8 ASYMMETRY OF TONSILS: ICD-10-CM

## 2023-12-05 DIAGNOSIS — R04.0 EPISTAXIS: ICD-10-CM

## 2023-12-05 DIAGNOSIS — E21.3 HYPERPARATHYROIDISM: ICD-10-CM

## 2023-12-05 DIAGNOSIS — E89.0 STATUS POST PARTIAL THYROIDECTOMY: Primary | ICD-10-CM

## 2023-12-05 DIAGNOSIS — E89.2 STATUS POST PARATHYROIDECTOMY: ICD-10-CM

## 2023-12-05 RX ORDER — LISINOPRIL 5 MG/1
5 TABLET ORAL
COMMUNITY

## 2023-12-05 RX ORDER — GUAIFENESIN 200 MG/10ML
LIQUID ORAL
COMMUNITY
Start: 2023-11-27

## 2023-12-05 RX ORDER — SUCRALFATE 1 G/1
1 TABLET ORAL
COMMUNITY
Start: 2023-11-27

## 2023-12-05 RX ORDER — CLINDAMYCIN HYDROCHLORIDE 300 MG/1
300 CAPSULE ORAL
COMMUNITY
Start: 2023-11-27

## 2023-12-05 RX ORDER — BACLOFEN 5 MG/1
TABLET ORAL
COMMUNITY
Start: 2023-11-20

## 2023-12-05 NOTE — PROGRESS NOTES
JAE Niño  [facility]  [address], [address]  Located within [facility] [address]  Fax [phone]  Phone [phone]      Visit Type: FOLLOW UP   Chief Complaint   Patient presents with    2 wk post op     Nose Bleed     Last night, went to [place] [facility] Rhino Rocket but they think it came out          Nose Bleed    Van Mckeon is a 83 y.o.  male who presents for follow up s/p thyroid lobectomy (hemithyroidectomy) - Left and Parathyroidectomy - Left on 10/31/2023. The patient has had a relatively normal postoperative course and currently has no related complaints. He denies pain, fatigue/weakness, difficulty swallowing talking or breathing, or other associated complaints at this time.     He is taking tums 12 per day    He also reports he had an episode of epistaxis yesterday lasted aprox 12 hours. Had to go to [place] ER and states they put a Rhinorocket in. He reports the rocket came out last night but did not start bleeding since then. He denies frequent nose bleeds. Denies headache, neuro symptoms, elevated bp or other associated symptoms/complaints with nose bleed yesterday.          Past Medical History:   Diagnosis Date    AAA (abdominal aortic aneurysm) 07/2023    Alcohol use     Anxiety     Arthritis     BPH (benign prostatic hyperplasia)     Bronchiectasis without complication 04/09/2019    Bronchitis     CKD (chronic kidney disease)     COPD (chronic obstructive pulmonary disease)     COPD, group B, by GOLD 2017 classification 04/09/2019    Essential hypertension 04/09/2019    Personal history of nicotine dependence 04/09/2019    Snoring     Thrombocytopenia     Thyroid nodule 10/2023    TIA (transient ischemic attack)     Tobacco user 10/03/2019       Past Surgical History:   Procedure Laterality Date    ABDOMINAL AORTIC ANEURYSM REPAIR WITH ENDOGRAFT N/A 7/19/2023    Procedure: ABDOMINAL AORTIC ANEURYSM REPAIR  WITH ENDOGRAFT;  Surgeon: Collins Sauceda DO;  Location: [facility] HYBRID OR 12;  Service: Vascular;  Laterality: N/A;    BRONCHOSCOPY      CATARACT EXTRACTION, BILATERAL      NASAL SINUS SURGERY      PARATHYROIDECTOMY Left 10/31/2023    Procedure: PARATHYROIDECTOMY;  Surgeon: Lion Cardenas Jr., MD;  Location: [facility] OR;  Service: ENT;  Laterality: Left;    THYROIDECTOMY Left 10/31/2023    Procedure: thyroid lobectomy (hemithyroidectomy);  Surgeon: Lion Cardenas Jr., MD;  Location: [facility] OR;  Service: ENT;  Laterality: Left;    TOTAL HIP ARTHROPLASTY Left        Family History: His family history includes Cancer in his brother and another family member; Emphysema in his father; Heart attack in his brother; Heart disease in his brother.     Social History: He  reports that he quit smoking about 2 months ago. His smoking use included cigarettes. He has a 35.00 pack-year smoking history. He has been exposed to tobacco smoke. He quit smokeless tobacco use about 53 years ago.  His smokeless tobacco use included snuff. He reports current alcohol use of about 2.0 standard drinks of alcohol per week. He reports that he does not use drugs.    Home Medications:  ALPRAZolam, Baclofen, Budeson-Glycopyrrol-Formoterol, HYDROcodone-acetaminophen, albuterol sulfate HFA, aspirin, clindamycin, clopidogrel, empagliflozin, finasteride, folic acid, furosemide, guaifenesin, lisinopril, metoprolol succinate XL, oxybutynin XL, pantoprazole, rosuvastatin, sertraline, spironolactone, sucralfate, tamsulosin, and tiotropium bromide-olodaterol    Allergies:  He is allergic to statins and tetanus toxoids.       Vital Signs:   Temp:  [98 °F (36.7 °C)] 98 °F (36.7 °C)  Heart Rate:  [86] 86  BP: (93)/(69) 93/69  ENT Physical Exam  Constitutional  Appearance: patient appears well-developed, well-nourished and well-groomed,  Communication/Voice: communication appropriate for developmental age; vocal quality normal;  Head and  Face  Appearance: head appears normal, face appears normal and face appears atraumatic;  Palpation: facial palpation normal;  Ear  Hearing: intact;  Auricles: bilateral auricles normal;  External Mastoids: bilateral external mastoids normal;  Ear Canals: bilateral ear canals normal;  Tympanic Membranes: bilateral tympanic membranes normal;  Nose  External Nose: nares patent bilaterally; nasal deformity visible; rhinophyma noted on nasal tip;  Internal Nose: nasal mucosa normal; nasal septal deviation present; deviation is S-shaped, bilateral inferior turbinates bluish; with hypertrophy;  Oral Cavity/Oropharynx  Lips: normal;  Teeth: dentures (top) noted;  Gums: gingiva normal;  Tongue: normal;  Oral mucosa: normal;  Soft palate: normal;  Tonsils: right tonsil 2+; left tonsil 1+;           Result Review    RESULTS REVIEW    I have reviewed the patients old records in the chart.   The following results/records were reviewed:     PTH, Intact (11/21/2023 20:51)   Comprehensive Metabolic Panel (11/21/2023 19:15)   PTH, Intact (11/01/2023 04:46)   Calcium (10/31/2023 12:37)     Assessment & Plan  Status post partial thyroidectomy    Status post parathyroidectomy    Hyperparathyroidism    Epistaxis    Asymmetry of tonsils       Orders Placed This Encounter   Procedures    TSH    T4, Free    T3    Comprehensive Metabolic Panel    PTH, Intact           Offered nasal endoscopy due to reported epistaxis for further eval and to ensure Rhinorocket was still not present in posterior cavity, patient declined at this time. There is no active bleeding currently, unable to see FB or other abnormal findings with basic nasal exam at this time.     They are also wanting PCP to follow thyroid and parathyroid labs. Advised this would be acceptable if they are willing to, but this needs to be monitored closely until calcium and levels stabilize and to make sure further intervention is not needed. They expressed understanding of  this.    Call for sinus problems, especially epistaxis.  Saline nasal spray or saline gel to nose three times a day and as needed. Use a bedside humidifier at night. Place Vasoline in nose in the morning and at night.  Place Vasoline in nose in the morning and at night on the center part of the nose (septum) at least 15 minutes prior to the nasal spray. When using the nasal spray, aim towards the outer corner of the eye.   NOSEBLEED INSTRUCTIONS: Use over the counter antibiotic ointment or Vasoline to anterior nares twice a day. Salt water spray or gel to nose every 2 hours and as needed. Hypertension control is important, keeping systolic pressures less than 140 is possible. If epistaxis present, hold all ASA or NSAIDs. Use Afrin or Neosynephrine spray if epistaxis returns, Hold direct pressure to the fleshy portion of the nose for five minutes. If epistaxis continues, repeat and hold pressure for another five minutes. If bleeding continues, call the office or go to the emergency room for evaluation.   Follow up with primary care physician for routine thyroid monitoring and refills. Call or return if problems or questions.  Labs ordered today for thyroid and parathyroid check including calcium. PCP can follow this if they are willing but this needs to be recheck for stability and routinely monitored.    Return in about 3 months (around 3/5/2024).    Electronically signed by JAE Niño 12/05/23 3:28 PM CST.

## 2023-12-05 NOTE — PATIENT INSTRUCTIONS
Have labs drawn either here or by PCP as discussed  Monitor for recurrence of nose bleeds, if this develops call for recheck    FIRST AID FOR NOSEBLEEDS:     Should you have frequent or persistent nosebleeds, please follow these instructions:  ·      First of all remain calm.  ·      Apply pressure by pinching the nose completely shut, both nostrils, as close to the nasal bones as is comfortable.  ·      Lean forward or tilt the tip of your nose down to prevent the blood from going down the back of the throat.  ·      Spit out the blood rather than swallowing it.  ·      Use ice water gargles frequently.  ·      Hold the pressure on the nose a full 20 minutes.  ·      Avoid checking for bleeding every few minutes.  ·      If bleeding is present after 20 minutes, squirt your nose with 2-3 puffs of Afrin or Chriss-synephrine then re-apply pressure. Repeat this 2 times if bleeding continues.  ·      If dripping continues after above, or if you have had surgery,or if you are on blood thinners, please call us.     Once the bleeding stops:  ·      Do not pick, rub or blow your nose for 24 hours.  ·      Do use saline spray or salt solution to moisturize the nose.  ·      Do use humidifiers, warm showers, etc. to moisturize the nose.  ·      Do not blow, instead suck nasal secretions back and spit out the mouth.  ·      Do use Vaseline or polysporin in the nose on the septum (the divider between the 2 sides of the nose) to the scabbed or dry area.

## 2023-12-06 ENCOUNTER — TELEPHONE (OUTPATIENT)
Dept: UROLOGY | Facility: CLINIC | Age: 83
End: 2023-12-06

## 2023-12-06 ENCOUNTER — OFFICE VISIT (OUTPATIENT)
Dept: UROLOGY | Facility: CLINIC | Age: 83
End: 2023-12-06
Payer: MEDICARE

## 2023-12-06 VITALS — TEMPERATURE: 97.4 F | BODY MASS INDEX: 18.69 KG/M2 | HEIGHT: 72 IN | WEIGHT: 138 LBS

## 2023-12-06 DIAGNOSIS — R33.9 URINARY RETENTION: Primary | ICD-10-CM

## 2023-12-06 DIAGNOSIS — R31.0 GROSS HEMATURIA: ICD-10-CM

## 2023-12-06 LAB
BILIRUB BLD-MCNC: ABNORMAL MG/DL
CLARITY, POC: ABNORMAL
COLOR UR: ABNORMAL
GLUCOSE UR STRIP-MCNC: ABNORMAL MG/DL
KETONES UR QL: ABNORMAL
LEUKOCYTE EST, POC: ABNORMAL
NITRITE UR-MCNC: NEGATIVE MG/ML
PH UR: 8.5 [PH] (ref 5–8)
PROT UR STRIP-MCNC: ABNORMAL MG/DL
RBC # UR STRIP: ABNORMAL /UL
SP GR UR: 1.01 (ref 1–1.03)
UROBILINOGEN UR QL: ABNORMAL

## 2023-12-06 PROCEDURE — 87086 URINE CULTURE/COLONY COUNT: CPT

## 2023-12-06 RX ORDER — OXYBUTYNIN CHLORIDE 5 MG/1
5 TABLET ORAL 3 TIMES DAILY
COMMUNITY

## 2023-12-06 RX ORDER — SUCRALFATE 1 G/1
1 TABLET ORAL 4 TIMES DAILY
COMMUNITY

## 2023-12-06 NOTE — PROGRESS NOTES
The patient states He is here today for catheter insertion.  Patient of Yenifer ROWE. Using sterile technique a new 18fr catheter was placed, balloon inflated using 10cc sterile water,  325cc of urine drained from patients bladder via catheter, flushed with 300 cc of sterile water, then catheter was connected to leg bag.  Patient tolerated the procedure well.  The patient was advised to return in 1 month for next catheter change. Patient verbalized understanding. Yenifer ROWE was in the office at the time of procedure.  Og KRUEGER have reviewed and agree with medical assistance documentation above.

## 2023-12-06 NOTE — TELEPHONE ENCOUNTER
Caller: WALESKA     Relationship: DAUGHTER    Best call back number: [phone]     What form or medical record are you requesting: OFFICE NOTE FROM TODAY'S VISIT    Who is requesting this form or medical record from you: DR. SELLERS [address] [place], [place] [id number] PH: [phone]    How would you like to receive the form or medical records (pick-up, mail, fax): FAX  If fax, what is the fax number: PT DTR DID NOT HAVE FAX.     Timeframe paperwork needed: ASAP, BY TODAY.    Additional notes: PT HAD EMERGENCY SURGERY WITH DR. SELLERS, PT IS STILL UNDERNEATH [facility]IE KENDRICK'S CARE, PT SAW US BECAUSE HE COULD NOT GET IN WITH HIM. SINCE WHAT HE WAS SEEN FOR RELATES BACK TO DR. KENDRICK'S SURGERY THAT IS WHY OFFICE NOTE IS NEEDED FROM TODAY. NEEDS OFFICE NOTE FAXED TODAY.

## 2023-12-06 NOTE — PROGRESS NOTES
"Subjective    Mr. Mckeon is 83 y.o. male    Chief Complaint: Difficulty urinating and blood in urine    History of Present Illness    83-year-old male established patient in for follow-up regarding acute urinary retention previously requiring indwelling Bose catheter placement.  Patient at last visit had the Bose removed however returned within less than 8 hours as was unable to urinate ultimately requiring indwelling Bose catheter replacement.  Patient present with daughter stating that ultimately ended up having problems with the catheter started passing large blood clots that were clogging the tube that eventually \"caused the catheter to blow out\" reporting patient was then in a bloody mess around the catheter.  Patient was taken to the closest emergency room of which being [facility] where patient was then treated by Dr. Coyle and placed on CBI.  Was initially thought may just undergo a clot EVAC however given the size of patient's prostate was decided to go ahead and treat with TURP after holding patient's Plavix for a couple days.  Patient's daughter has been in contact with Dr. Coyle's office following the TURP as patient has continued to have issues with passing large clots and difficulty emptying bladder and was told by the office that his postop follow-up appointment could not be moved up and that if he went to the ER at [facility] that Dr. Coyle may not see him therefore patient and patient's daughter presented to our office.  Patient stating at this time \"I hope you just admit me and get all of this taken care of and figured out\".     Prior to hospitalization patient on tamsulosin and finasteride due to history of BPH.     The following portions of the patient's history were reviewed and updated as appropriate: allergies, current medications, past family history, past medical history, past social history, past surgical history and problem list.    Review of " Systems   Constitutional:  Positive for fatigue. Negative for chills and fever.   Gastrointestinal:  Negative for nausea and vomiting.   Genitourinary:  Positive for decreased urine volume, difficulty urinating and hematuria.         Current Outpatient Medications:     albuterol sulfate  (90 Base) MCG/ACT inhaler, Inhale 2 puffs 4 (Four) Times a Day., Disp: , Rfl:     ALPRAZolam (XANAX) 0.5 MG tablet, Take 0.5 tablets by mouth Daily., Disp: , Rfl:     aspirin 81 MG EC tablet, Take 1 tablet by mouth Daily., Disp: , Rfl:     Baclofen (LIORESAL) 5 MG tablet, , Disp: , Rfl:     Budeson-Glycopyrrol-Formoterol (Breztri Aerosphere) 160-9-4.8 MCG/ACT aerosol inhaler, Inhale 2 puffs 2 (Two) Times a Day for 90 days., Disp: 3 each, Rfl: 3    clopidogrel (PLAVIX) 75 MG tablet, Take 1 tablet by mouth Daily., Disp: , Rfl:     empagliflozin (Jardiance) 10 MG tablet tablet, Take 1 tablet by mouth Daily., Disp: 30 tablet, Rfl: 11    finasteride (PROSCAR) 5 MG tablet, Take 1 tablet by mouth Daily., Disp: , Rfl:     folic acid (FOLVITE) 1 MG tablet, Take 1 tablet by mouth Daily., Disp: 30 tablet, Rfl: 2    furosemide (LASIX) 40 MG tablet, Take 1 tablet by mouth Daily As Needed (Weight gain or swelling)., Disp: , Rfl:     HYDROcodone-acetaminophen (Norco) 5-325 MG per tablet, Take 1 tablet by mouth Every 6 (Six) Hours As Needed for Moderate Pain., Disp: 15 tablet, Rfl: 0    oxybutynin (DITROPAN) 5 MG tablet, Take 1 tablet by mouth 3 (Three) Times a Day., Disp: , Rfl:     pantoprazole (PROTONIX) 40 MG EC tablet, Take 1 tablet by mouth 2 (Two) Times a Day Before Meals. (Patient taking differently: Take 1 tablet by mouth Daily.), Disp: 90 tablet, Rfl: 0    rosuvastatin (CRESTOR) 10 MG tablet, Take 0.5 tablets by mouth Every Night., Disp: , Rfl:     sertraline (ZOLOFT) 50 MG tablet, Take 1 tablet by mouth Daily., Disp: , Rfl:     spironolactone (ALDACTONE) 25 MG tablet, Take 1 tablet by mouth Daily., Disp: , Rfl:     sucralfate  (CARAFATE) 1 g tablet, 1 tablet., Disp: , Rfl:     sucralfate (CARAFATE) 1 g tablet, Take 1 tablet by mouth 4 (Four) Times a Day., Disp: , Rfl:     tamsulosin (FLOMAX) 0.4 MG capsule 24 hr capsule, Take 1 capsule by mouth 2 (Two) Times a Day., Disp: 90 capsule, Rfl: 3    tiotropium bromide-olodaterol (Stiolto Respimat) 2.5-2.5 MCG/ACT aerosol solution inhaler, Inhale 2 puffs Daily., Disp: , Rfl:     clindamycin (CLEOCIN) 300 MG capsule, 1 capsule. (Patient not taking: Reported on 12/6/2023), Disp: , Rfl:     guaifenesin (ROBITUSSIN) 100 MG/5ML liquid, TAKE 10 ML BY MOUTH TWICE DAILY AS NEEDED (Patient not taking: Reported on 12/6/2023), Disp: , Rfl:     lisinopril (PRINIVIL,ZESTRIL) 5 MG tablet, 1 tablet. (Patient not taking: Reported on 12/5/2023), Disp: , Rfl:     metoprolol succinate XL (TOPROL-XL) 25 MG 24 hr tablet, Take 0.5 tablets by mouth Every Evening. (Patient not taking: Reported on 12/5/2023), Disp: 90 tablet, Rfl: 3    Past Medical History:   Diagnosis Date    AAA (abdominal aortic aneurysm) 07/2023    Alcohol use     Anxiety     Arthritis     BPH (benign prostatic hyperplasia)     Bronchiectasis without complication 04/09/2019    Bronchitis     CKD (chronic kidney disease)     COPD (chronic obstructive pulmonary disease)     COPD, group B, by GOLD 2017 classification 04/09/2019    Essential hypertension 04/09/2019    Personal history of nicotine dependence 04/09/2019    Snoring     Thrombocytopenia     Thyroid nodule 10/2023    TIA (transient ischemic attack)     Tobacco user 10/03/2019       Past Surgical History:   Procedure Laterality Date    ABDOMINAL AORTIC ANEURYSM REPAIR WITH ENDOGRAFT N/A 7/19/2023    Procedure: ABDOMINAL AORTIC ANEURYSM REPAIR WITH ENDOGRAFT;  Surgeon: Collins Sauceda DO;  Location: [facility] OR ;  Service: Vascular;  Laterality: N/A;    BRONCHOSCOPY      CATARACT EXTRACTION, BILATERAL      NASAL SINUS SURGERY      PARATHYROIDECTOMY Left 10/31/2023    Procedure:  "PARATHYROIDECTOMY;  Surgeon: Lion Cardenas Jr., MD;  Location:  [facility] OR;  Service: ENT;  Laterality: Left;    THYROIDECTOMY Left 10/31/2023    Procedure: thyroid lobectomy (hemithyroidectomy);  Surgeon: Lion Cardenas Jr., MD;  Location:  [facility] OR;  Service: ENT;  Laterality: Left;    TOTAL HIP ARTHROPLASTY Left        Social History     Socioeconomic History    Marital status:    Tobacco Use    Smoking status: Former     Packs/day: 0.50     Years: 70.00     Additional pack years: 0.00     Total pack years: 35.00     Types: Cigarettes     Quit date: 10/2023     Years since quittin.1     Passive exposure: Current    Smokeless tobacco: Former     Types: Snuff     Quit date:     Tobacco comments:     7 weeks   Vaping Use    Vaping Use: Never used   Substance and Sexual Activity    Alcohol use: Yes     Alcohol/week: 2.0 standard drinks of alcohol     Types: 2 Shots of liquor per week     Comment: daily - 2 mixed drinks    Drug use: No    Sexual activity: Defer       Family History   Problem Relation Age of Onset    Emphysema Father     Heart attack Brother     Cancer Brother     Heart disease Brother     Cancer Other     Colon cancer Neg Hx     Colon polyps Neg Hx     Esophageal cancer Neg Hx        Objective    Temp 97.4 °F (36.3 °C)   Ht 182.9 cm (72.01\")   Wt 62.6 kg (138 lb)   BMI 18.71 kg/m²     Physical Exam  Constitutional:       Appearance: Normal appearance.   Abdominal:      Tenderness: There is no right CVA tenderness or left CVA tenderness.   Genitourinary:     Comments: Indwelling Bose catheter in place with bloody urine  Skin:     General: Skin is warm and dry.   Neurological:      Mental Status: He is alert and oriented to person, place, and time.   Psychiatric:         Mood and Affect: Mood normal.         Behavior: Behavior normal.             Results for orders placed or performed in visit on 23   Urine Culture - Urine, Urine, Random Void    Specimen: Urine, " Random Void   Result Value Ref Range    Urine Culture 25,000 CFU/mL Normal Urogenital Yesica    CBC (No Diff)    Specimen: Blood   Result Value Ref Range    WBC 6.94 3.40 - 10.80 10*3/mm3    RBC 2.71 (L) 4.14 - 5.80 10*6/mm3    Hemoglobin 8.0 (L) 13.0 - 17.7 g/dL    Hematocrit 25.2 (L) 37.5 - 51.0 %    MCV 93.0 79.0 - 97.0 fL    MCH 29.5 26.6 - 33.0 pg    MCHC 31.7 31.5 - 35.7 g/dL    RDW 14.8 12.3 - 15.4 %    Platelets 208 140 - 450 10*3/mm3   Comprehensive Metabolic Panel    Specimen: Blood   Result Value Ref Range    Glucose 92 65 - 99 mg/dL    BUN 23 8 - 23 mg/dL    Creatinine 1.59 (H) 0.76 - 1.27 mg/dL    EGFR Result 42.8 (L) >60.0 mL/min/1.73    BUN/Creatinine Ratio 14.5 7.0 - 25.0    Sodium 139 136 - 145 mmol/L    Potassium 4.4 3.5 - 5.2 mmol/L    Chloride 104 98 - 107 mmol/L    Total CO2 23.3 22.0 - 29.0 mmol/L    Calcium 7.9 (L) 8.6 - 10.5 mg/dL    Total Protein 6.3 6.0 - 8.5 g/dL    Albumin 3.5 3.5 - 5.2 g/dL    Globulin 2.8 gm/dL    A/G Ratio 1.3 g/dL    Total Bilirubin 0.3 0.0 - 1.2 mg/dL    Alkaline Phosphatase 71 39 - 117 U/L    AST (SGOT) 14 1 - 40 U/L    ALT (SGPT) 17 1 - 41 U/L   POC Urinalysis Dipstick, Multipro    Specimen: Urine   Result Value Ref Range    Color Red (A) Yellow, Straw, Dark Yellow, Zakiya    Clarity, UA Cloudy (A) Clear    Glucose,  mg/dL (A) Negative mg/dL    Bilirubin Large (3+) (A) Negative    Ketones, UA 40 mg/dL (A) Negative    Specific Gravity  1.010 1.005 - 1.030    Blood, UA Large (A) Negative    pH, Urine 8.5 (A) 5.0 - 8.0    Protein,  mg/dL (A) Negative mg/dL    Urobilinogen, UA 8 E.U./dL (A) Normal, 0.2 E.U./dL    Nitrite, UA Negative Negative    Leukocytes Large (3+) (A) Negative   Estimation of residual urine via abdominal ultrasound  Residual Urine: 347 ml  Indication: retention  Position: Supine  Examination: Incremental scanning of the suprapubic area using 3 MHz transducer using copious amounts of acoustic gel.   Findings: An anechoic area was  demonstrated which represented the bladder, with measurement of residual urine as noted. I inspected this myself. In that the residual urine was stable or insignificant, no treatment will be necessary at this time.      Assessment and Plan    Diagnoses and all orders for this visit:    1. Urinary retention (Primary)  -     POC Urinalysis Dipstick, Multipro  -     Cancel: CBC (No Diff); Future  -     Cancel: Comprehensive Metabolic Panel; Future  -     CBC (No Diff)  -     Comprehensive Metabolic Panel    2. Gross hematuria  -     Urine Culture - Urine, Urine, Random Void  -     Cancel: CBC (No Diff); Future  -     Cancel: Comprehensive Metabolic Panel; Future  -     CBC (No Diff)  -     Comprehensive Metabolic Panel      Patient requesting to be admitted     mL    Urine bloody with small clots currently noted with urine specimen    Given patient and daughter reporting ongoing lynn with large blood clots with no repeat labs since TURP as patient has not had his follow-up yet with Dr. Coyle would like to go ahead and obtain a CBC and CMP.    At this time given the report of passing large blood clots and difficulty emptying bladder with what appears to be retaining almost 400 mL urine we will go ahead and place indwelling Bose catheter and hand irrigate to check for any large clots.    Have instructed patient to present to the emergency room if continuing to pass large clots or experiences tubing clogged as patient may require a clot EVAC.    Patient reports he was told to continue the tamsulosin and finasteride by Dr. Coyle prior to discharge from hospital.  I will leave this up to him.      Given that patient underwent surgical intervention with Dr. Coyle we will transfer care to Dr. Coyle until patient recovered from postop.  And postop complications as will need to be treated through original physician.  Would be glad to see patient in the future.

## 2023-12-07 ENCOUNTER — TELEPHONE (OUTPATIENT)
Dept: UROLOGY | Facility: CLINIC | Age: 83
End: 2023-12-07
Payer: MEDICARE

## 2023-12-07 LAB
ALBUMIN SERPL-MCNC: 3.5 G/DL (ref 3.5–5.2)
ALBUMIN/GLOB SERPL: 1.3 G/DL
ALP SERPL-CCNC: 71 U/L (ref 39–117)
ALT SERPL-CCNC: 17 U/L (ref 1–41)
AST SERPL-CCNC: 14 U/L (ref 1–40)
BILIRUB SERPL-MCNC: 0.3 MG/DL (ref 0–1.2)
BUN SERPL-MCNC: 23 MG/DL (ref 8–23)
BUN/CREAT SERPL: 14.5 (ref 7–25)
CALCIUM SERPL-MCNC: 7.9 MG/DL (ref 8.6–10.5)
CHLORIDE SERPL-SCNC: 104 MMOL/L (ref 98–107)
CO2 SERPL-SCNC: 23.3 MMOL/L (ref 22–29)
CREAT SERPL-MCNC: 1.59 MG/DL (ref 0.76–1.27)
EGFRCR SERPLBLD CKD-EPI 2021: 42.8 ML/MIN/1.73
ERYTHROCYTE [DISTWIDTH] IN BLOOD BY AUTOMATED COUNT: 14.8 % (ref 12.3–15.4)
GLOBULIN SER CALC-MCNC: 2.8 GM/DL
GLUCOSE SERPL-MCNC: 92 MG/DL (ref 65–99)
HCT VFR BLD AUTO: 25.2 % (ref 37.5–51)
HGB BLD-MCNC: 8 G/DL (ref 13–17.7)
MCH RBC QN AUTO: 29.5 PG (ref 26.6–33)
MCHC RBC AUTO-ENTMCNC: 31.7 G/DL (ref 31.5–35.7)
MCV RBC AUTO: 93 FL (ref 79–97)
PLATELET # BLD AUTO: 208 10*3/MM3 (ref 140–450)
POTASSIUM SERPL-SCNC: 4.4 MMOL/L (ref 3.5–5.2)
PROT SERPL-MCNC: 6.3 G/DL (ref 6–8.5)
RBC # BLD AUTO: 2.71 10*6/MM3 (ref 4.14–5.8)
SODIUM SERPL-SCNC: 139 MMOL/L (ref 136–145)
WBC # BLD AUTO: 6.94 10*3/MM3 (ref 3.4–10.8)

## 2023-12-07 NOTE — TELEPHONE ENCOUNTER
Patient's daughter, Akosua, called asking how to initiate transfer of care from Dr. Coyle to our office. That her dad had surgery to remove clots that resulted in him being clot free at midnight, and now he is on CBI since 3 am.  I called Yenifer, she stated that [facility] would need to initiate transfer, and both hospitalist and on call urologist would have to accept case, that transfer is up to discretion on those doctors.  I called Akosua back and she voiced understanding.

## 2023-12-07 NOTE — TELEPHONE ENCOUNTER
Patient is in hospital, had to have emergency surgery last night at [facility] to clean out clots, currently on CBI and receiving blood. Spoke with daughter. V/u.     ----- Message from JAE Billingsley sent at 12/7/2023  8:24 AM CST -----  Hbg at 8.0 and cr 1.59. recommend pt contact his original surgeon with these labs and his PCP for further eval

## 2023-12-07 NOTE — TELEPHONE ENCOUNTER
I am waiting on Yenifer to sign the note so I can fax the office notes to Dr. Coyle's office. Their fax number is [phone].

## 2023-12-07 NOTE — PROGRESS NOTES
Hbg at 8.0 and cr 1.59. recommend pt contact his original surgeon with these labs and his PCP for further eval

## 2023-12-08 ENCOUNTER — TELEPHONE (OUTPATIENT)
Dept: UROLOGY | Facility: CLINIC | Age: 83
End: 2023-12-08
Payer: MEDICARE

## 2023-12-08 LAB — BACTERIA SPEC AEROBE CULT: NORMAL

## 2023-12-08 NOTE — TELEPHONE ENCOUNTER
----- Message from JAE Billingsley sent at 12/8/2023  9:13 AM CST -----  No confirmed bacteria found.

## 2023-12-08 NOTE — TELEPHONE ENCOUNTER
Spoke with patient's daughter to let her know there was no bacterial growth on his urine culture. Patient is still admitted.

## 2023-12-27 NOTE — PROGRESS NOTES
Chief Complaint  Bronchiectasis without complication    Subjective    History of Present Illness {CC  Problem List  Visit Diagnosis   Encounters  Notes  Medications  Labs  Result Review Imaging  Media: 23}    Van Mckeon presents to [facility] for:    History of Present Illness  Management of his bronchiectasis and COPD.  Most recent FEV1 was 60.  He is a long time smoker. He frequently has shortness of breath and productive coughing.  He controls his symptoms with Breztri.  Prior to this he was on Stiolto.  Since starting Breztri he has not been requiring his albuterol as much.  [facility]      He has suffered a stroke in the past with minimal residual memory issues.  As a result his daughters come to his appointments to assist.  He is doing better.       He had an endoscopy identifying erosive esophagitis in the past.  He had been on Protonix twice daily.  It was tapered down to once daily.  In the last several weeks he has been doing better and has not been taking it at all.      He was hospitalized for elevated calcium.  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  He is being followed by ENT, Dr. Cardenas.  This was removed.  He is now on calcium replacement orally.  This is being monitored closely.       Prior to Admission medications    Medication Sig Start Date End Date Taking? Authorizing Provider   albuterol sulfate  (90 Base) MCG/ACT inhaler Inhale 2 puffs 4 (Four) Times a Day.    ProviderDylon MD   ALPRAZolam (XANAX) 0.5 MG tablet Take 0.5 tablets by mouth Daily.    ProviderDylon MD   aspirin 81 MG EC tablet Take 1 tablet by mouth Daily.    ProviderDylon MD   Baclofen (LIORESAL) 5 MG tablet  11/20/23   ProviderDylon MD   Budeson-Glycopyrrol-Formoterol (Breztri Aerosphere) 160-9-4.8 MCG/ACT aerosol inhaler Inhale 2 puffs 2 (Two) Times a Day for 90  days. 11/16/23 2/14/24  Jsoephine Max APRN   clindamycin (CLEOCIN) 300 MG capsule 1 capsule.  Patient not taking: Reported on 12/6/2023 11/27/23   Dylon Bobo MD   clopidogrel (PLAVIX) 75 MG tablet Take 1 tablet by mouth Daily.    Dylon Bobo MD   empagliflozin (Jardiance) 10 MG tablet tablet Take 1 tablet by mouth Daily. 8/23/23   Jean Marie Díaz MD   finasteride (PROSCAR) 5 MG tablet Take 1 tablet by mouth Daily. 2/2/17   Dylon Bobo MD   folic acid (FOLVITE) 1 MG tablet Take 1 tablet by mouth Daily. 9/6/19   Clara Ambrosio APRN   furosemide (LASIX) 40 MG tablet Take 1 tablet by mouth Daily As Needed (Weight gain or swelling).    Dylon Bobo MD   guaifenesin (ROBITUSSIN) 100 MG/5ML liquid TAKE 10 ML BY MOUTH TWICE DAILY AS NEEDED  Patient not taking: Reported on 12/6/2023 11/27/23   Dylon Bobo MD   HYDROcodone-acetaminophen (Norco) 5-325 MG per tablet Take 1 tablet by mouth Every 6 (Six) Hours As Needed for Moderate Pain. 11/1/23   Lion Cardenas Jr., MD   lisinopril (PRINIVIL,ZESTRIL) 5 MG tablet 1 tablet.  Patient not taking: Reported on 12/5/2023    Dylon Bobo MD   metoprolol succinate XL (TOPROL-XL) 25 MG 24 hr tablet Take 0.5 tablets by mouth Every Evening.  Patient not taking: Reported on 12/5/2023 11/16/23   Jean Marie Díaz MD   oxybutynin (DITROPAN) 5 MG tablet Take 1 tablet by mouth 3 (Three) Times a Day.    Dylon Bobo MD   pantoprazole (PROTONIX) 40 MG EC tablet Take 1 tablet by mouth 2 (Two) Times a Day Before Meals.  Patient taking differently: Take 1 tablet by mouth Daily. 10/25/23   Jaye Frye MD   rosuvastatin (CRESTOR) 10 MG tablet Take 0.5 tablets by mouth Every Night.    Dylon Bobo MD   sertraline (ZOLOFT) 50 MG tablet Take 1 tablet by mouth Daily.    Dylon Bobo MD   spironolactone (ALDACTONE) 25 MG tablet Take 1 tablet by mouth Daily.    Provider, Dylon, MD   sucralfate  "(CARAFATE) 1 g tablet 1 tablet. 11/27/23   ProviderDylon MD   sucralfate (CARAFATE) 1 g tablet Take 1 tablet by mouth 4 (Four) Times a Day.    ProviderDylon MD   tamsulosin (FLOMAX) 0.4 MG capsule 24 hr capsule Take 1 capsule by mouth 2 (Two) Times a Day. 11/9/23   Yenifer Edwards APRN   tiotropium bromide-olodaterol (Stiolto Respimat) 2.5-2.5 MCG/ACT aerosol solution inhaler Inhale 2 puffs Daily.    ProviderDylon MD       Social History     Socioeconomic History    Marital status:    Tobacco Use    Smoking status: Every Day     Packs/day: 0.50     Years: 70.00     Additional pack years: 0.00     Total pack years: 35.00     Types: Cigarettes     Passive exposure: Current    Smokeless tobacco: Former     Types: Snuff     Quit date: 1970    Tobacco comments:     7 weeks   Vaping Use    Vaping Use: Never used   Substance and Sexual Activity    Alcohol use: Yes     Alcohol/week: 2.0 standard drinks of alcohol     Types: 2 Shots of liquor per week     Comment: daily - 2 mixed drinks    Drug use: No    Sexual activity: Defer       Objective   Vital Signs:   Ht 182.9 cm (72\")   Wt 68.5 kg (151 lb)   BMI 20.48 kg/m²     Physical Exam  Cardiovascular:      Rate and Rhythm: Normal rate and regular rhythm.      Heart sounds: No murmur heard.  Pulmonary:      Effort: Pulmonary effort is normal.      Breath sounds: Normal breath sounds.   Musculoskeletal:      Right lower leg: No edema.      Left lower leg: No edema.   Neurological:      Mental Status: He is alert and oriented to person, place, and time.        Result Review :      My interpretation of the PFT : none    Results for orders placed in visit on 10/14/19    Pulmonary Function Test    Narrative  Pulmonary Function Test  Performed by: Josephine Max APRN  Authorized by: Josephine Max APRN    Pre Drug  FVC: 80%  FEV1: 60%  FEF 25-75%: 32%  FEV1/FVC: 58%  DLCO: 60%  D/VAsb: 64%      My interpretation of imaging:  none    My " interpretation of labs: none      Assessment and Plan {CC Problem List  Visit Diagnosis  ROS  Review (Popup)  Health Maintenance  Quality  BestPractice  Medications  SmartSets  SnapShot Encounters  Media : 23}    Diagnoses and all orders for this visit:    1. Bronchiectasis without complication (Primary)  Comments:  Doing much better on Breztri.  Seldom requiring albuterol.  PFTs with follow-up.  He has not been hospitalized or required recurrent antibiotics    2. Personal history of nicotine dependence  Comments:  Recommend smoking cessation.  Not ready to establish a plan.  He does not meet criteria for low-dose lung cancer screening    3. Stage 2 moderate COPD by GOLD classification  Comments:  Well-controlled on Breztri.  PFTs when he returns.  No refills needed        Body mass index is 20.48 kg/m².    Josephine Max, APRN  1/30/2024  14:04 CST    Follow Up   Return in about 6 months (around 7/30/2024) for FVL with diffusion.    Patient was given instructions and counseling regarding his condition or for health maintenance advice. Please see specific information pulled into the AVS if appropriate.

## 2024-01-02 PROBLEM — J44.9 STAGE 2 MODERATE COPD BY GOLD CLASSIFICATION: Chronic | Status: ACTIVE | Noted: 2020-05-07

## 2024-01-02 PROBLEM — J44.9 COPD, GROUP B, BY GOLD 2017 CLASSIFICATION: Chronic | Status: RESOLVED | Noted: 2019-04-09 | Resolved: 2024-01-02

## 2024-01-30 ENCOUNTER — OFFICE VISIT (OUTPATIENT)
Dept: PULMONOLOGY | Facility: CLINIC | Age: 84
End: 2024-01-30
Payer: OTHER GOVERNMENT

## 2024-01-30 VITALS — WEIGHT: 151 LBS | HEIGHT: 72 IN | BODY MASS INDEX: 20.45 KG/M2

## 2024-01-30 DIAGNOSIS — Z87.891 PERSONAL HISTORY OF NICOTINE DEPENDENCE: Chronic | ICD-10-CM

## 2024-01-30 DIAGNOSIS — J44.9 STAGE 2 MODERATE COPD BY GOLD CLASSIFICATION: Chronic | ICD-10-CM

## 2024-01-30 DIAGNOSIS — J47.9 BRONCHIECTASIS WITHOUT COMPLICATION: Primary | Chronic | ICD-10-CM

## 2024-01-30 PROCEDURE — 99214 OFFICE O/P EST MOD 30 MIN: CPT | Performed by: NURSE PRACTITIONER

## 2024-01-30 RX ORDER — PHENOL 1.4 %
600 AEROSOL, SPRAY (ML) MUCOUS MEMBRANE DAILY
COMMUNITY

## 2024-02-01 RX ORDER — SPIRONOLACTONE 25 MG/1
25 TABLET ORAL DAILY
Qty: 90 TABLET | Refills: 3 | Status: SHIPPED | OUTPATIENT
Start: 2024-02-01

## 2024-02-01 NOTE — TELEPHONE ENCOUNTER
Patients daughter has called stating Van is due for a new script for pended medication. Patient needs script sent to [facility].

## 2024-02-22 ENCOUNTER — TELEPHONE (OUTPATIENT)
Dept: VASCULAR SURGERY | Facility: CLINIC | Age: 84
End: 2024-02-22
Payer: MEDICARE

## 2024-02-23 ENCOUNTER — OFFICE VISIT (OUTPATIENT)
Dept: VASCULAR SURGERY | Facility: CLINIC | Age: 84
End: 2024-02-23
Payer: MEDICARE

## 2024-02-23 ENCOUNTER — HOSPITAL ENCOUNTER (OUTPATIENT)
Dept: CT IMAGING | Facility: HOSPITAL | Age: 84
Discharge: HOME | End: 2024-02-23
Payer: MEDICARE

## 2024-02-23 VITALS
HEIGHT: 72 IN | SYSTOLIC BLOOD PRESSURE: 138 MMHG | DIASTOLIC BLOOD PRESSURE: 80 MMHG | OXYGEN SATURATION: 98 % | WEIGHT: 152 LBS | BODY MASS INDEX: 20.59 KG/M2 | HEART RATE: 76 BPM

## 2024-02-23 DIAGNOSIS — Z87.891 PERSONAL HISTORY OF NICOTINE DEPENDENCE: ICD-10-CM

## 2024-02-23 DIAGNOSIS — I71.43 INFRARENAL ABDOMINAL AORTIC ANEURYSM (AAA) WITHOUT RUPTURE: ICD-10-CM

## 2024-02-23 DIAGNOSIS — E78.5 HYPERLIPIDEMIA, UNSPECIFIED HYPERLIPIDEMIA TYPE: ICD-10-CM

## 2024-02-23 DIAGNOSIS — I72.4 POPLITEAL ARTERY ANEURYSM, BILATERAL: ICD-10-CM

## 2024-02-23 DIAGNOSIS — I10 ESSENTIAL (PRIMARY) HYPERTENSION: ICD-10-CM

## 2024-02-23 DIAGNOSIS — I72.4 ANEURYSM OF RIGHT POPLITEAL ARTERY: ICD-10-CM

## 2024-02-23 DIAGNOSIS — I71.43 INFRARENAL ABDOMINAL AORTIC ANEURYSM (AAA) WITHOUT RUPTURE: Primary | ICD-10-CM

## 2024-02-23 LAB — CREAT BLDA-MCNC: 1.5 MG/DL (ref 0.6–1.3)

## 2024-02-23 PROCEDURE — 74174 CTA ABD&PLVS W/CONTRAST: CPT

## 2024-02-23 PROCEDURE — 1159F MED LIST DOCD IN RCRD: CPT | Performed by: NURSE PRACTITIONER

## 2024-02-23 PROCEDURE — 1160F RVW MEDS BY RX/DR IN RCRD: CPT | Performed by: NURSE PRACTITIONER

## 2024-02-23 PROCEDURE — 3075F SYST BP GE 130 - 139MM HG: CPT | Performed by: NURSE PRACTITIONER

## 2024-02-23 PROCEDURE — 99214 OFFICE O/P EST MOD 30 MIN: CPT | Performed by: NURSE PRACTITIONER

## 2024-02-23 PROCEDURE — 25510000001 IOPAMIDOL PER 1 ML: Performed by: NURSE PRACTITIONER

## 2024-02-23 PROCEDURE — 82565 ASSAY OF CREATININE: CPT

## 2024-02-23 PROCEDURE — 3079F DIAST BP 80-89 MM HG: CPT | Performed by: NURSE PRACTITIONER

## 2024-02-23 RX ORDER — LEVOTHYROXINE SODIUM 0.05 MG/1
50 TABLET ORAL DAILY
COMMUNITY
Start: 2024-02-20

## 2024-02-23 RX ADMIN — IOPAMIDOL 100 ML: 755 INJECTION, SOLUTION INTRAVENOUS at 10:09

## 2024-02-23 NOTE — LETTER
March 25, 2024     Jean Marie Cabrera MD  [address]  [facility] [address]    Patient: Van Mckeon   YOB: 1940   Date of Visit: 2/23/2024     Dear Jean Marie Cabrera MD:       Thank you for referring Van Mckeon to me for evaluation. Below are the relevant portions of my assessment and plan of care.    If you have questions, please do not hesitate to call me. I look forward to following Van along with you.         Sincerely,        JAE Spencer        CC: No Recipients    Natalie David APRN  03/25/24 1628  Sign when Signing Visit  02/23/2024       Jean Marie Cabrera MD  [address]  [facility] [address]    Van Mckeon  1940    Chief Complaint   Patient presents with   • Follow-up     6 month follow up w/ testing. Last seen 8/23/23. Patient denies any new or worsening back or abdominal pains.        Dear Jean Marie Cabrera MD   HPI  I had the pleasure of seeing your patient Van Mckeon in the office today.  As you recall, Van Mckeon is a 83 y.o.  male who you are currently following for routine health maintenance.  We are following for an abdominal aortic aneurysm, and small right popliteal artery aneurysm.  He has previous TIA symptoms, but carotids found to be widely patent.  He did undergo endovascular abdominal aortic aneurysm repair on 7/19/2023.  He has done well.  He is maintained on aspirin, Plavix, and Crestor.  He did have noninvasive testing performed today, which I did review in office.      Review of Systems   Constitutional: Negative.    HENT: Negative.     Eyes: Negative.    Respiratory: Negative.     Cardiovascular:  Positive for leg swelling.   Gastrointestinal: Negative.    Endocrine: Negative.    Genitourinary: Negative.    Musculoskeletal: Negative.    Skin: Negative.    Allergic/Immunologic: Negative.    Neurological: Negative.    Hematological: Negative.    Psychiatric/Behavioral: Negative.     All other systems reviewed  "and are negative.        /80   Pulse 76   Ht 182.9 cm (72\")   Wt 68.9 kg (152 lb)   SpO2 98%   BMI 20.61 kg/m²     Physical Exam  Vitals and nursing note reviewed.   Constitutional:       General: He is not in acute distress.     Appearance: Normal appearance. He is well-developed and normal weight. He is not diaphoretic.   HENT:      Head: Normocephalic and atraumatic.   Eyes:      General: No scleral icterus.     Pupils: Pupils are equal, round, and reactive to light.   Neck:      Thyroid: No thyromegaly.      Vascular: No carotid bruit or JVD.   Cardiovascular:      Rate and Rhythm: Normal rate and regular rhythm.      Pulses: Normal pulses.           Carotid pulses are 2+ on the right side and 2+ on the left side.       Femoral pulses are 2+ on the right side and 2+ on the left side.       Popliteal pulses are 2+ on the right side and 2+ on the left side.        Dorsalis pedis pulses are 2+ on the right side and 2+ on the left side.        Posterior tibial pulses are 2+ on the right side and 2+ on the left side.      Heart sounds: Normal heart sounds, S1 normal and S2 normal. No murmur heard.     No friction rub. No gallop.      Comments: prominent popliteals bilaterally  Pulmonary:      Effort: Pulmonary effort is normal.      Breath sounds: Normal breath sounds.   Abdominal:      General: Bowel sounds are normal. There is no distension or abdominal bruit.      Palpations: Abdomen is soft. There is no mass or pulsatile mass.      Tenderness: There is no abdominal tenderness.   Musculoskeletal:         General: Swelling present. Normal range of motion.      Cervical back: Neck supple.   Lymphadenopathy:      Cervical: No cervical adenopathy.   Skin:     General: Skin is warm and dry.      Findings: Bruising (Left thigh and right posterior knee) present.   Neurological:      General: No focal deficit present.      Mental Status: He is alert and oriented to person, place, and time.      Cranial Nerves: " No cranial nerve deficit.      Sensory: No sensory deficit.   Psychiatric:         Mood and Affect: Mood normal.         Behavior: Behavior normal.         Thought Content: Thought content normal.         Judgment: Judgment normal.       Diagnostic Data:  Narrative & Impression   EXAMINATION:  CT ANGIOGRAM ABDOMEN PELVIS-  2/23/2024 8:22 AM     HISTORY: Aortic aneurysm (AAA), surveillance; I71.43-Infrarenal  abdominal aortic aneurysm, without rupture     TECHNIQUE: Spiral CT angiography was performed of the abdomen and pelvis  without and with IV contrast. Multiplanar and 3D images were  reconstructed.     DLP: 760.31 mGy.cm Automated dosage reduction technique was utilized to  reduce patient dose.     COMPARISON: 8/23/2023.     LUNG BASES: The lung bases are clear.     LIVER AND SPLEEN: The liver is unremarkable. The spleen is unremarkable.  There is a prominent splenule medial to the spleen measuring 4.7 cm.  There are no dense gallstones.     PANCREAS: No pancreatic mass or inflammatory change.     KIDNEYS AND ADRENALS: The adrenal glands are normal in size. There are  bilateral renal cysts. A 3.2 cm lesion in the upper to midpole right  kidney posteriorly measures 8 Hounsfield units precontrast and 26  Hounsfield units postcontrast. There is also mild wall thickening. The  ureters are nondilated. The bladder is unremarkable. The prostate is  enlarged measuring 5.5 cm transversely.     BOWEL: There is gastric wall thickening. Small bowel loops are  nondilated. There is under distention of portions of the colon. The  appendix is normal.     OTHER: There are degenerative changes of the spine and hips. There are  likely discogenic sclerotic changes at L3 and L4.     VASCULAR: There is an aortoiliac stent graft that is patent. The upper  aspect of the stent graft overlies the origins of the SMA and bilateral  renal arteries. There is no evidence of endoleak. The native aortic  lumen size remains stable at 4.4 cm.         IMPRESSION:  1. Aortoiliac stent graft remains stable. The native aortic lumen is  stable when measured in the same locations. I measure this at 4.4 cm  maximal diameter. The measurement is stable at the same location on the  prior study. No evidence of endoleak.  2. Upper pole renal lesion on the right enhances from 8 Hounsfield units  precontrast to 26 Hounsfield units postcontrast. There also appears to  be mild wall thickening. This lesion measures 3.2 cm. Ultrasound  recommended to further evaluate. There are other simple cysts that do  not enhance bilaterally.  3. Gastric wall thickening could be an artifact of under distention.  Gastritis and other etiologies are also considered.  4. Large splenule in the left upper quadrant measuring 4.7 cm.  5. Other nonacute findings, as discussed.     This report was signed and finalized on 2/23/2024 10:55 AM by Dr. Jaime Cronin MD.       Patient Active Problem List   Diagnosis   • Asymmetry of tonsils   • Enlarged tonsils   • Neoplasm of tonsil   • Bronchiectasis without complication   • Essential (primary) hypertension   • Personal history of nicotine dependence   • Temporary cerebral vascular dysfunction   • Alcohol use   • Alcohol dependence, uncomplicated   • Benign prostatic hyperplasia   • Anxiety   • Claustrophobia   • Thrombocytopenia   • [place] sprayers' lung   • Stage 2 moderate COPD by GOLD classification   • Benign hypertension   • Myopathy   • Bradycardia   • Calcification of abdominal aorta   • Gastroesophageal reflux disease   • Hypercalcemia   • Hypertrophy of prostate   • Infarction of left basal ganglia   • Knee deformity, acquired, left   • Left cataract   • PAD (peripheral artery disease)   • Pericarditis associated with severe chronic anemia   • Pseudophakia   • Statin myopathy   • Stasis edema of both lower extremities   • History of adenomatous polyp of colon   • Abdominal aortic aneurysm (AAA) without rupture   • TIA (transient ischemic  attack)   • Preop testing   • Abdominal aortic aneurysm   • Infrarenal abdominal aortic aneurysm (AAA) without rupture   • CKD (chronic kidney disease) stage 3, GFR 30-59 ml/min   • Thyroid nodule   • Parathyroid adenoma   • Status post partial thyroidectomy   • Status post parathyroidectomy   • Hyperparathyroidism        Diagnosis Plan   1. Infrarenal abdominal aortic aneurysm (AAA) without rupture  CT Angiogram Abdomen Pelvis      2. Essential (primary) hypertension        3. Aneurysm of right popliteal artery        4. Personal history of nicotine dependence        5. Popliteal artery aneurysm, bilateral  US Arterial Doppler Lower Extremity Complete      6. Hyperlipidemia, unspecified hyperlipidemia type                Plan: After thoroughly evaluating Van Mckeon, I believe the best course of action is to remain conservative from vascular surgery standpoint.  Currently he is doing well and denies any abdominal or back pain.  I did review his testing which shows his aneurysm well excluded with no evidence of endoleak.  He has small popliteal artery aneurysms bilaterally measuring just over 1 cm.  We will see him back in 6 months with repeat CTA of the abdomen pelvis and popliteal duplex for continued surveillance. I did discuss vascular risk factors as they pertain to the progression of vascular disease including controlling his hypertension and smoking cessation.  His blood pressure is stable on his current medications.  Unfortunately, he is a current daily smoker and is not willing to quit smoking at this time.  He is also interested in home lymphedema pumps and we can arrange to see [facility] at his next follow-up appointment.  He should continue his Crestor 5 mg nightly in addition to his other medications.  This was all discussed in full with complete understanding.  Thank you for allowing me to participate in the care of your patient.  Please do not hesitate to call with any questions or  concerns.  We will keep you aware of any further encounters with Van Mckeon.        Sincerely yours,         JAE Spencer David R, MD

## 2024-02-23 NOTE — PROGRESS NOTES
"02/23/2024       Jean Marie Cabrera MD  [address]  [facility] [address]    Van Mckeon  1940    Chief Complaint   Patient presents with    Follow-up     6 month follow up w/ testing. Last seen 8/23/23. Patient denies any new or worsening back or abdominal pains.        Dear Jean Marie Cabrera MD   HPI  I had the pleasure of seeing your patient Van Mckeon in the office today.  As you recall, Van Mckeon is a 83 y.o.  male who you are currently following for routine health maintenance.  We are following for an abdominal aortic aneurysm, and small right popliteal artery aneurysm.  He has previous TIA symptoms, but carotids found to be widely patent.  He did undergo endovascular abdominal aortic aneurysm repair on 7/19/2023.  He has done well.  He is maintained on aspirin, Plavix, and Crestor.  He did have noninvasive testing performed today, which I did review in office.      Review of Systems   Constitutional: Negative.    HENT: Negative.     Eyes: Negative.    Respiratory: Negative.     Cardiovascular:  Positive for leg swelling.   Gastrointestinal: Negative.    Endocrine: Negative.    Genitourinary: Negative.    Musculoskeletal: Negative.    Skin: Negative.    Allergic/Immunologic: Negative.    Neurological: Negative.    Hematological: Negative.    Psychiatric/Behavioral: Negative.     All other systems reviewed and are negative.        /80   Pulse 76   Ht 182.9 cm (72\")   Wt 68.9 kg (152 lb)   SpO2 98%   BMI 20.61 kg/m²     Physical Exam  Vitals and nursing note reviewed.   Constitutional:       General: He is not in acute distress.     Appearance: Normal appearance. He is well-developed and normal weight. He is not diaphoretic.   HENT:      Head: Normocephalic and atraumatic.   Eyes:      General: No scleral icterus.     Pupils: Pupils are equal, round, and reactive to light.   Neck:      Thyroid: No thyromegaly.      Vascular: No carotid bruit or JVD.   Cardiovascular:      " Rate and Rhythm: Normal rate and regular rhythm.      Pulses: Normal pulses.           Carotid pulses are 2+ on the right side and 2+ on the left side.       Femoral pulses are 2+ on the right side and 2+ on the left side.       Popliteal pulses are 2+ on the right side and 2+ on the left side.        Dorsalis pedis pulses are 2+ on the right side and 2+ on the left side.        Posterior tibial pulses are 2+ on the right side and 2+ on the left side.      Heart sounds: Normal heart sounds, S1 normal and S2 normal. No murmur heard.     No friction rub. No gallop.      Comments: prominent popliteals bilaterally  Pulmonary:      Effort: Pulmonary effort is normal.      Breath sounds: Normal breath sounds.   Abdominal:      General: Bowel sounds are normal. There is no distension or abdominal bruit.      Palpations: Abdomen is soft. There is no mass or pulsatile mass.      Tenderness: There is no abdominal tenderness.   Musculoskeletal:         General: Swelling present. Normal range of motion.      Cervical back: Neck supple.   Lymphadenopathy:      Cervical: No cervical adenopathy.   Skin:     General: Skin is warm and dry.      Findings: Bruising (Left thigh and right posterior knee) present.   Neurological:      General: No focal deficit present.      Mental Status: He is alert and oriented to person, place, and time.      Cranial Nerves: No cranial nerve deficit.      Sensory: No sensory deficit.   Psychiatric:         Mood and Affect: Mood normal.         Behavior: Behavior normal.         Thought Content: Thought content normal.         Judgment: Judgment normal.       Diagnostic Data:  Narrative & Impression   EXAMINATION:  CT ANGIOGRAM ABDOMEN PELVIS-  2/23/2024 8:22 AM     HISTORY: Aortic aneurysm (AAA), surveillance; I71.43-Infrarenal  abdominal aortic aneurysm, without rupture     TECHNIQUE: Spiral CT angiography was performed of the abdomen and pelvis  without and with IV contrast. Multiplanar and 3D  images were  reconstructed.     DLP: 760.31 mGy.cm Automated dosage reduction technique was utilized to  reduce patient dose.     COMPARISON: 8/23/2023.     LUNG BASES: The lung bases are clear.     LIVER AND SPLEEN: The liver is unremarkable. The spleen is unremarkable.  There is a prominent splenule medial to the spleen measuring 4.7 cm.  There are no dense gallstones.     PANCREAS: No pancreatic mass or inflammatory change.     KIDNEYS AND ADRENALS: The adrenal glands are normal in size. There are  bilateral renal cysts. A 3.2 cm lesion in the upper to midpole right  kidney posteriorly measures 8 Hounsfield units precontrast and 26  Hounsfield units postcontrast. There is also mild wall thickening. The  ureters are nondilated. The bladder is unremarkable. The prostate is  enlarged measuring 5.5 cm transversely.     BOWEL: There is gastric wall thickening. Small bowel loops are  nondilated. There is under distention of portions of the colon. The  appendix is normal.     OTHER: There are degenerative changes of the spine and hips. There are  likely discogenic sclerotic changes at L3 and L4.     VASCULAR: There is an aortoiliac stent graft that is patent. The upper  aspect of the stent graft overlies the origins of the SMA and bilateral  renal arteries. There is no evidence of endoleak. The native aortic  lumen size remains stable at 4.4 cm.        IMPRESSION:  1. Aortoiliac stent graft remains stable. The native aortic lumen is  stable when measured in the same locations. I measure this at 4.4 cm  maximal diameter. The measurement is stable at the same location on the  prior study. No evidence of endoleak.  2. Upper pole renal lesion on the right enhances from 8 Hounsfield units  precontrast to 26 Hounsfield units postcontrast. There also appears to  be mild wall thickening. This lesion measures 3.2 cm. Ultrasound  recommended to further evaluate. There are other simple cysts that do  not enhance bilaterally.  3.  Gastric wall thickening could be an artifact of under distention.  Gastritis and other etiologies are also considered.  4. Large splenule in the left upper quadrant measuring 4.7 cm.  5. Other nonacute findings, as discussed.     This report was signed and finalized on 2/23/2024 10:55 AM by Dr. Jaime Cronin MD.       Patient Active Problem List   Diagnosis    Asymmetry of tonsils    Enlarged tonsils    Neoplasm of tonsil    Bronchiectasis without complication    Essential (primary) hypertension    Personal history of nicotine dependence    Temporary cerebral vascular dysfunction    Alcohol use    Alcohol dependence, uncomplicated    Benign prostatic hyperplasia    Anxiety    Claustrophobia    Thrombocytopenia    Anatoly sprayers' lung    Stage 2 moderate COPD by GOLD classification    Benign hypertension    Myopathy    Bradycardia    Calcification of abdominal aorta    Gastroesophageal reflux disease    Hypercalcemia    Hypertrophy of prostate    Infarction of left basal ganglia    Knee deformity, acquired, left    Left cataract    PAD (peripheral artery disease)    Pericarditis associated with severe chronic anemia    Pseudophakia    Statin myopathy    Stasis edema of both lower extremities    History of adenomatous polyp of colon    Abdominal aortic aneurysm (AAA) without rupture    TIA (transient ischemic attack)    Preop testing    Abdominal aortic aneurysm    Infrarenal abdominal aortic aneurysm (AAA) without rupture    CKD (chronic kidney disease) stage 3, GFR 30-59 ml/min    Thyroid nodule    Parathyroid adenoma    Status post partial thyroidectomy    Status post parathyroidectomy    Hyperparathyroidism        Diagnosis Plan   1. Infrarenal abdominal aortic aneurysm (AAA) without rupture  CT Angiogram Abdomen Pelvis      2. Essential (primary) hypertension        3. Aneurysm of right popliteal artery        4. Personal history of nicotine dependence        5. Popliteal artery aneurysm, bilateral  US  Arterial Doppler Lower Extremity Complete      6. Hyperlipidemia, unspecified hyperlipidemia type                Plan: After thoroughly evaluating Van Mckeon, I believe the best course of action is to remain conservative from vascular surgery standpoint.  Currently he is doing well and denies any abdominal or back pain.  I did review his testing which shows his aneurysm well excluded with no evidence of endoleak.  He has small popliteal artery aneurysms bilaterally measuring just over 1 cm.  We will see him back in 6 months with repeat CTA of the abdomen pelvis and popliteal duplex for continued surveillance. I did discuss vascular risk factors as they pertain to the progression of vascular disease including controlling his hypertension and smoking cessation.  His blood pressure is stable on his current medications.  Unfortunately, he is a current daily smoker and is not willing to quit smoking at this time.  He is also interested in home lymphedema pumps and we can arrange to see tactile medical at his next follow-up appointment.  He should continue his Crestor 5 mg nightly in addition to his other medications.  This was all discussed in full with complete understanding.  Thank you for allowing me to participate in the care of your patient.  Please do not hesitate to call with any questions or concerns.  We will keep you aware of any further encounters with Van Mckeon.        Sincerely yours,         JAE Spencer David R, MD

## 2024-03-05 ENCOUNTER — TELEPHONE (OUTPATIENT)
Dept: OTOLARYNGOLOGY | Facility: CLINIC | Age: 84
End: 2024-03-05

## 2024-03-05 NOTE — TELEPHONE ENCOUNTER
Provider: TALIA SEXTON    Caller: WALESKA BRISCOE    Relationship to Patient: Emergency Contact     Phone Number: [phone]    Reason for Call: CANCEL TODAY'S APPOINTMENT DUE TO A SCHEDULING CONFLICT    When was the patient last seen: 12/05/24    Notes: MR. LANIER HAS ANOTHER DOCTOR'S APPOINTMENT TODAY AT THE SAME TIME. WALESKA WILL CALL BACK TO RESCHEDULE ONCE SHE LOOKS AT THEIR CALENDAR.    APPOINTMENT FOR TODAY, 03:00 PM CANCELED.

## 2024-04-18 PROBLEM — Z90.89 STATUS POST PARATHYROIDECTOMY: Chronic | Status: ACTIVE | Noted: 2023-10-31

## 2024-04-18 PROBLEM — Z98.890 STATUS POST PARATHYROIDECTOMY: Chronic | Status: ACTIVE | Noted: 2023-10-31

## 2024-04-18 PROBLEM — K21.9 GASTROESOPHAGEAL REFLUX DISEASE: Chronic | Status: ACTIVE | Noted: 2020-06-17

## 2024-04-18 NOTE — PROGRESS NOTES
Chief Complaint  Bronchiectasis without complication    Subjective    History of Present Illness {CC  Problem List  Visit Diagnosis   Encounters  Notes  Medications  Labs  Result Review Imaging  Media: 23}    Van Mckeon presents to [facility] for:    History of Present Illness  Management of his bronchiectasis, emphysema and COPD.  Most recent FEV1 was 60.  He is a long time smoker.  He has not recently worked on smoking cessation.    He frequently has shortness of breath and productive coughing.  He controls his symptoms with Breztri.  Prior to this he was on Stiolto.  Since starting Breztri he has not been requiring his albuterol as much.  [facility]. he has been more short of breath.  Particularly when he exerts or he is outside.  He is asking about qualifying for a portable concentrator.     He has suffered a stroke in the past with minimal residual memory issues.         He was hospitalized for elevated calcium.  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  He is being followed by ENT, Dr. Cardenas.  This was removed.  He is now on calcium replacement orally.  This is being monitored closely.       Prior to Admission medications    Medication Sig Start Date End Date Taking? Authorizing Provider   albuterol sulfate  (90 Base) MCG/ACT inhaler Inhale 2 puffs 4 (Four) Times a Day.    Dylon Bobo MD   Baclofen (LIORESAL) 5 MG tablet  11/20/23   Dylon Bobo MD   calcium carbonate (OS-JACKSON) 600 MG tablet Take 1 tablet by mouth Daily.    Dylon Bobo MD   clindamycin (CLEOCIN) 300 MG capsule 1 capsule. 11/27/23   Dylon Bobo MD   empagliflozin (Jardiance) 10 MG tablet tablet Take 1 tablet by mouth Daily. 8/23/23   Jean Marie Díaz MD   finasteride (PROSCAR) 5 MG tablet Take 1 tablet by mouth Daily. 2/2/17   Dylon Bobo MD   folic acid (FOLVITE) 1 MG  tablet Take 1 tablet by mouth Daily. 9/6/19   Clara Ambrosio APRN   furosemide (LASIX) 40 MG tablet Take 1 tablet by mouth Daily As Needed (Weight gain or swelling).    Dylon Bobo MD   guaifenesin (ROBITUSSIN) 100 MG/5ML liquid  11/27/23   Dylon Bobo MD   HYDROcodone-acetaminophen (Norco) 5-325 MG per tablet Take 1 tablet by mouth Every 6 (Six) Hours As Needed for Moderate Pain. 11/1/23   Lion Cardenas Jr., MD   levothyroxine (SYNTHROID, LEVOTHROID) 50 MCG tablet Take 1 tablet by mouth Daily. 2/20/24   Dylon Bobo MD   lisinopril (PRINIVIL,ZESTRIL) 5 MG tablet 1 tablet.    Dylon Bobo MD   metoprolol succinate XL (TOPROL-XL) 25 MG 24 hr tablet Take 0.5 tablets by mouth Every Evening. 11/16/23   Jean Marie Díaz MD   oxybutynin (DITROPAN) 5 MG tablet Take 1 tablet by mouth 3 (Three) Times a Day.    Dylon Bobo MD   rosuvastatin (CRESTOR) 10 MG tablet Take 0.5 tablets by mouth Every Night.    Dylon Bobo MD   sertraline (ZOLOFT) 50 MG tablet Take 1 tablet by mouth Daily.    Dylon Bobo MD   spironolactone (ALDACTONE) 25 MG tablet Take 1 tablet by mouth Daily. 2/1/24   Jean Marie Díaz MD   sucralfate (CARAFATE) 1 g tablet Take 1 tablet by mouth 4 (Four) Times a Day.    Dylon Bobo MD   tamsulosin (FLOMAX) 0.4 MG capsule 24 hr capsule Take 1 capsule by mouth 2 (Two) Times a Day. 11/9/23   Yenifer Edwards APRN       Social History     Socioeconomic History    Marital status:    Tobacco Use    Smoking status: Every Day     Current packs/day: 0.50     Average packs/day: 0.5 packs/day for 70.0 years (35.0 ttl pk-yrs)     Types: Cigarettes     Passive exposure: Current    Smokeless tobacco: Former     Types: Snuff     Quit date: 1970    Tobacco comments:     7 weeks   Vaping Use    Vaping status: Never Used   Substance and Sexual Activity    Alcohol use: Yes     Alcohol/week: 2.0 standard drinks of alcohol     Types: 2  "Shots of liquor per week     Comment: daily - 2 mixed drinks    Drug use: No    Sexual activity: Defer       Objective   Vital Signs:   /68   Pulse 64   Ht 182.9 cm (72\")   Wt 70.8 kg (156 lb)   SpO2 93% Comment: RA  BMI 21.16 kg/m²     Physical Exam  Cardiovascular:      Rate and Rhythm: Normal rate and regular rhythm.      Heart sounds: No murmur heard.  Pulmonary:      Effort: Pulmonary effort is normal.      Breath sounds: Normal breath sounds.   Musculoskeletal:      Right lower leg: No edema.      Left lower leg: No edema.   Neurological:      Mental Status: He is alert and oriented to person, place, and time.        Result Review :      My interpretation of the PFT : none    Results for orders placed in visit on 10/14/19    Pulmonary Function Test    Narrative  Pulmonary Function Test  Performed by: Josephine Max APRN  Authorized by: Josephine Max APRN    Pre Drug  FVC: 80%  FEV1: 60%  FEF 25-75%: 32%  FEV1/FVC: 58%  DLCO: 60%  D/VAsb: 64%    Rest/Exercise Pulse Ox Values          5/20/2024    10:15   Rest/Exercise Pulse Ox Results   Rest room air SAT % 93   Exercise room air SAT % 88   Rest on O2 @ Liters 2   Rest on O2 SAT % 97   Exercise on O2 @ Liters 2   Exercise on O2 SAT % 96   My interpretation of imaging:  none    My interpretation of labs: none      Assessment and Plan {CC Problem List  Visit Diagnosis  ROS  Review (Popup)  Health Maintenance  Quality  BestPractice  Medications  SmartSets  SnapShot Encounters  Media : 23}    Diagnoses and all orders for this visit:    1. Bronchiectasis without complication (Primary)  Comments:  He has not been hospitalized or needed any antibiotics.  If he has any exacerbations, we can discuss home chest physiotherapy  Orders:  -     Walking Oximetry; Future  -     Walking Oximetry  -     Oxygen Therapy    2. Stage 2 moderate COPD by GOLD classification  Comments:  Continue Breztri.  Use Ventolin as needed.  PFTs with " follow-up  Orders:  -     Walking Oximetry; Future  -     Walking Oximetry  -     Oxygen Therapy    3. Personal history of nicotine dependence  Comments:  Recommend smoking cessation.  He is going to restart nicotine patches.  He will call if it does not help    4. Status post parathyroidectomy  Comments:  Defer management to ENT    5. Gastroesophageal reflux disease with esophagitis without hemorrhage  Comments:  Previously on Protonix.  Currently on nothing.  Asymptomatic.  If cough or congestion worsens, resume    6. Centrilobular emphysema  Comments:  PFTs with follow-up    7. Shortness of breath  Comments:  He did qualify for 2 L of portable oxygen.  Portable concentrator prescribed.      Van Mckeon  reports that he has been smoking cigarettes. He has a 35 pack-year smoking history. He has been exposed to tobacco smoke. He quit smokeless tobacco use about 54 years ago.  His smokeless tobacco use included snuff. I have educated him on the risk of diseases from using tobacco products such as cancer, COPD, and heart disease.     I advised him to quit and he is willing to quit. We have discussed the following method/s for tobacco cessation:  Counseling OTC Cessation Products.  Together we have set a quit date for  3 months .  He will follow up with me in 6 months or sooner to check on his progress.    I spent 5 minutes counseling the patient.          Body mass index is 21.16 kg/m².    Josephine Max, APRN  5/20/2024  11:05 CDT    Follow Up   Return in about 6 months (around 11/20/2024) for FVL.    Patient was given instructions and counseling regarding his condition or for health maintenance advice. Please see specific information pulled into the AVS if appropriate.

## 2024-05-20 ENCOUNTER — OFFICE VISIT (OUTPATIENT)
Dept: PULMONOLOGY | Facility: CLINIC | Age: 84
End: 2024-05-20
Payer: OTHER GOVERNMENT

## 2024-05-20 VITALS
HEIGHT: 72 IN | DIASTOLIC BLOOD PRESSURE: 68 MMHG | HEART RATE: 64 BPM | WEIGHT: 156 LBS | SYSTOLIC BLOOD PRESSURE: 122 MMHG | BODY MASS INDEX: 21.13 KG/M2 | OXYGEN SATURATION: 93 %

## 2024-05-20 DIAGNOSIS — Z87.891 PERSONAL HISTORY OF NICOTINE DEPENDENCE: Chronic | ICD-10-CM

## 2024-05-20 DIAGNOSIS — J47.9 BRONCHIECTASIS WITHOUT COMPLICATION: Primary | Chronic | ICD-10-CM

## 2024-05-20 DIAGNOSIS — J43.2 CENTRILOBULAR EMPHYSEMA: Chronic | ICD-10-CM

## 2024-05-20 DIAGNOSIS — K21.00 GASTROESOPHAGEAL REFLUX DISEASE WITH ESOPHAGITIS WITHOUT HEMORRHAGE: Chronic | ICD-10-CM

## 2024-05-20 DIAGNOSIS — Z98.890 STATUS POST PARATHYROIDECTOMY: Chronic | ICD-10-CM

## 2024-05-20 DIAGNOSIS — J44.9 STAGE 2 MODERATE COPD BY GOLD CLASSIFICATION: Chronic | ICD-10-CM

## 2024-05-20 DIAGNOSIS — Z90.89 STATUS POST PARATHYROIDECTOMY: Chronic | ICD-10-CM

## 2024-05-20 DIAGNOSIS — R06.02 SHORTNESS OF BREATH: ICD-10-CM

## 2024-05-20 PROCEDURE — 99214 OFFICE O/P EST MOD 30 MIN: CPT | Performed by: NURSE PRACTITIONER

## 2024-05-20 RX ORDER — BUDESONIDE, GLYCOPYRROLATE, AND FORMOTEROL FUMARATE 160; 9; 4.8 UG/1; UG/1; UG/1
2 AEROSOL, METERED RESPIRATORY (INHALATION) 2 TIMES DAILY
COMMUNITY

## 2024-05-20 NOTE — PROCEDURES
Walking Oximetry    Performed by: Claudia Chadwick MA  Authorized by: Josephine Max APRN    Rest room air SAT %:  93  Exercise room air SAT %:  88  Rest on O2 @ Liters:  2  SAT %:  97  Exercise on O2 @ Liters:  2  SAT %:  96

## 2024-07-18 DIAGNOSIS — J44.9 STAGE 2 MODERATE COPD BY GOLD CLASSIFICATION: Primary | ICD-10-CM

## 2024-07-18 RX ORDER — ALBUTEROL SULFATE 90 UG/1
2 AEROSOL, METERED RESPIRATORY (INHALATION)
Qty: 54 G | Refills: 3 | Status: SHIPPED | OUTPATIENT
Start: 2024-07-18

## 2024-07-18 NOTE — TELEPHONE ENCOUNTER
Patient's daughter called requesting [facility].    Per protocol no cosign required, script sent to pharmacy.    Rx Refill Note  Requested Prescriptions     Pending Prescriptions Disp Refills    albuterol sulfate  (90 Base) MCG/ACT inhaler 54 g 3     Sig: Inhale 2 puffs 4 (Four) Times a Day.      Last office visit with prescribing clinician: 5/20/2024   Last telemedicine visit with prescribing clinician: Visit date not found   Next office visit with prescribing clinician: 11/25/2024                         Would you like a call back once the refill request has been completed: [] Yes [] No    If the office needs to give you a call back, can they leave a voicemail: [] Yes [] No    Claudia Chadwick MA  07/18/24, 11:33 CDT

## 2024-08-23 ENCOUNTER — HOSPITAL ENCOUNTER (OUTPATIENT)
Dept: ULTRASOUND IMAGING | Facility: HOSPITAL | Age: 84
Discharge: HOME | End: 2024-08-23
Payer: MEDICARE

## 2024-08-23 ENCOUNTER — HOSPITAL ENCOUNTER (OUTPATIENT)
Dept: CT IMAGING | Facility: HOSPITAL | Age: 84
Discharge: HOME | End: 2024-08-23
Payer: MEDICARE

## 2024-08-23 ENCOUNTER — TELEPHONE (OUTPATIENT)
Dept: VASCULAR SURGERY | Facility: CLINIC | Age: 84
End: 2024-08-23
Payer: MEDICARE

## 2024-08-23 DIAGNOSIS — I71.43 INFRARENAL ABDOMINAL AORTIC ANEURYSM (AAA) WITHOUT RUPTURE: ICD-10-CM

## 2024-08-23 DIAGNOSIS — I72.4 POPLITEAL ARTERY ANEURYSM, BILATERAL: ICD-10-CM

## 2024-08-23 LAB — CREAT BLDA-MCNC: 1.7 MG/DL (ref 0.6–1.3)

## 2024-08-23 PROCEDURE — 74174 CTA ABD&PLVS W/CONTRAST: CPT

## 2024-08-23 PROCEDURE — 93925 LOWER EXTREMITY STUDY: CPT

## 2024-08-23 PROCEDURE — 82565 ASSAY OF CREATININE: CPT

## 2024-08-23 PROCEDURE — 25510000001 IOPAMIDOL PER 1 ML: Performed by: NURSE PRACTITIONER

## 2024-08-23 RX ADMIN — IOPAMIDOL 100 ML: 755 INJECTION, SOLUTION INTRAVENOUS at 14:21

## 2024-08-26 ENCOUNTER — OFFICE VISIT (OUTPATIENT)
Dept: VASCULAR SURGERY | Facility: CLINIC | Age: 84
End: 2024-08-26
Payer: MEDICARE

## 2024-08-26 VITALS
HEIGHT: 72 IN | OXYGEN SATURATION: 97 % | DIASTOLIC BLOOD PRESSURE: 62 MMHG | SYSTOLIC BLOOD PRESSURE: 110 MMHG | WEIGHT: 162 LBS | BODY MASS INDEX: 21.94 KG/M2 | HEART RATE: 71 BPM

## 2024-08-26 DIAGNOSIS — I10 ESSENTIAL (PRIMARY) HYPERTENSION: ICD-10-CM

## 2024-08-26 DIAGNOSIS — E78.5 HYPERLIPIDEMIA, UNSPECIFIED HYPERLIPIDEMIA TYPE: ICD-10-CM

## 2024-08-26 DIAGNOSIS — I72.4 POPLITEAL ARTERY ANEURYSM, BILATERAL: ICD-10-CM

## 2024-08-26 DIAGNOSIS — I65.23 BILATERAL CAROTID ARTERY STENOSIS: ICD-10-CM

## 2024-08-26 DIAGNOSIS — I71.43 INFRARENAL ABDOMINAL AORTIC ANEURYSM (AAA) WITHOUT RUPTURE: Primary | ICD-10-CM

## 2024-08-26 DIAGNOSIS — Z87.891 PERSONAL HISTORY OF NICOTINE DEPENDENCE: ICD-10-CM

## 2024-08-26 PROCEDURE — 99214 OFFICE O/P EST MOD 30 MIN: CPT | Performed by: NURSE PRACTITIONER

## 2024-08-26 PROCEDURE — 3078F DIAST BP <80 MM HG: CPT | Performed by: NURSE PRACTITIONER

## 2024-08-26 PROCEDURE — 1160F RVW MEDS BY RX/DR IN RCRD: CPT | Performed by: NURSE PRACTITIONER

## 2024-08-26 PROCEDURE — 3074F SYST BP LT 130 MM HG: CPT | Performed by: NURSE PRACTITIONER

## 2024-08-26 PROCEDURE — 1159F MED LIST DOCD IN RCRD: CPT | Performed by: NURSE PRACTITIONER

## 2024-08-26 NOTE — LETTER
August 26, 2024     Jean Marie Cabrera MD  [address]  [facility] [address]    Patient: Van Mckeon   YOB: 1940   Date of Visit: 8/26/2024     Dear Jean Marie Cabrera MD:       Thank you for referring Van Mckeon to me for evaluation. Below are the relevant portions of my assessment and plan of care.    If you have questions, please do not hesitate to call me. I look forward to following Van along with you.         Sincerely,        JAE Spencer        CC: No Recipients    Natalie David APRN  08/26/24 1012  Sign when Signing Visit  8/26/2024       Jean Marie Cabrera MD  [address]  [facility] [address]    Van Mckeon  1940    Chief Complaint   Patient presents with   • Follow-up     6 month follow up w/ testing. Last seen 2/23/24. Patient denies any new or worsening back or abdominal pains.        Dear Jean Marie Cabrera MD   HPI  I had the pleasure of seeing your patient Van Mckeon in the office today.  As you recall, Van Mckeon is a 84 y.o.  male who you are currently following for routine health maintenance.  We are following for an abdominal aortic aneurysm, and small right popliteal artery aneurysm.  He has previous TIA symptoms, but carotids found to be widely patent.  He did undergo endovascular abdominal aortic aneurysm repair on 7/19/2023.  Currently he is doing well and denies any abdominal or back pain.  He is maintained on aspirin, Plavix, and Crestor.  He did have noninvasive testing performed today, which I did review in office.      Review of Systems   Constitutional: Negative.    HENT: Negative.     Eyes: Negative.    Respiratory: Negative.     Cardiovascular:  Positive for leg swelling.   Gastrointestinal: Negative.    Endocrine: Negative.    Genitourinary: Negative.    Musculoskeletal: Negative.    Skin: Negative.    Allergic/Immunologic: Negative.    Neurological: Negative.    Hematological: Negative.   "  Psychiatric/Behavioral: Negative.     All other systems reviewed and are negative.        /62   Pulse 71   Ht 182.9 cm (72\")   Wt 73.5 kg (162 lb)   SpO2 97%   BMI 21.97 kg/m²     Physical Exam  Vitals and nursing note reviewed.   Constitutional:       Appearance: Normal appearance. He is well-developed and normal weight.   HENT:      Head: Normocephalic and atraumatic.   Eyes:      General: No scleral icterus.     Pupils: Pupils are equal, round, and reactive to light.   Neck:      Thyroid: No thyromegaly.   Cardiovascular:      Rate and Rhythm: Normal rate and regular rhythm.      Heart sounds: Normal heart sounds.   Pulmonary:      Effort: Pulmonary effort is normal.      Breath sounds: Normal breath sounds.   Abdominal:      General: Bowel sounds are normal.      Palpations: Abdomen is soft.   Musculoskeletal:         General: Swelling present. Normal range of motion.      Cervical back: Normal range of motion and neck supple.   Skin:     General: Skin is warm and dry.   Neurological:      General: No focal deficit present.      Mental Status: He is alert and oriented to person, place, and time.   Psychiatric:         Mood and Affect: Mood normal.         Behavior: Behavior normal.         Thought Content: Thought content normal.         Judgment: Judgment normal.       Diagnostic Data:  CT Angiogram Abdomen Pelvis    Result Date: 8/23/2024  Narrative: EXAMINATION:  CT ANGIOGRAM ABDOMEN PELVIS-  8/23/2024 1:10 PM  HISTORY: Aortic aneurysm (AAA), post-op; I71.43-Infrarenal abdominal aortic aneurysm, without rupture.  TECHNIQUE: Spiral CT angiography was performed of the abdomen and pelvis without and with IV contrast. Multiplanar and 3D images were reconstructed.  DLP: 512.68 mGy.cm Automated dosage reduction technique was utilized to reduce patient dose.  COMPARISON: 2/23/2024.  LUNG BASES: Calcified left lower lobe granuloma.  LIVER AND SPLEEN: The liver demonstrates a normal early arterial phase " appearance. The spleen is heterogeneous on the contrast-enhanced images consistent with the arterial phase. There is a prominent splenule measuring 4.4 cm. There are no dense gallstones.  PANCREAS: No pancreatic mass or inflammatory change.  KIDNEYS AND ADRENALS: The adrenal glands are normal. There are bilateral renal cysts. The ureters are nondilated. There is mild thickening of the bladder wall likely due to muscular hypertrophy. The prostate is enlarged measuring 5.5 x 5.7 cm.  BOWEL: There is under distention of the stomach. There is food material in the stomach. Small bowel loops are nondilated. There is mild diverticulosis of the colon predominantly in the sigmoid region.  OTHER: There are no pathologically enlarged lymph nodes. There are degenerative changes of the spine and hips. There is an old fracture of the left femoral head with nonbony union.  VASCULAR: There is a patent aortoiliac stent graft. The native aortic lumen measures up to 4.3 cm, previously 4.4 cm. There is no endoleak identified. There is good opacification of the renal arteries through the upper portion of the stent graft. The SMA and celiac plexus are patent without significant stenosis. The CARLOS is occluded at its origin but there is collateralized flow within the CARLOS.       Impression: 1. Patent aortoiliac stent graft with no evidence of endoleak. The native aortic lumen measures 4.3 cm, previously 4.4 cm in maximal diameter. 2. Incidental prominent splenule measuring 4.4 cm, stable. 3. Bilateral renal cysts. 4. Diverticulosis of the colon. No CT findings of diverticulitis. 5. Enlarged prostate measuring 5.5 x 5.7 cm. Mild wall thickening of the bladder likely related to muscular hypertrophy. 6. Other nonacute findings, as discussed.  This report was signed and finalized on 8/23/2024 4:11 PM by Dr. Jaime Cronin MD.        Popliteal duplex shows small popliteal artery aneurysms on the right 1.15 on the left 0.91.      Patient Active  Problem List   Diagnosis   • Asymmetry of tonsils   • Enlarged tonsils   • Neoplasm of tonsil   • Bronchiectasis without complication   • Essential (primary) hypertension   • Personal history of nicotine dependence   • Temporary cerebral vascular dysfunction   • Alcohol use   • Alcohol dependence, uncomplicated   • Benign prostatic hyperplasia   • Anxiety   • Claustrophobia   • Thrombocytopenia   • [place] sprayers' lung   • Stage 2 moderate COPD by GOLD classification   • Benign hypertension   • Myopathy   • Bradycardia   • Calcification of abdominal aorta   • Gastroesophageal reflux disease   • Hypercalcemia   • Hypertrophy of prostate   • Infarction of left basal ganglia   • Knee deformity, acquired, left   • Left cataract   • PAD (peripheral artery disease)   • Pericarditis associated with severe chronic anemia   • Pseudophakia   • Statin myopathy   • Stasis edema of both lower extremities   • History of adenomatous polyp of colon   • Abdominal aortic aneurysm (AAA) without rupture   • TIA (transient ischemic attack)   • Preop testing   • Abdominal aortic aneurysm   • Infrarenal abdominal aortic aneurysm (AAA) without rupture   • CKD (chronic kidney disease) stage 3, GFR 30-59 ml/min   • Thyroid nodule   • Parathyroid adenoma   • Status post partial thyroidectomy   • Status post parathyroidectomy   • Hyperparathyroidism   • Centrilobular emphysema        Diagnosis Plan   1. Infrarenal abdominal aortic aneurysm (AAA) without rupture  US Aorta Limited      2. Essential (primary) hypertension        3. Popliteal artery aneurysm, bilateral  US Arterial Doppler Lower Extremity Complete      4. Personal history of nicotine dependence        5. Hyperlipidemia, unspecified hyperlipidemia type        6. Bilateral carotid artery stenosis  US Carotid Bilateral                Plan: After thoroughly evaluating Van Mckeon, I believe the best course of action is to remain conservative from vascular surgery standpoint.   I did review his testing which shows his aneurysm to be well excluded with no evidence of endoleak.  This is also slightly smaller than 6 months ago.  His popliteal duplex shows small popliteal artery aneurysms bilaterally.  We will see him back in 1 year with repeat noninvasive testing for continued surveillance, including an ultrasound of the aorta, carotid duplex, popliteal duplex.  We can alternate CTA's and ultrasounds of the aorta.  Unfortunately, he is a current daily smoker and is not willing to quit smoking at this time.  He is also interested in home lymphedema pumps and we can arrange to see [facility] medical at his next follow-up appointment. I did discuss vascular risk factors as they pertain to the progression of vascular disease including controlling his hypertension and hyperlipidemia.  His blood pressure is stable on his current medications.  He should continue on his Crestor 10 mg daily in addition to his other medications.   This was all discussed in full with complete understanding.  Thank you for allowing me to participate in the care of your patient.  Please do not hesitate to call with any questions or concerns.  We will keep you aware of any further encounters with Van Mckeon.        Sincerely yours,         JAE Spencer David R, MD

## 2024-08-26 NOTE — PROGRESS NOTES
"8/26/2024       Jean Marie Cabrera MD  [address]  [facility] [address]    Van Mckeon  1940    Chief Complaint   Patient presents with    Follow-up     6 month follow up w/ testing. Last seen 2/23/24. Patient denies any new or worsening back or abdominal pains.        Dear Jean Marie Cabrera MD   HPI  I had the pleasure of seeing your patient Van Mckeon in the office today.  As you recall, Van Mckeon is a 84 y.o.  male who you are currently following for routine health maintenance.  We are following for an abdominal aortic aneurysm, and small right popliteal artery aneurysm.  He has previous TIA symptoms, but carotids found to be widely patent.  He did undergo endovascular abdominal aortic aneurysm repair on 7/19/2023.  Currently he is doing well and denies any abdominal or back pain.  He is maintained on aspirin, Plavix, and Crestor.  He did have noninvasive testing performed today, which I did review in office.      Review of Systems   Constitutional: Negative.    HENT: Negative.     Eyes: Negative.    Respiratory: Negative.     Cardiovascular:  Positive for leg swelling.   Gastrointestinal: Negative.    Endocrine: Negative.    Genitourinary: Negative.    Musculoskeletal: Negative.    Skin: Negative.    Allergic/Immunologic: Negative.    Neurological: Negative.    Hematological: Negative.    Psychiatric/Behavioral: Negative.     All other systems reviewed and are negative.        /62   Pulse 71   Ht 182.9 cm (72\")   Wt 73.5 kg (162 lb)   SpO2 97%   BMI 21.97 kg/m²     Physical Exam  Vitals and nursing note reviewed.   Constitutional:       Appearance: Normal appearance. He is well-developed and normal weight.   HENT:      Head: Normocephalic and atraumatic.   Eyes:      General: No scleral icterus.     Pupils: Pupils are equal, round, and reactive to light.   Neck:      Thyroid: No thyromegaly.   Cardiovascular:      Rate and Rhythm: Normal rate and regular rhythm.      " Heart sounds: Normal heart sounds.   Pulmonary:      Effort: Pulmonary effort is normal.      Breath sounds: Normal breath sounds.   Abdominal:      General: Bowel sounds are normal.      Palpations: Abdomen is soft.   Musculoskeletal:         General: Swelling present. Normal range of motion.      Cervical back: Normal range of motion and neck supple.   Skin:     General: Skin is warm and dry.   Neurological:      General: No focal deficit present.      Mental Status: He is alert and oriented to person, place, and time.   Psychiatric:         Mood and Affect: Mood normal.         Behavior: Behavior normal.         Thought Content: Thought content normal.         Judgment: Judgment normal.       Diagnostic Data:  CT Angiogram Abdomen Pelvis    Result Date: 8/23/2024  Narrative: EXAMINATION:  CT ANGIOGRAM ABDOMEN PELVIS-  8/23/2024 1:10 PM  HISTORY: Aortic aneurysm (AAA), post-op; I71.43-Infrarenal abdominal aortic aneurysm, without rupture.  TECHNIQUE: Spiral CT angiography was performed of the abdomen and pelvis without and with IV contrast. Multiplanar and 3D images were reconstructed.  DLP: 512.68 mGy.cm Automated dosage reduction technique was utilized to reduce patient dose.  COMPARISON: 2/23/2024.  LUNG BASES: Calcified left lower lobe granuloma.  LIVER AND SPLEEN: The liver demonstrates a normal early arterial phase appearance. The spleen is heterogeneous on the contrast-enhanced images consistent with the arterial phase. There is a prominent splenule measuring 4.4 cm. There are no dense gallstones.  PANCREAS: No pancreatic mass or inflammatory change.  KIDNEYS AND ADRENALS: The adrenal glands are normal. There are bilateral renal cysts. The ureters are nondilated. There is mild thickening of the bladder wall likely due to muscular hypertrophy. The prostate is enlarged measuring 5.5 x 5.7 cm.  BOWEL: There is under distention of the stomach. There is food material in the stomach. Small bowel loops are  nondilated. There is mild diverticulosis of the colon predominantly in the sigmoid region.  OTHER: There are no pathologically enlarged lymph nodes. There are degenerative changes of the spine and hips. There is an old fracture of the left femoral head with nonbony union.  VASCULAR: There is a patent aortoiliac stent graft. The native aortic lumen measures up to 4.3 cm, previously 4.4 cm. There is no endoleak identified. There is good opacification of the renal arteries through the upper portion of the stent graft. The SMA and celiac plexus are patent without significant stenosis. The CARLOS is occluded at its origin but there is collateralized flow within the CARLOS.       Impression: 1. Patent aortoiliac stent graft with no evidence of endoleak. The native aortic lumen measures 4.3 cm, previously 4.4 cm in maximal diameter. 2. Incidental prominent splenule measuring 4.4 cm, stable. 3. Bilateral renal cysts. 4. Diverticulosis of the colon. No CT findings of diverticulitis. 5. Enlarged prostate measuring 5.5 x 5.7 cm. Mild wall thickening of the bladder likely related to muscular hypertrophy. 6. Other nonacute findings, as discussed.  This report was signed and finalized on 8/23/2024 4:11 PM by Dr. Jaime Cronin MD.        Popliteal duplex shows small popliteal artery aneurysms on the right 1.15 on the left 0.91.      Patient Active Problem List   Diagnosis    Asymmetry of tonsils    Enlarged tonsils    Neoplasm of tonsil    Bronchiectasis without complication    Essential (primary) hypertension    Personal history of nicotine dependence    Temporary cerebral vascular dysfunction    Alcohol use    Alcohol dependence, uncomplicated    Benign prostatic hyperplasia    Anxiety    Claustrophobia    Thrombocytopenia    [place] sprayers' lung    Stage 2 moderate COPD by GOLD classification    Benign hypertension    Myopathy    Bradycardia    Calcification of abdominal aorta    Gastroesophageal reflux disease    Hypercalcemia     Hypertrophy of prostate    Infarction of left basal ganglia    Knee deformity, acquired, left    Left cataract    PAD (peripheral artery disease)    Pericarditis associated with severe chronic anemia    Pseudophakia    Statin myopathy    Stasis edema of both lower extremities    History of adenomatous polyp of colon    Abdominal aortic aneurysm (AAA) without rupture    TIA (transient ischemic attack)    Preop testing    Abdominal aortic aneurysm    Infrarenal abdominal aortic aneurysm (AAA) without rupture    CKD (chronic kidney disease) stage 3, GFR 30-59 ml/min    Thyroid nodule    Parathyroid adenoma    Status post partial thyroidectomy    Status post parathyroidectomy    Hyperparathyroidism    Centrilobular emphysema        Diagnosis Plan   1. Infrarenal abdominal aortic aneurysm (AAA) without rupture  US Aorta Limited      2. Essential (primary) hypertension        3. Popliteal artery aneurysm, bilateral  US Arterial Doppler Lower Extremity Complete      4. Personal history of nicotine dependence        5. Hyperlipidemia, unspecified hyperlipidemia type        6. Bilateral carotid artery stenosis  US Carotid Bilateral                Plan: After thoroughly evaluating Van Mckeon, I believe the best course of action is to remain conservative from vascular surgery standpoint.  I did review his testing which shows his aneurysm to be well excluded with no evidence of endoleak.  This is also slightly smaller than 6 months ago.  His popliteal duplex shows small popliteal artery aneurysms bilaterally.  We will see him back in 1 year with repeat noninvasive testing for continued surveillance, including an ultrasound of the aorta, carotid duplex, popliteal duplex.  We can alternate CTA's and ultrasounds of the aorta.  Unfortunately, he is a current daily smoker and is not willing to quit smoking at this time.  He is also interested in home lymphedema pumps and we can arrange to see [facility] medical at his next  follow-up appointment. I did discuss vascular risk factors as they pertain to the progression of vascular disease including controlling his hypertension and hyperlipidemia.  His blood pressure is stable on his current medications.  He should continue on his Crestor 10 mg daily in addition to his other medications.   This was all discussed in full with complete understanding.  Thank you for allowing me to participate in the care of your patient.  Please do not hesitate to call with any questions or concerns.  We will keep you aware of any further encounters with Van Mckeon.        Sincerely yours,         JAE Spencer David R, MD

## 2024-09-05 ENCOUNTER — TRANSCRIBE ORDERS (OUTPATIENT)
Dept: ADMINISTRATIVE | Facility: HOSPITAL | Age: 84
End: 2024-09-05
Payer: MEDICARE

## 2024-09-05 DIAGNOSIS — N18.32 STAGE 3B CHRONIC KIDNEY DISEASE: Primary | ICD-10-CM

## 2024-09-17 ENCOUNTER — HOSPITAL ENCOUNTER (OUTPATIENT)
Dept: ULTRASOUND IMAGING | Facility: HOSPITAL | Age: 84
Discharge: HOME | End: 2024-09-17
Admitting: NURSE PRACTITIONER
Payer: OTHER GOVERNMENT

## 2024-09-17 DIAGNOSIS — N18.32 STAGE 3B CHRONIC KIDNEY DISEASE: ICD-10-CM

## 2024-09-17 PROCEDURE — 76775 US EXAM ABDO BACK WALL LIM: CPT

## 2024-10-01 ENCOUNTER — TELEPHONE (OUTPATIENT)
Dept: CARDIOLOGY | Facility: CLINIC | Age: 84
End: 2024-10-01
Payer: MEDICARE

## 2024-10-01 NOTE — TELEPHONE ENCOUNTER
I called pts daughter to let her know we are [facility].  I had to send ov notes dated 10/20/23 because pt had not been seen since then and was seen in the Heart Failure Clinic by Shaji.  He no showed his December fu.  I told pts daughter [facility] we would need to get him in to be seen.  I put some samples up front for just in case they do not fill the medication before he runs out.  He had a week worth left.  Since he no showed in December and his yearly is coming up we will need to get him in to be seen.  Do you want to see him in the heart failure clinic or in the office?  Gigi Edge, CMA

## 2024-10-01 NOTE — TELEPHONE ENCOUNTER
He will need to get a [facility] approval.  I tried to send the [facility] Jardiance to [facility].  They denied it because pt needed a fu and a [facility] approval.  I will let his daughter know so she can come get samples.  He only has a week left.  Once he has been seen we can resend the rx with the updated approval and ov notes.  Gigi Edge, CMA

## 2024-10-10 NOTE — TELEPHONE ENCOUNTER
Pts daughter called asking for a Rx for the Jardiance to be sent to the local pharmacy because he will be out before his appt on 10/24 at the [facility].  [facility] the [facility] is seen by us.  She just wants a 2 week supply.  Gigi Edge, CMA

## 2024-10-24 ENCOUNTER — HOSPITAL ENCOUNTER (OUTPATIENT)
Dept: CARDIOLOGY | Facility: HOSPITAL | Age: 84
Discharge: HOME | End: 2024-10-24
Admitting: HOSPITALIST
Payer: MEDICARE

## 2024-10-24 VITALS
BODY MASS INDEX: 22.6 KG/M2 | SYSTOLIC BLOOD PRESSURE: 144 MMHG | DIASTOLIC BLOOD PRESSURE: 62 MMHG | WEIGHT: 166.6 LBS | HEART RATE: 58 BPM | OXYGEN SATURATION: 94 %

## 2024-10-24 DIAGNOSIS — I50.32 CHRONIC HEART FAILURE WITH PRESERVED EJECTION FRACTION (HFPEF): Primary | ICD-10-CM

## 2024-10-24 DIAGNOSIS — N18.31 STAGE 3A CHRONIC KIDNEY DISEASE: ICD-10-CM

## 2024-10-24 DIAGNOSIS — I10 ESSENTIAL (PRIMARY) HYPERTENSION: ICD-10-CM

## 2024-10-24 DIAGNOSIS — I73.9 PAD (PERIPHERAL ARTERY DISEASE): ICD-10-CM

## 2024-10-24 LAB
ABSOLUTE LUNG FLUID CONTENT: 25 % (ref 20–35)
ALBUMIN SERPL-MCNC: 3.8 G/DL (ref 3.5–5.2)
ALBUMIN/GLOB SERPL: 1.5 G/DL
ALP SERPL-CCNC: 57 U/L (ref 39–117)
ALT SERPL W P-5'-P-CCNC: 10 U/L (ref 1–41)
ANION GAP SERPL CALCULATED.3IONS-SCNC: 9 MMOL/L (ref 5–15)
AST SERPL-CCNC: 15 U/L (ref 1–40)
BILIRUB SERPL-MCNC: 0.4 MG/DL (ref 0–1.2)
BUN SERPL-MCNC: 25 MG/DL (ref 8–23)
BUN/CREAT SERPL: 19.5 (ref 7–25)
CALCIUM SPEC-SCNC: 9.1 MG/DL (ref 8.6–10.5)
CHLORIDE SERPL-SCNC: 103 MMOL/L (ref 98–107)
CO2 SERPL-SCNC: 25 MMOL/L (ref 22–29)
CREAT SERPL-MCNC: 1.28 MG/DL (ref 0.76–1.27)
DEPRECATED RDW RBC AUTO: 49.7 FL (ref 37–54)
EGFRCR SERPLBLD CKD-EPI 2021: 55.2 ML/MIN/1.73
ERYTHROCYTE [DISTWIDTH] IN BLOOD BY AUTOMATED COUNT: 14.1 % (ref 12.3–15.4)
GLOBULIN UR ELPH-MCNC: 2.6 GM/DL
GLUCOSE SERPL-MCNC: 95 MG/DL (ref 65–99)
HCT VFR BLD AUTO: 37.7 % (ref 37.5–51)
HGB BLD-MCNC: 11.8 G/DL (ref 13–17.7)
MCH RBC QN AUTO: 29.9 PG (ref 26.6–33)
MCHC RBC AUTO-ENTMCNC: 31.3 G/DL (ref 31.5–35.7)
MCV RBC AUTO: 95.4 FL (ref 79–97)
NT-PROBNP SERPL-MCNC: 208.1 PG/ML (ref 0–1800)
PLATELET # BLD AUTO: 110 10*3/MM3 (ref 140–450)
PMV BLD AUTO: 11.5 FL (ref 6–12)
POTASSIUM SERPL-SCNC: 4.1 MMOL/L (ref 3.5–5.2)
PROT SERPL-MCNC: 6.4 G/DL (ref 6–8.5)
QT INTERVAL: 402 MS
QTC INTERVAL: 418 MS
RBC # BLD AUTO: 3.95 10*6/MM3 (ref 4.14–5.8)
SODIUM SERPL-SCNC: 137 MMOL/L (ref 136–145)
WBC NRBC COR # BLD AUTO: 5.55 10*3/MM3 (ref 3.4–10.8)

## 2024-10-24 PROCEDURE — 94726 PLETHYSMOGRAPHY LUNG VOLUMES: CPT | Performed by: HOSPITALIST

## 2024-10-24 PROCEDURE — 80053 COMPREHEN METABOLIC PANEL: CPT | Performed by: HOSPITALIST

## 2024-10-24 PROCEDURE — 93005 ELECTROCARDIOGRAM TRACING: CPT | Performed by: HOSPITALIST

## 2024-10-24 PROCEDURE — 85027 COMPLETE CBC AUTOMATED: CPT | Performed by: HOSPITALIST

## 2024-10-24 PROCEDURE — 83880 ASSAY OF NATRIURETIC PEPTIDE: CPT | Performed by: HOSPITALIST

## 2024-10-24 RX ORDER — ASPIRIN 81 MG/1
81 TABLET ORAL DAILY
Qty: 90 TABLET | Refills: 3 | Status: SHIPPED | OUTPATIENT
Start: 2024-10-24

## 2024-10-24 RX ORDER — ROSUVASTATIN CALCIUM 10 MG/1
10 TABLET, COATED ORAL NIGHTLY
Qty: 90 TABLET | Refills: 3 | Status: SHIPPED | OUTPATIENT
Start: 2024-10-24

## 2024-10-24 NOTE — ADDENDUM NOTE
Encounter addended by: Jean Marie Díaz MD on: 10/24/2024 10:19 AM   Actions taken: Charge Capture section accepted

## 2024-10-24 NOTE — PROGRESS NOTES
Heart Failure Clinic    Date:  10/24/24     Vitals:    10/24/24 0828   BP: 144/62   Pulse: 58   SpO2: 94%        Indication:  Heart Failure     Procedure:  ReDS device sensor unit applied to right side of chest and right side of back.  Appropriate positioning confirmed based off of the unit's calculation.  Chest measurement obtained with the chest size ruler.  Measurement session performed over 45 seconds.      Method of arrival:  Ambulatory    Weighing self daily:  No    Taking medications as prescribed:  Yes    Edema:  Yes and 1+ pitting edema to left lower leg    Shortness of Air:  Yes, with minimal exertion    Number of pillows used at night:  <2, patient sleeps on one pillow    Results:  ReDS Value=   25                       Interpretation:  25-35% - normal/ideal lung fluid content    Mallory L Haase, RN 10/24/24 08:28 CDT

## 2024-10-24 NOTE — PROGRESS NOTES
Reason For Visit:  CHF    Subjective        Van Mckeon is a 84 y.o. male with the below pertinent PMH who presents for follow-up of HF.    Van Mckeon was most recently seen in CHF clinic 10/20/2023 at which time he appeared euvolemic and had stable weight.  About low BP, and lisinopril was discontinued.    Today, the patient reports that he has done quite well in the past year for the most part.  He does continue to have some dyspnea on exertion that he thinks has been a little worse during the summer in the context of some increased wheezing and lung issues.  He denies any exertional chest discomfort.  He also denies palpitations, lightheadedness, orthopnea, or peripheral edema.  He has not required any as needed Lasix.  He does mention that he stopped his aspirin and Plavix almost a year ago due to bleeding related to some prostate issues and has not resumed this (although notable that his vascular notes indicate that he is maintained on aspirin and Plavix).    ROS: Pertinent findings are included above.    Cardiac Studies  Echo 9/4/2019: Normal LV function, LVH with sigmoid septum, G1DD, normal RV function, no significant valvular dysfunction.  Echo 6/23/2023: LVEF 61-65%, normal diastolic function, RV borderline dilated with normal systolic function, RA borderline dilated, RVSP 42, no hemodynamically significant valve disease.    Pertinent PMH  HFpEF  Pulmonary hypertension  History of TIA   Former tobacco use  Hypertension  COPD  AAA s/p repair  Bilateral popliteal artery aneurysms  GERD  Parathyroid cancer s/p parathyroidectomy    Pertinent past medical, surgical, family, and social history were reviewed.      Current Outpatient Medications:     albuterol sulfate  (90 Base) MCG/ACT inhaler, Inhale 2 puffs 4 (Four) Times a Day., Disp: 54 g, Rfl: 3    Budeson-Glycopyrrol-Formoterol (Breztri Aerosphere) 160-9-4.8 MCG/ACT aerosol inhaler, Inhale 2 puffs 2 (Two) Times a Day., Disp: , Rfl:      "calcium carbonate (OS-JACKSON) 600 MG tablet, Take 1 tablet by mouth Daily., Disp: , Rfl:     empagliflozin (Jardiance) 10 MG tablet tablet, Take 1 tablet by mouth Daily., Disp: 15 tablet, Rfl: 0    finasteride (PROSCAR) 5 MG tablet, Take 1 tablet by mouth Daily., Disp: , Rfl:     folic acid (FOLVITE) 1 MG tablet, Take 1 tablet by mouth Daily., Disp: 30 tablet, Rfl: 2    furosemide (LASIX) 40 MG tablet, Take 1 tablet by mouth Daily As Needed (Weight gain or swelling)., Disp: , Rfl:     guaifenesin (ROBITUSSIN) 100 MG/5ML liquid, , Disp: , Rfl:     levothyroxine (SYNTHROID, LEVOTHROID) 50 MCG tablet, Take 1 tablet by mouth Daily., Disp: , Rfl:     rosuvastatin (CRESTOR) 10 MG tablet, Take 0.5 tablets by mouth Every Night., Disp: , Rfl:     sertraline (ZOLOFT) 50 MG tablet, Take 1 tablet by mouth Daily., Disp: , Rfl:     spironolactone (ALDACTONE) 25 MG tablet, Take 1 tablet by mouth Daily., Disp: 90 tablet, Rfl: 3    tamsulosin (FLOMAX) 0.4 MG capsule 24 hr capsule, Take 1 capsule by mouth 2 (Two) Times a Day. (Patient taking differently: Take 1 capsule by mouth Daily.), Disp: 90 capsule, Rfl: 3     Objective   Vital Signs:  /62 (BP Location: Left arm, Patient Position: Sitting)   Pulse 58   Wt 75.6 kg (166 lb 9.6 oz)   SpO2 94%   BMI 22.60 kg/m²   Estimated body mass index is 22.6 kg/m² as calculated from the following:    Height as of 8/26/24: 182.9 cm (72\").    Weight as of this encounter: 75.6 kg (166 lb 9.6 oz).      Constitutional:       Appearance: Healthy appearance. Not in distress.   Neck:      Vascular: JVD normal.   Pulmonary:      Effort: Pulmonary effort is normal.      Breath sounds: Normal breath sounds.   Cardiovascular:      Normal rate. Regular rhythm.      Murmurs: There is no murmur.      No gallop.  No click. No rub.   Edema:     Peripheral edema absent.   Abdominal:      General: There is no distension.      Palpations: Abdomen is soft.      Tenderness: There is no abdominal tenderness. "   Skin:     General: Skin is warm and dry.   Neurological:      Mental Status: Alert and oriented to person, place and time.        Result Review :  The following data was reviewed by: Jean Marie Díaz MD on 10/24/2024:  CMP   CMP          2/23/2024    09:12 8/23/2024    14:09 10/24/2024    08:16   CMP   Glucose   95    BUN   25    Creatinine 1.50  1.70  1.28    EGFR   55.2    Sodium   137    Potassium   4.1    Chloride   103    Calcium   9.1    Total Protein   6.4    Albumin   3.8    Globulin   2.6    Total Bilirubin   0.4    Alkaline Phosphatase   57    AST (SGOT)   15    ALT (SGPT)   10    Albumin/Globulin Ratio   1.5    BUN/Creatinine Ratio   19.5    Anion Gap   9.0      CBC   CBC          11/21/2023    19:15 12/6/2023    10:33 10/24/2024    08:16   CBC   WBC 8.33  6.94  5.55    RBC 3.13  2.71  3.95    Hemoglobin 9.4  8.0  11.8    Hematocrit 31.0  25.2  37.7    MCV 99.0  93.0  95.4    MCH 30.0  29.5  29.9    MCHC 30.3  31.7  31.3    RDW 16.5  14.8  14.1    Platelets 176  208  110      Pro-BNP       Lab 10/24/24  0816   PROBNP 208.1                 Assessment and Plan   Diagnoses and all orders for this visit:    1. Chronic heart failure with preserved ejection fraction (HFpEF) (Primary)  -     Comprehensive Metabolic Panel; Future  -     CBC (No Diff); Future  -     BNP; Future  -     ReDs Vest  -     Comprehensive Metabolic Panel; Standing  -     Comprehensive Metabolic Panel  -     CBC (No Diff); Standing  -     CBC (No Diff)  -     BNP; Standing  -     BNP    2. Stage 3a chronic kidney disease    3. Essential (primary) hypertension    4. PAD (peripheral artery disease)    Other orders  -     ECG 12 Lead Bradycardia; Standing  -     ECG 12 Lead Bradycardia  -     rosuvastatin (CRESTOR) 10 MG tablet; Take 1 tablet by mouth Every Night.  Dispense: 90 tablet; Refill: 3  -     empagliflozin (Jardiance) 10 MG tablet tablet; Take 1 tablet by mouth Daily.  Dispense: 90 tablet; Refill: 3  -     aspirin 81 MG EC tablet;  Take 1 tablet by mouth Daily.  Dispense: 90 tablet; Refill: 3      - Appears euvolemic.  BP a little elevated; plan to continue current meds and monitor BP for now.  Hemoglobin now improved and he has not had any recent bleeding issues; I do think that he would benefit from resuming antiplatelet therapy and increasing his Crestor back to 10 mg daily.  I am going to reach out to vascular surgery to make them aware of the antiplatelet adjustments.  - Start aspirin 81 mg daily  - Increase Crestor to 10 mg daily  - Continue spironolactone 25 mg daily  - Continue Jardiance 10 mg daily  - Continue Lasix PRN although he has not required this in quite some time.  - We discussed the possibility of a stress echo should he continue to have worsening dyspnea on exertion as he certainly has risk factors for CAD, but he favored holding off on this for now as he feels his breathing issues are more related to COPD.    Follow Up   Return in about 12 weeks (around 1/16/2025).  Patient was given instructions and counseling regarding his condition or for health maintenance advice. Please see specific information pulled into the AVS if appropriate.       EMR Dragon/Transcription disclaimer: Much of this encounter note is an electronic transcription/translation of spoken language to printed text. The electronic translation of spoken language may permit erroneous, or at times, nonsensical words or phrases to be inadvertently transcribed; although I have reviewed the note for such errors, some may still exist.

## 2024-11-05 NOTE — PROGRESS NOTES
Chief Complaint  Bronchiectasis without complication    Subjective    History of Present Illness {CC  Problem List  Visit Diagnosis   Encounters  Notes  Medications  Labs  Result Review Imaging  Media: 23}    Van Mckeon presents to [facility] for:    History of Present Illness  Management of his bronchiectasis, emphysema and COPD.  Most recent FEV1 in 2019 was 60.  He is here today for repeat.  He is a long time smoker.  He is not interested in establishing a cessation plan.       He frequently has shortness of breath and productive coughing.  He controls his symptoms with Breztri.  Prior to this he was on Stiolto.  Since starting Breztri he has not been requiring his albuterol as much.  He does not take Mucinex.  He does not have a nebulizer.       He has been prescribed 2 L of oxygen.  He is compliant with this and benefiting from it.  He would like to see if he qualifies to have portable oxygen.  He would like a portable concentrator.     He has suffered a stroke in the past with minimal residual memory issues.          He was hospitalized for elevated calcium.  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  He is being followed by ENT, Dr. Cardenas.  This was removed.  He is now on calcium replacement orally.  This is being monitored closely.    He has chronic heart failure with preserved ejection fraction.  He is on Lasix and Jardiance.  He is doing well with this.           Prior to Admission medications    Medication Sig Start Date End Date Taking? Authorizing Provider   albuterol sulfate  (90 Base) MCG/ACT inhaler Inhale 2 puffs 4 (Four) Times a Day. 7/18/24   Josephine Max APRN   Budeson-Glycopyrrol-Formoterol (Breztri Aerosphere) 160-9-4.8 MCG/ACT aerosol inhaler Inhale 2 puffs 2 (Two) Times a Day.    Provider, MD Dylon   calcium carbonate (OS-JACKSON) 600 MG tablet Take 1 tablet by mouth Daily.     "Dylon Bobo MD   empagliflozin (Jardiance) 10 MG tablet tablet Take 1 tablet by mouth Daily. 10/10/24   Jean Marie Díaz MD   finasteride (PROSCAR) 5 MG tablet Take 1 tablet by mouth Daily. 2/2/17   Dylon Bobo MD   folic acid (FOLVITE) 1 MG tablet Take 1 tablet by mouth Daily. 9/6/19   Clara Ambrosio APRN   furosemide (LASIX) 40 MG tablet Take 1 tablet by mouth Daily As Needed (Weight gain or swelling).    Dylon Bobo MD   guaifenesin (ROBITUSSIN) 100 MG/5ML liquid  11/27/23   Dylon Bobo MD   levothyroxine (SYNTHROID, LEVOTHROID) 50 MCG tablet Take 1 tablet by mouth Daily. 2/20/24   Dylon Bobo MD   rosuvastatin (CRESTOR) 10 MG tablet Take 0.5 tablets by mouth Every Night.    Dylon Boob MD   sertraline (ZOLOFT) 50 MG tablet Take 1 tablet by mouth Daily.    Dylon Bobo MD   spironolactone (ALDACTONE) 25 MG tablet Take 1 tablet by mouth Daily. 2/1/24   Jean Marie Díaz MD   tamsulosin (FLOMAX) 0.4 MG capsule 24 hr capsule Take 1 capsule by mouth 2 (Two) Times a Day.  Patient taking differently: Take 1 capsule by mouth Daily. 11/9/23   Yenifer Edwards APRN       Social History     Socioeconomic History    Marital status:    Tobacco Use    Smoking status: Every Day     Current packs/day: 0.50     Average packs/day: 0.5 packs/day for 70.0 years (35.0 ttl pk-yrs)     Types: Cigarettes     Passive exposure: Current    Smokeless tobacco: Former     Types: Snuff     Quit date: 1970    Tobacco comments:     7 weeks   Vaping Use    Vaping status: Never Used   Substance and Sexual Activity    Alcohol use: Yes     Alcohol/week: 2.0 standard drinks of alcohol     Types: 2 Shots of liquor per week     Comment: daily - 2 mixed drinks    Drug use: No    Sexual activity: Defer       Objective   Vital Signs:   /74   Pulse 78   Ht 180.3 cm (71\")   Wt 76.2 kg (168 lb)   SpO2 94% Comment: RA  BMI 23.43 kg/m²     Physical " Exam  Cardiovascular:      Rate and Rhythm: Normal rate and regular rhythm.      Heart sounds: No murmur heard.  Pulmonary:      Effort: Pulmonary effort is normal. No tachypnea, accessory muscle usage, prolonged expiration or respiratory distress.      Breath sounds: Wheezing and rhonchi present.   Musculoskeletal:      Right lower leg: No edema.      Left lower leg: No edema.   Neurological:      Mental Status: He is alert and oriented to person, place, and time.        Result Review :    PFT Values          11/14/2024    08:30   Pre Drug PFT Results   FVC 70   FEV1 33   FEF 25-75% 17   FEV1/FVC 35     Results for orders placed in visit on 11/14/24    Spirometry    Narrative  Spirometry    Performed by: Licha Parker, MAGUE  Authorized by: Josephine Max APRN  Pre Drug % Predicted  FVC: 70%  FEV1: 33%  FEF 25-75%: 17%  FEV1/FVC: 35%    Interpretation  Spirometry  Spirometry shows severe obstruction. There is reduced midflow suggesting small airway/airflow obstruction.  Review of FVL curve  There is a flattening of the inspiratory and expiratory curve, suggesting fixed airflow obstruction.      Results for orders placed in visit on 10/14/19    Pulmonary Function Test    Narrative  Pulmonary Function Test  Performed by: Josephine Max APRN  Authorized by: Josephine Max APRN    Pre Drug  FVC: 80%  FEV1: 60%  FEF 25-75%: 32%  FEV1/FVC: 58%  DLCO: 60%  D/VAsb: 64%    Rest/Exercise Pulse Ox Values          5/20/2024    10:15 11/14/2024    08:30   Rest/Exercise Pulse Ox Results   Rest room air SAT % 93 94   Exercise room air SAT % 88 91   Rest on O2 @ Liters 2    Rest on O2 SAT % 97    Exercise on O2 @ Liters 2    Exercise on O2 SAT % 96      My interpretation of imaging:  none    My interpretation of labs: none      Assessment and Plan {CC Problem List  Visit Diagnosis  ROS  Review (Popup)  Health Maintenance  Quality  BestPractice  Medications  SmartSets  SnapShot Encounters  Media :  23}    Diagnoses and all orders for this visit:    1. Bronchiectasis without complication (Primary)  Comments:  Monitor for exacerbations.  May need to consider home chest physiotherapy.  Orders:  -     Spirometry  -     Walking Oximetry; Future  -     guaiFENesin (Mucinex) 600 MG 12 hr tablet; Take 2 tablets by mouth 2 (Two) Times a Day for 90 days.  Dispense: 180 tablet; Refill: 3  -     Walking Oximetry    2. Centrilobular emphysema  Comments:  Contributes to shortness of breath.  Alpha testing not warranted at his age.    3. Stage 2 moderate COPD by GOLD classification  Comments:  PFTs worse since 2019.  Continue Breztri.  Start Mucinex twice daily.  Will consider nebulizer if his condition worsens  Orders:  -     Walking Oximetry; Future  -     methylPREDNISolone (MEDROL) 4 MG dose pack; Take as directed on package instructions.  Dispense: 21 tablet; Refill: 0  -     guaiFENesin (Mucinex) 600 MG 12 hr tablet; Take 2 tablets by mouth 2 (Two) Times a Day for 90 days.  Dispense: 180 tablet; Refill: 3  -     Walking Oximetry    4. Personal history of nicotine dependence  Comments:  Recommend cessation.  Not ready to establish a plan.  Does not qualify for low-dose lung cancer screening    5. Status post parathyroidectomy  Comments:  Calcium management ongoing, defer to ENT    6. Chronic heart failure with preserved ejection fraction (HFpEF)  Comments:  Likely contributes some to his shortness of breath.  Appears euvolemic.  Doing well on Jardiance and Lasix.  Continue to monitor fluid status.          Body mass index is 23.43 kg/m².    Josephine Max, APRN  11/14/2024  09:30 CST    Follow Up   Return in about 6 months (around 5/14/2025).    Patient was given instructions and counseling regarding his condition or for health maintenance advice. Please see specific information pulled into the AVS if appropriate.

## 2024-11-06 ENCOUNTER — TELEPHONE (OUTPATIENT)
Dept: CARDIOLOGY | Facility: HOSPITAL | Age: 84
End: 2024-11-06

## 2024-11-06 NOTE — TELEPHONE ENCOUNTER
Left message on both contact numbers to schedule pt earlier than January per referral. 11/6 11:10. MINH SOLIS  Pt Called back regarding appointment for pt assistance renewal. Pt was unaware he needed to come back in to renew pt assistance. Pt to call back to schedule upon finding out availability. 11/6 1:12 MIHN SOLIS

## 2024-11-14 ENCOUNTER — OFFICE VISIT (OUTPATIENT)
Dept: PULMONOLOGY | Facility: CLINIC | Age: 84
End: 2024-11-14
Payer: OTHER GOVERNMENT

## 2024-11-14 VITALS
DIASTOLIC BLOOD PRESSURE: 74 MMHG | HEART RATE: 78 BPM | OXYGEN SATURATION: 94 % | BODY MASS INDEX: 23.52 KG/M2 | SYSTOLIC BLOOD PRESSURE: 122 MMHG | WEIGHT: 168 LBS | HEIGHT: 71 IN

## 2024-11-14 DIAGNOSIS — J43.2 CENTRILOBULAR EMPHYSEMA: Chronic | ICD-10-CM

## 2024-11-14 DIAGNOSIS — Z90.89 STATUS POST PARATHYROIDECTOMY: Chronic | ICD-10-CM

## 2024-11-14 DIAGNOSIS — Z98.890 STATUS POST PARATHYROIDECTOMY: Chronic | ICD-10-CM

## 2024-11-14 DIAGNOSIS — J47.9 BRONCHIECTASIS WITHOUT COMPLICATION: Primary | Chronic | ICD-10-CM

## 2024-11-14 DIAGNOSIS — J44.9 STAGE 2 MODERATE COPD BY GOLD CLASSIFICATION: Chronic | ICD-10-CM

## 2024-11-14 DIAGNOSIS — I50.32 CHRONIC HEART FAILURE WITH PRESERVED EJECTION FRACTION (HFPEF): Chronic | ICD-10-CM

## 2024-11-14 DIAGNOSIS — Z87.891 PERSONAL HISTORY OF NICOTINE DEPENDENCE: Chronic | ICD-10-CM

## 2024-11-14 RX ORDER — GUAIFENESIN 600 MG/1
1200 TABLET, EXTENDED RELEASE ORAL 2 TIMES DAILY
Qty: 180 TABLET | Refills: 3 | Status: SHIPPED | OUTPATIENT
Start: 2024-11-14 | End: 2025-02-12

## 2024-11-14 RX ORDER — METHYLPREDNISOLONE 4 MG/1
TABLET ORAL
Qty: 21 TABLET | Refills: 0 | Status: SHIPPED | OUTPATIENT
Start: 2024-11-14

## 2024-11-14 NOTE — PROCEDURES
Walking Oximetry    Performed by: Licha Cruz CMA  Authorized by: Josephine Max APRN    Rest room air SAT %:  94  Exercise room air SAT %:  91

## 2024-11-14 NOTE — PROCEDURES
Spirometry    Performed by: iLcha Parker, RRT  Authorized by: Josephine Max APRN     Pre Drug % Predicted    FVC: 70%   FEV1: 33%   FEF 25-75%: 17%   FEV1/FVC: 35%    Interpretation   Spirometry   Spirometry shows severe obstruction. There is reduced midflow suggesting small airway/airflow obstruction.   Review of FVL curve   There is a flattening of the inspiratory and expiratory curve, suggesting fixed airflow obstruction.

## 2024-11-15 ENCOUNTER — TELEPHONE (OUTPATIENT)
Dept: PULMONOLOGY | Facility: CLINIC | Age: 84
End: 2024-11-15
Payer: MEDICARE

## 2024-11-15 NOTE — TELEPHONE ENCOUNTER
Left message for patient to call back regarding script for mucinex.   They are needing to call [phone] [facility].

## 2025-01-14 DIAGNOSIS — J44.9 STAGE 2 MODERATE COPD BY GOLD CLASSIFICATION: Primary | ICD-10-CM

## 2025-01-14 RX ORDER — BUDESONIDE, GLYCOPYRROLATE, AND FORMOTEROL FUMARATE 160; 9; 4.8 UG/1; UG/1; UG/1
2 AEROSOL, METERED RESPIRATORY (INHALATION) 2 TIMES DAILY
Qty: 32.1 G | Refills: 3 | Status: SHIPPED | OUTPATIENT
Start: 2025-01-14

## 2025-01-14 NOTE — TELEPHONE ENCOUNTER
Patient called requesting that a [facility].    Rx Refill Note  Requested Prescriptions     Pending Prescriptions Disp Refills    Budeson-Glycopyrrol-Formoterol (Breztri Aerosphere) 160-9-4.8 MCG/ACT aerosol inhaler 32.1 g 3     Sig: Inhale 2 puffs 2 (Two) Times a Day.      Last office visit with prescribing clinician: 11/14/2024   Last telemedicine visit with prescribing clinician: Visit date not found   Next office visit with prescribing clinician: 5/15/2025                         Would you like a call back once the refill request has been completed: [] Yes [] No    If the office needs to give you a call back, can they leave a voicemail: [] Yes [] No    Claudia Chadwick MA  01/14/25, 13:51 CST

## 2025-01-30 ENCOUNTER — HOSPITAL ENCOUNTER (OUTPATIENT)
Dept: CARDIOLOGY | Facility: HOSPITAL | Age: 85
Discharge: HOME | End: 2025-01-30
Admitting: NURSE PRACTITIONER
Payer: OTHER GOVERNMENT

## 2025-01-30 VITALS
SYSTOLIC BLOOD PRESSURE: 109 MMHG | OXYGEN SATURATION: 92 % | WEIGHT: 169.6 LBS | HEART RATE: 66 BPM | DIASTOLIC BLOOD PRESSURE: 61 MMHG | BODY MASS INDEX: 23.65 KG/M2

## 2025-01-30 DIAGNOSIS — I73.9 PAD (PERIPHERAL ARTERY DISEASE): ICD-10-CM

## 2025-01-30 DIAGNOSIS — N18.31 STAGE 3A CHRONIC KIDNEY DISEASE: ICD-10-CM

## 2025-01-30 DIAGNOSIS — I10 ESSENTIAL (PRIMARY) HYPERTENSION: ICD-10-CM

## 2025-01-30 DIAGNOSIS — I50.32 CHRONIC HEART FAILURE WITH PRESERVED EJECTION FRACTION (HFPEF): Primary | Chronic | ICD-10-CM

## 2025-01-30 LAB
ABSOLUTE LUNG FLUID CONTENT: 23 % (ref 20–35)
QT INTERVAL: 418 MS
QTC INTERVAL: 438 MS

## 2025-01-30 PROCEDURE — 93005 ELECTROCARDIOGRAM TRACING: CPT | Performed by: NURSE PRACTITIONER

## 2025-01-30 RX ORDER — MULTIVIT WITH MINERALS/LUTEIN
500 TABLET ORAL DAILY
COMMUNITY

## 2025-01-30 NOTE — PROGRESS NOTES
Heart Failure Clinic    Date:  01/30/25     Vitals:    01/30/25 1458   BP: 109/61   Pulse: 66   SpO2: 92%        Indication:  Heart Failure     Procedure:  ReDS device sensor unit applied to right side of chest and right side of back.  Appropriate positioning confirmed based off of the unit's calculation.  Chest measurement obtained with the chest size ruler.  Measurement session performed over 45 seconds.      Method of arrival:  Ambulatory    Weighing self daily:  No    Taking medications as prescribed:  Yes    Edema:  No    Shortness of Air:  Yes, with moderate exertion    Number of pillows used at night:  2    Results:  ReDS Value=    23                      Interpretation:  20-24% - low normal lung fluid content    Mallory L Haase, RN 01/30/25 15:00 CST

## 2025-01-30 NOTE — PROGRESS NOTES
Heart Failure Clinic Progress Note  Reason For Visit:  CHF    Subjective    Van Mckeon is a 84 y.o. male with the below pertinent PMH who presents for follow-up of CHF.    Van Mckeon was most recently seen in the heart failure clinic on 10/24/2024.  At that time he was doing fairly well.  He continued to have some stable dyspnea with exertion that he felt was secondary to his COPD.  He continued to deny any other associated heart failure symptoms.  He did not need to take any of his had any Lasix.  His Crestor was increased to 10 mg daily and he was restarted on aspirin 81 mg daily. A stress test was discussed but he declined at that time.    He continues to do well.  He denies any new symptoms of lightheadedness, dizziness, peripheral edema, or chest pain.  He reports that he continues to have stable dyspnea with exertion and endorses that his inhaler will improve his breathing.  He is taking all of his medications appropriately and has not had any as needed Lasix.  His weight is up 3 pounds since last being seen and his ReDs reading today is 23%.    Review of Systems   Constitutional: Negative for malaise/fatigue.   Cardiovascular:  Positive for dyspnea on exertion. Negative for chest pain and leg swelling.   Neurological:  Negative for dizziness and light-headedness.     Cardiac Studies  Echo 9/4/2019: Normal LV function, LVH with sigmoid septum, G1DD, normal RV function, no significant valvular dysfunction.  Echo 6/23/2023: LVEF 61-65%, normal diastolic function, RV borderline dilated with normal systolic function, RA borderline dilated, RVSP 42, no hemodynamically significant valve disease.     Pertinent PMH  HFpEF  Pulmonary hypertension  History of TIA   Former tobacco use  Hypertension  COPD  AAA s/p repair  Bilateral popliteal artery aneurysms  GERD  Parathyroid cancer s/p parathyroidectomy    Pertinent past medical, surgical, family, and social history were reviewed.    Current Outpatient  "Medications   Medication Instructions    albuterol sulfate  (90 Base) MCG/ACT inhaler 2 puffs, Inhalation, 4 Times Daily - RT    aspirin 81 mg, Oral, Daily    Budeson-Glycopyrrol-Formoterol (Breztri Aerosphere) 160-9-4.8 MCG/ACT aerosol inhaler 2 puffs, Inhalation, 2 Times Daily    calcium carbonate (OS-JACKSON) 600 mg, Daily    empagliflozin (JARDIANCE) 10 mg, Oral, Daily    finasteride (PROSCAR) 5 mg, Daily    folic acid (FOLVITE) 1 mg, Oral, Daily    levothyroxine (SYNTHROID, LEVOTHROID) 50 mcg, Daily    rosuvastatin (CRESTOR) 10 mg, Oral, Nightly    sertraline (ZOLOFT) 25 mg, Daily    spironolactone (ALDACTONE) 25 mg, Oral, Daily    tamsulosin (FLOMAX) 0.4 mg, Oral, 2 Times Daily    vitamin C (ASCORBIC ACID) 500 mg, Oral, Daily        Objective   Vital Signs:  /61 (BP Location: Left arm, Patient Position: Sitting)   Pulse 66   Wt 76.9 kg (169 lb 9.6 oz)   SpO2 92%   BMI 23.65 kg/m²   Estimated body mass index is 23.65 kg/m² as calculated from the following:    Height as of 11/14/24: 180.3 cm (71\").    Weight as of this encounter: 76.9 kg (169 lb 9.6 oz).    Neck:      Vascular: No JVD.   Pulmonary:      Effort: Pulmonary effort is normal.      Breath sounds: Rhonchi present.   Cardiovascular:      Normal rate. Regular rhythm. Normal S1. Normal S2.       Murmurs: There is no murmur.      No gallop.  No click. No rub.   Pulses:     Intact distal pulses.   Edema:     Peripheral edema absent.   Abdominal:      Palpations: Abdomen is soft.      Tenderness: There is no abdominal tenderness.   Skin:     General: Skin is warm and dry.   Neurological:      General: No focal deficit present.      Mental Status: Alert and oriented to person, place and time.   Psychiatric:         Behavior: Behavior is cooperative.        The following data was reviewed by myself, JAE Moser  CMP   CMP          2/23/2024    09:12 8/23/2024    14:09 10/24/2024    08:16   CMP   Glucose   95    BUN   25    Creatinine 1.50 "  1.70  1.28    EGFR   55.2    Sodium   137    Potassium   4.1    Chloride   103    Calcium   9.1    Total Protein   6.4    Albumin   3.8    Globulin   2.6    Total Bilirubin   0.4    Alkaline Phosphatase   57    AST (SGOT)   15    ALT (SGPT)   10    Albumin/Globulin Ratio   1.5    BUN/Creatinine Ratio   19.5    Anion Gap   9.0      ReDS   Lab Results   Component Value Date    ABSOLUTELUNG 23 01/30/2025    ABSOLUTELUNG 25 10/24/2024       Results for orders placed during the hospital encounter of 06/23/23    Adult Transthoracic Echo Complete w/ Color, Spectral and Contrast if necessary per protocol    Interpretation Summary    Left ventricular systolic function is normal. Left ventricular ejection fraction appears to be 61 - 65%.    Left ventricular diastolic function was normal.    The right ventricular cavity is borderline dilated with normal systolic function.    The right atrial cavity is borderline dilated.    Mild pulmonary hypertension is present with estimated RVSP 42 mmHg.    No hemodynamically significant valve disease.       Assessment   Assessment and Plan  Diagnoses and all orders for this visit:    1. Chronic heart failure with preserved ejection fraction (HFpEF) (Primary)  2. Stage 3a chronic kidney disease  3. Essential (primary) hypertension  4. PAD (peripheral artery disease)  Overall appears to be doing well.  He denies any new symptoms of decompensated heart failure and continues to have stable dyspnea with exertion.  He does appear to be euvolemic on examination.  He is doing well with his fluid status and has not needed as needed Lasix.  - Jardiance 10 mg daily  - Spironolactone 25 mg daily  - Lasix as needed.  Not requiring any recently.  - Aspirin 81 mg daily  - Crestor 10 mg daily         Follow Up:  Return in about 6 months (around 7/30/2025) for recheck.    Patient was given instructions and counseling regarding his condition or for health maintenance advice.      Axel Orosco,  APRN  01/30/25  16:29 CST

## 2025-02-05 LAB
QT INTERVAL: 418 MS
QTC INTERVAL: 438 MS

## 2025-02-13 RX ORDER — SPIRONOLACTONE 25 MG/1
25 TABLET ORAL DAILY
Qty: 90 TABLET | Refills: 3 | Status: SHIPPED | OUTPATIENT
Start: 2025-02-13

## 2025-05-06 NOTE — PROGRESS NOTES
Chief Complaint  Bronchiectasis without complication    Subjective    History of Present Illness {CC  Problem List  Visit Diagnosis   Encounters  Notes  Medications  Labs  Result Review Imaging  Media: 23}    Van Mckeon presents to [facility] for:    History of Present Illness  Management of his bronchiectasis, emphysema and COPD.  Most recent FEV1 33.  He is a half a pack per day smoker for 70 years.  He is not interested in establishing a cessation plan.  He has aged out of low-dose lung cancer screening.  Last x-ray of his chest 2023 showing emphysema.     He frequently has shortness of breath and productive coughing.  He controls his symptoms with Breztri.  Prior to this he was on Stiolto.  Since starting Breztri he has not been requiring his albuterol as much.  He does not take Mucinex.  He does not have a nebulizer.        He has been prescribed 2 L of oxygen.  He is compliant with this and benefiting from it.  He would like to see if he qualifies to have portable oxygen.  He would like a portable concentrator.  I have checked with his DME, [facility].  They do not have 1 available.  He would like to know what other DME may have 1 in the local area.     He has suffered a stroke in the past with minimal residual memory issues.         He was hospitalized for elevated calcium.  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  He is being followed by ENT, Dr. Cardenas.  This was removed.  He is now on calcium replacement orally.  This is being monitored closely.     He has chronic heart failure with preserved ejection fraction.  He is on Lasix, Aldactone and Jardiance.  He is doing well with this.         Current Outpatient Medications:     albuterol sulfate  (90 Base) MCG/ACT inhaler, Inhale 2 puffs 4 (Four) Times a Day., Disp: 54 g, Rfl: 3    aspirin 81 MG EC tablet, Take 1 tablet by mouth Daily., Disp: 90 tablet, Rfl: 3    " Budeson-Glycopyrrol-Formoterol (Breztri Aerosphere) 160-9-4.8 MCG/ACT aerosol inhaler, Inhale 2 puffs 2 (Two) Times a Day., Disp: 32.1 g, Rfl: 3    calcium carbonate (OS-JACKSON) 600 MG tablet, Take 1 tablet by mouth Daily., Disp: , Rfl:     empagliflozin (Jardiance) 10 MG tablet tablet, Take 1 tablet by mouth Daily., Disp: 90 tablet, Rfl: 3    finasteride (PROSCAR) 5 MG tablet, Take 1 tablet by mouth Daily., Disp: , Rfl:     folic acid (FOLVITE) 1 MG tablet, Take 1 tablet by mouth Daily., Disp: 30 tablet, Rfl: 2    levothyroxine (SYNTHROID, LEVOTHROID) 50 MCG tablet, Take 1 tablet by mouth Daily., Disp: , Rfl:     rosuvastatin (CRESTOR) 10 MG tablet, Take 1 tablet by mouth Every Night. (Patient taking differently: Take 0.5 tablets by mouth Every Night.), Disp: 90 tablet, Rfl: 3    sertraline (ZOLOFT) 50 MG tablet, Take 0.5 tablets by mouth Daily., Disp: , Rfl:     spironolactone (ALDACTONE) 25 MG tablet, TAKE ONE TABLET BY MOUTH ONCE A DAY, Disp: 90 tablet, Rfl: 3    tamsulosin (FLOMAX) 0.4 MG capsule 24 hr capsule, Take 1 capsule by mouth 2 (Two) Times a Day. (Patient taking differently: Take 1 capsule by mouth Daily.), Disp: 90 capsule, Rfl: 3    Social History     Socioeconomic History    Marital status:    Tobacco Use    Smoking status: Every Day     Current packs/day: 0.50     Average packs/day: 0.5 packs/day for 70.0 years (35.0 ttl pk-yrs)     Types: Cigarettes     Passive exposure: Current    Smokeless tobacco: Former     Types: Snuff     Quit date: 1970    Tobacco comments:     7 weeks   Vaping Use    Vaping status: Never Used   Substance and Sexual Activity    Alcohol use: Yes     Alcohol/week: 2.0 standard drinks of alcohol     Types: 2 Shots of liquor per week     Comment: daily - 2 mixed drinks    Drug use: No    Sexual activity: Defer       Objective   Vital Signs:   /68   Pulse 52   Ht 180.3 cm (71\")   Wt 75.8 kg (167 lb)   SpO2 93%   BMI 23.29 kg/m²     Physical Exam  Cardiovascular: "      Rate and Rhythm: Normal rate and regular rhythm.      Heart sounds: No murmur heard.  Pulmonary:      Effort: Pulmonary effort is normal.      Breath sounds: Normal breath sounds.   Musculoskeletal:      Right lower leg: No edema.      Left lower leg: No edema.   Neurological:      Mental Status: He is alert and oriented to person, place, and time.        Result Review :    PFT Values          11/14/2024    08:30   Pre Drug PFT Results   FVC 70   FEV1 33   FEF 25-75% 17   FEV1/FVC 35     My interpretation of the PFT : none    Results for orders placed in visit on 11/14/24    Spirometry    Narrative  Spirometry    Performed by: Licha Parker RRT  Authorized by: Josephine Max APRN  Pre Drug % Predicted  FVC: 70%  FEV1: 33%  FEF 25-75%: 17%  FEV1/FVC: 35%    Interpretation  Spirometry  Spirometry shows severe obstruction. There is reduced midflow suggesting small airway/airflow obstruction.  Review of FVL curve  There is a flattening of the inspiratory and expiratory curve, suggesting fixed airflow obstruction.      Results for orders placed in visit on 10/14/19    Pulmonary Function Test    Narrative  Pulmonary Function Test  Performed by: Josephine Max APRN  Authorized by: Josephine Max APRN    Pre Drug  FVC: 80%  FEV1: 60%  FEF 25-75%: 32%  FEV1/FVC: 58%  DLCO: 60%  D/VAsb: 64%    Rest/Exercise Pulse Ox Values          5/20/2024    10:15 11/14/2024    08:30 5/22/2025    08:30   Rest/Exercise Pulse Ox Results   Rest room air SAT % 93 94 92   Exercise room air SAT % 88 91 88   Rest on O2 @ Liters 2  2   Rest on O2 SAT % 97  97   Exercise on O2 @ Liters 2  2   Exercise on O2 SAT % 96  96       My interpretation of imaging:  none    My interpretation of labs: none      Assessment and Plan {CC Problem List  Visit Diagnosis  ROS  Review (Popup)  Health Maintenance  Quality  BestPractice  Medications  SmartSets  SnapShot Encounters  Media : 23}    Diagnoses and all orders for this  visit:    1. Bronchiectasis without complication (Primary)  Comments:  Continue Breztri.  Use albuterol as needed.  PFTs with follow-up.  No refills needed    2. Centrilobular emphysema  Comments:  Recommend cessation.  PFTs with follow-up.  Continue bronchodilators    3. Stage 2 moderate COPD by GOLD classification  Comments:  Continue Breztri. Use albuterol as needed. PFTs with follow-up. No refills needed    4. Personal history of nicotine dependence  Comments:  Recommend cessation.  Not ready to establish a plan.  He has aged out of low-dose lung cancer screening    5. Chronic heart failure with preserved ejection fraction (HFpEF)  Comments:  Doing well on Lasix, Aldactone and Jardiance.  Continue.  Keep follow-up with cardiology.  Can contribute to shortness of breath    6. Shortness of breath  Comments:  Qualifies for portable oxygen today. Wants to see about getting a portable concentrator.  They will reach out to local DME's.  His does not have 1 currently  Orders:  -     Walking Oximetry; Future  -     Oxygen Therapy  -     Walking Oximetry        Body mass index is 23.29 kg/m².    Josephine Max, JAE  5/22/2025  08:37 CDT    Follow Up   Return in about 6 months (around 11/22/2025) for FVL with diffusion.    Patient was given instructions and counseling regarding his condition or for health maintenance advice. Please see specific information pulled into the AVS if appropriate.

## 2025-05-22 ENCOUNTER — OFFICE VISIT (OUTPATIENT)
Dept: PULMONOLOGY | Facility: CLINIC | Age: 85
End: 2025-05-22
Payer: OTHER GOVERNMENT

## 2025-05-22 VITALS
BODY MASS INDEX: 23.38 KG/M2 | DIASTOLIC BLOOD PRESSURE: 68 MMHG | WEIGHT: 167 LBS | HEIGHT: 71 IN | SYSTOLIC BLOOD PRESSURE: 122 MMHG | OXYGEN SATURATION: 93 % | HEART RATE: 52 BPM

## 2025-05-22 DIAGNOSIS — J44.9 STAGE 2 MODERATE COPD BY GOLD CLASSIFICATION: Chronic | ICD-10-CM

## 2025-05-22 DIAGNOSIS — J43.2 CENTRILOBULAR EMPHYSEMA: Chronic | ICD-10-CM

## 2025-05-22 DIAGNOSIS — J47.9 BRONCHIECTASIS WITHOUT COMPLICATION: Primary | Chronic | ICD-10-CM

## 2025-05-22 DIAGNOSIS — I50.32 CHRONIC HEART FAILURE WITH PRESERVED EJECTION FRACTION (HFPEF): Chronic | ICD-10-CM

## 2025-05-22 DIAGNOSIS — R06.02 SHORTNESS OF BREATH: ICD-10-CM

## 2025-05-22 DIAGNOSIS — Z87.891 PERSONAL HISTORY OF NICOTINE DEPENDENCE: Chronic | ICD-10-CM

## 2025-05-22 NOTE — PROCEDURES
Walking Oximetry    Performed by: Claudia Chadwick MA  Authorized by: Josephine Max APRN    Rest room air SAT %:  92  Exercise room air SAT %:  88  Rest on O2 @ Liters:  2  SAT %:  97  Exercise on O2 @ Liters:  2  SAT %:  96

## 2025-07-17 ENCOUNTER — HOSPITAL ENCOUNTER (OUTPATIENT)
Dept: CARDIOLOGY | Facility: HOSPITAL | Age: 85
Discharge: HOME | End: 2025-07-17
Admitting: NURSE PRACTITIONER
Payer: OTHER GOVERNMENT

## 2025-07-17 VITALS
HEART RATE: 57 BPM | WEIGHT: 169 LBS | DIASTOLIC BLOOD PRESSURE: 75 MMHG | OXYGEN SATURATION: 96 % | BODY MASS INDEX: 23.57 KG/M2 | SYSTOLIC BLOOD PRESSURE: 124 MMHG

## 2025-07-17 DIAGNOSIS — I10 ESSENTIAL (PRIMARY) HYPERTENSION: ICD-10-CM

## 2025-07-17 DIAGNOSIS — N18.31 STAGE 3A CHRONIC KIDNEY DISEASE: ICD-10-CM

## 2025-07-17 DIAGNOSIS — I50.32 CHRONIC HEART FAILURE WITH PRESERVED EJECTION FRACTION (HFPEF): Primary | Chronic | ICD-10-CM

## 2025-07-17 LAB
ANION GAP SERPL CALCULATED.3IONS-SCNC: 11 MMOL/L (ref 5–15)
BUN SERPL-MCNC: 22 MG/DL (ref 8–23)
BUN/CREAT SERPL: 14.8 (ref 7–25)
CALCIUM SPEC-SCNC: 8.9 MG/DL (ref 8.6–10.5)
CHLORIDE SERPL-SCNC: 104 MMOL/L (ref 98–107)
CO2 SERPL-SCNC: 25 MMOL/L (ref 22–29)
CREAT SERPL-MCNC: 1.49 MG/DL (ref 0.76–1.27)
EGFRCR SERPLBLD CKD-EPI 2021: 46 ML/MIN/1.73
GLUCOSE SERPL-MCNC: 85 MG/DL (ref 65–99)
POTASSIUM SERPL-SCNC: 4.8 MMOL/L (ref 3.5–5.2)
SODIUM SERPL-SCNC: 140 MMOL/L (ref 136–145)

## 2025-07-17 PROCEDURE — 80048 BASIC METABOLIC PNL TOTAL CA: CPT | Performed by: NURSE PRACTITIONER

## 2025-07-17 NOTE — PROGRESS NOTES
Heart Failure Clinic Progress Note  Reason For Visit:  CHF    Subjective    Van Mckeon is a 84 y.o. male with the below pertinent PMH who presents for follow-up of CHF.    Van Mckeon was most recently seen in clinic on 1/30/2025.  At that time he was doing well with no new concerns.  He had not needed any of his Lasix dosing.  No new changes were made to his therapy at that time.    He reports that he is doing well.  He denies any shortness of breath, lightheadedness, dizziness, chest pain, or lower extremity edema.  He has no new concerns or complaints today and feels that he overall continues to do well and is stable.  He has not needed any dosing of his Lasix in quite some time.  His weight today is stable at 169 and his ReDs reading is 23%    Review of Systems   Constitutional: Negative for malaise/fatigue.   Cardiovascular:  Negative for chest pain, dyspnea on exertion and leg swelling.   Neurological:  Negative for dizziness and light-headedness.     Past Medical History:   Diagnosis Date    AAA (abdominal aortic aneurysm) 07/2023    Alcohol use     Anxiety     Arthritis     BPH (benign prostatic hyperplasia)     Bronchiectasis without complication 04/09/2019    Bronchitis     CKD (chronic kidney disease)     COPD (chronic obstructive pulmonary disease)     COPD, group B, by GOLD 2017 classification 04/09/2019    Essential hypertension 04/09/2019    Personal history of nicotine dependence 04/09/2019    Snoring     Thrombocytopenia     Thyroid nodule 10/2023    TIA (transient ischemic attack)     Tobacco user 10/03/2019     Pertinent past medical, surgical, family, and social history were reviewed.    Current Outpatient Medications   Medication Instructions    albuterol sulfate  (90 Base) MCG/ACT inhaler 2 puffs, Inhalation, 4 Times Daily - RT    aspirin 81 mg, Oral, Daily    Budeson-Glycopyrrol-Formoterol (Breztri Aerosphere) 160-9-4.8 MCG/ACT aerosol inhaler 2 puffs, Inhalation, 2  "Times Daily    calcium carbonate (OS-JACKSON) 600 mg, Daily    empagliflozin (JARDIANCE) 10 mg, Oral, Daily    finasteride (PROSCAR) 5 mg, Daily    folic acid (FOLVITE) 1 mg, Oral, Daily    levothyroxine (SYNTHROID, LEVOTHROID) 50 mcg, Daily    rosuvastatin (CRESTOR) 10 mg, Oral, Nightly    sertraline (ZOLOFT) 25 mg, Daily    spironolactone (ALDACTONE) 25 mg, Oral, Daily    tamsulosin (FLOMAX) 0.4 mg, Oral, 2 Times Daily        Objective   Vital Signs:  /75 (BP Location: Left arm, Patient Position: Sitting)   Pulse 57   Wt 76.7 kg (169 lb)   SpO2 96%   BMI 23.57 kg/m²   Estimated body mass index is 23.57 kg/m² as calculated from the following:    Height as of 5/22/25: 180.3 cm (71\").    Weight as of this encounter: 76.7 kg (169 lb).    Neck:      Vascular: JVD normal.   Pulmonary:      Effort: Pulmonary effort is normal.      Breath sounds: Normal breath sounds.   Cardiovascular:      Normal rate. Regular rhythm. Normal S1. Normal S2.       Murmurs: There is no murmur.      No gallop.  No click. No rub.   Pulses:     Intact distal pulses.   Edema:     Peripheral edema absent.   Abdominal:      Palpations: Abdomen is soft.      Tenderness: There is no abdominal tenderness.   Skin:     General: Skin is warm and dry.   Neurological:      Mental Status: Alert and oriented to person, place and time.        The following data was reviewed by myself, JAE Moser  [facility]   BMP          8/23/2024    14:09 10/24/2024    08:16 7/17/2025    15:04   BMP   BUN  25  22.0    Creatinine 1.70  1.28  1.49    Sodium  137  140    Potassium  4.1  4.8    Chloride  103  104    CO2  25.0  25.0    Calcium  9.1  8.9      ReDS   Lab Results   Component Value Date    ABSOLUTELUNG 23 01/30/2025    ABSOLUTELUNG 25 10/24/2024       Results for orders placed during the hospital encounter of 06/23/23    Adult Transthoracic Echo Complete w/ Color, Spectral and Contrast if necessary per protocol 06/23/2023 12:45 PM    Interpretation " Summary    Left ventricular systolic function is normal. Left ventricular ejection fraction appears to be 61 - 65%.    Left ventricular diastolic function was normal.    The right ventricular cavity is borderline dilated with normal systolic function.    The right atrial cavity is borderline dilated.    Mild pulmonary hypertension is present with estimated RVSP 42 mmHg.    No hemodynamically significant valve disease.       Assessment   Assessment and Plan  1. Chronic heart failure with preserved ejection fraction (HFpEF)    2. Stage 3a chronic kidney disease    3. Essential (primary) hypertension      At this point he remains stable without any new concerns.  He is euvolemic on examination.  He has not required to the loop diuretic dosing for some time.  After further discussion with his family members it does appear that he had an episode of some shortness of breath about 2 months ago and since then he is stop smoking and has had great improvement in this.  - I did advise that they call me with any other concerns for shortness of breath and he stated understanding.  - Jardiance 10 mg daily  - Spironolactone 25 mg daily  - Lasix as needed.  Not requiring  - Aspirin 81 mg daily  - Crestor 10 mg daily       Follow Up:  No follow-ups on file.    Patient was given instructions and counseling regarding his condition or for health maintenance advice.      Axel Orosco, JAE  07/17/25  15:32 CDT

## 2025-08-18 ENCOUNTER — TELEPHONE (OUTPATIENT)
Dept: VASCULAR SURGERY | Facility: CLINIC | Age: 85
End: 2025-08-18
Payer: MEDICARE

## 2025-08-19 ENCOUNTER — HOSPITAL ENCOUNTER (OUTPATIENT)
Dept: ULTRASOUND IMAGING | Facility: HOSPITAL | Age: 85
Discharge: HOME | End: 2025-08-19
Payer: OTHER GOVERNMENT

## 2025-08-19 ENCOUNTER — OFFICE VISIT (OUTPATIENT)
Dept: VASCULAR SURGERY | Facility: CLINIC | Age: 85
End: 2025-08-19
Payer: OTHER GOVERNMENT

## 2025-08-19 VITALS
WEIGHT: 168 LBS | SYSTOLIC BLOOD PRESSURE: 140 MMHG | OXYGEN SATURATION: 98 % | DIASTOLIC BLOOD PRESSURE: 76 MMHG | HEART RATE: 58 BPM | HEIGHT: 72 IN | BODY MASS INDEX: 22.75 KG/M2

## 2025-08-19 DIAGNOSIS — I65.23 BILATERAL CAROTID ARTERY STENOSIS: ICD-10-CM

## 2025-08-19 DIAGNOSIS — E78.5 HYPERLIPIDEMIA, UNSPECIFIED HYPERLIPIDEMIA TYPE: ICD-10-CM

## 2025-08-19 DIAGNOSIS — I71.43 INFRARENAL ABDOMINAL AORTIC ANEURYSM (AAA) WITHOUT RUPTURE: ICD-10-CM

## 2025-08-19 DIAGNOSIS — Z87.891 PERSONAL HISTORY OF NICOTINE DEPENDENCE: ICD-10-CM

## 2025-08-19 DIAGNOSIS — I71.43 INFRARENAL ABDOMINAL AORTIC ANEURYSM (AAA) WITHOUT RUPTURE: Primary | ICD-10-CM

## 2025-08-19 DIAGNOSIS — I72.4 POPLITEAL ARTERY ANEURYSM, BILATERAL: ICD-10-CM

## 2025-08-19 DIAGNOSIS — I10 ESSENTIAL (PRIMARY) HYPERTENSION: ICD-10-CM

## 2025-08-19 PROCEDURE — 99214 OFFICE O/P EST MOD 30 MIN: CPT | Performed by: SURGERY

## 2025-08-19 PROCEDURE — 76775 US EXAM ABDO BACK WALL LIM: CPT

## 2025-08-19 PROCEDURE — 93880 EXTRACRANIAL BILAT STUDY: CPT

## 2025-08-19 PROCEDURE — 93925 LOWER EXTREMITY STUDY: CPT

## (undated) DEVICE — SUT NONABS PROLENE TPR/HEMOSEAL DBL/ARM HS/7 5/0 60CM BLU

## (undated) DEVICE — 4-PORT MANIFOLD: Brand: NEPTUNE 2

## (undated) DEVICE — RETR STAY HK ELAS SHRP 5MM 50PK

## (undated) DEVICE — DRSNG SURESITE WNDW 4X4.5

## (undated) DEVICE — VAGINAL PREP TRAY: Brand: MEDLINE INDUSTRIES, INC.

## (undated) DEVICE — Device

## (undated) DEVICE — GLV SURG BIOGEL LTX PF 7 1/2

## (undated) DEVICE — ANTIBACTERIAL UNDYED BRAIDED (POLYGLACTIN 910), SYNTHETIC ABSORBABLE SUTURE: Brand: COATED VICRYL

## (undated) DEVICE — SHEATH SENTRANT 16F 28CM

## (undated) DEVICE — PROBE 8225825X 3PK INCREMT STD PRASS TIP: Brand: NIM

## (undated) DEVICE — TOTAL TRAY, 16FR 10ML SIL FOLEY, URN: Brand: MEDLINE

## (undated) DEVICE — DRSNG TELFA PAD NONADH STR 1S 3X8IN

## (undated) DEVICE — STERILE (14X122CM) TELESCOPICALLY-FOLDED COVER: Brand: CIV-CLEAR™ TRANSDUCER COVER

## (undated) DEVICE — DISPOSABLE IRRIGATION BIPOLAR CORD, M1000 TYPE: Brand: KIRWAN

## (undated) DEVICE — DRP SPECIAL PROC 4PC W/FC POUCH

## (undated) DEVICE — TRAP FLD MINIVAC MEGADYNE 100ML

## (undated) DEVICE — PK ENT HD AND NK 30

## (undated) DEVICE — BALN OCCL CODA2 .035 10F 46MM 140CM

## (undated) DEVICE — BAPTIST TURNOVER KIT: Brand: MEDLINE INDUSTRIES, INC.

## (undated) DEVICE — CLTH CLENS READYCLEANSE PERI CARE PK/5

## (undated) DEVICE — SURGICAL SUCTION CONNECTING TUBE WITH MALE CONNECTOR AND SUCTION CLAMP, 2 FT. LONG (.6 M), 5 MM I.D.: Brand: CONMED

## (undated) DEVICE — GLV SURG BIOGEL LTX PF 6 1/2

## (undated) DEVICE — GLV SURG BIOGEL M LTX PF 7 1/2

## (undated) DEVICE — YANKAUER,BULB TIP WITH VENT: Brand: ARGYLE

## (undated) DEVICE — SNAP KOVER: Brand: UNBRANDED

## (undated) DEVICE — SHEATH SENTRANT 12F 28CM

## (undated) DEVICE — PAD MAJOR VASCULAR: Brand: MEDLINE INDUSTRIES, INC.

## (undated) DEVICE — PROXIMATE RH ROTATING HEAD SKIN STAPLERS (35 REGULAR) CONTAINS 35 STAINLESS STEEL STAPLES: Brand: PROXIMATE

## (undated) DEVICE — PERCLOSE™ PROSTYLE™ SUTURE-MEDIATED CLOSURE AND REPAIR SYSTEM: Brand: PERCLOSE™ PROSTYLE™

## (undated) DEVICE — HARMONIC FOCUS SHEARS 9CM LENGTH + ADAPTIVE TISSUE TECHNOLOGY FOR USE WITH BLUE HAND PIECE ONLY: Brand: HARMONIC FOCUS

## (undated) DEVICE — ADHS SKIN PREMIERPRO EXOFIN TOPICAL HI/VISC .5ML

## (undated) DEVICE — NDL HYPO PRECISIONGLIDE REG 25G 1 1/2

## (undated) DEVICE — HDRST POSITIONING FM RND 2X9IN

## (undated) DEVICE — SUT MNCRYL 5/0 P3 18IN UD MCP493G

## (undated) DEVICE — GAUZE,SPONGE,4"X4",16PLY,XRAY,STRL,LF: Brand: MEDLINE

## (undated) DEVICE — SUT SILK 0 FSL 18IN 678H

## (undated) DEVICE — SYR LUERLOK 50ML

## (undated) DEVICE — RESERVOIR,SUCTION,100CC,SILICONE: Brand: MEDLINE

## (undated) DEVICE — APPL CHLORAPREP HI/LITE 26ML ORNG

## (undated) DEVICE — SYR LL TP 10ML STRL

## (undated) DEVICE — SPNG GZ WOVN 4X4IN 12PLY 10/BX STRL

## (undated) DEVICE — INTENDED FOR TISSUE SEPARATION, AND OTHER PROCEDURES THAT REQUIRE A SHARP SURGICAL BLADE TO PUNCTURE OR CUT.: Brand: BARD-PARKER ®  SAFETY SCALPED

## (undated) DEVICE — KT ANTI FOG W/FLD AND SPNG

## (undated) DEVICE — SPONGE,DISSECTOR,ROUND CHERRY,XR,ST,5/PK: Brand: MEDLINE

## (undated) DEVICE — STRIP,CLOSURE,WOUND,MEDI-STRIP,1/2X4: Brand: MEDLINE

## (undated) DEVICE — ELECTRD BLD EZ CLN MOD XLNG 2.75IN